# Patient Record
Sex: MALE | Race: BLACK OR AFRICAN AMERICAN | Employment: UNEMPLOYED | URBAN - METROPOLITAN AREA
[De-identification: names, ages, dates, MRNs, and addresses within clinical notes are randomized per-mention and may not be internally consistent; named-entity substitution may affect disease eponyms.]

---

## 2017-02-24 ENCOUNTER — ED HISTORICAL/CONVERTED ENCOUNTER (OUTPATIENT)
Dept: OTHER | Age: 47
End: 2017-02-24

## 2017-05-02 ENCOUNTER — ED HISTORICAL/CONVERTED ENCOUNTER (OUTPATIENT)
Dept: OTHER | Age: 47
End: 2017-05-02

## 2018-04-08 ENCOUNTER — ED HISTORICAL/CONVERTED ENCOUNTER (OUTPATIENT)
Dept: OTHER | Age: 48
End: 2018-04-08

## 2018-04-18 ENCOUNTER — OFFICE VISIT (OUTPATIENT)
Dept: FAMILY MEDICINE CLINIC | Age: 48
End: 2018-04-18

## 2018-04-18 VITALS
DIASTOLIC BLOOD PRESSURE: 124 MMHG | WEIGHT: 243 LBS | SYSTOLIC BLOOD PRESSURE: 182 MMHG | OXYGEN SATURATION: 100 % | HEART RATE: 53 BPM | BODY MASS INDEX: 28.69 KG/M2 | RESPIRATION RATE: 18 BRPM | HEIGHT: 77 IN

## 2018-04-18 DIAGNOSIS — I10 ESSENTIAL HYPERTENSION: Primary | ICD-10-CM

## 2018-04-18 RX ORDER — VALSARTAN AND HYDROCHLOROTHIAZIDE 160; 12.5 MG/1; MG/1
1 TABLET, FILM COATED ORAL DAILY
Qty: 30 TAB | Refills: 2 | Status: SHIPPED | OUTPATIENT
Start: 2018-04-18 | End: 2018-05-14 | Stop reason: DRUGHIGH

## 2018-04-18 RX ORDER — LISINOPRIL 20 MG/1
TABLET ORAL DAILY
COMMUNITY
End: 2018-04-18 | Stop reason: ALTCHOICE

## 2018-04-18 NOTE — PROGRESS NOTES
Chief Complaint   Patient presents with    Establish Care    Medication Evaluation    Blood Pressure Check     hx of BP       1. Have you been to the ER, urgent care clinic since your last visit? Hospitalized since your last visit?states that he passed out, he was dehydrated. 2. Have you seen or consulted any other health care providers outside of the [address] [place] [address] since your last visit? Include any pap smears or colon screening.  No

## 2018-04-18 NOTE — MR AVS SNAPSHOT
[address] [address] [phone] Patient: Terra Li MRN: [id number] MOH:9[birth year] Visit Information Date & Time Provider Department Dept. Phone Encounter #  
 4/18/2018 10:45 AM Elvin Paget, NP [facility] [phone] [id number] Follow-up Instructions Return in about 2 weeks (around 5/2/2018) for bp check. Your Appointments 4/18/2018 10:45 AM  
New Patient with Elvin Paget, NP [facility] ([address]) Appt Note: NEW pt est pcp  medication [id number] [place] [address] Road [address] [phone]  
  
   
 [address] [place] [address] Road [address] Upcoming Health Maintenance Date Due DTaP/Tdap/Td series (1 - Tdap) 8/4/1992 Influenza Age 5 to Adult 8/1/2017 Allergies as of 4/18/2018  Review Complete On: 4/18/2018 By: Emy Simon LPN No Known Allergies Current Immunizations  Never Reviewed No immunizations on file. Not reviewed this visit You Were Diagnosed With   
  
 Codes Comments Essential hypertension    -  Primary ICD-10-CM: I10 
ICD-9-CM: 401.9 Vitals BP Pulse Resp Height(growth percentile) Weight(growth percentile) SpO2  
 (!) 182/124 (!) 53 18 6' 5\" (1.956 m) 243 lb (110.2 kg) 100% BMI Smoking Status 28.82 kg/m2 Never Smoker Vitals History BMI and BSA Data Body Mass Index Body Surface Area  
 28.82 kg/m 2 2.45 m 2 Preferred Pharmacy Pharmacy Name Phone [facility] [address] [phone] Your Updated Medication List  
  
   
This list is accurate as of 4/18/18  8:52 AM.  Always use your most recent med list.  
  
  
  
  
 valsartan-hydroCHLOROthiazide 160-12.5 mg per tablet Commonly known as:  DIOVAN-HCT Take 1 Tab by mouth daily. Prescriptions Sent to Pharmacy Refills valsartan-hydroCHLOROthiazide (DIOVAN-HCT) 160-12.5 mg per tablet 2 Sig: Take 1 Tab by mouth daily. Class: Normal  
 Pharmacy: [facility] [address] BRANDON K[facility], [address] [place] [address] #: [phone] Route: Oral  
  
We Performed the Following CBC WITH AUTOMATED DIFF [36883 CPT(R)] LIPID PANEL [96604 CPT(R)] METABOLIC PANEL, COMPREHENSIVE [26921 CPT(R)] Follow-up Instructions Return in about 2 weeks (around 5/2/2018) for bp check. Introducing [facility] & HEALTH SERVICES! Mercedes Mckeon introduces [employer] patient portal. Now you can access parts of your medical record, email your doctor's office, and request medication refills online. 1. In your internet browser, go to [email or web address] 2. Click on the First Time User? Click Here link in the Sign In box. You will see the New Member Sign Up page. 3. Enter your [employer] Access Code exactly as it appears below. You will not need to use this code after youve completed the sign-up process. If you do not sign up before the expiration date, you must request a new code. · [employer] Access Code: [email or web address] Expires: 7/17/2018  8:52 AM 
 
4. Enter the last four digits of your Social Security Number (xxxx) and Date of Birth (mm/dd/yyyy) as indicated and click Submit. You will be taken to the next sign-up page. 5. Create a [employer] ID. This will be your [employer] login ID and cannot be changed, so think of one that is secure and easy to remember. 6. Create a [employer] password. You can change your password at any time. 7. Enter your Password Reset Question and Answer. This can be used at a later time if you forget your password. 8. Enter your e-mail address. You will receive e-mail notification when new information is available in [address] 9. Click Sign Up. You can now view and download portions of your medical record.  
10. Click the Download Summary menu link to download a portable copy of your medical information. If you have questions, please visit the Frequently Asked Questions section of the [employer] website. Remember, [employer] is NOT to be used for urgent needs. For medical emergencies, dial 911. Now available from your iPhone and Android! Please provide this summary of care documentation to your next provider. If you have any questions after today's visit, please call [phone].

## 2018-04-19 LAB
ALBUMIN SERPL-MCNC: 4.6 G/DL (ref 3.5–5.5)
ALBUMIN/GLOB SERPL: 1.8 {RATIO} (ref 1.2–2.2)
ALP SERPL-CCNC: 53 IU/L (ref 39–117)
ALT SERPL-CCNC: 17 IU/L (ref 0–44)
AST SERPL-CCNC: 18 IU/L (ref 0–40)
BASOPHILS # BLD AUTO: 0 X10E3/UL (ref 0–0.2)
BASOPHILS NFR BLD AUTO: 0 %
BILIRUB SERPL-MCNC: 0.9 MG/DL (ref 0–1.2)
BUN SERPL-MCNC: 17 MG/DL (ref 6–24)
BUN/CREAT SERPL: 11 (ref 9–20)
CALCIUM SERPL-MCNC: 9.4 MG/DL (ref 8.7–10.2)
CHLORIDE SERPL-SCNC: 99 MMOL/L (ref 96–106)
CHOLEST SERPL-MCNC: 146 MG/DL (ref 100–199)
CO2 SERPL-SCNC: 26 MMOL/L (ref 18–29)
CREAT SERPL-MCNC: 1.5 MG/DL (ref 0.76–1.27)
EOSINOPHIL # BLD AUTO: 0.1 X10E3/UL (ref 0–0.4)
EOSINOPHIL NFR BLD AUTO: 2 %
ERYTHROCYTE [DISTWIDTH] IN BLOOD BY AUTOMATED COUNT: 15.3 % (ref 12.3–15.4)
GFR SERPLBLD CREATININE-BSD FMLA CKD-EPI: 55 ML/MIN/1.73
GFR SERPLBLD CREATININE-BSD FMLA CKD-EPI: 64 ML/MIN/1.73
GLOBULIN SER CALC-MCNC: 2.6 G/DL (ref 1.5–4.5)
GLUCOSE SERPL-MCNC: 121 MG/DL (ref 65–99)
HCT VFR BLD AUTO: 40.2 % (ref 37.5–51)
HDLC SERPL-MCNC: 38 MG/DL
HGB BLD-MCNC: 13.4 G/DL (ref 13–17.7)
IMM GRANULOCYTES # BLD: 0 X10E3/UL (ref 0–0.1)
IMM GRANULOCYTES NFR BLD: 0 %
INTERPRETATION, 910389: NORMAL
INTERPRETATION: NORMAL
LDLC SERPL CALC-MCNC: 95 MG/DL (ref 0–99)
LYMPHOCYTES # BLD AUTO: 1.8 X10E3/UL (ref 0.7–3.1)
LYMPHOCYTES NFR BLD AUTO: 39 %
MCH RBC QN AUTO: 26.6 PG (ref 26.6–33)
MCHC RBC AUTO-ENTMCNC: 33.3 G/DL (ref 31.5–35.7)
MCV RBC AUTO: 80 FL (ref 79–97)
MONOCYTES # BLD AUTO: 0.3 X10E3/UL (ref 0.1–0.9)
MONOCYTES NFR BLD AUTO: 6 %
NEUTROPHILS # BLD AUTO: 2.3 X10E3/UL (ref 1.4–7)
NEUTROPHILS NFR BLD AUTO: 53 %
PDF IMAGE, 910387: NORMAL
PLATELET # BLD AUTO: 190 X10E3/UL (ref 150–379)
POTASSIUM SERPL-SCNC: 3.7 MMOL/L (ref 3.5–5.2)
PROT SERPL-MCNC: 7.2 G/DL (ref 6–8.5)
RBC # BLD AUTO: 5.03 X10E6/UL (ref 4.14–5.8)
SODIUM SERPL-SCNC: 141 MMOL/L (ref 134–144)
TRIGL SERPL-MCNC: 64 MG/DL (ref 0–149)
VLDLC SERPL CALC-MCNC: 13 MG/DL (ref 5–40)
WBC # BLD AUTO: 4.5 X10E3/UL (ref 3.4–10.8)

## 2018-04-22 NOTE — PROGRESS NOTES
HISTORY OF PRESENT ILLNESS  Cassius Henson is a 56 y.o. male. HPI  He is new to the practice today  Has a pmh of htn but has been out of meds  Has been as high as 180/120 recently  Does have slight headache otherwise no sx noted  The FamHx, SocHx, MedHx, Medication, and Allergy lists have been reviewed and updated in the chart. ROS  A comprehensive review of system was obtained and negative except findings in the HPI    Visit Vitals    BP (!) 182/124    Pulse (!) 53    Resp 18    Ht 6' 5\" (1.956 m)    Wt 243 lb (110.2 kg)    SpO2 100%    BMI 28.82 kg/m2     Physical Exam   Constitutional: He is oriented to person, place, and time. He appears well-developed and well-nourished. No distress. Cardiovascular: Normal rate and regular rhythm. No murmur heard. Pulmonary/Chest: Breath sounds normal. No respiratory distress. He has no wheezes. Musculoskeletal: He exhibits no edema. Neurological: He is alert and oriented to person, place, and time. Nursing note and vitals reviewed. ASSESSMENT and PLAN  Encounter Diagnoses   Name Primary?  Essential hypertension Yes     Orders Placed This Encounter    LIPID PANEL    METABOLIC PANEL, COMPREHENSIVE    CBC WITH AUTOMATED DIFF    valsartan-hydroCHLOROthiazide (DIOVAN-HCT) 160-12.5 mg per tablet     Labs updated today  Refills of diovan given, recheck 2 weeks  I have discussed the diagnosis with the patient and the intended plan as seen in the above orders. The patient has received an after-visit summary and questions were answered concerning future plans. Patient conveyed understanding of the plan at the time of the visit.     Cat Palmer, MSN, ANP  4/22/2018

## 2018-05-14 ENCOUNTER — OFFICE VISIT (OUTPATIENT)
Dept: FAMILY MEDICINE CLINIC | Age: 48
End: 2018-05-14

## 2018-05-14 VITALS
RESPIRATION RATE: 16 BRPM | OXYGEN SATURATION: 98 % | HEART RATE: 81 BPM | TEMPERATURE: 98.6 F | HEIGHT: 77 IN | BODY MASS INDEX: 28.47 KG/M2 | WEIGHT: 241.13 LBS | DIASTOLIC BLOOD PRESSURE: 120 MMHG | SYSTOLIC BLOOD PRESSURE: 172 MMHG

## 2018-05-14 DIAGNOSIS — I10 ESSENTIAL HYPERTENSION: Primary | ICD-10-CM

## 2018-05-14 RX ORDER — VALSARTAN AND HYDROCHLOROTHIAZIDE 320; 25 MG/1; MG/1
1 TABLET, FILM COATED ORAL DAILY
Qty: 30 TAB | Refills: 5 | Status: SHIPPED | OUTPATIENT
Start: 2018-05-14 | End: 2019-02-26

## 2018-05-14 NOTE — PROGRESS NOTES
1. Have you been to the ER, urgent care clinic since your last visit? Hospitalized since your last visit? No    2. Have you seen or consulted any other health care providers outside of the [address] [place] [address] since your last visit? Include any pap smears or colon screening.  No    Chief Complaint   Patient presents with    Follow-up     3 wk f/u med ck

## 2018-05-14 NOTE — MR AVS SNAPSHOT
[address] [place] [address] 
[phone] Patient: Ana Soto MRN: [id number] LAV:[birth year] Visit Information Date & Time Provider Department Dept. Phone Encounter #  
 5/14/2018  9:30 AM Irena Colmenares NP [address] [facility] [phone] [id number] Upcoming Health Maintenance Date Due DTaP/Tdap/Td series (1 - Tdap) 8/4/1992 Influenza Age 5 to Adult 8/1/2018 Allergies as of 5/14/2018  Review Complete On: 5/14/2018 By: Aranza Johnson LPN No Known Allergies Current Immunizations  Never Reviewed No immunizations on file. Not reviewed this visit You Were Diagnosed With   
  
 Codes Comments Essential hypertension    -  Primary ICD-10-CM: I10 
ICD-9-CM: 401.9 Vitals BP Pulse Temp Resp Height(growth percentile) Weight(growth percentile) (!) 172/120 81 98.6 °F (37 °C) (Oral) 16 6' 5\" (1.956 m) 241 lb 2 oz (109.4 kg) SpO2 BMI Smoking Status 98% 28.59 kg/m2 Never Smoker Vitals History BMI and BSA Data Body Mass Index Body Surface Area 28.59 kg/m 2 2.44 m 2 Preferred Pharmacy Pharmacy Name Phone [facility] [address] [phone] Your Updated Medication List  
  
   
This list is accurate as of 5/14/18  9:56 AM.  Always use your most recent med list.  
  
  
  
  
 valsartan-hydroCHLOROthiazide 320-25 mg per tablet Commonly known as:  DIOVAN-HCT Take 1 Tab by mouth daily. Prescriptions Sent to Pharmacy Refills  
 valsartan-hydroCHLOROthiazide (DIOVAN-HCT) 320-25 mg per tablet 5 Sig: Take 1 Tab by mouth daily. Class: Normal  
 [facility] [address] Ph #: [phone] Route: Oral  
  
Introducing [facility] HE[facility] SERVICES!    
 [facility] introduces [employer] patient portal. Now you can access parts of your medical record, email your doctor's office, and request medication refills online. 1. In your internet browser, go to [email or web address] 2. Click on the First Time User? Click Here link in the Sign In box. You will see the New Member Sign Up page. 3. Enter your [employer] Access Code exactly as it appears below. You will not need to use this code after youve completed the sign-up process. If you do not sign up before the expiration date, you must request a new code. · [employer] Access Code: [email or web address] Expires: 7/17/2018  8:52 AM 
 
4. Enter the last four digits of your Social Security Number (xxxx) and Date of Birth (mm/dd/yyyy) as indicated and click Submit. You will be taken to the next sign-up page. 5. Create a [employer] ID. This will be your [employer] login ID and cannot be changed, so think of one that is secure and easy to remember. 6. Create a [employer] password. You can change your password at any time. 7. Enter your Password Reset Question and Answer. This can be used at a later time if you forget your password. 8. Enter your e-mail address. You will receive e-mail notification when new information is available in [address] 9. Click Sign Up. You can now view and download portions of your medical record. 10. Click the Download Summary menu link to download a portable copy of your medical information. If you have questions, please visit the Frequently Asked Questions section of the [employer] website. Remember, [employer] is NOT to be used for urgent needs. For medical emergencies, dial 911. Now available from your iPhone and Android! Please provide this summary of care documentation to your next provider. If you have any questions after today's visit, please call [phone].

## 2018-05-14 NOTE — PROGRESS NOTES
HISTORY OF PRESENT ILLNESS  Venora Kawasaki is a 56 y.o. male. HPI  Here for bp check  Started diovan hct 160/12.5 last visit  A decrease in headaches but still running high  Blames stressful job at work,   No adr/se of med    ROS  A comprehensive review of system was obtained and negative except findings in the HPI    Visit Vitals    BP (!) 172/120    Pulse 81    Temp 98.6 °F (37 °C) (Oral)    Resp 16    Ht 6' 5\" (1.956 m)    Wt 241 lb 2 oz (109.4 kg)    SpO2 98%    BMI 28.59 kg/m2     Physical Exam   Constitutional: He is oriented to person, place, and time. He appears well-developed and well-nourished. No distress. Cardiovascular: Normal rate and regular rhythm. No murmur heard. Pulmonary/Chest: Breath sounds normal. No respiratory distress. He has no wheezes. Musculoskeletal: He exhibits no edema. Neurological: He is alert and oriented to person, place, and time. Nursing note and vitals reviewed. ASSESSMENT and PLAN  Encounter Diagnoses   Name Primary?  Essential hypertension Yes     Orders Placed This Encounter    valsartan-hydroCHLOROthiazide (DIOVAN-HCT) 320-25 mg per tablet     Increase diovan today to 320/25, recheck 2 weeks  Reviewed stress management as well  Encouraged bp checks at home as well    I have discussed the diagnosis with the patient and the intended plan as seen in the above orders. The patient has received an after-visit summary and questions were answered concerning future plans. Patient conveyed understanding of the plan at the time of the visit.     Abran Hashimoto, MSN, ANP  5/14/2018

## 2018-07-23 ENCOUNTER — ED HISTORICAL/CONVERTED ENCOUNTER (OUTPATIENT)
Dept: OTHER | Age: 48
End: 2018-07-23

## 2018-08-07 ENCOUNTER — OFFICE VISIT (OUTPATIENT)
Dept: FAMILY MEDICINE CLINIC | Age: 48
End: 2018-08-07

## 2018-08-07 VITALS
RESPIRATION RATE: 18 BRPM | SYSTOLIC BLOOD PRESSURE: 162 MMHG | OXYGEN SATURATION: 97 % | TEMPERATURE: 98.7 F | HEIGHT: 77 IN | WEIGHT: 244 LBS | DIASTOLIC BLOOD PRESSURE: 114 MMHG | BODY MASS INDEX: 28.81 KG/M2 | HEART RATE: 84 BPM

## 2018-08-07 DIAGNOSIS — I10 ESSENTIAL HYPERTENSION: Primary | ICD-10-CM

## 2018-08-07 RX ORDER — AMLODIPINE BESYLATE 5 MG/1
5 TABLET ORAL DAILY
Qty: 30 TAB | Refills: 5 | Status: SHIPPED | OUTPATIENT
Start: 2018-08-07 | End: 2019-02-26

## 2018-08-07 NOTE — MR AVS SNAPSHOT
[address] [place] [address] 
[phone] Patient: Supriya Barboza MRN: [id number] KMJ:[birth year] Visit Information Date & Time Provider Department Dept. Phone Encounter #  
 8/7/2018  4:00 PM Jeet Johnston [phone] [id number] Upcoming Health Maintenance Date Due DTaP/Tdap/Td series (1 - Tdap) 8/4/1992 Influenza Age 5 to Adult 8/1/2018 Allergies as of 8/7/2018  Review Complete On: 8/7/2018 By: Rafael Camacho LPN No Known Allergies Current Immunizations  Never Reviewed No immunizations on file. Not reviewed this visit You Were Diagnosed With   
  
 Codes Comments Essential hypertension    -  Primary ICD-10-CM: I10 
ICD-9-CM: 401.9 Vitals BP Pulse Temp Resp Height(growth percentile) Weight(growth percentile) (!) 162/114 (BP 1 Location: Right arm, BP Patient Position: Sitting) 84 98.7 °F (37.1 °C) (Oral) 18 6' 5\" (1.956 m) 244 lb (110.7 kg) SpO2 BMI Smoking Status 97% 28.93 kg/m2 Never Smoker BMI and BSA Data Body Mass Index Body Surface Area  
 28.93 kg/m 2 2.45 m 2 Preferred Pharmacy Pharmacy Name Phone [facility]", [address] [place] [address] [phone] Your Updated Medication List  
  
   
This list is accurate as of 8/7/18  4:54 PM.  Always use your most recent med list. amLODIPine 5 mg tablet Commonly known as:  Marcy Pace Take 1 Tab by mouth daily. valsartan-hydroCHLOROthiazide 320-25 mg per tablet Commonly known as:  DIOVAN-HCT Take 1 Tab by mouth daily. Prescriptions Sent to Pharmacy Refills  
 amLODIPine (NORVASC) 5 mg tablet 5 Sig: Take 1 Tab by mouth daily. Class: Normal  
 Pharmacy: [facility] \[facility]", [address] [place] [address] Ph #: [phone] Route: Oral  
  
Introducing [facility] & HEALTH SERVICES! [employer] introduces [employer] patient portal. Now you can access parts of your medical record, email your doctor's office, and request medication refills online. 1. In your internet browser, go to [email or web address] 2. Click on the First Time User? Click Here link in the Sign In box. You will see the New Member Sign Up page. 3. Enter your [employer] Access Code exactly as it appears below. You will not need to use this code after youve completed the sign-up process. If you do not sign up before the expiration date, you must request a new code. · [employer] Access Code: [email or web address] Expires: 11/5/2018  4:54 PM 
 
4. Enter the last four digits of your Social Security Number (xxxx) and Date of Birth (mm/dd/yyyy) as indicated and click Submit. You will be taken to the next sign-up page. 5. Create a [employer] ID. This will be your [employer] login ID and cannot be changed, so think of one that is secure and easy to remember. 6. Create a [employer] password. You can change your password at any time. 7. Enter your Password Reset Question and Answer. This can be used at a later time if you forget your password. 8. Enter your e-mail address. You will receive e-mail notification when new information is available in [address] 9. Click Sign Up. You can now view and download portions of your medical record. 10. Click the Download Summary menu link to download a portable copy of your medical information. If you have questions, please visit the Frequently Asked Questions section of the [employer] website. Remember, [employer] is NOT to be used for urgent needs. For medical emergencies, dial 911. Now available from your iPhone and Android! Please provide this summary of care documentation to your next provider. If you have any questions after today's visit, please call [phone].

## 2018-08-07 NOTE — PROGRESS NOTES
HISTORY OF PRESENT ILLNESS  Skyler Herron is a 53 y.o. male. 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 He appears well-developed and well-nourished. No distress. Cardiovascular: Normal rate and regular rhythm. No murmur heard. Pulmonary/Chest: Breath sounds normal. No respiratory distress. He has no wheezes. Musculoskeletal: He exhibits no edema. Neurological: He is alert and oriented to person, place, and time. Nursing note and vitals reviewed. ASSESSMENT and PLAN  Encounter Diagnoses   Name Primary?  Essential hypertension Yes     Orders Placed This Encounter    amLODIPine (NORVASC) 5 mg tablet     Start norvasc 5mg one a day with diovan hct  Recheck 2 weeks  I have discussed the diagnosis with the patient and the intended plan as seen in the above orders. The patient has received an after-visit summary and questions were answered concerning future plans. Patient conveyed understanding of the plan at the time of the visit.     Sang Elliott, MSN, ANP  8/7/2018

## 2018-08-07 NOTE — PROGRESS NOTES
Chief Complaint   Patient presents with    Hypertension     Patient in office today for recheck on bp. Pt would like to know if pt can approve a couple days missed from work. Have been trying to get dental work completed unable to get done due to elevated bp. 1. Have you been to the ER, urgent care clinic since your last visit? Hospitalized since your last visit? No    2. Have you seen or consulted any other health care providers outside of the [address] [place] [address] since your last visit? Include any pap smears or colon screening.  No

## 2018-08-07 NOTE — LETTER
8/7/2018 Mr. June Araujo [facility] [phone] Patient was seen in the office today due to illness. Please excuse from work 8/8/18 - 8/15/18.  
 
 
 
 
Sincerely, 
 
 
London Parham NP

## 2019-02-14 PROCEDURE — 96376 TX/PRO/DX INJ SAME DRUG ADON: CPT

## 2019-02-14 PROCEDURE — 99285 EMERGENCY DEPT VISIT HI MDM: CPT

## 2019-02-14 PROCEDURE — 96368 THER/DIAG CONCURRENT INF: CPT

## 2019-02-14 PROCEDURE — 96375 TX/PRO/DX INJ NEW DRUG ADDON: CPT

## 2019-02-14 PROCEDURE — 96365 THER/PROPH/DIAG IV INF INIT: CPT

## 2019-02-15 ENCOUNTER — APPOINTMENT (OUTPATIENT)
Dept: GENERAL RADIOLOGY | Age: 49
DRG: 173 | End: 2019-02-15
Attending: SURGERY
Payer: MEDICAID

## 2019-02-15 ENCOUNTER — APPOINTMENT (OUTPATIENT)
Dept: VASCULAR SURGERY | Age: 49
DRG: 173 | End: 2019-02-15
Attending: SURGERY
Payer: MEDICAID

## 2019-02-15 ENCOUNTER — ANESTHESIA (OUTPATIENT)
Dept: SURGERY | Age: 49
DRG: 173 | End: 2019-02-15
Payer: MEDICAID

## 2019-02-15 ENCOUNTER — HOSPITAL ENCOUNTER (INPATIENT)
Age: 49
LOS: 11 days | Discharge: HOME | DRG: 173 | End: 2019-02-26
Attending: EMERGENCY MEDICINE | Admitting: SURGERY
Payer: MEDICAID

## 2019-02-15 ENCOUNTER — APPOINTMENT (OUTPATIENT)
Dept: NON INVASIVE DIAGNOSTICS | Age: 49
DRG: 173 | End: 2019-02-15
Attending: INTERNAL MEDICINE
Payer: MEDICAID

## 2019-02-15 ENCOUNTER — APPOINTMENT (OUTPATIENT)
Dept: CT IMAGING | Age: 49
DRG: 173 | End: 2019-02-15
Attending: EMERGENCY MEDICINE
Payer: MEDICAID

## 2019-02-15 ENCOUNTER — ANESTHESIA EVENT (OUTPATIENT)
Dept: SURGERY | Age: 49
DRG: 173 | End: 2019-02-15
Payer: MEDICAID

## 2019-02-15 ENCOUNTER — APPOINTMENT (OUTPATIENT)
Dept: CT IMAGING | Age: 49
DRG: 173 | End: 2019-02-15
Attending: SURGERY
Payer: MEDICAID

## 2019-02-15 DIAGNOSIS — R11.2 NAUSEA AND VOMITING, INTRACTABILITY OF VOMITING NOT SPECIFIED, UNSPECIFIED VOMITING TYPE: ICD-10-CM

## 2019-02-15 DIAGNOSIS — R77.8 ELEVATED TROPONIN: ICD-10-CM

## 2019-02-15 DIAGNOSIS — I71.03 DISSECTION OF THORACOABDOMINAL AORTA (HCC): Primary | ICD-10-CM

## 2019-02-15 DIAGNOSIS — I16.1 HYPERTENSIVE EMERGENCY: ICD-10-CM

## 2019-02-15 PROBLEM — I71.019 ACUTE THORACIC AORTIC DISSECTION: Status: ACTIVE | Noted: 2019-02-15

## 2019-02-15 LAB
ALBUMIN SERPL-MCNC: 3.8 G/DL (ref 3.5–5)
ALBUMIN SERPL-MCNC: 4 G/DL (ref 3.5–5)
ALBUMIN/GLOB SERPL: 1.1 {RATIO} (ref 1.1–2.2)
ALBUMIN/GLOB SERPL: 1.2 {RATIO} (ref 1.1–2.2)
ALP SERPL-CCNC: 42 U/L (ref 45–117)
ALP SERPL-CCNC: 50 U/L (ref 45–117)
ALT SERPL-CCNC: 27 U/L (ref 12–78)
ALT SERPL-CCNC: 28 U/L (ref 12–78)
AMPHET UR QL SCN: NEGATIVE
ANION GAP SERPL CALC-SCNC: 8 MMOL/L (ref 5–15)
APPEARANCE UR: CLEAR
AST SERPL-CCNC: 19 U/L (ref 15–37)
AST SERPL-CCNC: 20 U/L (ref 15–37)
ATRIAL RATE: 67 BPM
ATRIAL RATE: 68 BPM
BACTERIA URNS QL MICRO: NEGATIVE /HPF
BARBITURATES UR QL SCN: NEGATIVE
BASOPHILS # BLD: 0 K/UL (ref 0–0.1)
BASOPHILS NFR BLD: 0 % (ref 0–1)
BENZODIAZ UR QL: NEGATIVE
BILIRUB SERPL-MCNC: 0.6 MG/DL (ref 0.2–1)
BILIRUB SERPL-MCNC: 0.7 MG/DL (ref 0.2–1)
BILIRUB UR QL: NEGATIVE
BUN SERPL-MCNC: 26 MG/DL (ref 6–20)
BUN SERPL-MCNC: 29 MG/DL (ref 6–20)
BUN SERPL-MCNC: 34 MG/DL (ref 6–20)
BUN/CREAT SERPL: 11 (ref 12–20)
BUN/CREAT SERPL: 11 (ref 12–20)
BUN/CREAT SERPL: 13 (ref 12–20)
CALCIUM SERPL-MCNC: 8.6 MG/DL (ref 8.5–10.1)
CALCIUM SERPL-MCNC: 8.7 MG/DL (ref 8.5–10.1)
CALCIUM SERPL-MCNC: 8.7 MG/DL (ref 8.5–10.1)
CALCULATED P AXIS, ECG09: 52 DEGREES
CALCULATED R AXIS, ECG10: 28 DEGREES
CALCULATED R AXIS, ECG10: 35 DEGREES
CALCULATED T AXIS, ECG11: -33 DEGREES
CALCULATED T AXIS, ECG11: -66 DEGREES
CANNABINOIDS UR QL SCN: NEGATIVE
CHLORIDE SERPL-SCNC: 105 MMOL/L (ref 97–108)
CK SERPL-CCNC: 234 U/L (ref 39–308)
CO2 SERPL-SCNC: 27 MMOL/L (ref 21–32)
CO2 SERPL-SCNC: 27 MMOL/L (ref 21–32)
CO2 SERPL-SCNC: 30 MMOL/L (ref 21–32)
COCAINE UR QL SCN: NEGATIVE
COLOR UR: ABNORMAL
COMMENT, HOLDF: NORMAL
CREAT SERPL-MCNC: 1.97 MG/DL (ref 0.7–1.3)
CREAT SERPL-MCNC: 2.58 MG/DL (ref 0.7–1.3)
CREAT SERPL-MCNC: 3.06 MG/DL (ref 0.7–1.3)
DIAGNOSIS, 93000: NORMAL
DIAGNOSIS, 93000: NORMAL
DIFFERENTIAL METHOD BLD: ABNORMAL
DRUG SCRN COMMENT,DRGCM: NORMAL
ECHO AO ROOT DIAM: 3.77 CM
ECHO AV AREA PEAK VELOCITY: 2.1 CM2
ECHO AV AREA/BSA PEAK VELOCITY: 0.9 CM2/M2
ECHO AV CUSP MM: 2.34 CM
ECHO AV PEAK GRADIENT: 7.3 MMHG
ECHO AV PEAK VELOCITY: 135.38 CM/S
ECHO EST RA PRESSURE: 8 MMHG
ECHO LA AREA 2C: 27.95 CM2
ECHO LA AREA 4C: 24 CM2
ECHO LA MAJOR AXIS: 4.04 CM
ECHO LA TO AORTIC ROOT RATIO: 1.07
ECHO LA VOL 2C: 100.83 ML (ref 18–58)
ECHO LA VOL 4C: 68.67 ML (ref 18–58)
ECHO LA VOL BP: 87.1 ML (ref 18–58)
ECHO LA VOL/BSA BIPLANE: 35.96 ML/M2 (ref 16–28)
ECHO LA VOLUME INDEX A2C: 41.63 ML/M2 (ref 16–28)
ECHO LA VOLUME INDEX A4C: 28.35 ML/M2 (ref 16–28)
ECHO LV INTERNAL DIMENSION DIASTOLIC: 5.24 CM (ref 4.2–5.9)
ECHO LV INTERNAL DIMENSION SYSTOLIC: 3.46 CM
ECHO LV IVSD: 1.82 CM (ref 0.6–1)
ECHO LV IVSS: 2.09 CM
ECHO LV MASS 2D: 557.1 G (ref 88–224)
ECHO LV MASS INDEX 2D: 230 G/M2 (ref 49–115)
ECHO LV POSTERIOR WALL DIASTOLIC: 1.79 CM (ref 0.6–1)
ECHO LV POSTERIOR WALL SYSTOLIC: 2.1 CM
ECHO LVOT DIAM: 2.33 CM
ECHO LVOT PEAK GRADIENT: 1.8 MMHG
ECHO LVOT PEAK VELOCITY: 66.25 CM/S
ECHO MV A VELOCITY: 68.35 CM/S
ECHO MV AREA PHT: 4.3 CM2
ECHO MV E DECELERATION TIME (DT): 174.4 MS
ECHO MV E VELOCITY: 0.83 CM/S
ECHO MV E/A RATIO: 0.01
ECHO MV PRESSURE HALF TIME (PHT): 50.6 MS
ECHO MV REGURGITANT PEAK GRADIENT: 7.6 MMHG
ECHO MV REGURGITANT PEAK VELOCITY: 137.51 CM/S
ECHO PULMONARY ARTERY SYSTOLIC PRESSURE (PASP): 19.2 MMHG
ECHO PV MAX VELOCITY: 110.96 CM/S
ECHO PV PEAK GRADIENT: 4.9 MMHG
ECHO RIGHT VENTRICULAR SYSTOLIC PRESSURE (RVSP): 19.2 MMHG
ECHO RV INTERNAL DIMENSION: 3.86 CM
ECHO TV REGURGITANT MAX VELOCITY: 167.2 CM/S
ECHO TV REGURGITANT PEAK GRADIENT: 11.2 MMHG
EOSINOPHIL # BLD: 0 K/UL (ref 0–0.4)
EOSINOPHIL # BLD: 0 K/UL (ref 0–0.4)
EOSINOPHIL # BLD: 0.3 K/UL (ref 0–0.4)
EOSINOPHIL NFR BLD: 0 % (ref 0–7)
EOSINOPHIL NFR BLD: 0 % (ref 0–7)
EOSINOPHIL NFR BLD: 4 % (ref 0–7)
EPITH CASTS URNS QL MICRO: ABNORMAL /LPF
ERYTHROCYTE [DISTWIDTH] IN BLOOD BY AUTOMATED COUNT: 14.4 % (ref 11.5–14.5)
ERYTHROCYTE [DISTWIDTH] IN BLOOD BY AUTOMATED COUNT: 14.6 % (ref 11.5–14.5)
ERYTHROCYTE [DISTWIDTH] IN BLOOD BY AUTOMATED COUNT: 14.7 % (ref 11.5–14.5)
GLOBULIN SER CALC-MCNC: 3.3 G/DL (ref 2–4)
GLOBULIN SER CALC-MCNC: 3.4 G/DL (ref 2–4)
GLUCOSE SERPL-MCNC: 124 MG/DL (ref 65–100)
GLUCOSE SERPL-MCNC: 128 MG/DL (ref 65–100)
GLUCOSE SERPL-MCNC: 143 MG/DL (ref 65–100)
GLUCOSE UR STRIP.AUTO-MCNC: NEGATIVE MG/DL
HCT VFR BLD AUTO: 38.3 % (ref 36.6–50.3)
HCT VFR BLD AUTO: 39.7 % (ref 36.6–50.3)
HCT VFR BLD AUTO: 40.1 % (ref 36.6–50.3)
HGB BLD-MCNC: 12.3 G/DL (ref 12.1–17)
HGB BLD-MCNC: 12.6 G/DL (ref 12.1–17)
HGB BLD-MCNC: 13.1 G/DL (ref 12.1–17)
HGB UR QL STRIP: NEGATIVE
HYALINE CASTS URNS QL MICRO: ABNORMAL /LPF (ref 0–5)
IMM GRANULOCYTES # BLD AUTO: 0 K/UL (ref 0–0.04)
IMM GRANULOCYTES # BLD AUTO: 0 K/UL (ref 0–0.04)
IMM GRANULOCYTES # BLD AUTO: 0.1 K/UL (ref 0–0.04)
IMM GRANULOCYTES NFR BLD AUTO: 0 % (ref 0–0.5)
IMM GRANULOCYTES NFR BLD AUTO: 0 % (ref 0–0.5)
IMM GRANULOCYTES NFR BLD AUTO: 1 % (ref 0–0.5)
KETONES UR QL STRIP.AUTO: NEGATIVE MG/DL
LACTATE SERPL-SCNC: 2.9 MMOL/L (ref 0.4–2)
LEUKOCYTE ESTERASE UR QL STRIP.AUTO: NEGATIVE
LIPASE SERPL-CCNC: 146 U/L (ref 73–393)
LYMPHOCYTES # BLD: 0.7 K/UL (ref 0.8–3.5)
LYMPHOCYTES # BLD: 1.1 K/UL (ref 0.8–3.5)
LYMPHOCYTES # BLD: 4.3 K/UL (ref 0.8–3.5)
LYMPHOCYTES NFR BLD: 11 % (ref 12–49)
LYMPHOCYTES NFR BLD: 47 % (ref 12–49)
LYMPHOCYTES NFR BLD: 8 % (ref 12–49)
MCH RBC QN AUTO: 26.4 PG (ref 26–34)
MCH RBC QN AUTO: 26.9 PG (ref 26–34)
MCH RBC QN AUTO: 27.1 PG (ref 26–34)
MCHC RBC AUTO-ENTMCNC: 31.7 G/DL (ref 30–36.5)
MCHC RBC AUTO-ENTMCNC: 32.1 G/DL (ref 30–36.5)
MCHC RBC AUTO-ENTMCNC: 32.7 G/DL (ref 30–36.5)
MCV RBC AUTO: 82.9 FL (ref 80–99)
MCV RBC AUTO: 83.2 FL (ref 80–99)
MCV RBC AUTO: 83.6 FL (ref 80–99)
METHADONE UR QL: NEGATIVE
MONOCYTES # BLD: 0.3 K/UL (ref 0–1)
MONOCYTES # BLD: 0.4 K/UL (ref 0–1)
MONOCYTES # BLD: 0.7 K/UL (ref 0–1)
MONOCYTES NFR BLD: 3 % (ref 5–13)
MONOCYTES NFR BLD: 4 % (ref 5–13)
MONOCYTES NFR BLD: 7 % (ref 5–13)
NEUTS SEG # BLD: 3.9 K/UL (ref 1.8–8)
NEUTS SEG # BLD: 8.3 K/UL (ref 1.8–8)
NEUTS SEG # BLD: 8.5 K/UL (ref 1.8–8)
NEUTS SEG NFR BLD: 42 % (ref 32–75)
NEUTS SEG NFR BLD: 85 % (ref 32–75)
NEUTS SEG NFR BLD: 89 % (ref 32–75)
NITRITE UR QL STRIP.AUTO: NEGATIVE
NRBC # BLD: 0 K/UL (ref 0–0.01)
NRBC BLD-RTO: 0 PER 100 WBC
OPIATES UR QL: NEGATIVE
P-R INTERVAL, ECG05: 160 MS
P-R INTERVAL, ECG05: 194 MS
PCP UR QL: NEGATIVE
PH UR STRIP: 7 [PH] (ref 5–8)
PLATELET # BLD AUTO: 137 K/UL (ref 150–400)
PLATELET # BLD AUTO: 144 K/UL (ref 150–400)
PLATELET # BLD AUTO: 152 K/UL (ref 150–400)
PMV BLD AUTO: 11.1 FL (ref 8.9–12.9)
PMV BLD AUTO: 11.5 FL (ref 8.9–12.9)
PMV BLD AUTO: 11.8 FL (ref 8.9–12.9)
POTASSIUM SERPL-SCNC: 2.9 MMOL/L (ref 3.5–5.1)
POTASSIUM SERPL-SCNC: 3.8 MMOL/L (ref 3.5–5.1)
POTASSIUM SERPL-SCNC: 4.3 MMOL/L (ref 3.5–5.1)
PROT SERPL-MCNC: 7.2 G/DL (ref 6.4–8.2)
PROT SERPL-MCNC: 7.3 G/DL (ref 6.4–8.2)
PROT UR STRIP-MCNC: ABNORMAL MG/DL
Q-T INTERVAL, ECG07: 406 MS
Q-T INTERVAL, ECG07: 416 MS
QRS DURATION, ECG06: 104 MS
QRS DURATION, ECG06: 92 MS
QTC CALCULATION (BEZET), ECG08: 429 MS
QTC CALCULATION (BEZET), ECG08: 442 MS
RBC # BLD AUTO: 4.58 M/UL (ref 4.1–5.7)
RBC # BLD AUTO: 4.77 M/UL (ref 4.1–5.7)
RBC # BLD AUTO: 4.84 M/UL (ref 4.1–5.7)
RBC #/AREA URNS HPF: ABNORMAL /HPF (ref 0–5)
SAMPLES BEING HELD,HOLD: NORMAL
SODIUM SERPL-SCNC: 140 MMOL/L (ref 136–145)
SODIUM SERPL-SCNC: 140 MMOL/L (ref 136–145)
SODIUM SERPL-SCNC: 143 MMOL/L (ref 136–145)
SP GR UR REFRACTOMETRY: 1.02 (ref 1–1.03)
TROPONIN I BLD-MCNC: <0.04 NG/ML (ref 0–0.08)
TROPONIN I SERPL-MCNC: 0.1 NG/ML
TROPONIN I SERPL-MCNC: 0.11 NG/ML
TROPONIN I SERPL-MCNC: 0.11 NG/ML
UA: UC IF INDICATED,UAUC: ABNORMAL
UROBILINOGEN UR QL STRIP.AUTO: 1 EU/DL (ref 0.2–1)
VENTRICULAR RATE, ECG03: 67 BPM
VENTRICULAR RATE, ECG03: 68 BPM
WBC # BLD AUTO: 10 K/UL (ref 4.1–11.1)
WBC # BLD AUTO: 9.2 K/UL (ref 4.1–11.1)
WBC # BLD AUTO: 9.3 K/UL (ref 4.1–11.1)
WBC URNS QL MICRO: ABNORMAL /HPF (ref 0–4)

## 2019-02-15 PROCEDURE — 77030008771 HC TU NG SALEM SUMP -A: Performed by: ANESTHESIOLOGY

## 2019-02-15 PROCEDURE — B4101ZZ FLUOROSCOPY OF ABDOMINAL AORTA USING LOW OSMOLAR CONTRAST: ICD-10-PCS | Performed by: SURGERY

## 2019-02-15 PROCEDURE — 76000 FLUOROSCOPY <1 HR PHYS/QHP: CPT

## 2019-02-15 PROCEDURE — B3101ZZ FLUOROSCOPY OF THORACIC AORTA USING LOW OSMOLAR CONTRAST: ICD-10-PCS | Performed by: SURGERY

## 2019-02-15 PROCEDURE — 82550 ASSAY OF CK (CPK): CPT

## 2019-02-15 PROCEDURE — 96375 TX/PRO/DX INJ NEW DRUG ADDON: CPT

## 2019-02-15 PROCEDURE — C1894 INTRO/SHEATH, NON-LASER: HCPCS | Performed by: SURGERY

## 2019-02-15 PROCEDURE — 71275 CT ANGIOGRAPHY CHEST: CPT

## 2019-02-15 PROCEDURE — 77030026438 HC STYL ET INTUB CARD -A: Performed by: ANESTHESIOLOGY

## 2019-02-15 PROCEDURE — 74011000250 HC RX REV CODE- 250: Performed by: INTERNAL MEDICINE

## 2019-02-15 PROCEDURE — 77030002924 HC SUT GORTX WLGO -B: Performed by: SURGERY

## 2019-02-15 PROCEDURE — 74011250637 HC RX REV CODE- 250/637: Performed by: NURSE PRACTITIONER

## 2019-02-15 PROCEDURE — 86923 COMPATIBILITY TEST ELECTRIC: CPT

## 2019-02-15 PROCEDURE — 74011250636 HC RX REV CODE- 250/636

## 2019-02-15 PROCEDURE — 76010000179 HC OR TIME 6 TO 6.5 HR INTENSV-TIER 1: Performed by: SURGERY

## 2019-02-15 PROCEDURE — 02VX3EZ RESTRICTION OF THORACIC AORTA, ASCENDING/ARCH WITH BRANCHED OR FENESTRATED INTRALUMINAL DEVICE, ONE OR TWO ARTERIES, PERCUTANEOUS APPROACH: ICD-10-PCS | Performed by: SURGERY

## 2019-02-15 PROCEDURE — 74011000250 HC RX REV CODE- 250

## 2019-02-15 PROCEDURE — 74011250636 HC RX REV CODE- 250/636: Performed by: EMERGENCY MEDICINE

## 2019-02-15 PROCEDURE — 83690 ASSAY OF LIPASE: CPT

## 2019-02-15 PROCEDURE — 74011000272 HC RX REV CODE- 272: Performed by: SURGERY

## 2019-02-15 PROCEDURE — C1769 GUIDE WIRE: HCPCS | Performed by: SURGERY

## 2019-02-15 PROCEDURE — 77030018793 HC ORG SUT GAB FRAT TELE -B: Performed by: SURGERY

## 2019-02-15 PROCEDURE — 74011000258 HC RX REV CODE- 258: Performed by: SURGERY

## 2019-02-15 PROCEDURE — 96365 THER/PROPH/DIAG IV INF INIT: CPT

## 2019-02-15 PROCEDURE — 74011636320 HC RX REV CODE- 636/320: Performed by: SURGERY

## 2019-02-15 PROCEDURE — 77030002986 HC SUT PROL J&J -A: Performed by: SURGERY

## 2019-02-15 PROCEDURE — 74011250636 HC RX REV CODE- 250/636: Performed by: SURGERY

## 2019-02-15 PROCEDURE — 83605 ASSAY OF LACTIC ACID: CPT

## 2019-02-15 PROCEDURE — 80053 COMPREHEN METABOLIC PANEL: CPT

## 2019-02-15 PROCEDURE — C1876 STENT, NON-COA/NON-COV W/DEL: HCPCS | Performed by: SURGERY

## 2019-02-15 PROCEDURE — 81001 URINALYSIS AUTO W/SCOPE: CPT

## 2019-02-15 PROCEDURE — C1768 GRAFT, VASCULAR: HCPCS | Performed by: SURGERY

## 2019-02-15 PROCEDURE — 74011000258 HC RX REV CODE- 258: Performed by: INTERNAL MEDICINE

## 2019-02-15 PROCEDURE — 77030010938 HC CLP LIG TELE -A: Performed by: SURGERY

## 2019-02-15 PROCEDURE — 77030013797 HC KT TRNSDUC PRSSR EDWD -A: Performed by: ANESTHESIOLOGY

## 2019-02-15 PROCEDURE — 65610000006 HC RM INTENSIVE CARE

## 2019-02-15 PROCEDURE — 74011000250 HC RX REV CODE- 250: Performed by: SURGERY

## 2019-02-15 PROCEDURE — 86900 BLOOD TYPING SEROLOGIC ABO: CPT

## 2019-02-15 PROCEDURE — 96376 TX/PRO/DX INJ SAME DRUG ADON: CPT

## 2019-02-15 PROCEDURE — 74011250637 HC RX REV CODE- 250/637: Performed by: INTERNAL MEDICINE

## 2019-02-15 PROCEDURE — 77030020061 HC IV BLD WRMR ADMIN SET 3M -B: Performed by: ANESTHESIOLOGY

## 2019-02-15 PROCEDURE — 84484 ASSAY OF TROPONIN QUANT: CPT

## 2019-02-15 PROCEDURE — 74011000258 HC RX REV CODE- 258: Performed by: EMERGENCY MEDICINE

## 2019-02-15 PROCEDURE — 77030019908 HC STETH ESOPH SIMS -A: Performed by: ANESTHESIOLOGY

## 2019-02-15 PROCEDURE — 93005 ELECTROCARDIOGRAM TRACING: CPT

## 2019-02-15 PROCEDURE — C1892 INTRO/SHEATH,FIXED,PEEL-AWAY: HCPCS | Performed by: SURGERY

## 2019-02-15 PROCEDURE — 80307 DRUG TEST PRSMV CHEM ANLYZR: CPT

## 2019-02-15 PROCEDURE — 77030018673: Performed by: SURGERY

## 2019-02-15 PROCEDURE — 77030018836 HC SOL IRR NACL ICUM -A: Performed by: SURGERY

## 2019-02-15 PROCEDURE — C1751 CATH, INF, PER/CENT/MIDLINE: HCPCS | Performed by: ANESTHESIOLOGY

## 2019-02-15 PROCEDURE — 76060000043 HC ANESTHESIA 6 TO 6.5 HR: Performed by: SURGERY

## 2019-02-15 PROCEDURE — 77030002933 HC SUT MCRYL J&J -A: Performed by: SURGERY

## 2019-02-15 PROCEDURE — 77030020263 HC SOL INJ SOD CL0.9% LFCR 1000ML: Performed by: SURGERY

## 2019-02-15 PROCEDURE — 36415 COLL VENOUS BLD VENIPUNCTURE: CPT

## 2019-02-15 PROCEDURE — 93922 UPR/L XTREMITY ART 2 LEVELS: CPT

## 2019-02-15 PROCEDURE — 77030020153 HC PRB DOPLR DISP MIZU -C: Performed by: SURGERY

## 2019-02-15 PROCEDURE — 77030004561 HC CATH ANGI DX COBRA ANGI -B: Performed by: SURGERY

## 2019-02-15 PROCEDURE — C1753 CATH, INTRAVAS ULTRASOUND: HCPCS | Performed by: SURGERY

## 2019-02-15 PROCEDURE — 74011250636 HC RX REV CODE- 250/636: Performed by: INTERNAL MEDICINE

## 2019-02-15 PROCEDURE — 93306 TTE W/DOPPLER COMPLETE: CPT

## 2019-02-15 PROCEDURE — C2625 STENT, NON-COR, TEM W/DEL SY: HCPCS | Performed by: SURGERY

## 2019-02-15 PROCEDURE — 77030014008 HC SPNG HEMSTAT J&J -C: Performed by: SURGERY

## 2019-02-15 PROCEDURE — 77030008684 HC TU ET CUF COVD -B: Performed by: ANESTHESIOLOGY

## 2019-02-15 PROCEDURE — 74011000250 HC RX REV CODE- 250: Performed by: EMERGENCY MEDICINE

## 2019-02-15 PROCEDURE — 72190 X-RAY EXAM OF PELVIS: CPT

## 2019-02-15 PROCEDURE — 77030008463 HC STPLR SKN PROX J&J -B: Performed by: SURGERY

## 2019-02-15 PROCEDURE — B31J1ZZ FLUOROSCOPY OF LEFT UPPER EXTREMITY ARTERIES USING LOW OSMOLAR CONTRAST: ICD-10-PCS | Performed by: SURGERY

## 2019-02-15 PROCEDURE — 031J0JY BYPASS LEFT COMMON CAROTID ARTERY TO UPPER ARTERY WITH SYNTHETIC SUBSTITUTE, OPEN APPROACH: ICD-10-PCS | Performed by: SURGERY

## 2019-02-15 PROCEDURE — 03L43DZ OCCLUSION OF LEFT SUBCLAVIAN ARTERY WITH INTRALUMINAL DEVICE, PERCUTANEOUS APPROACH: ICD-10-PCS | Performed by: SURGERY

## 2019-02-15 PROCEDURE — 77030031139 HC SUT VCRL2 J&J -A: Performed by: SURGERY

## 2019-02-15 PROCEDURE — 047A3EZ DILATION OF LEFT RENAL ARTERY WITH TWO INTRALUMINAL DEVICES, PERCUTANEOUS APPROACH: ICD-10-PCS | Performed by: SURGERY

## 2019-02-15 PROCEDURE — 77030005401 HC CATH RAD ARRO -A: Performed by: ANESTHESIOLOGY

## 2019-02-15 PROCEDURE — 74011000250 HC RX REV CODE- 250: Performed by: NURSE ANESTHETIST, CERTIFIED REGISTERED

## 2019-02-15 PROCEDURE — 0T9B70Z DRAINAGE OF BLADDER WITH DRAINAGE DEVICE, VIA NATURAL OR ARTIFICIAL OPENING: ICD-10-PCS | Performed by: SURGERY

## 2019-02-15 PROCEDURE — 77030011640 HC PAD GRND REM COVD -A: Performed by: SURGERY

## 2019-02-15 PROCEDURE — 77030010120 HC SHR COAG HARMO J&J -E: Performed by: SURGERY

## 2019-02-15 PROCEDURE — 96368 THER/DIAG CONCURRENT INF: CPT

## 2019-02-15 PROCEDURE — C1760 CLOSURE DEV, VASC: HCPCS | Performed by: SURGERY

## 2019-02-15 PROCEDURE — B4171ZZ FLUOROSCOPY OF LEFT RENAL ARTERY USING LOW OSMOLAR CONTRAST: ICD-10-PCS | Performed by: SURGERY

## 2019-02-15 PROCEDURE — 74174 CTA ABD&PLVS W/CONTRAST: CPT

## 2019-02-15 PROCEDURE — 74011250637 HC RX REV CODE- 250/637: Performed by: EMERGENCY MEDICINE

## 2019-02-15 PROCEDURE — 77030002987 HC SUT PROL J&J -B: Performed by: SURGERY

## 2019-02-15 PROCEDURE — 77030014558 HC PLUG EMB VASC -G: Performed by: SURGERY

## 2019-02-15 PROCEDURE — C1887 CATHETER, GUIDING: HCPCS | Performed by: SURGERY

## 2019-02-15 PROCEDURE — 77030013058 HC DEV INFL ANGIO BSC -B: Performed by: SURGERY

## 2019-02-15 PROCEDURE — 74011636320 HC RX REV CODE- 636/320: Performed by: EMERGENCY MEDICINE

## 2019-02-15 PROCEDURE — 85025 COMPLETE CBC W/AUTO DIFF WBC: CPT

## 2019-02-15 PROCEDURE — 77030020256 HC SOL INJ NACL 0.9%  500ML: Performed by: SURGERY

## 2019-02-15 DEVICE — IMPLANTABLE DEVICE: Type: IMPLANTABLE DEVICE | Site: BILE DUCT | Status: FUNCTIONAL

## 2019-02-15 DEVICE — PREMOUNTED STENT SYSTEM
Type: IMPLANTABLE DEVICE | Site: BILE DUCT | Status: FUNCTIONAL
Brand: EXPRESS® LD ILIAC / BILIARY

## 2019-02-15 DEVICE — PROPATEN VASCULAR GRAFT TW 8MMX40CM HEPARIN
Type: IMPLANTABLE DEVICE | Site: ARTERIAL | Status: FUNCTIONAL
Brand: GORE PROPATEN VASCULAR GRAFT

## 2019-02-15 RX ORDER — FENTANYL CITRATE 50 UG/ML
INJECTION, SOLUTION INTRAMUSCULAR; INTRAVENOUS AS NEEDED
Status: DISCONTINUED | OUTPATIENT
Start: 2019-02-15 | End: 2019-02-15 | Stop reason: HOSPADM

## 2019-02-15 RX ORDER — HYDROMORPHONE HYDROCHLORIDE 1 MG/ML
0.5 INJECTION, SOLUTION INTRAMUSCULAR; INTRAVENOUS; SUBCUTANEOUS
Status: DISCONTINUED | OUTPATIENT
Start: 2019-02-15 | End: 2019-02-15

## 2019-02-15 RX ORDER — ESMOLOL HYDROCHLORIDE 10 MG/ML
0-200 INJECTION, SOLUTION INTRAVENOUS
Status: DISCONTINUED | OUTPATIENT
Start: 2019-02-15 | End: 2019-02-16

## 2019-02-15 RX ORDER — LABETALOL HYDROCHLORIDE 5 MG/ML
20 INJECTION, SOLUTION INTRAVENOUS
Status: DISCONTINUED | OUTPATIENT
Start: 2019-02-15 | End: 2019-02-15

## 2019-02-15 RX ORDER — LABETALOL HCL 20 MG/4 ML
SYRINGE (ML) INTRAVENOUS
Status: DISPENSED
Start: 2019-02-15 | End: 2019-02-15

## 2019-02-15 RX ORDER — MUPIROCIN 20 MG/G
OINTMENT TOPICAL 2 TIMES DAILY
Status: DISPENSED | OUTPATIENT
Start: 2019-02-15 | End: 2019-02-20

## 2019-02-15 RX ORDER — LABETALOL HCL 20 MG/4 ML
20 SYRINGE (ML) INTRAVENOUS
Status: COMPLETED | OUTPATIENT
Start: 2019-02-15 | End: 2019-02-15

## 2019-02-15 RX ORDER — MORPHINE SULFATE 2 MG/ML
2 INJECTION, SOLUTION INTRAMUSCULAR; INTRAVENOUS
Status: DISCONTINUED | OUTPATIENT
Start: 2019-02-15 | End: 2019-02-19

## 2019-02-15 RX ORDER — POTASSIUM CHLORIDE 7.45 MG/ML
10 INJECTION INTRAVENOUS
Status: COMPLETED | OUTPATIENT
Start: 2019-02-15 | End: 2019-02-15

## 2019-02-15 RX ORDER — AMLODIPINE BESYLATE 5 MG/1
5 TABLET ORAL DAILY
Status: DISCONTINUED | OUTPATIENT
Start: 2019-02-15 | End: 2019-02-20

## 2019-02-15 RX ORDER — SODIUM CHLORIDE 0.9 % (FLUSH) 0.9 %
10 SYRINGE (ML) INJECTION
Status: COMPLETED | OUTPATIENT
Start: 2019-02-15 | End: 2019-02-15

## 2019-02-15 RX ORDER — LIDOCAINE HYDROCHLORIDE 10 MG/ML
0.1 INJECTION, SOLUTION EPIDURAL; INFILTRATION; INTRACAUDAL; PERINEURAL AS NEEDED
Status: CANCELLED | OUTPATIENT
Start: 2019-02-15

## 2019-02-15 RX ORDER — SODIUM CHLORIDE 9 MG/ML
INJECTION, SOLUTION INTRAVENOUS
Status: DISCONTINUED | OUTPATIENT
Start: 2019-02-15 | End: 2019-02-15 | Stop reason: HOSPADM

## 2019-02-15 RX ORDER — LIDOCAINE HYDROCHLORIDE 20 MG/ML
INJECTION, SOLUTION EPIDURAL; INFILTRATION; INTRACAUDAL; PERINEURAL AS NEEDED
Status: DISCONTINUED | OUTPATIENT
Start: 2019-02-15 | End: 2019-02-15 | Stop reason: HOSPADM

## 2019-02-15 RX ORDER — SODIUM CHLORIDE 9 MG/ML
250 INJECTION, SOLUTION INTRAVENOUS AS NEEDED
Status: DISCONTINUED | OUTPATIENT
Start: 2019-02-15 | End: 2019-02-17 | Stop reason: ALTCHOICE

## 2019-02-15 RX ORDER — HYDROMORPHONE HYDROCHLORIDE 1 MG/ML
1-2 INJECTION, SOLUTION INTRAMUSCULAR; INTRAVENOUS; SUBCUTANEOUS
Status: DISCONTINUED | OUTPATIENT
Start: 2019-02-15 | End: 2019-02-15

## 2019-02-15 RX ORDER — SODIUM BICARBONATE IN D5W 150/1000ML
PLASTIC BAG, INJECTION (ML) INTRAVENOUS CONTINUOUS
Status: DISCONTINUED | OUTPATIENT
Start: 2019-02-15 | End: 2019-02-16

## 2019-02-15 RX ORDER — LORAZEPAM 2 MG/ML
INJECTION INTRAMUSCULAR
Status: DISPENSED
Start: 2019-02-15 | End: 2019-02-16

## 2019-02-15 RX ORDER — SODIUM CHLORIDE 0.9 % (FLUSH) 0.9 %
5-40 SYRINGE (ML) INJECTION EVERY 8 HOURS
Status: CANCELLED | OUTPATIENT
Start: 2019-02-15

## 2019-02-15 RX ORDER — FACIAL-BODY WIPES
10 EACH TOPICAL DAILY PRN
Status: DISCONTINUED | OUTPATIENT
Start: 2019-02-15 | End: 2019-02-26 | Stop reason: HOSPADM

## 2019-02-15 RX ORDER — SODIUM CHLORIDE 0.9 % (FLUSH) 0.9 %
5-40 SYRINGE (ML) INJECTION AS NEEDED
Status: CANCELLED | OUTPATIENT
Start: 2019-02-15

## 2019-02-15 RX ORDER — PROPOFOL 10 MG/ML
INJECTION, EMULSION INTRAVENOUS AS NEEDED
Status: DISCONTINUED | OUTPATIENT
Start: 2019-02-15 | End: 2019-02-15 | Stop reason: HOSPADM

## 2019-02-15 RX ORDER — NEOSTIGMINE METHYLSULFATE 1 MG/ML
INJECTION INTRAVENOUS AS NEEDED
Status: DISCONTINUED | OUTPATIENT
Start: 2019-02-15 | End: 2019-02-15 | Stop reason: HOSPADM

## 2019-02-15 RX ORDER — ACETAMINOPHEN 10 MG/ML
INJECTION, SOLUTION INTRAVENOUS AS NEEDED
Status: DISCONTINUED | OUTPATIENT
Start: 2019-02-15 | End: 2019-02-15 | Stop reason: HOSPADM

## 2019-02-15 RX ORDER — SODIUM CHLORIDE, SODIUM LACTATE, POTASSIUM CHLORIDE, CALCIUM CHLORIDE 600; 310; 30; 20 MG/100ML; MG/100ML; MG/100ML; MG/100ML
INJECTION, SOLUTION INTRAVENOUS
Status: DISCONTINUED | OUTPATIENT
Start: 2019-02-15 | End: 2019-02-15 | Stop reason: HOSPADM

## 2019-02-15 RX ORDER — ASPIRIN 325 MG
325 TABLET ORAL
Status: DISCONTINUED | OUTPATIENT
Start: 2019-02-15 | End: 2019-02-15

## 2019-02-15 RX ORDER — GUAIFENESIN 100 MG/5ML
324 LIQUID (ML) ORAL
Status: COMPLETED | OUTPATIENT
Start: 2019-02-15 | End: 2019-02-15

## 2019-02-15 RX ORDER — GLYCOPYRROLATE 0.2 MG/ML
INJECTION INTRAMUSCULAR; INTRAVENOUS AS NEEDED
Status: DISCONTINUED | OUTPATIENT
Start: 2019-02-15 | End: 2019-02-15 | Stop reason: HOSPADM

## 2019-02-15 RX ORDER — MORPHINE SULFATE 4 MG/ML
4 INJECTION INTRAVENOUS
Status: DISCONTINUED | OUTPATIENT
Start: 2019-02-15 | End: 2019-02-19

## 2019-02-15 RX ORDER — NITROGLYCERIN 0.4 MG/1
0.4 TABLET SUBLINGUAL
Status: DISCONTINUED | OUTPATIENT
Start: 2019-02-15 | End: 2019-02-15

## 2019-02-15 RX ORDER — POTASSIUM CHLORIDE 29.8 MG/ML
20 INJECTION INTRAVENOUS ONCE
Status: DISCONTINUED | OUTPATIENT
Start: 2019-02-15 | End: 2019-02-15

## 2019-02-15 RX ORDER — FENTANYL CITRATE 50 UG/ML
50 INJECTION, SOLUTION INTRAMUSCULAR; INTRAVENOUS
Status: COMPLETED | OUTPATIENT
Start: 2019-02-15 | End: 2019-02-15

## 2019-02-15 RX ORDER — ONDANSETRON 2 MG/ML
4 INJECTION INTRAMUSCULAR; INTRAVENOUS
Status: DISCONTINUED | OUTPATIENT
Start: 2019-02-15 | End: 2019-02-19

## 2019-02-15 RX ORDER — HYDROMORPHONE HYDROCHLORIDE 1 MG/ML
1 INJECTION, SOLUTION INTRAMUSCULAR; INTRAVENOUS; SUBCUTANEOUS
Status: DISCONTINUED | OUTPATIENT
Start: 2019-02-15 | End: 2019-02-15

## 2019-02-15 RX ORDER — ONDANSETRON 2 MG/ML
INJECTION INTRAMUSCULAR; INTRAVENOUS AS NEEDED
Status: DISCONTINUED | OUTPATIENT
Start: 2019-02-15 | End: 2019-02-15 | Stop reason: HOSPADM

## 2019-02-15 RX ORDER — LABETALOL HYDROCHLORIDE 5 MG/ML
20 INJECTION, SOLUTION INTRAVENOUS
Status: COMPLETED | OUTPATIENT
Start: 2019-02-15 | End: 2019-02-15

## 2019-02-15 RX ORDER — CARVEDILOL 6.25 MG/1
6.25 TABLET ORAL 2 TIMES DAILY WITH MEALS
Status: DISCONTINUED | OUTPATIENT
Start: 2019-02-15 | End: 2019-02-18

## 2019-02-15 RX ORDER — ROCURONIUM BROMIDE 10 MG/ML
INJECTION, SOLUTION INTRAVENOUS AS NEEDED
Status: DISCONTINUED | OUTPATIENT
Start: 2019-02-15 | End: 2019-02-15 | Stop reason: HOSPADM

## 2019-02-15 RX ORDER — CEFAZOLIN SODIUM 1 G/3ML
INJECTION, POWDER, FOR SOLUTION INTRAMUSCULAR; INTRAVENOUS AS NEEDED
Status: DISCONTINUED | OUTPATIENT
Start: 2019-02-15 | End: 2019-02-15 | Stop reason: HOSPADM

## 2019-02-15 RX ORDER — HYDROMORPHONE HYDROCHLORIDE 1 MG/ML
INJECTION, SOLUTION INTRAMUSCULAR; INTRAVENOUS; SUBCUTANEOUS
Status: COMPLETED
Start: 2019-02-15 | End: 2019-02-15

## 2019-02-15 RX ORDER — SUCCINYLCHOLINE CHLORIDE 20 MG/ML
INJECTION INTRAMUSCULAR; INTRAVENOUS AS NEEDED
Status: DISCONTINUED | OUTPATIENT
Start: 2019-02-15 | End: 2019-02-15 | Stop reason: HOSPADM

## 2019-02-15 RX ORDER — LORAZEPAM 2 MG/ML
1 INJECTION INTRAMUSCULAR ONCE
Status: COMPLETED | OUTPATIENT
Start: 2019-02-15 | End: 2019-02-15

## 2019-02-15 RX ORDER — FENTANYL CITRATE 50 UG/ML
50 INJECTION, SOLUTION INTRAMUSCULAR; INTRAVENOUS
Status: DISCONTINUED | OUTPATIENT
Start: 2019-02-15 | End: 2019-02-15

## 2019-02-15 RX ORDER — PHENYLEPHRINE HCL IN 0.9% NACL 30MG/250ML
10-100 PLASTIC BAG, INJECTION (ML) INTRAVENOUS
Status: DISCONTINUED | OUTPATIENT
Start: 2019-02-15 | End: 2019-02-19

## 2019-02-15 RX ORDER — LORAZEPAM 2 MG/ML
1 INJECTION INTRAMUSCULAR
Status: DISCONTINUED | OUTPATIENT
Start: 2019-02-15 | End: 2019-02-21

## 2019-02-15 RX ORDER — ONDANSETRON 2 MG/ML
4 INJECTION INTRAMUSCULAR; INTRAVENOUS
Status: COMPLETED | OUTPATIENT
Start: 2019-02-15 | End: 2019-02-15

## 2019-02-15 RX ORDER — SODIUM CHLORIDE 9 MG/ML
100 INJECTION, SOLUTION INTRAVENOUS CONTINUOUS
Status: DISCONTINUED | OUTPATIENT
Start: 2019-02-15 | End: 2019-02-16

## 2019-02-15 RX ORDER — LABETALOL 200 MG/1
200 TABLET, FILM COATED ORAL 2 TIMES DAILY
Status: DISCONTINUED | OUTPATIENT
Start: 2019-02-15 | End: 2019-02-15

## 2019-02-15 RX ORDER — HEPARIN SODIUM 1000 [USP'U]/ML
INJECTION, SOLUTION INTRAVENOUS; SUBCUTANEOUS AS NEEDED
Status: DISCONTINUED | OUTPATIENT
Start: 2019-02-15 | End: 2019-02-15 | Stop reason: HOSPADM

## 2019-02-15 RX ADMIN — SODIUM CHLORIDE, SODIUM LACTATE, POTASSIUM CHLORIDE, CALCIUM CHLORIDE: 600; 310; 30; 20 INJECTION, SOLUTION INTRAVENOUS at 16:00

## 2019-02-15 RX ADMIN — IOPAMIDOL 100 ML: 755 INJECTION, SOLUTION INTRAVENOUS at 00:50

## 2019-02-15 RX ADMIN — CARVEDILOL 6.25 MG: 6.25 TABLET, FILM COATED ORAL at 11:29

## 2019-02-15 RX ADMIN — ESMOLOL HYDROCHLORIDE 75 MCG/KG/MIN: 10 INJECTION INTRAVENOUS at 05:58

## 2019-02-15 RX ADMIN — ROCURONIUM BROMIDE 10 MG: 10 INJECTION, SOLUTION INTRAVENOUS at 20:29

## 2019-02-15 RX ADMIN — SUCCINYLCHOLINE CHLORIDE 160 MG: 20 INJECTION INTRAMUSCULAR; INTRAVENOUS at 16:15

## 2019-02-15 RX ADMIN — ONDANSETRON 4 MG: 2 INJECTION INTRAMUSCULAR; INTRAVENOUS at 05:50

## 2019-02-15 RX ADMIN — PROCHLORPERAZINE EDISYLATE 10 MG: 5 INJECTION INTRAMUSCULAR; INTRAVENOUS at 09:02

## 2019-02-15 RX ADMIN — SODIUM CHLORIDE 5 MG/HR: 900 INJECTION, SOLUTION INTRAVENOUS at 05:57

## 2019-02-15 RX ADMIN — AMLODIPINE BESYLATE 5 MG: 5 TABLET ORAL at 11:29

## 2019-02-15 RX ADMIN — NEOSTIGMINE METHYLSULFATE 5 MG: 1 INJECTION INTRAVENOUS at 21:07

## 2019-02-15 RX ADMIN — HEPARIN SODIUM 3000 UNITS: 1000 INJECTION, SOLUTION INTRAVENOUS; SUBCUTANEOUS at 18:46

## 2019-02-15 RX ADMIN — ACETAMINOPHEN 1000 MG: 10 INJECTION, SOLUTION INTRAVENOUS at 20:27

## 2019-02-15 RX ADMIN — POTASSIUM CHLORIDE 10 MEQ: 10 INJECTION, SOLUTION INTRAVENOUS at 02:31

## 2019-02-15 RX ADMIN — ROCURONIUM BROMIDE 30 MG: 10 INJECTION, SOLUTION INTRAVENOUS at 17:55

## 2019-02-15 RX ADMIN — FENTANYL CITRATE 50 MCG: 50 INJECTION, SOLUTION INTRAMUSCULAR; INTRAVENOUS at 20:25

## 2019-02-15 RX ADMIN — HEPARIN SODIUM 7000 UNITS: 1000 INJECTION, SOLUTION INTRAVENOUS; SUBCUTANEOUS at 17:38

## 2019-02-15 RX ADMIN — LORAZEPAM 1 MG: 2 INJECTION INTRAMUSCULAR; INTRAVENOUS at 23:00

## 2019-02-15 RX ADMIN — FENTANYL CITRATE 50 MCG: 50 INJECTION, SOLUTION INTRAMUSCULAR; INTRAVENOUS at 16:14

## 2019-02-15 RX ADMIN — ESMOLOL HYDROCHLORIDE 100 MCG/KG/MIN: 10 INJECTION INTRAVENOUS at 05:30

## 2019-02-15 RX ADMIN — FENTANYL CITRATE 100 MCG: 50 INJECTION, SOLUTION INTRAMUSCULAR; INTRAVENOUS at 17:56

## 2019-02-15 RX ADMIN — FENTANYL CITRATE 150 MCG: 50 INJECTION, SOLUTION INTRAMUSCULAR; INTRAVENOUS at 16:55

## 2019-02-15 RX ADMIN — IOPAMIDOL 100 ML: 755 INJECTION, SOLUTION INTRAVENOUS at 13:16

## 2019-02-15 RX ADMIN — GLYCOPYRROLATE 0.8 MG: 0.2 INJECTION INTRAMUSCULAR; INTRAVENOUS at 21:07

## 2019-02-15 RX ADMIN — ASPIRIN 81 MG 324 MG: 81 TABLET ORAL at 00:25

## 2019-02-15 RX ADMIN — HEPARIN SODIUM 2000 UNITS: 1000 INJECTION, SOLUTION INTRAVENOUS; SUBCUTANEOUS at 19:45

## 2019-02-15 RX ADMIN — POTASSIUM CHLORIDE 10 MEQ: 10 INJECTION, SOLUTION INTRAVENOUS at 03:25

## 2019-02-15 RX ADMIN — SODIUM CHLORIDE 15 MG/HR: 900 INJECTION, SOLUTION INTRAVENOUS at 03:20

## 2019-02-15 RX ADMIN — FENTANYL CITRATE 50 MCG: 50 INJECTION, SOLUTION INTRAMUSCULAR; INTRAVENOUS at 09:02

## 2019-02-15 RX ADMIN — ONDANSETRON 4 MG: 2 INJECTION INTRAMUSCULAR; INTRAVENOUS at 21:07

## 2019-02-15 RX ADMIN — LORAZEPAM 1 MG: 2 INJECTION INTRAMUSCULAR; INTRAVENOUS at 23:30

## 2019-02-15 RX ADMIN — MUPIROCIN: 20 OINTMENT TOPICAL at 09:02

## 2019-02-15 RX ADMIN — FENTANYL CITRATE 50 MCG: 50 INJECTION, SOLUTION INTRAMUSCULAR; INTRAVENOUS at 00:52

## 2019-02-15 RX ADMIN — HYDROMORPHONE HYDROCHLORIDE 1 MG: 1 INJECTION, SOLUTION INTRAMUSCULAR; INTRAVENOUS; SUBCUTANEOUS at 01:57

## 2019-02-15 RX ADMIN — ESMOLOL HYDROCHLORIDE 100 MCG/KG/MIN: 10 INJECTION INTRAVENOUS at 09:53

## 2019-02-15 RX ADMIN — DEXMEDETOMIDINE HYDROCHLORIDE 1.4 MCG/KG/HR: 100 INJECTION, SOLUTION INTRAVENOUS at 23:42

## 2019-02-15 RX ADMIN — ESMOLOL HYDROCHLORIDE 75 MCG/KG/MIN: 10 INJECTION INTRAVENOUS at 05:56

## 2019-02-15 RX ADMIN — CEFAZOLIN SODIUM 2 G: 1 INJECTION, POWDER, FOR SOLUTION INTRAMUSCULAR; INTRAVENOUS at 20:18

## 2019-02-15 RX ADMIN — Medication 100 ML/HR: at 17:52

## 2019-02-15 RX ADMIN — ROCURONIUM BROMIDE 50 MG: 10 INJECTION, SOLUTION INTRAVENOUS at 16:32

## 2019-02-15 RX ADMIN — SODIUM CHLORIDE 100 ML/HR: 900 INJECTION, SOLUTION INTRAVENOUS at 11:22

## 2019-02-15 RX ADMIN — LABETALOL HYDROCHLORIDE 20 MG: 5 INJECTION, SOLUTION INTRAVENOUS at 01:09

## 2019-02-15 RX ADMIN — ESMOLOL HYDROCHLORIDE 50 MCG/KG/MIN: 10 INJECTION INTRAVENOUS at 01:12

## 2019-02-15 RX ADMIN — LORAZEPAM 1 MG: 2 INJECTION INTRAMUSCULAR; INTRAVENOUS at 23:20

## 2019-02-15 RX ADMIN — PROPOFOL 100 MG: 10 INJECTION, EMULSION INTRAVENOUS at 16:15

## 2019-02-15 RX ADMIN — FENTANYL CITRATE 100 MCG: 50 INJECTION, SOLUTION INTRAMUSCULAR; INTRAVENOUS at 19:14

## 2019-02-15 RX ADMIN — LABETALOL HYDROCHLORIDE 20 MG: 5 INJECTION, SOLUTION INTRAVENOUS at 00:57

## 2019-02-15 RX ADMIN — ESMOLOL HYDROCHLORIDE 125 MCG/KG/MIN: 10 INJECTION INTRAVENOUS at 14:56

## 2019-02-15 RX ADMIN — SODIUM CHLORIDE 5 MG/HR: 900 INJECTION, SOLUTION INTRAVENOUS at 11:23

## 2019-02-15 RX ADMIN — ONDANSETRON 4 MG: 2 INJECTION INTRAMUSCULAR; INTRAVENOUS at 02:01

## 2019-02-15 RX ADMIN — Medication 10 ML: at 13:17

## 2019-02-15 RX ADMIN — ROCURONIUM BROMIDE 20 MG: 10 INJECTION, SOLUTION INTRAVENOUS at 18:43

## 2019-02-15 RX ADMIN — Medication 10 ML: at 00:50

## 2019-02-15 RX ADMIN — PROPOFOL 100 MG: 10 INJECTION, EMULSION INTRAVENOUS at 19:46

## 2019-02-15 RX ADMIN — SODIUM CHLORIDE 15 MG/HR: 900 INJECTION, SOLUTION INTRAVENOUS at 23:05

## 2019-02-15 RX ADMIN — LORAZEPAM 1 MG: 2 INJECTION INTRAMUSCULAR; INTRAVENOUS at 22:35

## 2019-02-15 RX ADMIN — SODIUM CHLORIDE: 9 INJECTION, SOLUTION INTRAVENOUS at 16:04

## 2019-02-15 RX ADMIN — HYDROMORPHONE HYDROCHLORIDE 1 MG: 1 INJECTION, SOLUTION INTRAMUSCULAR; INTRAVENOUS; SUBCUTANEOUS at 03:42

## 2019-02-15 RX ADMIN — FENTANYL CITRATE 50 MCG: 50 INJECTION, SOLUTION INTRAMUSCULAR; INTRAVENOUS at 11:31

## 2019-02-15 RX ADMIN — LIDOCAINE HYDROCHLORIDE 100 MG: 20 INJECTION, SOLUTION EPIDURAL; INFILTRATION; INTRACAUDAL; PERINEURAL at 16:14

## 2019-02-15 RX ADMIN — LORAZEPAM 1 MG: 2 INJECTION INTRAMUSCULAR; INTRAVENOUS at 14:54

## 2019-02-15 RX ADMIN — SODIUM CHLORIDE 5 MG/HR: 900 INJECTION, SOLUTION INTRAVENOUS at 01:13

## 2019-02-15 RX ADMIN — ESMOLOL HYDROCHLORIDE 150 MCG/KG/MIN: 10 INJECTION INTRAVENOUS at 22:57

## 2019-02-15 RX ADMIN — CEFAZOLIN SODIUM 2 G: 1 INJECTION, POWDER, FOR SOLUTION INTRAMUSCULAR; INTRAVENOUS at 16:52

## 2019-02-15 NOTE — ED NOTES
Pt arrived via EMS c/o 10/10 sharp, stabbing chest pain during sexual intercourse tonight. EMS gave 100 mcg fentanyl 2341 IV. Pt denies ETOH or drug use.

## 2019-02-15 NOTE — ED PROVIDER NOTES
EMERGENCY DEPARTMENT HISTORY AND PHYSICAL EXAM      Date: 2/15/2019  Patient Name: Rossy Lindsay    History of Presenting Illness     Chief Complaint   Patient presents with    Chest Pain    Vomiting       History Provided By: Patient and Patient's Wife    HPI: Rossy Lindsay, 53 y.o. male with a PMHx significant for HTN, fracture (jaw, thumb, and foot), and ruptured ear drum, presents ambulatory to the ED with cc of moderate, constant CP that started at 10:45 PM tonight. Pt reports nausea and vomiting. This patient states he was having intercourse when his Sx started. He states he called 911 at this time and EMS report an EKG was given that showed ST elevations in multiple leads. Pt notes his pain was sharp and stabbing in quality, and was non-radiating. He states this has never happened in the past. He notes he has had indigestion before, but this feels different. Pt notes he has no FHx of CAD. He states he does not smoke, does not abuse EtOH, and does not use illicit drugs. He states he took an antacid with no relief. Pt states he has taken no ASA PTA to try and relieve his Sx, and none was given by EMS. EMS gave 100 mcg fentanyl 2341 IV. Pt denies any modifying factors to his Sx. Pt specifically denies pleuritic pain, SOB, diaphoresis, numbness, weakness, tingling, calf pain, LE swelling, extremity pain, and lightheadedness. There are no other complaints, changes, or physical findings at this time.     Social History: -tobacco, -EtOH, -Illicit Drugs  Allergies: NKDA    PCP: None    Current Facility-Administered Medications   Medication Dose Route Frequency Provider Last Rate Last Dose    labetalol (NORMODYNE;TRANDATE) 20 mg/4 mL (5 mg/mL) injection             nitroglycerin (NITROSTAT) tablet 0.4 mg  0.4 mg SubLINGual Q5MIN PRN Gio Spear MD        esmolol 10mg/mL (BREVIBLOC) infusion  50 mcg/kg/min IntraVENous TITRATE Gio Spear .9 mL/hr at 02/15/19 0210 200 mcg/kg/min at 02/15/19 0210    niCARdipine (CARDENE) 25 mg in 0.9% sodium chloride 250 mL infusion  0-15 mg/hr IntraVENous TITRATE Max Mederos  mL/hr at 02/15/19 0206 15 mg/hr at 02/15/19 0206    HYDROmorphone (PF) (DILAUDID) injection 1-2 mg  1-2 mg IntraVENous Q1H PRN Andrei Funez MD         Current Outpatient Medications   Medication Sig Dispense Refill    amLODIPine (NORVASC) 5 mg tablet Take 1 Tab by mouth daily. 30 Tab 5    valsartan-hydroCHLOROthiazide (DIOVAN-HCT) 320-25 mg per tablet Take 1 Tab by mouth daily. 30 Tab 5       Past History     Past Medical History:  Past Medical History:   Diagnosis Date    Foot fracture     Hypertension     Jaw fracture ([address]     Ruptured ear drum     Thumb fracture        Past Surgical History:  Past Surgical History:   Procedure Laterality Date    HX APPENDECTOMY      HX ORTHOPAEDIC         Family History:  Family History   Problem Relation Age of Onset    Diabetes Mother        Social History:  Social History     Tobacco Use    Smoking status: Never Smoker    Smokeless tobacco: Never Used   Substance Use Topics    Alcohol use: No    Drug use: No       Allergies:  No Known Allergies      Review of Systems   Review of Systems   Constitutional: Negative for fever, chills, and fatigue. HENT: Negative for congestion, sore throat, rhinorrhea, sneezing and neck stiffness   Eyes: Negative for discharge and redness. Respiratory: Negative for  shortness of breath, pleuritic pain, wheezing   Cardiovascular: Positive for chest pain, palpitations   Gastrointestinal: Positive for nausea, vomiting, negative for abdominal pain, constipation, diarrhea and blood in stool. Genitourinary: Negative for dysuria, urgency, frequency, hematuria, flank pain, decreased urine volume, discharge,   Musculoskeletal: Negative for myalgias, calf pain, extremity pain or joint pain . Skin: Negative for rash, diaphoresis, or lesions .    Neurological: Negative weakness, tingling, light-headedness, numbness and headaches. Physical Exam   Physical Exam   Nursing note and vitals reviewed. GENERAL: alert and oriented, moderate distress, diaphoretic. EYES: PEERL, No injection, discharge or icterus. HENT: Mucous membranes pink and moist.  NECK: Supple  LUNGS: Airway patent. Non-labored respirations. Breath sounds clear with good air entry bilaterally. HEART: Regular rate and rhythm. No peripheral edema  ABDOMEN: Non-distended and non-tender, without guarding or rebound. SKIN:  warm, dry  MSK/ EXTREMITIES: Without swelling, tenderness or deformity, symmetric with normal ROM  NEUROLOGICAL: Alert, oriented      Diagnostic Study Results     Labs -     Recent Results (from the past 12 hour(s))   POC TROPONIN-I    Collection Time: 02/15/19 12:20 AM   Result Value Ref Range    Troponin-I (POC) <0.04 0.00 - 0.08 ng/mL   CBC WITH AUTOMATED DIFF    Collection Time: 02/15/19 12:21 AM   Result Value Ref Range    WBC 9.2 4.1 - 11.1 K/uL    RBC 4.84 4.10 - 5.70 M/uL    HGB 13.1 12.1 - 17.0 g/dL    HCT 40.1 36.6 - 50.3 %    MCV 82.9 80.0 - 99.0 FL    MCH 27.1 26.0 - 34.0 PG    MCHC 32.7 30.0 - 36.5 g/dL    RDW 14.6 (H) 11.5 - 14.5 %    PLATELET 141 839 - 891 K/uL    MPV 11.1 8.9 - 12.9 FL    NRBC 0.0 0  WBC    ABSOLUTE NRBC 0.00 0.00 - 0.01 K/uL    NEUTROPHILS 42 32 - 75 %    LYMPHOCYTES 47 12 - 49 %    MONOCYTES 7 5 - 13 %    EOSINOPHILS 4 0 - 7 %    BASOPHILS 0 0 - 1 %    IMMATURE GRANULOCYTES 0 0.0 - 0.5 %    ABS. NEUTROPHILS 3.9 1.8 - 8.0 K/UL    ABS. LYMPHOCYTES 4.3 (H) 0.8 - 3.5 K/UL    ABS. MONOCYTES 0.7 0.0 - 1.0 K/UL    ABS. EOSINOPHILS 0.3 0.0 - 0.4 K/UL    ABS. BASOPHILS 0.0 0.0 - 0.1 K/UL    ABS. IMM.  GRANS. 0.0 0.00 - 0.04 K/UL    DF AUTOMATED     METABOLIC PANEL, COMPREHENSIVE    Collection Time: 02/15/19 12:21 AM   Result Value Ref Range    Sodium 143 136 - 145 mmol/L    Potassium 2.9 (L) 3.5 - 5.1 mmol/L    Chloride 105 97 - 108 mmol/L    CO2 30 21 - 32 mmol/L    Anion gap 8 5 - 15 mmol/L    Glucose 124 (H) 65 - 100 mg/dL    BUN 26 (H) 6 - 20 MG/DL    Creatinine 1.97 (H) 0.70 - 1.30 MG/DL    BUN/Creatinine ratio 13 12 - 20      GFR est AA 44 (L) >60 ml/min/1.73m2    GFR est non-AA 37 (L) >60 ml/min/1.73m2    Calcium 8.7 8.5 - 10.1 MG/DL    Bilirubin, total 0.6 0.2 - 1.0 MG/DL    ALT (SGPT) 27 12 - 78 U/L    AST (SGOT) 20 15 - 37 U/L    Alk. phosphatase 50 45 - 117 U/L    Protein, total 7.2 6.4 - 8.2 g/dL    Albumin 3.8 3.5 - 5.0 g/dL    Globulin 3.4 2.0 - 4.0 g/dL    A-G Ratio 1.1 1.1 - 2.2     TROPONIN I    Collection Time: 02/15/19 12:21 AM   Result Value Ref Range    Troponin-I, Qt. 0.11 (H) <0.05 ng/mL   LIPASE    Collection Time: 02/15/19 12:21 AM   Result Value Ref Range    Lipase 146 73 - 393 U/L   TYPE & SCREEN    Collection Time: 02/15/19  1:10 AM   Result Value Ref Range    Crossmatch Expiration 02/18/2019     ABO/Rh(D) O POSITIVE     Antibody screen NEG    EKG, 12 LEAD, INITIAL    Collection Time: 02/15/19  1:42 AM   Result Value Ref Range    Ventricular Rate 68 BPM    Atrial Rate 68 BPM    P-R Interval 194 ms    QRS Duration 104 ms    Q-T Interval 416 ms    QTC Calculation (Bezet) 442 ms    Calculated P Axis 52 degrees    Calculated R Axis 28 degrees    Calculated T Axis -33 degrees    Diagnosis       Normal sinus rhythm  T wave abnormality, consider inferolateral ischemia  No previous ECGs available         Radiologic Studies -   CTA CHEST W OR W WO CONT   Final Result   IMPRESSION:    There is no pulmonary embolism. There is a type B aortic dissection with filling of the true and false lumen at   this time. No aortic aneurysm. No evidence of compromise of the cervical   vasculature. The findings were called to Dr. Antony Donaldson on 2/15/2018 at 12:50 AM by Dr. Sandra Live. [phone]            CT Results  (Last 48 hours)               02/15/19 0050  CTA CHEST W OR W WO CONT Final result    Impression:  IMPRESSION:    There is no pulmonary embolism.    There is a type B aortic dissection with filling of the true and false lumen at   this time. No aortic aneurysm. No evidence of compromise of the cervical   vasculature. The findings were called to Dr. Lonnie Chang on 2/15/2018 at 12:50 AM by Dr. Jennifer Fiore. [phone]            Narrative:  CLINICAL HISTORY: Severe chest pain       INDICATION: Or chest pain       COMPARISON: None       TECHNIQUE: CT of the chest with  IV contrast , Isovue-370 is performed. Axial   images from the thoracic inlet to the level of the upper abdomen are obtained. Manual post-processing of the images and coronal reformatting is also performed. CT dose reduction was achieved through use of a standardized protocol tailored   for this examination and automatic exposure control for dose modulation. Multiplanar reformatted imaging was performed. Sagittal and coronal reformatting. 3-D Postprocessing of imaging was performed. 3-D MIP reconstructed images were obtained in the coronal plane. FINDINGS:    There is no pulmonary embolism. 3 vessel arch without compromise of the major cervical vasculature. There is an aortic dissection which begins just beyond the origin of the left   subclavian artery. The thoracic aorta measures 35 x 38 mm at the level of the   main pulmonary artery. Descending aorta 32 x 33 mm in size. Opacification of   both the true and false lumen. Visualized celiac and SMA origins arising from   the true lumen. Tiny hepatic hypodensity is most likely simple cyst. There is no   pleural or pericardial effusion. There is no mediastinal, axillary or hilar   lymphadenopathy. The aorta is normal in course and caliber. The proximal   pulmonary arteries are without evidence of filling defects. No lytic or blastic   lesions are identified. The remainder of the upper abdomen visualized is   unremarkable. CXR Results  (Last 48 hours)    None          Medical Decision Making   I am the first provider for this patient.     I reviewed the vital signs, available nursing notes, past medical history, past surgical history, family history and social history. Vital Signs-Reviewed the patient's vital signs. Patient Vitals for the past 12 hrs:   Temp Pulse Resp BP SpO2   02/15/19 0206 -- 72 -- 139/66 --   02/15/19 0156 -- 70 -- 154/75 --   02/15/19 0147 -- 70 -- 151/79 --   02/15/19 0127 -- 66 -- (!) 211/115 --   02/15/19 0104 -- 69 17 (!) 213/108 97 %   02/15/19 0100 -- 66 19 (!) 223/111 97 %   02/15/19 0053 -- 67 24 (!) 221/114 100 %   02/15/19 0052 -- 65 18 (!) 214/116 99 %   02/15/19 0049 -- 76 27 (!) 214/116 97 %   02/15/19 0010 97.5 °F (36.4 °C) 67 16 (!) 169/100 99 %       Pulse Oximetry Analysis - 99% on RA    Cardiac Monitor:   Rate: 67 bpm  Rhythm: Normal Sinus Rhythm      EKG interpretation: (Preliminary) 0004  Rhythm: normal sinus rhythm; and regular . Rate (approx.): 67; Axis: normal; ID interval: normal; QRS interval: normal ; ST/T wave: T wave inverted inferior lateral leads; No previous for comparision    Records Reviewed: Nursing Notes, Old Medical Records, Previous Radiology Studies and Previous Laboratory Studies    Provider Notes (Medical Decision Making): On presentation the patient is well appearing, distressed with reassurnig vital signs. Based on the history and exam the differential diagnosis for this patient includes ACS, aortic dissection, PE, PNX. Patients sx concerning for possible aortic dissection so stat CTA ordered which did note Type B aortic dissection. Immediately after the dx was made esmolol/nicardipine drips were ordered and several labetalol pushes ordered for BP control with goal SBP<120 and HR<60. Also aggressive pain management as this is likely contributing to the htn. Vascular surgery was consulted who evaluated immediately in the ED and will admit to the ICU. ED Course:   Initial assessment performed.  The patients presenting problems have been discussed, and they are in agreement with the care plan formulated and outlined with them. I have encouraged them to ask questions as they arise throughout their visit. Meds given in the ER:   Medications   nitroglycerin (NITROSTAT) tablet 0.4 mg (not administered)   esmolol 10mg/mL (BREVIBLOC) infusion (200 mcg/kg/min × 111.6 kg IntraVENous Rate Change 2/15/19 0210)   niCARdipine (CARDENE) 25 mg in 0.9% sodium chloride 250 mL infusion (15 mg/hr IntraVENous Rate Change 2/15/19 0206)   labetalol (NORMODYNE;TRANDATE) 20 mg/4 mL (5 mg/mL) injection (  Canceled Entry 2/15/19 0152)   HYDROmorphone (PF) (DILAUDID) injection 1-2 mg (not administered)   aspirin chewable tablet 324 mg (324 mg Oral Given 2/15/19 0025)   fentaNYL citrate (PF) injection 50 mcg (50 mcg IntraVENous Given 2/15/19 0052)   sodium chloride (NS) flush 10 mL (10 mL IntraVENous Given 2/15/19 0050)   iopamidol (ISOVUE-370) 76 % injection 100 mL (100 mL IntraVENous Given 2/15/19 0050)   ondansetron (ZOFRAN) injection 4 mg (4 mg IntraVENous Given 2/15/19 0201)   labetalol (NORMODYNE;TRANDATE) injection 20 mg (20 mg IntraVENous Given 2/15/19 0057)   labetalol (NORMODYNE;TRANDATE) 20 mg/4 mL (5 mg/mL) injection 20 mg (20 mg IntraVENous Given 2/15/19 0109)     Progress Note:  12:48 AM  Dx  aortic dissection. Consult Note:  1:05 AM  Noel Sosa MD spoke with Hermilo Patten MD,  Specialty: Vascular Surgery  Discussed pt's hx, disposition, and available diagnostic and imaging results. Reviewed care plans. Consultant agrees with plans as outlined. Agrees with nicardipine and esmolol, will come and see this patient and admit to the ICU. Progress Note:  1:45 AM  Pain is well controlled. Blood pressure is down. Vascular surgeon is in the room evaluating the patient.       Critical Care Time:   CRITICAL CARE NOTE :    1:02 AM  IMPENDING DETERIORATION -Cardiovascular  ASSOCIATED RISK FACTORS - dissection  MANAGEMENT- Bedside Assessment and Supervision of Care  INTERPRETATION -  CT Scan, ECG and Blood Pressure  INTERVENTIONS - hemodynamic mngmt  CASE REVIEW - Medical Sub-Specialist  TREATMENT RESPONSE -Stable  PERFORMED BY - Self    NOTES   :  I have spent 60 minutes of critical care time involved in lab review, consultations with specialist, family decision- making, bedside attention and documentation. During this entire length of time I was immediately available to the patient . Tonya Heath MD    Disposition:  Admit Note:  2:23 AM  Pt is being admitted by Dr. Kelly Yip. The results of their tests and reason(s) for their admission have been discussed with pt and/or available family. They convey agreement and understanding for the need to be admitted and for admission diagnosis. PLAN:  1. Admit to the ICU. Diagnosis     Clinical Impression:   1. Aortic dissection  2. Hypertensive emergency   3. elevated troponin    Attestations: This note is prepared by Lilibeth Hitchcock. Jazmín Ramirez, acting as Scribe for Tonya Heath MD.    Fiona Heath MD: The scribe's documentation has been prepared under my direction and personally reviewed by me in its entirety. I confirm that the note above accurately reflects all work, treatment, procedures, and medical decision making performed by me. This note will not be viewable in [address]

## 2019-02-15 NOTE — ANESTHESIA PROCEDURE NOTES
Arterial Line Placement    Start time: 2/15/2019 3:33 PM  End time: 2/15/2019 3:37 PM  Performed by: Young Wade CRNA  Authorized by: Luis M Droan MD     Pre-Procedure  Timeout Time: 15:08        Procedure:   Prep:  Chlorhexidine  Seldinger Technique?: Yes    Orientation:  Right  Location:  Radial artery  Catheter size:  20 G  Number of attempts:  1  Cont Cardiac Output Sensor: No      Assessment:   Post-procedure:  Line secured and sterile dressing applied  Patient Tolerance:  Patient tolerated the procedure well with no immediate complications
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
## 2019-02-15 NOTE — ED NOTES
Care of patient assumed from triage. Patient presents with complaints of pain to the chest and vomiting. Patient with 10/10 chest pain, doubled up in bed. 12:18 AM  18 GA R AC established. EMS line in [address] [place] [address] verified to be patent. Patient placed on monitor. POC troponin done. Dr. Donia Soulier notified. Orders given for 324 mg of chewable aspirin. 12:20 AM  POC troponin negative. 12:25 AM  Patient off floor for stat CTA. 12:57 AM  Patient blood pressure 200/100s Dr. Yasmin Murray notified. 20 mg of labetolol push given. Pharmacy called to stat send medications. 1:09 AM  Patient blood pressure still in 200/100s. 20 mg labetalol repeated per Dr. Yasmin Murray    1:12 AM  Esmolol and Cardene arrived from pharmacy. Administered to patient. Medications verified with 2nd RN Nery Musa.    1:29 AM  Esmolol increased to 100 mcg/min and cardene increased to 10 mg/hr. 1:30 AM  Patient blood pressure down to 185/98, trending in the right direction. 1:35 AM  Patient blood pressure 171/89. Will continue to monitor. 1:40 AM  Patient blood pressure 155/83. Will continue to monitor. 1:45 AM  Patient blood pressure 151/79. Will continue to monitor. 1:50 AM  Patient blood pressure 147/82. Vascular surgeon at bedside. 2:15 AM  Patient sleeping at this time. Blood pressure 134/76. 2:35 AM   Potassium started. 2:57 AM  CCU called to give report. Will call back shortly for report. 3:04 AM  Verbal report given to [facility], RN. All questions answered. Patient to be transported upstairs.

## 2019-02-15 NOTE — PROGRESS NOTES
Vascular    Denies pain  Abd now S, NT  Extremely oliguric w/ rapid rise in Cr  Likely headed for at least temp HD  Picture is concerning for renal malperfusion  Will get stat CTA C/A/P

## 2019-02-15 NOTE — ANESTHESIA PROCEDURE NOTES
Central Line Placement    Start time: 2/15/2019 3:33 PM  End time: 2/15/2019 3:51 PM  Performed by: Gwendolyn Rm MD  Authorized by: Gwendolyn Rm MD     Indications: vascular access  Preanesthetic Checklist: patient identified, risks and benefits discussed, anesthesia consent, site marked, patient being monitored and timeout performed    Timeout Time: 15:08       Pre-procedure: All elements of maximal sterile barrier technique followed?  Yes    2% Chlorhexidine for cutaneous antisepsis, Hand hygiene performed prior to catheter insertion and Ultrasound guidance    Sterile Ultrasound Technique followed?: Yes            Procedure:   Prep:  Chlorhexidine    Orientation:  Right  Patient position:  Trendelenburg  Catheter type:  Quad lumen  Catheter size:  8.5 Fr  Catheter length:  16 cm  Number of attempts:  1  Successful placement: Yes      Assessment:   Post-procedure:  Catheter secured and sterile dressing with CHG applied  Assessment:  Blood return through all ports, free fluid flow and guidewire removal verified  Insertion:  Uncomplicated  Patient tolerance:  Patient tolerated the procedure well with no immediate complications

## 2019-02-15 NOTE — CDMP QUERY
Account Number: [de-identified] MRN: [id number] Patient: Jannie Watts Created: [id number]:[id number]:00 Clinician Name: Nakia Karimi RN, CCDS Dr. Dima Martell. : 
Patient admitted with Acute thoracic aortic dissection, noted to have Creat of 1.97. If possible, please document in progress notes and d/c summary if you are evaluating and/or treating any of the following:  
 
=> Acute kidney Injury POA 
=> CKD stage 
=> ATN  
=> Other explanation of clinical findings  
=> Clinically Undetermined (no explanation for clinical findings) The medical record reflects the following: 
   Risk Factors: 54yo M w/ HTN, ? sleep apnea Clinical Indicators: GFR 44, Cr 1.97, BUN 26 rising Creat to 3.06, GFR 27 hypertensive on admission. 222/118 Treatment: IVF, nephro consult, anithypertensive meds, follow I&0. enamorado.    
Thank Rajat Gallegos RN, CCDS

## 2019-02-15 NOTE — PERIOP NOTES
Report was received from [address] [place] [address] [address] prior to patient coming to Holding area. Esmolol drip on IV pump @ 100 mics and Cardene is going on IV pump @ 7.5/hr. Dr. Mynor Collins was a beside and inserted a lumbar drain, art line, and Central line. Patient was on cardiac monitors x 3 and oxygen at 4 l/m.

## 2019-02-15 NOTE — PROGRESS NOTES
Patient seen and examined. Denies CP. VSS currently stable on esmolol and nicardipine. Complains of suprapubic pain reports that he feels like he needs to pee but can't. Abd soft and ND. Creatinine elevated. Enamorado catheter ordered. Nephrology, Cardiology, and Cardio-thoracic consulted. Dr. Krystal Dean aware. 13:37 Update: Persistent abd pain despite enamorado catheter placement. Lactic acid 2.9. WBC normal.  H&H stable. Stat CT of the chest, abd, and pelvis pending. Dr. Krystal Dean notified.

## 2019-02-15 NOTE — INTERDISCIPLINARY ROUNDS
Interdisciplinary team rounds were held 2/15/2019 with the following team members:Care Management, Nursing, Nutrition, Pharmacy, Physical Therapy, Physician, Respiratory Therapy and Clinical Coordinator. Plan of care discussed. See clinical pathway and/or care plan for interventions and desired outcomes.

## 2019-02-15 NOTE — PROGRESS NOTES
Patient nauseated, Dr. Ryan baires, orders received for Zofran 4 mg IV A3nteqt PRN. Raised need for A-line placement, MD states cuff pressure is adequate for now. Will continue to monitor.

## 2019-02-15 NOTE — PROGRESS NOTES
1- Dr. Carlos Bautista at the bedside. Aware u/o low. Patient for stat ct. 1310- Patient to CT with Nurse.

## 2019-02-15 NOTE — PROGRESS NOTES
3526- pt arrived to room 2516 via stretcher with girlfriend at his side.      0342-1mg dilaudid given for chest pain    0540- 100ml of emesis-  0550-4mg iv zofran given    See emar- for titration of esmolol and nicardipine    0650- esmolol gtt at 75mcg/kg/min             Nicardipine at 5mg/hr

## 2019-02-15 NOTE — PROGRESS NOTES
Reason for Admission:   Acute thoracic aortic dissection                   RRAT Score:         4            Plan for utilizing home health: To be determined                     Likelihood of Readmission: Mod to high                         Transition of Care Plan: To be determined    CM met with patient and Mother at bedside. Pt confirmed name and  as pt identifiers. Demographics confirmed. Patient has asked that his mother be added as emergency contact. Tomy Mckinnon  [phone]    Patient is single, lives with his mother in a single story home, with ramp entry. ADL's/IADL's  - Independent prior to admission to include steps and driving. DME - none  Preferred Rx - [facility]  HH/SNF - no previous experience    Care Management Interventions  PCP Verified by CM: No(patient does not have a PCP)  Transition of Care Consult (CM Consult): Discharge Planning  Discharge Durable Medical Equipment: No  Physical Therapy Consult: No  Occupational Therapy Consult: No  Current Support Network: Relative's Home(1 story home with ramp entry)  Confirm Follow Up Transport: Family  Plan discussed with Pt/Family/Caregiver: No  Discharge Location  Discharge Placement: Home with home health    CM discussed PCP status with patient. Patient does not have a PCP and will need one set up prior to discharge. Patient confirmed he is Self Pay. Irven Clam with [facility] [employer] notified. Patient is agreeable to screening and information regarding Care Card. Patient will need assistance with cost of prescriptions at time of discharge. Late entry  CM offered assistance with AMD during initial assessment. Patient declined.      Ricardo Gamez RN CM  Ext [phone]
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
## 2019-02-15 NOTE — CONSULTS
Consult    NAME: Melita Young   :  1970   MRN:  [id number]     Date/Time:  2/15/2019 9:46 AM    Patient PCP: None  ________________________________________________________________________     Assessment:     1. Acute type B aortic dissection starting from left subclavian down to at least renals. 2. No hx of CAD, equivicol troponin  3. Unknown EF  4. Sinus with inferior and lateral TWI  5. HTN  6. Lipid ok  7. Snores, concern for sleep apnea, will need outpt sleep study  8. 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 Echo now. Sinus  Discussed with Dr. Arely Villegas concern for mal perfusion of abdomen and kidneys. 1. Hold on aspirin, would like to start soon. 2. Titrate esmolol and nicardipine as needed. 3. Add coreg 6.25mg bid  4. Cont amlodipine  5. Hold valsartan HCT  6. Add  cc/hr, enamorado in place, renal consulted, follow bmp  7. Eventual statin    Discussed with mother, brother, son and patient. [x]        High complexity decision making was performed        Subjective:   CHIEF COMPLAINT: nausea/vomiting    HISTORY OF PRESENT ILLNESS:       Melita Young, 53 y.o. male with a PMHx significant for HTN, fracture (jaw, thumb, and foot), and ruptured ear drum, presents ambulatory to the ED with cc of moderate, constant CP that started at 10:45 PM tonight. Pt reports nausea and vomiting. This patient states he was having intercourse when his Sx started. He states he called 911 at this time and EMS report an EKG was given that showed ST elevations in multiple leads. Pt notes his pain was sharp and stabbing in quality, and was non-radiating. He states this has never happened in the past. He notes he has had indigestion before, but this feels different. Pt notes he has no FHx of CAD.  He states he does not smoke, does not abuse EtOH, and does not use illicit drugs. He states he took an antacid with no relief. Pt states he has taken no ASA PTA to try and relieve his Sx, and none was given by EMS. EMS gave 100 mcg fentanyl 2341 IV. Pt denies any modifying factors to his Sx. Pt specifically denies pleuritic pain, SOB, diaphoresis, numbness, weakness, tingling, calf pain, LE swelling, extremity pain, and lightheadedness. Currently in bed, sleepy, still nauseated, +abd pain. We were asked to consult for work up and evaluation of the above problems. Past Medical History:   Diagnosis Date    Foot fracture     Hypertension     Jaw fracture (HCC)     Ruptured ear drum     Thumb fracture       Past Surgical History:   Procedure Laterality Date    HX APPENDECTOMY      HX ORTHOPAEDIC       No Known Allergies   Meds:  See below  Social History     Tobacco Use    Smoking status: Never Smoker    Smokeless tobacco: Never Used   Substance Use Topics    Alcohol use: No      Family History   Problem Relation Age of Onset    Diabetes Mother        REVIEW OF SYSTEMS:     []         Unable to obtain  ROS due to ---   [x]         Total of 12 systems reviewed as follows:     Total of 12 systems reviewed as follows:       POSITIVE= Bold text  Negative = normal text  General:  fever, chills, sweats, generalized weakness, weight loss/gain,      loss of appetite   Eyes:    blurred vision, eye pain, loss of vision, double vision  ENT:    rhinorrhea, pharyngitis   Respiratory:   cough, sputum production, SOB, ESCOBEDO, wheezing, pleuritic pain   Cardiology:   chest pain, palpitations, orthopnea, PND, edema, syncope   Gastrointestinal:  abdominal pain , N/V, diarrhea, dysphagia, constipation, bleeding   Genitourinary:  frequency, urgency, dysuria, hematuria, incontinence   Muskuloskeletal :  arthralgia, myalgia, back pain  Hematology:  easy bruising, nose or gum bleeding, lymphadenopathy   Dermatological: rash, ulceration, pruritis, color change / jaundice  Endocrine:   hot flashes or polydipsia   Neurological:  headache, dizziness, confusion, focal weakness, paresthesia,     Speech difficulties, memory loss, gait difficulty  Psychological: Feelings of anxiety, depression, agitation    Objective:      Physical Exam:    Last 24hrs VS reviewed since prior progress note. Most recent are:    Visit Vitals  /70   Pulse 68   Temp 97.6 °F (36.4 °C)   Resp 14   Ht 6' 5\" (1.956 m)   Wt 109.4 kg (241 lb 2.9 oz)   SpO2 96%   BMI 28.60 kg/m²       Intake/Output Summary (Last 24 hours) at 2/15/2019 0946  Last data filed at 2/15/2019 0330  Gross per 24 hour   Intake 734.38 ml   Output --   Net 734.38 ml        General Appearance: Well developed, well nourished, alert & oriented x 3,    Mild distress. Ears/Nose/Mouth/Throat: Pupils equal and round, Hearing grossly normal.  Neck: Supple. JVP within normal limits. Carotids good upstrokes, with no bruit. Chest: Lungs clear to auscultation bilaterally. Cardiovascular: Regular rate and rhythm, S1S2 normal, no murmur, rubs, gallops. Abdomen: Distended, mild tender, bowel sounds are hypoactive. No organomegaly. Extremities: No edema bilaterally. Femoral pulses +2, Distal Pulses +1. Skin: Warm and dry. Neuro: CN II-XII grossly intact, Strength and sensation grossly intact. Data:      Prior to Admission medications    Medication Sig Start Date End Date Taking? Authorizing Provider   amLODIPine (NORVASC) 5 mg tablet Take 1 Tab by mouth daily. 8/7/18   Abel Schmitt NP   valsartan-hydroCHLOROthiazide (DIOVAN-HCT) 320-25 mg per tablet Take 1 Tab by mouth daily.  5/14/18   Abel Schmitt NP       Recent Results (from the past 24 hour(s))   EKG, 12 LEAD, INITIAL    Collection Time: 02/15/19 12:04 AM   Result Value Ref Range    Ventricular Rate 67 BPM    Atrial Rate 67 BPM    P-R Interval 160 ms    QRS Duration 92 ms    Q-T Interval 406 ms    QTC Calculation (Bezet) 429 ms    Calculated R Axis 35 degrees Calculated T Axis -66 degrees    Diagnosis       Normal sinus rhythm  ST & T wave abnormality, consider inferior ischemia  No previous ECGs available  Confirmed by Obie Rosenberg MD, Bridget Mckeon ([id number]) on 2/15/2019 9:08:51 AM     POC TROPONIN-I    Collection Time: 02/15/19 12:20 AM   Result Value Ref Range    Troponin-I (POC) <0.04 0.00 - 0.08 ng/mL   CBC WITH AUTOMATED DIFF    Collection Time: 02/15/19 12:21 AM   Result Value Ref Range    WBC 9.2 4.1 - 11.1 K/uL    RBC 4.84 4.10 - 5.70 M/uL    HGB 13.1 12.1 - 17.0 g/dL    HCT 40.1 36.6 - 50.3 %    MCV 82.9 80.0 - 99.0 FL    MCH 27.1 26.0 - 34.0 PG    MCHC 32.7 30.0 - 36.5 g/dL    RDW 14.6 (H) 11.5 - 14.5 %    PLATELET 201 876 - 389 K/uL    MPV 11.1 8.9 - 12.9 FL    NRBC 0.0 0  WBC    ABSOLUTE NRBC 0.00 0.00 - 0.01 K/uL    NEUTROPHILS 42 32 - 75 %    LYMPHOCYTES 47 12 - 49 %    MONOCYTES 7 5 - 13 %    EOSINOPHILS 4 0 - 7 %    BASOPHILS 0 0 - 1 %    IMMATURE GRANULOCYTES 0 0.0 - 0.5 %    ABS. NEUTROPHILS 3.9 1.8 - 8.0 K/UL    ABS. LYMPHOCYTES 4.3 (H) 0.8 - 3.5 K/UL    ABS. MONOCYTES 0.7 0.0 - 1.0 K/UL    ABS. EOSINOPHILS 0.3 0.0 - 0.4 K/UL    ABS. BASOPHILS 0.0 0.0 - 0.1 K/UL    ABS. IMM. GRANS. 0.0 0.00 - 0.04 K/UL    DF AUTOMATED     METABOLIC PANEL, COMPREHENSIVE    Collection Time: 02/15/19 12:21 AM   Result Value Ref Range    Sodium 143 136 - 145 mmol/L    Potassium 2.9 (L) 3.5 - 5.1 mmol/L    Chloride 105 97 - 108 mmol/L    CO2 30 21 - 32 mmol/L    Anion gap 8 5 - 15 mmol/L    Glucose 124 (H) 65 - 100 mg/dL    BUN 26 (H) 6 - 20 MG/DL    Creatinine 1.97 (H) 0.70 - 1.30 MG/DL    BUN/Creatinine ratio 13 12 - 20      GFR est AA 44 (L) >60 ml/min/1.73m2    GFR est non-AA 37 (L) >60 ml/min/1.73m2    Calcium 8.7 8.5 - 10.1 MG/DL    Bilirubin, total 0.6 0.2 - 1.0 MG/DL    ALT (SGPT) 27 12 - 78 U/L    AST (SGOT) 20 15 - 37 U/L    Alk.  phosphatase 50 45 - 117 U/L    Protein, total 7.2 6.4 - 8.2 g/dL    Albumin 3.8 3.5 - 5.0 g/dL    Globulin 3.4 2.0 - 4.0 g/dL    A-G Ratio 1.1 1.1 - 2.2     TROPONIN I    Collection Time: 02/15/19 12:21 AM   Result Value Ref Range    Troponin-I, Qt. 0.11 (H) <0.05 ng/mL   LIPASE    Collection Time: 02/15/19 12:21 AM   Result Value Ref Range    Lipase 146 73 - 393 U/L   TYPE & SCREEN    Collection Time: 02/15/19  1:10 AM   Result Value Ref Range    Crossmatch Expiration 02/18/2019     ABO/Rh(D) O POSITIVE     Antibody screen NEG    EKG, 12 LEAD, INITIAL    Collection Time: 02/15/19  1:42 AM   Result Value Ref Range    Ventricular Rate 68 BPM    Atrial Rate 68 BPM    P-R Interval 194 ms    QRS Duration 104 ms    Q-T Interval 416 ms    QTC Calculation (Bezet) 442 ms    Calculated P Axis 52 degrees    Calculated R Axis 28 degrees    Calculated T Axis -33 degrees    Diagnosis       Normal sinus rhythm  T wave abnormality, consider inferolateral ischemia  No previous ECGs available  Confirmed by Edgar Cobb MD, Juan David Voss ([id number]) on 2/15/2019 9:09:16 AM     CBC WITH AUTOMATED DIFF    Collection Time: 02/15/19  5:28 AM   Result Value Ref Range    WBC 10.0 4.1 - 11.1 K/uL    RBC 4.77 4.10 - 5.70 M/uL    HGB 12.6 12.1 - 17.0 g/dL    HCT 39.7 36.6 - 50.3 %    MCV 83.2 80.0 - 99.0 FL    MCH 26.4 26.0 - 34.0 PG    MCHC 31.7 30.0 - 36.5 g/dL    RDW 14.4 11.5 - 14.5 %    PLATELET 517 (L) 080 - 400 K/uL    MPV 11.5 8.9 - 12.9 FL    NRBC 0.0 0  WBC    ABSOLUTE NRBC 0.00 0.00 - 0.01 K/uL    NEUTROPHILS 85 (H) 32 - 75 %    LYMPHOCYTES 11 (L) 12 - 49 %    MONOCYTES 4 (L) 5 - 13 %    EOSINOPHILS 0 0 - 7 %    BASOPHILS 0 0 - 1 %    IMMATURE GRANULOCYTES 1 (H) 0.0 - 0.5 %    ABS. NEUTROPHILS 8.5 (H) 1.8 - 8.0 K/UL    ABS. LYMPHOCYTES 1.1 0.8 - 3.5 K/UL    ABS. MONOCYTES 0.4 0.0 - 1.0 K/UL    ABS. EOSINOPHILS 0.0 0.0 - 0.4 K/UL    ABS. BASOPHILS 0.0 0.0 - 0.1 K/UL    ABS. IMM.  GRANS. 0.1 (H) 0.00 - 0.04 K/UL    DF AUTOMATED     METABOLIC PANEL, COMPREHENSIVE    Collection Time: 02/15/19  5:28 AM   Result Value Ref Range    Sodium 140 136 - 145 mmol/L    Potassium 3.8 3.5 - 5.1 mmol/L    Chloride 105 97 - 108 mmol/L    CO2 27 21 - 32 mmol/L    Anion gap 8 5 - 15 mmol/L    Glucose 143 (H) 65 - 100 mg/dL    BUN 29 (H) 6 - 20 MG/DL    Creatinine 2.58 (H) 0.70 - 1.30 MG/DL    BUN/Creatinine ratio 11 (L) 12 - 20      GFR est AA 33 (L) >60 ml/min/1.73m2    GFR est non-AA 27 (L) >60 ml/min/1.73m2    Calcium 8.7 8.5 - 10.1 MG/DL    Bilirubin, total 0.7 0.2 - 1.0 MG/DL    ALT (SGPT) 28 12 - 78 U/L    AST (SGOT) 19 15 - 37 U/L    Alk. phosphatase 42 (L) 45 - 117 U/L    Protein, total 7.3 6.4 - 8.2 g/dL    Albumin 4.0 3.5 - 5.0 g/dL    Globulin 3.3 2.0 - 4.0 g/dL    A-G Ratio 1.2 1.1 - 2.2     TROPONIN I    Collection Time: 02/15/19  5:28 AM   Result Value Ref Range    Troponin-I, Qt. 0.11 (H) <0.05 ng/mL   SAMPLES BEING HELD    Collection Time: 02/15/19  5:28 AM   Result Value Ref Range    SAMPLES BEING HELD 1PST     COMMENT        Add-on orders for these samples will be processed based on acceptable specimen integrity and analyte stability, which may vary by analyte.

## 2019-02-15 NOTE — CONSULTS
Consultation Note    NAME: Kathy Araujo   :  1970   MRN:  [id number]     Date/Time:  2/15/2019 1:28 PM    I have been asked to see this patient by Dr. Nora Wiley  for advice/opinion re: MARY. Assessment :    Plan:  MARY on CKD stage 2    HTN/LVH    type B aortic dissection with filling of the true and false lumen    Rapid creatinine rise and dropping urine output (oligoanuric at this time). Johnson. S/p CTA early this am, but creatinine was elevated prior to contrast (I am doubtful that this is BRANDON). Creatinine in  1.5, elevated (2) on arrival, now 3.1. Home BP meds: norvasc 5, diovan /25  Quite HTN on presentation (now controlled on esmolol/cardene gtt, amlodipine 5, coreg 6.25 bid    I agree with NS at 100    [place] urine    I think the benefit from more information with CTA of abd/pelvis outweighs any potential downside from further contrast.     No acute need for RRT, but will need to follow closely. Subjective:   CHIEF COMPLAINT:  mary    HISTORY OF PRESENT ILLNESS:     Sandra Flores is a 53 y.o.   male who has a history of HTN. Presented with CP (sharp, stabbing). Some abdominal pain and n/v earlier this morning. Now comfortable. S/p CTA chest early this am and just back from CTA chest/abd/pelvis. Johnson. No nsaids. Not following with a pcp lately. States he is ok at taking meds (misses doses though). Aunt on HD for unclear etiology. His mother and son were in the room. We discussed that no HD is needed at present. We reviewed that as of now his kidneys are not doing so well and that we will need to follow closely.     Past Medical History:   Diagnosis Date    Foot fracture     Hypertension     Jaw fracture (HCC)     Ruptured ear drum     Thumb fracture       Past Surgical History:   Procedure Laterality Date    HX APPENDECTOMY      HX ORTHOPAEDIC       Social History     Tobacco Use    Smoking status: Never Smoker    Smokeless tobacco: Never Used   Substance Use Topics    Alcohol use: No      Family History   Problem Relation Age of Onset    Diabetes Mother       No Known Allergies   Prior to Admission medications    Medication Sig Start Date End Date Taking? Authorizing Provider   amLODIPine (NORVASC) 5 mg tablet Take 1 Tab by mouth daily. 8/7/18   Marlen Cons, NP   valsartan-hydroCHLOROthiazide (DIOVAN-HCT) 320-25 mg per tablet Take 1 Tab by mouth daily.  5/14/18   Marlen Cons, NP     REVIEW OF SYSTEMS:     []  Unable to obtain reliable ROS due to  [] mental status  [] sedated   [] intubated   [x] Total of 12 systems reviewed as follows:  Constitutional: negative fever, negative chills, negative weight loss  Eyes:   negative visual changes  ENT:   negative sore throat, tongue or lip swelling  Respiratory:  negative cough, negative dyspnea  Cards:  negative for  palpitations, lower extremity edema  GI:   negative for diarrhea  :  negative for frequency, dysuria  Integument:  negative for rash and pruritus  Heme:  negative for easy bruising and gum/nose bleeding  Musculoskel: negative for myalgias,  back pain and muscle weakness  Neuro:  negative for headaches, dizziness, vertigo  Psych:  negative for feelings of anxiety, depression   Travel?: none    Objective:   VITALS:    Visit Vitals  /73   Pulse 75   Temp 97.6 °F (36.4 °C)   Resp 16   Ht 6' 5\" (1.956 m)   Wt 109.4 kg (241 lb 2.9 oz)   SpO2 95%   BMI 28.60 kg/m²     PHYSICAL EXAM:  Gen:  [x]  WD [x]  WN  [] cachectic []  thin []  obese []  disheveled             []  ill apearing  []   Critical  []   Chronic    [x]  No acute distress    HEENT:   [x] NC/AT/PERRLA/EOMI    [] pink conjunctivae      [] pale conjunctivae                  PERRL  [] yes  [] no      [] moist mucosa    [] dry mucosa    hearing intact to voice [x] yes  [] No                 NECK:   supple [x] yes  [] no        masses [] yes  [] No               thyroid  []  non tender  []  tender    RESP:   [x] CTA bilaterally/no wheezing/rhonchi/rales/crackles    [] rhonchi bilaterally - no dullness  [] wheezing   [] rhonchi   [] crackles     use of accessory muscles [] yes [x] no    CARD:   [x]  regular rate and rhythm/No murmurs/rubs/gallops    murmur  [] yes ()  [] no      Rubs  [] yes  [] no       Gallops [] yes  [] no    Rate []  regular  []  irregular        carotid bruits  [] Right  []  Left                 LE edema [] yes  [x] no           JVP  []  yes   [x]  no    ABD:    [x] soft/non distended/mildly tender/+bowel sounds/no HSM    []  Rigid    tenderness [] yes [] no   Liver enlargement  []  yes []  no                Spleen enlargement  []  yes []  no     distended []  yes [] no     bowel sound  [] hypoactive   [] hyperactive    LYMPH:    Neck []  yes [x]  no       Axillae []  yes []  no    SKIN:   Rashes []  yes   [x]  no    Ulcers []  yes   []  no               [] tight to palpitation    skin turgor [x]  good  [] poor  [] decreased               Cyanosis/clubbing []  yes []  no    NEUR:   [x] cranial nerves II-XII grossly intact       [] Cranial nerves deficit                 []  facial droop    []  slurred speech   [] aphasic     [] Strength normal     []  weakness  []  LUE  []   RUE/ []  LLE  []   RLE    follows commands  [x]  yes []  no           PSYCH:   insight [] poor [x] good   Alert and Oriented to  [x] person  [x] place  [x]  time                    [] depressed [] anxious [] agitated  [] lethargic [] stuporous  [] sedated     LAB DATA REVIEWED:    Recent Labs     02/15/19  1114 02/15/19  0528   WBC 9.3 10.0   HGB 12.3 12.6   HCT 38.3 39.7   * 144*     Recent Labs     02/15/19  1114 02/15/19  0528 02/15/19  0021    140 143   K 4.3 3.8 2.9*    105 105   CO2 27 27 30   BUN 34* 29* 26*   CREA 3.06* 2.58* 1.97*   * 143* 124*   CA 8.6 8.7 8.7     Recent Labs     02/15/19  0528 02/15/19  0021   SGOT 19 20   ALT 28 27   AP 42* 50   TBILI 0.7 0.6   ALB 4.0 3.8   GLOB 3.3 3.4   LPSE  --  146     No results for input(s): INR, PTP, APTT in the last 72 hours. No lab exists for component: INREXT   No results for input(s): FE, TIBC, PSAT, FERR in the last 72 hours. No results for input(s): PH, PCO2, PO2 in the last 72 hours. No results for input(s): CPK, CKMB in the last 72 hours. No lab exists for component: TROPONINI  No results found for: GLUCPOC    Procedures: see electronic medical records for all procedures/Xrays and details which were not copied into this note but were reviewed prior to creation of Plan.    ________________________________________________________________________       ___________________________________________________  Consulting Physician:  Angelina Pettit MD

## 2019-02-15 NOTE — H&P
Vascular Surgery  --full note dictated  --acute type B dissection  --symptoms/appearance have improved following BP mgmt  --goals of SBP of 120/HR of 60 with nicardipine/esmolol  --will need repeat CT-A following next lab draw of abd/pelvis  --pain control  --d/w family; will likely require tx of this/ stent graft coverage

## 2019-02-15 NOTE — PROGRESS NOTES
12- TRANSFER - IN REPORT:    Verbal report received from Sandip Sawant RN(name) on Beti Russo  being received from ED(unit) for routine progression of care      Report consisted of patients Situation, Background, Assessment and   Recommendations(SBAR). Information from the following report(s) SBAR, Kardex, ED Summary, Intake/Output, MAR, Accordion, Recent Results, Med Rec Status, Cardiac Rhythm SR on esmolo gtt at 200mcg/kg/min and cardene gtt at 15mg/hr and Alarm Parameters  was reviewed with the receiving nurse. Opportunity for questions and clarification was provided. Assessment completed upon patients arrival to unit and care assumed.

## 2019-02-15 NOTE — PROGRESS NOTES
0730: Report received from [facility].    0800: Pt. Is alert and oriented X4. Pt. Follows commands. Pt. NPO at this time. Breath sounds diminished. Pt. On 2 Liters via NC. Bowel sounds active. Pt. Has Esmolol infusing and Nicardipine infusing to keep a SBP less than 120. See MAR for titrations as both of these are titratable infusions. Pt. Now nauseous and just vomited. Pt. Having CP. Giving Compazine and Fentanyl now as ordered by . Dr. Rhonda Saleem at the bedside. Pt. Urinated 250 ml of urine in the urinal. Bladder scanned showed 200 ml of urine still in the bladder. enamorado inserted per Dr. Hector Reddy NP due to rising CR. 40 ml out after enamorado inserted. 1114: Labs now sent. Will make Dr. Judy Peres aware of these labs when they result. 1122: NS infusion started. 1129: PO HTN meds given with little water per Dr. Derrick sEcobar. 1130: PRN fentanyl given again for ABD pain. 1200: Pt. Reassessed. Pt. States his N/V is much better and CP/ABD pain has improved. 1230: Spoke to Dr. Jacob Courtney regarding patient's VERY low UOP even after enamorado inserted. He is also aware of Lactic acid result of 2.9 and BMP with a higher CR. STAT CT ordered received. 1250: Pt. Now in CT. 1430: Dr. Judy Peres at the bedside. Consents singed by Dr. Jacob Courtney and patient. Chlorhexidine bath not given at this time as OR team at the bedside and stated they would do it in Pre OP. 1454: Ativan given one time as ordered by Dr. Jacob Courtney for Anxiety.

## 2019-02-15 NOTE — ANESTHESIA PREPROCEDURE EVALUATION
Anesthetic History   No history of anesthetic complications            Review of Systems / Medical History  Patient summary reviewed, nursing notes reviewed and pertinent labs reviewed    Pulmonary  Within defined limits                 Neuro/Psych   Within defined limits           Cardiovascular    Hypertension  Valvular problems/murmurs: aortic insufficiency            Exercise tolerance: >4 METS  Comments: Acute Type B Thoracic Aortic Dissection    TTE (2/15/19): Mild AI, EF=60-65%   GI/Hepatic/Renal         Renal disease: ARF and CRI       Endo/Other  Within defined limits          Comments:  Thrombocytopenia Other Findings            Physical Exam    Airway  Mallampati: III  TM Distance: > 6 cm  Neck ROM: normal range of motion   Mouth opening: Normal     Cardiovascular  Regular rate and rhythm,  S1 and S2 normal,  no murmur, click, rub, or gallop             Dental         Pulmonary  Breath sounds clear to auscultation               Abdominal  GI exam deferred       Other Findings            Anesthetic Plan    ASA: 4, emergent  Anesthesia type: general    Monitoring Plan: CVP and Arterial line      Induction: Intravenous  Anesthetic plan and risks discussed with: Patient      Lumbar Drain

## 2019-02-15 NOTE — ANESTHESIA PROCEDURE NOTES
Lumbar Drain Placement    Start time: 2/15/2019 3:10 PM  End time: 2/15/2019 3:21 PM  Performed by: Sharon Vuong MD  Authorized by: Sharon Vuong MD     Pre-Procedure  Indications: Lumbar Drain placement for TEVAR. Preanesthetic Checklist: patient identified, risks and benefits discussed, anesthesia consent, site marked, patient being monitored and timeout performed    Timeout Time: 15:08        Anesthesia and Sedation:   Anesthesia:  Local infiltration  Local Anesthetic:  Lidocaine 1% without epinephrine  Patient Sedated: Yes      Procedure:   Prep:  ChloraPrep  Lumbar Space:  L4-L5 interspace  Patient position:  Seated  Needle gauge: 14 G   Needle Type:  Tuohy needle  Needle length:  3.5  Attempts:  1  Fluid appearance:  Clear    Assessment:   Post-procedure:  Site cleaned  Patient tolerance:  Patient tolerated the procedure well with no immediate complications  Catheter placed and Touhy removed. Catheter secured with sterile bandage.

## 2019-02-15 NOTE — PROGRESS NOTES
CCU called to state that order for Esmolol was put in incorrectly. Dr. Ned Stern notified. Dr. Ned Stern gave verbal order to modify order to 0/200 mcg/ min. Order in [facility] reflects change.

## 2019-02-16 LAB
ALBUMIN SERPL-MCNC: 3.2 G/DL (ref 3.5–5)
ALBUMIN/GLOB SERPL: 1 {RATIO} (ref 1.1–2.2)
ALP SERPL-CCNC: 33 U/L (ref 45–117)
ALT SERPL-CCNC: 60 U/L (ref 12–78)
ANION GAP SERPL CALC-SCNC: 6 MMOL/L (ref 5–15)
AST SERPL-CCNC: 124 U/L (ref 15–37)
BASOPHILS # BLD: 0 K/UL (ref 0–0.1)
BASOPHILS NFR BLD: 0 % (ref 0–1)
BILIRUB DIRECT SERPL-MCNC: 0.1 MG/DL (ref 0–0.2)
BILIRUB SERPL-MCNC: 0.4 MG/DL (ref 0.2–1)
BUN SERPL-MCNC: 36 MG/DL (ref 6–20)
BUN/CREAT SERPL: 12 (ref 12–20)
CALCIUM SERPL-MCNC: 7.1 MG/DL (ref 8.5–10.1)
CHLORIDE SERPL-SCNC: 107 MMOL/L (ref 97–108)
CO2 SERPL-SCNC: 28 MMOL/L (ref 21–32)
CREAT SERPL-MCNC: 3.12 MG/DL (ref 0.7–1.3)
DIFFERENTIAL METHOD BLD: ABNORMAL
EOSINOPHIL # BLD: 0 K/UL (ref 0–0.4)
EOSINOPHIL NFR BLD: 0 % (ref 0–7)
ERYTHROCYTE [DISTWIDTH] IN BLOOD BY AUTOMATED COUNT: 15 % (ref 11.5–14.5)
GLOBULIN SER CALC-MCNC: 3.1 G/DL (ref 2–4)
GLUCOSE SERPL-MCNC: 154 MG/DL (ref 65–100)
HBV SURFACE AB SER QL: NONREACTIVE
HBV SURFACE AB SER-ACNC: <3.1 MIU/ML
HBV SURFACE AG SER QL: <0.1 INDEX
HBV SURFACE AG SER QL: NEGATIVE
HCT VFR BLD AUTO: 35.4 % (ref 36.6–50.3)
HGB BLD-MCNC: 11 G/DL (ref 12.1–17)
IMM GRANULOCYTES # BLD AUTO: 0 K/UL (ref 0–0.04)
IMM GRANULOCYTES NFR BLD AUTO: 0 % (ref 0–0.5)
LACTATE SERPL-SCNC: 1.7 MMOL/L (ref 0.4–2)
LYMPHOCYTES # BLD: 0.8 K/UL (ref 0.8–3.5)
LYMPHOCYTES NFR BLD: 9 % (ref 12–49)
MAGNESIUM SERPL-MCNC: 2 MG/DL (ref 1.6–2.4)
MCH RBC QN AUTO: 26.3 PG (ref 26–34)
MCHC RBC AUTO-ENTMCNC: 31.1 G/DL (ref 30–36.5)
MCV RBC AUTO: 84.7 FL (ref 80–99)
MONOCYTES # BLD: 0.8 K/UL (ref 0–1)
MONOCYTES NFR BLD: 8 % (ref 5–13)
NEUTS SEG # BLD: 7.9 K/UL (ref 1.8–8)
NEUTS SEG NFR BLD: 83 % (ref 32–75)
NRBC # BLD: 0 K/UL (ref 0–0.01)
NRBC BLD-RTO: 0 PER 100 WBC
PHOSPHATE SERPL-MCNC: 3.8 MG/DL (ref 2.6–4.7)
PLATELET # BLD AUTO: 102 K/UL (ref 150–400)
PMV BLD AUTO: 11 FL (ref 8.9–12.9)
POTASSIUM SERPL-SCNC: 4.7 MMOL/L (ref 3.5–5.1)
PROT SERPL-MCNC: 6.3 G/DL (ref 6.4–8.2)
RBC # BLD AUTO: 4.18 M/UL (ref 4.1–5.7)
SODIUM SERPL-SCNC: 141 MMOL/L (ref 136–145)
WBC # BLD AUTO: 9.6 K/UL (ref 4.1–11.1)

## 2019-02-16 PROCEDURE — 80076 HEPATIC FUNCTION PANEL: CPT

## 2019-02-16 PROCEDURE — 77030027138 HC INCENT SPIROMETER -A

## 2019-02-16 PROCEDURE — 83735 ASSAY OF MAGNESIUM: CPT

## 2019-02-16 PROCEDURE — 74011000250 HC RX REV CODE- 250: Performed by: INTERNAL MEDICINE

## 2019-02-16 PROCEDURE — 86706 HEP B SURFACE ANTIBODY: CPT

## 2019-02-16 PROCEDURE — 36415 COLL VENOUS BLD VENIPUNCTURE: CPT

## 2019-02-16 PROCEDURE — 74011250636 HC RX REV CODE- 250/636: Performed by: SURGERY

## 2019-02-16 PROCEDURE — 77010033711 HC HIGH FLOW OXYGEN

## 2019-02-16 PROCEDURE — 77030029684 HC NEB SM VOL KT MONA -A

## 2019-02-16 PROCEDURE — 85025 COMPLETE CBC W/AUTO DIFF WBC: CPT

## 2019-02-16 PROCEDURE — 74011000258 HC RX REV CODE- 258: Performed by: SURGERY

## 2019-02-16 PROCEDURE — 74011000250 HC RX REV CODE- 250: Performed by: SURGERY

## 2019-02-16 PROCEDURE — 77030033269 HC SLV COMPR SCD KNE2 CARD -B

## 2019-02-16 PROCEDURE — 74011000258 HC RX REV CODE- 258: Performed by: INTERNAL MEDICINE

## 2019-02-16 PROCEDURE — 87340 HEPATITIS B SURFACE AG IA: CPT

## 2019-02-16 PROCEDURE — 77030018846 HC SOL IRR STRL H20 ICUM -A

## 2019-02-16 PROCEDURE — 83605 ASSAY OF LACTIC ACID: CPT

## 2019-02-16 PROCEDURE — 94640 AIRWAY INHALATION TREATMENT: CPT

## 2019-02-16 PROCEDURE — 80048 BASIC METABOLIC PNL TOTAL CA: CPT

## 2019-02-16 PROCEDURE — 74011000250 HC RX REV CODE- 250: Performed by: EMERGENCY MEDICINE

## 2019-02-16 PROCEDURE — 77030012794 HC KT NSL CANN/HGH TRAN -A

## 2019-02-16 PROCEDURE — 84100 ASSAY OF PHOSPHORUS: CPT

## 2019-02-16 PROCEDURE — 74011250637 HC RX REV CODE- 250/637: Performed by: SURGERY

## 2019-02-16 PROCEDURE — 65610000006 HC RM INTENSIVE CARE

## 2019-02-16 PROCEDURE — 74011000250 HC RX REV CODE- 250: Performed by: NURSE ANESTHETIST, CERTIFIED REGISTERED

## 2019-02-16 RX ORDER — FENTANYL CITRATE 50 UG/ML
25 INJECTION, SOLUTION INTRAMUSCULAR; INTRAVENOUS
Status: DISCONTINUED | OUTPATIENT
Start: 2019-02-16 | End: 2019-02-18 | Stop reason: ALTCHOICE

## 2019-02-16 RX ORDER — SODIUM CHLORIDE 0.9 % (FLUSH) 0.9 %
5-40 SYRINGE (ML) INJECTION EVERY 8 HOURS
Status: DISCONTINUED | OUTPATIENT
Start: 2019-02-16 | End: 2019-02-26 | Stop reason: HOSPADM

## 2019-02-16 RX ORDER — SODIUM CHLORIDE, SODIUM LACTATE, POTASSIUM CHLORIDE, CALCIUM CHLORIDE 600; 310; 30; 20 MG/100ML; MG/100ML; MG/100ML; MG/100ML
25 INJECTION, SOLUTION INTRAVENOUS CONTINUOUS
Status: DISCONTINUED | OUTPATIENT
Start: 2019-02-16 | End: 2019-02-16

## 2019-02-16 RX ORDER — SODIUM CHLORIDE 450 MG/100ML
25 INJECTION, SOLUTION INTRAVENOUS CONTINUOUS
Status: DISCONTINUED | OUTPATIENT
Start: 2019-02-16 | End: 2019-02-19

## 2019-02-16 RX ORDER — HYDROMORPHONE HYDROCHLORIDE 1 MG/ML
0.2 INJECTION, SOLUTION INTRAMUSCULAR; INTRAVENOUS; SUBCUTANEOUS
Status: DISCONTINUED | OUTPATIENT
Start: 2019-02-16 | End: 2019-02-18 | Stop reason: ALTCHOICE

## 2019-02-16 RX ORDER — DIPHENHYDRAMINE HYDROCHLORIDE 50 MG/ML
12.5 INJECTION, SOLUTION INTRAMUSCULAR; INTRAVENOUS AS NEEDED
Status: ACTIVE | OUTPATIENT
Start: 2019-02-16 | End: 2019-02-16

## 2019-02-16 RX ORDER — ESMOLOL HYDROCHLORIDE 10 MG/ML
0-200 INJECTION, SOLUTION INTRAVENOUS
Status: DISCONTINUED | OUTPATIENT
Start: 2019-02-16 | End: 2019-02-19

## 2019-02-16 RX ORDER — LORAZEPAM 2 MG/ML
1 INJECTION INTRAMUSCULAR ONCE
Status: DISCONTINUED | OUTPATIENT
Start: 2019-02-16 | End: 2019-02-16

## 2019-02-16 RX ORDER — METOPROLOL TARTRATE 5 MG/5ML
5 INJECTION INTRAVENOUS EVERY 6 HOURS
Status: DISCONTINUED | OUTPATIENT
Start: 2019-02-16 | End: 2019-02-16

## 2019-02-16 RX ORDER — SODIUM CHLORIDE 0.9 % (FLUSH) 0.9 %
5-40 SYRINGE (ML) INJECTION AS NEEDED
Status: DISCONTINUED | OUTPATIENT
Start: 2019-02-16 | End: 2019-02-26 | Stop reason: HOSPADM

## 2019-02-16 RX ORDER — ACETAMINOPHEN 650 MG/1
650 SUPPOSITORY RECTAL
Status: DISCONTINUED | OUTPATIENT
Start: 2019-02-16 | End: 2019-02-18

## 2019-02-16 RX ORDER — HYDRALAZINE HYDROCHLORIDE 20 MG/ML
10 INJECTION INTRAMUSCULAR; INTRAVENOUS EVERY 6 HOURS
Status: DISCONTINUED | OUTPATIENT
Start: 2019-02-16 | End: 2019-02-17

## 2019-02-16 RX ORDER — ALBUTEROL SULFATE 0.83 MG/ML
2.5 SOLUTION RESPIRATORY (INHALATION)
Status: DISCONTINUED | OUTPATIENT
Start: 2019-02-16 | End: 2019-02-20

## 2019-02-16 RX ADMIN — ACETAMINOPHEN 650 MG: 650 SUPPOSITORY RECTAL at 18:07

## 2019-02-16 RX ADMIN — SODIUM CHLORIDE 15 MG/HR: 900 INJECTION, SOLUTION INTRAVENOUS at 22:54

## 2019-02-16 RX ADMIN — SODIUM CHLORIDE 15 MG/HR: 900 INJECTION, SOLUTION INTRAVENOUS at 10:08

## 2019-02-16 RX ADMIN — SODIUM CHLORIDE 15 MG/HR: 900 INJECTION, SOLUTION INTRAVENOUS at 19:30

## 2019-02-16 RX ADMIN — SODIUM NITROPRUSSIDE 0.2 MCG/KG/MIN: 25 INJECTION, SOLUTION, CONCENTRATE INTRAVENOUS at 19:00

## 2019-02-16 RX ADMIN — ESMOLOL HYDROCHLORIDE 50 MCG/KG/MIN: 10 INJECTION INTRAVENOUS at 05:12

## 2019-02-16 RX ADMIN — ESMOLOL HYDROCHLORIDE 175 MCG/KG/MIN: 10 INJECTION INTRAVENOUS at 21:15

## 2019-02-16 RX ADMIN — SODIUM CHLORIDE 15 MG/HR: 900 INJECTION, SOLUTION INTRAVENOUS at 08:25

## 2019-02-16 RX ADMIN — SODIUM CHLORIDE 100 ML/HR: 450 INJECTION, SOLUTION INTRAVENOUS at 16:01

## 2019-02-16 RX ADMIN — ALBUTEROL SULFATE 2.5 MG: 2.5 SOLUTION RESPIRATORY (INHALATION) at 20:01

## 2019-02-16 RX ADMIN — METOPROLOL TARTRATE 5 MG: 5 INJECTION INTRAVENOUS at 14:40

## 2019-02-16 RX ADMIN — SODIUM CHLORIDE 15 MG/HR: 900 INJECTION, SOLUTION INTRAVENOUS at 15:33

## 2019-02-16 RX ADMIN — SODIUM CHLORIDE 100 ML/HR: 900 INJECTION, SOLUTION INTRAVENOUS at 15:34

## 2019-02-16 RX ADMIN — SODIUM CHLORIDE 15 MG/HR: 900 INJECTION, SOLUTION INTRAVENOUS at 18:54

## 2019-02-16 RX ADMIN — Medication 10 ML: at 15:34

## 2019-02-16 RX ADMIN — SODIUM CHLORIDE 15 MG/HR: 900 INJECTION, SOLUTION INTRAVENOUS at 11:55

## 2019-02-16 RX ADMIN — DEXMEDETOMIDINE HYDROCHLORIDE 0.3 MCG/KG/HR: 100 INJECTION, SOLUTION INTRAVENOUS at 19:24

## 2019-02-16 RX ADMIN — MUPIROCIN: 20 OINTMENT TOPICAL at 17:53

## 2019-02-16 RX ADMIN — DEXMEDETOMIDINE HYDROCHLORIDE 1.4 MCG/KG/HR: 100 INJECTION, SOLUTION INTRAVENOUS at 15:32

## 2019-02-16 RX ADMIN — DEXMEDETOMIDINE HYDROCHLORIDE 1.4 MCG/KG/HR: 100 INJECTION, SOLUTION INTRAVENOUS at 02:33

## 2019-02-16 RX ADMIN — DEXMEDETOMIDINE HYDROCHLORIDE 1.4 MCG/KG/HR: 100 INJECTION, SOLUTION INTRAVENOUS at 13:09

## 2019-02-16 RX ADMIN — DEXMEDETOMIDINE HYDROCHLORIDE 1.2 MCG/KG/HR: 100 INJECTION, SOLUTION INTRAVENOUS at 05:32

## 2019-02-16 RX ADMIN — MORPHINE SULFATE 2 MG: 2 INJECTION, SOLUTION INTRAMUSCULAR; INTRAVENOUS at 14:20

## 2019-02-16 RX ADMIN — ESMOLOL HYDROCHLORIDE 175 MCG/KG/MIN: 10 INJECTION INTRAVENOUS at 23:13

## 2019-02-16 RX ADMIN — Medication 10 ML: at 05:04

## 2019-02-16 RX ADMIN — ESMOLOL HYDROCHLORIDE 200 MCG/KG/MIN: 10 INJECTION INTRAVENOUS at 19:14

## 2019-02-16 RX ADMIN — SODIUM CHLORIDE 15 MG/HR: 900 INJECTION, SOLUTION INTRAVENOUS at 06:41

## 2019-02-16 RX ADMIN — Medication 10 ML: at 21:16

## 2019-02-16 RX ADMIN — HYDRALAZINE HYDROCHLORIDE 10 MG: 20 INJECTION INTRAMUSCULAR; INTRAVENOUS at 16:48

## 2019-02-16 RX ADMIN — HYDRALAZINE HYDROCHLORIDE 10 MG: 20 INJECTION INTRAMUSCULAR; INTRAVENOUS at 23:15

## 2019-02-16 RX ADMIN — SODIUM CHLORIDE 15 MG/HR: 900 INJECTION, SOLUTION INTRAVENOUS at 05:03

## 2019-02-16 RX ADMIN — SODIUM CHLORIDE 15 MG/HR: 900 INJECTION, SOLUTION INTRAVENOUS at 03:26

## 2019-02-16 RX ADMIN — ESMOLOL HYDROCHLORIDE 50 MCG/KG/MIN: 10 INJECTION INTRAVENOUS at 16:40

## 2019-02-16 RX ADMIN — DEXMEDETOMIDINE HYDROCHLORIDE 0.6 MCG/KG/HR: 100 INJECTION, SOLUTION INTRAVENOUS at 09:01

## 2019-02-16 RX ADMIN — SODIUM CHLORIDE 15 MG/HR: 900 INJECTION, SOLUTION INTRAVENOUS at 01:53

## 2019-02-16 RX ADMIN — SODIUM CHLORIDE 15 MG/HR: 900 INJECTION, SOLUTION INTRAVENOUS at 17:10

## 2019-02-16 RX ADMIN — SODIUM CHLORIDE 15 MG/HR: 900 INJECTION, SOLUTION INTRAVENOUS at 13:43

## 2019-02-16 RX ADMIN — Medication: at 03:25

## 2019-02-16 RX ADMIN — LORAZEPAM 1 MG: 2 INJECTION INTRAMUSCULAR; INTRAVENOUS at 12:18

## 2019-02-16 NOTE — ANESTHESIA POSTPROCEDURE EVALUATION
Procedure(s):  THORACIC ANEURYSM REPAIR ENDOVASCULAR (TEVAR)  LEFT CAROTID TO SUBCLAVIAN BYPASS. Anesthesia Post Evaluation        Patient location during evaluation: PACU  Note status: Adequate. Level of consciousness: responsive to verbal stimuli and sleepy but conscious  Pain management: satisfactory to patient  Airway patency: patent  Anesthetic complications: no  Cardiovascular status: acceptable  Respiratory status: acceptable  Hydration status: acceptable  Comments: +Post-Anesthesia Evaluation and Assessment    Patient: Janell Fulton MRN: [id number]  SSN: [id number]   YOB: 1970  Age: 53 y.o. Sex: male      Cardiovascular Function/Vital Signs    /79   Pulse 78   Temp 36.8 °C (98.2 °F)   Resp 18   Ht 6' 5\" (1.956 m)   Wt 109.4 kg (241 lb 2.9 oz)   SpO2 95%   BMI 28.60 kg/m²     Patient is status post Procedure(s) with comments:  THORACIC ANEURYSM REPAIR ENDOVASCULAR (TEVAR)  LEFT CAROTID TO SUBCLAVIAN BYPASS - Neck also included. .    Nausea/Vomiting: Controlled. Postoperative hydration reviewed and adequate. Pain:  Pain Scale 1: Numeric (0 - 10) (02/15/19 1507)  Pain Intensity 1: 0 (02/15/19 1507)   Managed. Neurological Status:   Neuro (WDL): Within Defined Limits (02/15/19 1510)   At baseline. Mental Status and Level of Consciousness: Arousable. Pulmonary Status:   O2 Device: Ventimask (02/15/19 2246)   Adequate oxygenation and airway patent. Complications related to anesthesia: None    Post-anesthesia assessment completed. No concerns.     Signed By: Jimmie Mcarthur MD    2/15/2019  Post anesthesia nausea and vomiting:  controlled      Visit Vitals  /79   Pulse 78   Temp 36.8 °C (98.2 °F)   Resp 18   Ht 6' 5\" (1.956 m)   Wt 109.4 kg (241 lb 2.9 oz)   SpO2 95%   BMI 28.60 kg/m²

## 2019-02-16 NOTE — BRIEF OP NOTE
BRIEF OPERATIVE NOTE    Date of Procedure: 2/15/2019   Preoperative Diagnosis: thoracic aneurysm  Postoperative Diagnosis: thoracic aneurysm    Procedure(s): TEVAR,  LEFT CAROTID TO SUBCLAVIAN BYPASS, EMBOLIZATION OF LEFT SUBCLAVIAN ARTERY, STENTING OF LEFT RENAL ARTERY  Surgeon(s) and Role:     * Oskar Arvizu MD - Primary     * Pari Trotter MD - Assisting         Surgical Assistant: None    Surgical Staff:  Circ-1: Pamela Morgan RN  Circ-Relief: Maria Elena Garcia RN  Radiology Technician: Jelena Rivero Tech-1: Francisca Soto  Scrub Tech-Relief: Kerry Chaudhry  Scrub RN-1: Nicolette Zapien RN  Surg Asst-1: [id number] Staff: Waldo Calvo RN  Event Time In Time Out   Incision Start 1654    Incision Close 2131      Anesthesia: General   Estimated Blood Loss: 200 cc  Specimens: * No specimens in log *   Findings: Malperfusion of bilateral renal arteries and compromise of inflow to Celiac and SMA. Celiac, SMA, and Rt renal improved after TEVAR. Dissection extended well into Lt renal artery. Good flow to Lt kidney after stenting. Dramatically improved flow in true lumen throughout abdominal aorta after TEVAR. Complications: None  Implants:   Implant Name Type Inv.  Item Serial No.  Lot No. LRB No. Used Action   GRAFT TW STRTCH [id number] -- [employer]  GRAFT TW STRTCH [id number] -- Jagjit Keita [id number] [employer] NA N/A 1 Implanted   valant thoracic stent graft with the captivia delivery system    [id number] [employer] N/A N/A 1 Implanted   valiant thoracic stent gradt with the captivia delivery system   [id number] [employer] N/A N/A 1 Implanted   ev3 everflex protege 7x40 biliary stent   N/A [employer] [id number] N/A 1 Implanted   STENT BILI LD PMTD 6FR 7X27MM -- EXPRESS [id number] - SN/A Stent STENT BILI LD PMTD 6FR 7X27MM -- EXPRESS [id number] N/A [employer] [id number] N/A 1 Implanted

## 2019-02-16 NOTE — PROGRESS NOTES
[facility] INTENSIVIST Consult Service Note  Pulmonary, Critical Care, and Sleep Medicine    Name: Terra Li MRN: [id number]   : 1970 Hospital: [facility]   Date: 2019  Admission date: 2/15/2019 Hospital Day: 2       Subjective/Interval History:       Chart reviewed  D/w vasc sgy and RN  Post op TEVAR and left carotid to [facility] bypass 2/15  BP up   sedation issue- agitated        Patient Active Problem List   Diagnosis Code    Essential hypertension I10    Acute thoracic aortic dissection ([facility] [address] I71.01       IMPRESSION:   1. Uncontrolled HTN on IV esmolol and Nicardipine  2. Acute type B aortic Dissection  3. Severe Chest pain  4. Nausea and vomiting  5. Abdominal pain  6. MARY  7. Urinary retention  Body mass index is 28.6 kg/m². Probable MARITZA  8. Additional workup outlined below  9. Multiorgan dysfunction as outlined above: Pt has one or more acute or chronic illnesses with severe exacerbation with progression or side effects of treatment that poses a threat to life or bodily function  10. Pt is unstable, unpredictable needing more CCU monitoring; at high risk of sudden decline and decompensation with life threatening consequenses and continued end organ dysfunction and failure  11. Pt is critically ill. Time spent with pt and staff actively rendering care, managing pt and coordinating care as stated below;  35 minutes, exclusive of any procedures      RECOMMENDATIONS/PLAN:   1. CCU monitoring  2. Post op assessments w vasc sgy  3. titate oxygenAntiemetics  4. Adjust pain meds  5. Watch GI sx  6. Elective pSG  7. cxr  8. Renal following  9. Transfuse prn to maintain Hgb > 7  10. Glucose monitoring and SSI  11. Replete electrolytes  12. Labs to follow electrolytes, renal function and and blood counts  13. Bronchial hygiene with respiratory therapy techniques, bronchodilators  14.  Pt needs IV fluids with additives and Drug therapy requiring intensive monitoring for toxicity  15. Prescription drug management with home med reconciliation reviewed  16. DVT, SUP prophylaxis  17. Will be available to assist in medical management while in the CCU pending disposition         Subjective/Initial History:   I have reviewed the flowsheet and previous days notes. Seen earlier today on rounds. I was asked by Vianey Howard MD to see Terra Li a 53 y.o.  male  in consultation for a chief complaint of uncontrolled HTN with acute Type B aortic dissection    Excerpts from admission 2/15/2019 and consult notes reviewed as follows:     \"Pt arrived via EMS c/o 10/10 sharp, stabbing chest pain during sexual intercourse tonight. EMS gave 100 mcg fentanyl 2341 IV. Pt denies ETOH or drug use. \"    Per Dr. Zay Palumbo: Terra Li, 53 y.o. male with a PMHx significant for HTN, fracture (jaw, thumb, and foot), and ruptured ear drum, presents ambulatory to the ED with cc of moderate, constant CP that started at 10:45 PM tonight. Pt reports nausea and vomiting. This patient states he was having intercourse when his Sx started. He states he called 911 at this time and EMS report an EKG was given that showed ST elevations in multiple leads. Pt notes his pain was sharp and stabbing in quality, and was non-radiating. He states this has never happened in the past. He notes he has had indigestion before, but this feels different. Pt notes he has no FHx of CAD. He states he does not smoke, does not abuse EtOH, and does not use illicit drugs. He states he took an antacid with no relief. Pt states he has taken no ASA PTA to try and relieve his Sx, and none was given by EMS. EMS gave 100 mcg fentanyl 2341 IV. \"    CTA showed acute type B dissection. Seen by Dr. Julio Ramirez. Now in CCU on IV esmolol and IV n icardipine. Nausea and emesis with abdominal pain after IV dilaudid. Getting IVF. Cr up. D/w nursing and Vascular. Abdomen tender. Will not void while supine.      Patient PCP: None  PMH:  has a past medical history of Foot fracture, Hypertension, Jaw fracture ([address] Ruptured ear drum, and Thumb fracture. PSH:   has a past surgical history that includes hx appendectomy and hx orthopaedic. FHX: family history includes Diabetes in his mother. SHX:  reports that  has never smoked. he has never used smokeless tobacco. He reports that he does not drink alcohol or use drugs. ROS:A comprehensive review of systems was negative except for that written in the HPI.     No Known Allergies   MEDS:   Current Facility-Administered Medications   Medication    sodium chloride (NS) flush 5-40 mL    sodium chloride (NS) flush 5-40 mL    fentaNYL citrate (PF) injection 25 mcg    HYDROmorphone (PF) (DILAUDID) injection 0.2 mg    diphenhydrAMINE (BENADRYL) injection 12.5 mg    meperidine (DEMEROL) injection 12.5 mg    esmolol 10mg/mL (BREVIBLOC) infusion    mupirocin (BACTROBAN) 2 % ointment    ondansetron (ZOFRAN) injection 4 mg    prochlorperazine (COMPAZINE) with saline injection 10 mg    amLODIPine (NORVASC) tablet 5 mg    carvedilol (COREG) tablet 6.25 mg    0.9% sodium chloride infusion    0.9% sodium chloride infusion 250 mL    0.9% sodium chloride infusion 250 mL    niCARdipine (CARDENE) 25 mg in 0.9% sodium chloride 250 mL infusion    morphine injection 2 mg    morphine injection 4 mg    bisacodyl (DULCOLAX) suppository 10 mg    PHENYLephrine (PF)(TRE-SYNEPHRINE) 30 mg in 0.9% sodium chloride 250 mL infusion    LORazepam (ATIVAN) injection 1 mg    dexmedeTOMidine (PRECEDEX) 400 mcg in 0.9% sodium chloride 100 mL infusion        Current Facility-Administered Medications:     sodium chloride (NS) flush 5-40 mL, 5-40 mL, IntraVENous, Q8H, Ko Ortega MD, 10 mL at 02/16/19 0504    sodium chloride (NS) flush 5-40 mL, 5-40 mL, IntraVENous, PRN, Carol Ortega MD    fentaNYL citrate (PF) injection 25 mcg, 25 mcg, IntraVENous, Multiple, Carol Ortega MD  [facility] HYDROmorphone (PF) (DILAUDID) injection 0.2 mg, 0.2 mg, IntraVENous, Multiple, Jamee Ortega MD    diphenhydrAMINE (BENADRYL) injection 12.5 mg, 12.5 mg, IntraVENous, PRN, Jamee Ortega MD    meperidine (DEMEROL) injection 12.5 mg, 12.5 mg, IntraVENous, Q5MIN PRN, Royce Mendoza MD    esmolol 10mg/mL (BREVIBLOC) infusion, 0-200 mcg/kg/min, IntraVENous, TITRATE, Moses Mata MD, Stopped at 02/16/19 1211    mupirocin (BACTROBAN) 2 % ointment, , Both Nostrils, BID, [place], [facility] Center[facility] ARIAS MD    ondansetron [facility]) injection 4 mg, 4 mg, IntraVENous, Q4H PRN, Josi Hawk MD, 4 mg at 02/15/19 0550    prochlorperazine (COMPAZINE) with saline injection 10 mg, 10 mg, IntraVENous, Q6H PRN, Giovany ANTONIO MD, 10 mg at 02/15/19 0902    amLODIPine (NORVASC) tablet 5 mg, 5 mg, Oral, DAILY, Faiza Reilly NP, Stopped at 02/16/19 0900    carvedilol (COREG) tablet 6.25 mg, 6.25 mg, Oral, BID WITH MEALS, Aric Garrido MD, Stopped at 02/16/19 0800    0.9% sodium chloride infusion, 100 mL/hr, IntraVENous, CONTINUOUS, Abilio Aguirre MD, Last Rate: 100 mL/hr at 02/16/19 0810, 100 mL/hr at 02/16/19 0810    0.9% sodium chloride infusion 250 mL, 250 mL, IntraVENous, PRN, Abilio Aguirre MD    0.9% sodium chloride infusion 250 mL, 250 mL, IntraVENous, PRN, Ryley Stafford CRNA    niCARdipine (CARDENE) 25 mg in 0.9% sodium chloride 250 mL infusion, 0-15 mg/hr, IntraVENous, TITRATE, Abilio Aguirre MD, Last Rate: 150 mL/hr at 02/16/19 1155, 15 mg/hr at 02/16/19 1155    morphine injection 2 mg, 2 mg, IntraVENous, Q3H PRN, Abilio Signs, MD    morphine injection 4 mg, 4 mg, IntraVENous, Q3H PRN, Abilio Signs, MD    bisacodyl (DULCOLAX) suppository 10 mg, 10 mg, Rectal, DAILY PRN, Abilio Signs, MD    PHENYLephrine (PF)(TRE-SYNEPHRINE) 30 mg in 0.9% sodium chloride 250 mL infusion,  mcg/min, IntraVENous, TITRATE, Mari Farooq Ernst Manzano MD, Stopped at 02/15/19 2303    LORazepam (ATIVAN) injection 1 mg, 1 mg, IntraVENous, Q4H PRN, Bam Mcgrath MD, 1 mg at 19 1218    dexmedeTOMidine (PRECEDEX) 400 mcg in 0.9% sodium chloride 100 mL infusion, 0.2-1.4 mcg/kg/hr, IntraVENous, TITRATE, Thalia England MD, Last Rate: 38.3 mL/hr at 19 1309, 1.4 mcg/kg/hr at 19 1309      Objective:     Vital Signs: Telemetry:    normal sinus rhythm Intake/Output:   Visit Vitals  /82   Pulse 84   Temp 98.8 °F (37.1 °C)   Resp 14   Ht 6' 5\" (1.956 m)   Wt 109.4 kg (241 lb 2.9 oz)   SpO2 92%   BMI 28.60 kg/m²       Temp (24hrs), Av.2 °F (36.8 °C), Min:97.7 °F (36.5 °C), Max:98.8 °F (37.1 °C)        O2 Device: Hi flow nasal cannula O2 Flow Rate (L/min): 15 l/min         Body mass index is 28.6 kg/m². Wt Readings from Last 4 Encounters:   02/15/19 109.4 kg (241 lb 2.9 oz)   18 110.7 kg (244 lb)   18 109.4 kg (241 lb 2 oz)   18 110.2 kg (243 lb)          Intake/Output Summary (Last 24 hours) at 2019 1327  Last data filed at 2019 1300  Gross per 24 hour   Intake 4045.34 ml   Output 4605 ml   Net -559.66 ml       Last shift:       07 - 1900  In: -   Out: 9024 [Urine:1295; Drains:60]  Last 3 shifts: 1901 -  0700  In: 6018.2 [I.V.:6018.2]  Out: 2222 [Urine:3160; Drains:90]         Physical Exam:     General:  male; somnolent but arousable   HEAD: Normocephalic, without obvious abnormality, atraumatic   EYES: conjunctivae clear. PERRL,  AN Icteric sclerae   NOSE: nares normal, no drainage, no nasal flaring,    THROAT: mucous membranes dry; Lips, mucosa dry; No Thrush;     Neck: Supple, symmetrical, trachea midline,  No accessory mm use; No Stridor/ cuff leak, No goiter or thyroid tenderness   LYMPH: No abnormally enlarged lymph nodes.  in neck   Chest: normal   Lungs: normal air entry   Heart: Regular rate and rhythm; no edema   Abdomen: nondistended, hypoactive bowel sounds, tenderness marked and guarding - in the entire abdomen   : No Johnson; Extremity: negative, clubbing; no joint swelling or erythema   Neuro: speech fluent; withdraws to pain; unable to check gait and station; following simple commands   Psych: oriented to time, place and person ; No agitation;    Devices:per sepsis flow sheet- reviewed with team during IDR   Skin: Warm;    Pulses:Bilateral, Radial, 2+   Capillary refill: warm, well perfused,      Labs:    Recent Labs     02/16/19  0406 02/15/19  1114 02/15/19  0528   WBC 9.6 9.3 10.0   HGB 11.0* 12.3 12.6   * 137* 144*     Recent Labs     02/16/19  0406 02/16/19  0149 02/15/19  1114 02/15/19  0528 02/15/19  0021     --  140 140 143   K 4.7  --  4.3 3.8 2.9*     --  105 105 105   CO2 28  --  27 27 30   *  --  128* 143* 124*   BUN 36*  --  34* 29* 26*   CREA 3.12*  --  3.06* 2.58* 1.97*   CA 7.1*  --  8.6 8.7 8.7   MG 2.0  --   --   --   --    PHOS 3.8  --   --   --   --    LAC  --  1.7 2.9*  --   --    ALB 3.2*  --   --  4.0 3.8   SGOT 124*  --   --  19 20   ALT 60  --   --  28 27   LPSE  --   --   --   --  146     No results for input(s): PH, PCO2, PO2, HCO3, FIO2 in the last 72 hours. Recent Labs     02/15/19  1114 02/15/19  0528 02/15/19  0021   TROIQ 0.10* 0.11* 0.11*     No results found for: BNPP, BNP   No results found for: CULT   No results found for: VANCT, CPK    Imaging:  I have personally reviewed the patients radiographs and have reviewed the reports:    CXR Results  (Last 48 hours)    None        Results from Hospital Encounter encounter on 02/15/19   XR PELV 3 V    Narrative Clinical history: Intraoperative views obtained during aortic stenting. Compliance only    Fluoroscopy dose: 553.22 MG Y     Results from [facility] encounter on 02/15/19   CTA CHEST W OR W WO CONT    Narrative EXAM:  CTA CHEST W OR W WO CONT, CTA ABDOMEN PELV W CONT   INDICATION: Dissection.   COMPARISON: CT chest 2/15/2019. CONTRAST: 100 mL of Isovue-370. TECHNIQUE:   Multislice helical CT arteriography was performed from the thoracic inlet to the  symphysis pubis during uneventful rapid bolus intravenous contrast  administration. No oral contrast was administered. Contiguous 2.5 mm axial  images were reconstructed and lung and soft tissue windows were generated. 3D  postprocessing was performed. Coronal and sagittal reformations and 3-D shaded  surface display renderings were generated. CT dose reduction was achieved  through use of a standardized protocol tailored for this examination and  automatic exposure control for dose modulation. FINDINGS:  The thoracic aorta and great vessels enhance well. There is a type B dissection  beginning in the aortic arch beyond the left subclavian artery and extending  through the descending thoracic and abdominal aorta. The dissection extends through the abdominal aorta. The celiac, superior  mesenteric and renal arteries are patent without stenosis. There is a single  renal artery to each kidney. The mesenteric and right renal arteries all arise  from the true lumen which is smaller than the false lumen. The left renal artery  arises from the false lumen. The inferior mesenteric artery is patent and arises  from the true lumen. The common iliac and external iliac arteries are patent. The dissection extends into the right common and external iliac arteries and  into the left common iliac artery. The ascending aorta measures 3.7 cm. The descending thoracic aorta measures 3.2  cm. The abdominal aorta at the level of the renal arteries measures 2.7 cm. The  common iliac arteries each measure 14-15 mm. The external iliac and common  femoral arteries measure 10 mm. There is mild atelectasis in the lower lobes. There is no mediastinal or hilar adenopathy or mass.   There is absent enhancement of the lower pole the left kidney consistent with  renal infarct and diminished enhancement of the upper pole. There is contrast in  the renal collecting systems and gallbladder from an earlier CT  examination. There are no focal abnormalities within the liver, spleen, pancreas,  adrenal glands or kidneys. The aorta tapers without aneurysm. There is no retroperitoneal adenopathy or  mass. The bowel is normal. The appendix is normal. There is no ascites or free  intraperitoneal air. The prostate gland and seminal vesicles are symmetrical. There is a Johnson  catheter in the urinary bladder which contains contrast and is normal.   There  is no pelvic mass or adenopathy. The bones and soft tissues are within normal limits. Impression IMPRESSION:   1. Type B aortic dissection beginning in the aortic arch and extending into the  right common and extra renal iliac and left common iliac arteries. 2. The mesenteric and right renal arteries all arise from the true lumen. 3. The left renal artery arises from the false lumen and there is an infarct of  the lower pole of the left kidney and diminished enhancement of the upper pole. During this entire length of time the patient's condition was unstable, unpredictable and critically ill in the CCU/ ICU. I was immediately available to the patient whose care required several interactions with nursing, multidisciplinary team members leading to multiple interventions with fluid resuscitation and medication adjustments to optimize respiratory support, hemodynamic treatment, medication changes based on repeat labs results, reviews, exams and assessments. The reason for providing this level of medical care was due to a critical illness that impaired one or more vital organ systems, such that there was a high probability of sudden or life threatening deterioration in the patient's condition.      This care involved high complexity medical decision making to treat acute and unstable vital organ system failure, and to prevent further life threatening deterioration of the patients condition. I personally:  · Reviewed the flowsheet and previous days notes  · Reviewed and summarized records or history from previous days note or discussions with staff, family  · Parenteral controlled substances - Reviewed/ Adjusted / Pasty Harps / Started  · High Risk Drug therapy requiring intensive monitoring for toxicity: eg steroids, pressors, antibiotics  · Reviewed and/or ordered Clinical lab tests  · Reviewed and/or ordered Radiology tests  · Reviewed and/or ordered of Medicine tests  · Independently visualized radiologic Images  · Reviewed the patients ECG / Telemetry  · discussed my assessment/management with : Consultants, Nursing, Pharmacy, Case Management, PT, OT, Respiratory Therapy, Hospitalist and Family for coordination of care           Thank you for allowing us to participate in the care of this patient. We will follow along with you until they no longer require CCU services.     Keshav Gallardo MD

## 2019-02-16 NOTE — PROGRESS NOTES
Vascular    Sedated on Precedex  Not responding to voice  BP/HR good on Cardene and Esmolol  -250/hr  Cr basically stable (3.12 from 3.08 preop)  Abd soft  Groins flat  2+DP/PT bilaterally  Not able to get neuro exam at present  Overall, he is doing very well  Post ATN diuresis  Does not look like he will need HD at present  Dropping platelets probably from graft absorption  Cont bedrest and CSF drainage  D/C bicarb drip  Keep I=O  PO BP meds per Renal and Cardiology  Need to wean off Precedex and get neuro exam

## 2019-02-16 NOTE — PROGRESS NOTES
26 - Bedside report received from Jessica, [facility]. Patient is currently asleep in bed, VSS. Precedex @ 1.2, Cardene @ 15, Esmolol @ 25, NaHCO3 @ 100. Duc spiked and hung on pole but not currently infusing (on standby for SBP < 100). RIJ Merrill and RIJ 4L dressings clean, dry, and inctact. Lumbar drain intact and in place. Midflow O2@ 15L.      0800 - Per MD, increased UOP following ATN is appropriate finding. Orders received to adjust IVF and attempt to wean sedation. 7496 - Precedex @ 0.8. Patient opens eyes to light physical stimulation. Patient asks GEOFF Atkinson I talk? \" RN informed patient that he could speak freely. Patient able to wiggle toes of BLE upon request.  BLE pulses palpable +2. Patient drifted back to sleep quickly in the absence of physical stimulation. Will continue to monitor. Bonifacia.Crafts - Per MD, no need for dialysis at this time. WIll continue to monitor. 36 - Precedex currently at 0.4. Patient's eyes open to light physical stimulation with mouth care. Patient is able to move all extremities to command. Patient c/o dry throat and mucous build up in his throat. Oral care provided. Patient asked if he could get up to expectorate his mucous. RN reminded patient of the need to keep [facility] minimally elevated. Oral suction provided. Patient drifted back to sleep in the absence of physical stimulation. 1720 - Patient drowsy but responds to voice. Precedex at 1.2. Able to participate in incentive spirometry treatment, mobilizing the disc to the 250cc marker for 7 of 10 breaths. 1857 - SPO2 83%. 15L NRB applied. 1905 - SPO2 94%. Report given to GEOFF Lopez.

## 2019-02-16 NOTE — PROGRESS NOTES
Problem: Falls - Risk of  Goal: *Absence of Falls  Document Layton Fall Risk and appropriate interventions in the flowsheet.   Outcome: Progressing Towards Goal  Fall Risk Interventions:            Medication Interventions: Assess postural VS orthostatic hypotension, Bed/chair exit alarm, Evaluate medications/consider consulting pharmacy, Utilize gait belt for transfers/ambulation, Teach patient to arise slowly, Patient to call before getting OOB    Elimination Interventions: Call light in reach, Bed/chair exit alarm, Patient to call for help with toileting needs

## 2019-02-16 NOTE — PROGRESS NOTES
PRogress Note    NAME: Cassius Henson   :  1970   MRN:  [id number]     Date/Time:  2019          Assessment :    Plan:  MARY on CKD stage 2    HTN/LVH    type B aortic dissection with filling of the true and false lumen --S/P TEVAR, RA stenting, (L) carotid bypass           Home BP meds: norvasc 5, diovan /25  Quite HTN on presentation (now controlled on esmolol/cardene gtt, amlodipine 5, coreg 6.25 bid--home meds restarted-holding arb/hcta  No need for HD-adequate uop, creatinine holding at 3.1    Change to 1/2 ns    [place] urine  D/W dr. Casanova Men:   CHIEF COMPLAINT:  Unable to give, seen earlier on rounds    Past Medical History:   Diagnosis Date    Foot fracture     Hypertension     Jaw fracture ([address]     Ruptured ear drum     Thumb fracture       Past Surgical History:   Procedure Laterality Date    HX APPENDECTOMY      HX ORTHOPAEDIC       Social History     Tobacco Use    Smoking status: Never Smoker    Smokeless tobacco: Never Used   Substance Use Topics    Alcohol use: No      Family History   Problem Relation Age of Onset    Diabetes Mother       No Known Allergies   Prior to Admission medications    Medication Sig Start Date End Date Taking? Authorizing Provider   amLODIPine (NORVASC) 5 mg tablet Take 1 Tab by mouth daily. 18   Melvi Malik NP   valsartan-hydroCHLOROthiazide (DIOVAN-HCT) 320-25 mg per tablet Take 1 Tab by mouth daily.  18   Melvi Malik NP     REVIEW OF SYSTEMS:     [x]  Unable to obtain reliable ROS due to  [] mental status  [x] sedated   [] intubated      Objective:   VITALS:    Visit Vitals  /68   Pulse 90   Temp 98.8 °F (37.1 °C)   Resp 16   Ht 6' 5\" (1.956 m)   Wt 109.4 kg (241 lb 2.9 oz)   SpO2 92%   BMI 28.60 kg/m²     PHYSICAL EXAM:  Gen:  [x]  WD [x]  WN  [] cachectic []  thin []  obese []  disheveled             []  ill apearing  []   Critical  []   Chronic    [x]  No acute distress    HEENT:   [x] NC/AT    [] pink conjunctivae      [] pale conjunctivae                  PERRL  [] yes  [] no      [] moist mucosa    [] dry mucosa    hearing intact to voice [x] yes  [] No                   RESP:   [x] CTA bilaterally/no wheezing/rhonchi/rales/crackles    [] rhonchi bilaterally - no dullness  [] wheezing   [] rhonchi   [] crackles     use of accessory muscles [] yes [x] no    CARD:   [x]  regular rate and rhythm/No murmurs/rubs/gallops    murmur  [] yes ()  [] no      Rubs  [] yes  [] no       Gallops [] yes  [] no    Rate []  regular  []  irregular        carotid bruits  [] Right  []  Left                 LE edema [] yes  [x] no           JVP  []  yes   [x]  no    ABD:    [] soft/non distended/mildly tender/+bowel sounds/no HSM    []  Rigid    tenderness [] yes [] no   Liver enlargement  []  yes []  no                Spleen enlargement  []  yes []  no     distended [x]  yes [] no     bowel sound  [x] hypoactive   [] hyperactive    NEUR:   [x] cranial nerves II-XII grossly intact       [] Cranial nerves deficit                   LAB DATA REVIEWED:    Recent Labs     02/16/19  0406 02/15/19  1114   WBC 9.6 9.3   HGB 11.0* 12.3   HCT 35.4* 38.3   * 137*     Recent Labs     02/16/19  0406 02/15/19  1114 02/15/19  0528    140 140   K 4.7 4.3 3.8    105 105   CO2 28 27 27   BUN 36* 34* 29*   CREA 3.12* 3.06* 2.58*   * 128* 143*   CA 7.1* 8.6 8.7   MG 2.0  --   --    PHOS 3.8  --   --      Recent Labs     02/16/19  0406 02/15/19  0528 02/15/19  0021   SGOT 124* 19 20   ALT 60 28 27   AP 33* 42* 50   TBILI 0.4 0.7 0.6   ALB 3.2* 4.0 3.8   GLOB 3.1 3.3 3.4   LPSE  --   --  146     No results for input(s): INR, PTP, APTT in the last 72 hours. No lab exists for component: INREXT, INREXT   No results for input(s): FE, TIBC, PSAT, FERR in the last 72 hours. No results for input(s): PH, PCO2, PO2 in the last 72 hours. No results for input(s): CPK, CKMB in the last 72 hours.     No lab exists for component: TROPONINI  No results found for: GLUCPOC    Procedures: see electronic medical records for all procedures/Xrays and details which were not copied into this note but were reviewed prior to creation of Plan.    ________________________________________________________________________       ___________________________________________________  Consulting Physician: Shane Charlton MD

## 2019-02-16 NOTE — PROGRESS NOTES
Cardiology Progress Note      2/16/2019 11:24 AM    Admit Date: 2/15/2019          Subjective: Course noted. Remains on nicardipene and esmolol gtt. Sedation wened some earlier. Good UO. Visit Vitals  /79   Pulse 79   Temp 97.7 °F (36.5 °C)   Resp 17   Ht 6' 5\" (1.956 m)   Wt 109.4 kg (241 lb 2.9 oz)   SpO2 93%   BMI 28.60 kg/m²     02/14 1901 - 02/16 0700  In: 6018.2 [I.V.:6018.2]  Out: 7519 [Urine:3160; Drains:90]        Objective:      Physical Exam:  VS as above  Chest CTA ant  Card RRR no gallop or rub     Data Review:   Labs:    BUN 36  Creat 3.1   Plat 102K  Hgb 11.0     Telemetry: SR       Assessment:       1. Acute type B aortic dissection starting from left subclavian down to at least renals. S/p TEVAR, left carotid subclavian bypass, renal artery stenting   2. No hx of CAD, equivicol troponin, nl EF by echo   3. Sinus with inferior and lateral TWI  4. HTN  5. Acute kidney injury  6. Thrombocytopenia     Plan: Try and wean pressors and transition to PO meds as tolerated.  No additional recc this am.

## 2019-02-16 NOTE — PROGRESS NOTES
2140- Spoke to OR patient to come directly to ICU for recovery. 2230- Patient restless, attempting to get out of bed, multiple staff present to ensure patient's safety, received order for 1 mg ativan by Dr. Nuria Castillo.   2240- RN to call to get order for precedex if needed, per Dr. Nuria Castillo ok to give ativan if needed prior to precedex drip.   0700- Report to [facility]

## 2019-02-17 ENCOUNTER — APPOINTMENT (OUTPATIENT)
Dept: GENERAL RADIOLOGY | Age: 49
DRG: 173 | End: 2019-02-17
Attending: INTERNAL MEDICINE
Payer: MEDICAID

## 2019-02-17 LAB
ANION GAP SERPL CALC-SCNC: 5 MMOL/L (ref 5–15)
APTT PPP: 33 SEC (ref 22.1–32)
BASOPHILS # BLD: 0 K/UL (ref 0–0.1)
BASOPHILS NFR BLD: 0 % (ref 0–1)
BUN SERPL-MCNC: 36 MG/DL (ref 6–20)
BUN/CREAT SERPL: 16 (ref 12–20)
CALCIUM SERPL-MCNC: 6.8 MG/DL (ref 8.5–10.1)
CHLORIDE SERPL-SCNC: 112 MMOL/L (ref 97–108)
CO2 SERPL-SCNC: 27 MMOL/L (ref 21–32)
CREAT SERPL-MCNC: 2.31 MG/DL (ref 0.7–1.3)
DIFFERENTIAL METHOD BLD: ABNORMAL
EOSINOPHIL # BLD: 0 K/UL (ref 0–0.4)
EOSINOPHIL NFR BLD: 0 % (ref 0–7)
ERYTHROCYTE [DISTWIDTH] IN BLOOD BY AUTOMATED COUNT: 15.1 % (ref 11.5–14.5)
ERYTHROCYTE [DISTWIDTH] IN BLOOD BY AUTOMATED COUNT: 15.5 % (ref 11.5–14.5)
GLUCOSE SERPL-MCNC: 127 MG/DL (ref 65–100)
HCT VFR BLD AUTO: 32.7 % (ref 36.6–50.3)
HCT VFR BLD AUTO: 33.3 % (ref 36.6–50.3)
HGB BLD-MCNC: 10.6 G/DL (ref 12.1–17)
HGB BLD-MCNC: 10.7 G/DL (ref 12.1–17)
IMM GRANULOCYTES # BLD AUTO: 0.1 K/UL (ref 0–0.04)
IMM GRANULOCYTES NFR BLD AUTO: 1 % (ref 0–0.5)
INR PPP: 1.3 (ref 0.9–1.1)
LYMPHOCYTES # BLD: 0.9 K/UL (ref 0.8–3.5)
LYMPHOCYTES NFR BLD: 7 % (ref 12–49)
MAGNESIUM SERPL-MCNC: 1.6 MG/DL (ref 1.6–2.4)
MCH RBC QN AUTO: 26.8 PG (ref 26–34)
MCH RBC QN AUTO: 27 PG (ref 26–34)
MCHC RBC AUTO-ENTMCNC: 32.1 G/DL (ref 30–36.5)
MCHC RBC AUTO-ENTMCNC: 32.4 G/DL (ref 30–36.5)
MCV RBC AUTO: 83.2 FL (ref 80–99)
MCV RBC AUTO: 83.3 FL (ref 80–99)
MONOCYTES # BLD: 1.3 K/UL (ref 0–1)
MONOCYTES NFR BLD: 11 % (ref 5–13)
NEUTS SEG # BLD: 9.5 K/UL (ref 1.8–8)
NEUTS SEG NFR BLD: 81 % (ref 32–75)
NRBC # BLD: 0 K/UL (ref 0–0.01)
NRBC # BLD: 0 K/UL (ref 0–0.01)
NRBC BLD-RTO: 0 PER 100 WBC
NRBC BLD-RTO: 0 PER 100 WBC
PHOSPHATE SERPL-MCNC: 1.9 MG/DL (ref 2.6–4.7)
PLATELET # BLD AUTO: 94 K/UL (ref 150–400)
PLATELET # BLD AUTO: 99 K/UL (ref 150–400)
PMV BLD AUTO: 11.6 FL (ref 8.9–12.9)
PMV BLD AUTO: 12.2 FL (ref 8.9–12.9)
POTASSIUM SERPL-SCNC: 3.6 MMOL/L (ref 3.5–5.1)
PROTHROMBIN TIME: 13 SEC (ref 9–11.1)
RBC # BLD AUTO: 3.93 M/UL (ref 4.1–5.7)
RBC # BLD AUTO: 4 M/UL (ref 4.1–5.7)
SODIUM SERPL-SCNC: 144 MMOL/L (ref 136–145)
THERAPEUTIC RANGE,PTTT: ABNORMAL SECS (ref 58–77)
WBC # BLD AUTO: 11.7 K/UL (ref 4.1–11.1)
WBC # BLD AUTO: 12.5 K/UL (ref 4.1–11.1)

## 2019-02-17 PROCEDURE — 74011000250 HC RX REV CODE- 250: Performed by: INTERNAL MEDICINE

## 2019-02-17 PROCEDURE — 74011250637 HC RX REV CODE- 250/637: Performed by: SURGERY

## 2019-02-17 PROCEDURE — 74011000250 HC RX REV CODE- 250: Performed by: SURGERY

## 2019-02-17 PROCEDURE — 74011250637 HC RX REV CODE- 250/637: Performed by: INTERNAL MEDICINE

## 2019-02-17 PROCEDURE — 36415 COLL VENOUS BLD VENIPUNCTURE: CPT

## 2019-02-17 PROCEDURE — 77030022017 HC DRSG HEMO QCLOT ZMED -A

## 2019-02-17 PROCEDURE — 87040 BLOOD CULTURE FOR BACTERIA: CPT

## 2019-02-17 PROCEDURE — 85025 COMPLETE CBC W/AUTO DIFF WBC: CPT

## 2019-02-17 PROCEDURE — 74011000258 HC RX REV CODE- 258: Performed by: INTERNAL MEDICINE

## 2019-02-17 PROCEDURE — 94640 AIRWAY INHALATION TREATMENT: CPT

## 2019-02-17 PROCEDURE — 74011250636 HC RX REV CODE- 250/636: Performed by: INTERNAL MEDICINE

## 2019-02-17 PROCEDURE — 85027 COMPLETE CBC AUTOMATED: CPT

## 2019-02-17 PROCEDURE — 65610000006 HC RM INTENSIVE CARE

## 2019-02-17 PROCEDURE — 71045 X-RAY EXAM CHEST 1 VIEW: CPT

## 2019-02-17 PROCEDURE — 85610 PROTHROMBIN TIME: CPT

## 2019-02-17 PROCEDURE — 85730 THROMBOPLASTIN TIME PARTIAL: CPT

## 2019-02-17 PROCEDURE — 74011250637 HC RX REV CODE- 250/637: Performed by: NURSE PRACTITIONER

## 2019-02-17 PROCEDURE — 77010033678 HC OXYGEN DAILY

## 2019-02-17 PROCEDURE — 84100 ASSAY OF PHOSPHORUS: CPT

## 2019-02-17 PROCEDURE — 80048 BASIC METABOLIC PNL TOTAL CA: CPT

## 2019-02-17 PROCEDURE — 74011250636 HC RX REV CODE- 250/636: Performed by: SURGERY

## 2019-02-17 PROCEDURE — 77010033711 HC HIGH FLOW OXYGEN

## 2019-02-17 PROCEDURE — 83735 ASSAY OF MAGNESIUM: CPT

## 2019-02-17 PROCEDURE — 74011000258 HC RX REV CODE- 258: Performed by: SURGERY

## 2019-02-17 RX ORDER — MAGNESIUM SULFATE HEPTAHYDRATE 40 MG/ML
2 INJECTION, SOLUTION INTRAVENOUS ONCE
Status: COMPLETED | OUTPATIENT
Start: 2019-02-17 | End: 2019-02-17

## 2019-02-17 RX ORDER — METOPROLOL TARTRATE 5 MG/5ML
5 INJECTION INTRAVENOUS
Status: DISCONTINUED | OUTPATIENT
Start: 2019-02-17 | End: 2019-02-21

## 2019-02-17 RX ORDER — BUMETANIDE 0.25 MG/ML
2 INJECTION INTRAMUSCULAR; INTRAVENOUS ONCE
Status: COMPLETED | OUTPATIENT
Start: 2019-02-17 | End: 2019-02-17

## 2019-02-17 RX ORDER — POTASSIUM CHLORIDE 29.8 MG/ML
20 INJECTION INTRAVENOUS ONCE
Status: COMPLETED | OUTPATIENT
Start: 2019-02-17 | End: 2019-02-17

## 2019-02-17 RX ORDER — ACETAMINOPHEN 325 MG/1
650 TABLET ORAL
Status: DISCONTINUED | OUTPATIENT
Start: 2019-02-17 | End: 2019-02-18

## 2019-02-17 RX ADMIN — CARVEDILOL 6.25 MG: 6.25 TABLET, FILM COATED ORAL at 18:08

## 2019-02-17 RX ADMIN — SODIUM CHLORIDE 12.5 MG/HR: 900 INJECTION, SOLUTION INTRAVENOUS at 16:40

## 2019-02-17 RX ADMIN — SODIUM CHLORIDE 15 MG/HR: 900 INJECTION, SOLUTION INTRAVENOUS at 02:14

## 2019-02-17 RX ADMIN — ESMOLOL HYDROCHLORIDE 200 MCG/KG/MIN: 10 INJECTION INTRAVENOUS at 16:38

## 2019-02-17 RX ADMIN — SODIUM CHLORIDE 100 ML/HR: 450 INJECTION, SOLUTION INTRAVENOUS at 01:06

## 2019-02-17 RX ADMIN — SODIUM CHLORIDE 12.5 MG/HR: 900 INJECTION, SOLUTION INTRAVENOUS at 08:11

## 2019-02-17 RX ADMIN — Medication 10 ML: at 05:04

## 2019-02-17 RX ADMIN — ALBUTEROL SULFATE 2.5 MG: 2.5 SOLUTION RESPIRATORY (INHALATION) at 19:45

## 2019-02-17 RX ADMIN — SODIUM CHLORIDE 12.5 MG/HR: 900 INJECTION, SOLUTION INTRAVENOUS at 12:04

## 2019-02-17 RX ADMIN — ESMOLOL HYDROCHLORIDE 200 MCG/KG/MIN: 10 INJECTION INTRAVENOUS at 14:41

## 2019-02-17 RX ADMIN — MORPHINE SULFATE 2 MG: 2 INJECTION, SOLUTION INTRAMUSCULAR; INTRAVENOUS at 14:35

## 2019-02-17 RX ADMIN — ESMOLOL HYDROCHLORIDE 200 MCG/KG/MIN: 10 INJECTION INTRAVENOUS at 18:33

## 2019-02-17 RX ADMIN — AMLODIPINE BESYLATE 5 MG: 5 TABLET ORAL at 08:59

## 2019-02-17 RX ADMIN — MORPHINE SULFATE 2 MG: 2 INJECTION, SOLUTION INTRAMUSCULAR; INTRAVENOUS at 21:07

## 2019-02-17 RX ADMIN — ESMOLOL HYDROCHLORIDE 100 MCG/KG/MIN: 10 INJECTION INTRAVENOUS at 01:37

## 2019-02-17 RX ADMIN — DEXMEDETOMIDINE HYDROCHLORIDE 0.3 MCG/KG/HR: 100 INJECTION, SOLUTION INTRAVENOUS at 06:27

## 2019-02-17 RX ADMIN — MUPIROCIN: 20 OINTMENT TOPICAL at 09:01

## 2019-02-17 RX ADMIN — SODIUM CHLORIDE: 900 INJECTION, SOLUTION INTRAVENOUS at 10:36

## 2019-02-17 RX ADMIN — ESMOLOL HYDROCHLORIDE 75 MCG/KG/MIN: 10 INJECTION INTRAVENOUS at 12:29

## 2019-02-17 RX ADMIN — ALBUTEROL SULFATE 2.5 MG: 2.5 SOLUTION RESPIRATORY (INHALATION) at 15:40

## 2019-02-17 RX ADMIN — METOPROLOL TARTRATE 5 MG: 5 INJECTION INTRAVENOUS at 17:12

## 2019-02-17 RX ADMIN — ACETAMINOPHEN 650 MG: 325 TABLET ORAL at 18:24

## 2019-02-17 RX ADMIN — Medication 10 ML: at 13:15

## 2019-02-17 RX ADMIN — BUMETANIDE 2 MG: 0.25 INJECTION INTRAMUSCULAR; INTRAVENOUS at 12:32

## 2019-02-17 RX ADMIN — SODIUM CHLORIDE 25 ML/HR: 450 INJECTION, SOLUTION INTRAVENOUS at 18:34

## 2019-02-17 RX ADMIN — METOPROLOL TARTRATE 5 MG: 5 INJECTION INTRAVENOUS at 23:07

## 2019-02-17 RX ADMIN — HYDRALAZINE HYDROCHLORIDE 10 MG: 20 INJECTION INTRAMUSCULAR; INTRAVENOUS at 05:05

## 2019-02-17 RX ADMIN — MUPIROCIN: 20 OINTMENT TOPICAL at 18:10

## 2019-02-17 RX ADMIN — ALBUTEROL SULFATE 2.5 MG: 2.5 SOLUTION RESPIRATORY (INHALATION) at 11:07

## 2019-02-17 RX ADMIN — ALBUTEROL SULFATE 2.5 MG: 2.5 SOLUTION RESPIRATORY (INHALATION) at 07:45

## 2019-02-17 RX ADMIN — CARVEDILOL 6.25 MG: 6.25 TABLET, FILM COATED ORAL at 07:58

## 2019-02-17 RX ADMIN — Medication 10 ML: at 23:07

## 2019-02-17 RX ADMIN — ACETAMINOPHEN 650 MG: 325 TABLET ORAL at 09:06

## 2019-02-17 RX ADMIN — SODIUM CHLORIDE 5 MG/HR: 900 INJECTION, SOLUTION INTRAVENOUS at 22:25

## 2019-02-17 RX ADMIN — ESMOLOL HYDROCHLORIDE 200 MCG/KG/MIN: 10 INJECTION INTRAVENOUS at 22:28

## 2019-02-17 RX ADMIN — POTASSIUM CHLORIDE 20 MEQ: 29.8 INJECTION, SOLUTION INTRAVENOUS at 10:11

## 2019-02-17 RX ADMIN — ESMOLOL HYDROCHLORIDE 200 MCG/KG/MIN: 10 INJECTION INTRAVENOUS at 20:30

## 2019-02-17 RX ADMIN — MAGNESIUM SULFATE HEPTAHYDRATE 2 G: 40 INJECTION, SOLUTION INTRAVENOUS at 10:08

## 2019-02-17 RX ADMIN — ESMOLOL HYDROCHLORIDE 125 MCG/KG/MIN: 10 INJECTION INTRAVENOUS at 12:48

## 2019-02-17 NOTE — PROGRESS NOTES
Lumbar Drain Follow-up Note    2 Days Post-Op sp Procedure(s):  THORACIC ANEURYSM REPAIR ENDOVASCULAR (TEVAR)  LEFT CAROTID TO SUBCLAVIAN BYPASS. Visit Vitals  /70   Pulse 91   Temp (!) 38.5 °C (101.3 °F)   Resp 19   Ht 6' 5\" (1.956 m)   Wt 109.4 kg (241 lb 2.9 oz)   SpO2 90%   BMI 28.60 kg/m²   . Lumbar Drain site is clean, dry and intact. Lumbar drain catheter removed with tip intact. No bleeding or CSF leakage noted after catheter removed. Sterile gauze and Tegaderm placed over site.

## 2019-02-17 NOTE — PROGRESS NOTES
Vascular    Alert  Wants enamorado out  Tm 101.9  On Cardene  Esmolol off now despite HR>100  Nipride last night but off now  SBP 90's this AM  Great UOP   I>O 1700  Looks good  Neuro intact throughout  2+ pedal pulses  Abd soft  Incisions OK  Plt down to 99, WBC 11  Cr 2.3  CXR: mild pulmonary edema and bilateral effusions  Overall, doing well  Excellent renal recovery  Fever most likely 2/2 atelectasis  He is not septic  Volume overloaded 2/2 multiple drips and decrease in post ATN diuresis  Will KVO maintenance IV  D/C Precedex  Restart Esmolol to keep HR<80  Able to take PO Norvasc and Coreg this AM  Lyte repletion, diuresis, Merrill removal per Dr Karthikeyan Beard  Needs to get off bedrest for pulm toilet  Will stop draining CSF for 6 hrs and pull drain if OK  Thrombocytopenia likely 2/2 graft absorption, recheck before pulling drain  Cont enamorado while drain in  Remain in ICU

## 2019-02-17 NOTE — PROGRESS NOTES
PULMONARY ASSOCIATES [facility] INTENSIVIST Consult Service Note  Pulmonary, Critical Care, and Sleep Medicine    Name: Paddy Deleon MRN: [id number]   : 1970 Hospital: [facility]   Date: 2019  Admission date: 2/15/2019 Hospital Day: 3       Subjective/Interval History:       Better  Lumbar drain to come out later  To stay CCU per vas  BP controlled  Renal following crea 2.3        Chart reviewed  D/w vasc sgy and RN  Post op TEVAR and left carotid to SC bypass 2/15  BP up   sedation issue- agitated        Patient Active Problem List   Diagnosis Code    Essential hypertension I10    Acute thoracic aortic dissection ([facility] [address] I71.01       IMPRESSION:   1. Uncontrolled HTN on IV esmolol and Nicardipine  2. Acute type B aortic Dissection  3. Severe Chest pain  4. Nausea and vomiting  5. Abdominal pain  6. MARY  7. Urinary retention  Body mass index is 28.6 kg/m². Probable MARITZA  8. Additional workup outlined below  9. Multiorgan dysfunction as outlined above: Pt has one or more acute or chronic illnesses with severe exacerbation with progression or side effects of treatment that poses a threat to life or bodily function  10. Pt is unstable, unpredictable needing more CCU monitoring; at high risk of sudden decline and decompensation with life threatening consequenses and continued end organ dysfunction and failure  11. Pt is critically ill. Time spent with pt and staff actively rendering care, managing pt and coordinating care as stated below;  35 minutes, exclusive of any procedures      RECOMMENDATIONS/PLAN:   1. CCU monitoring  2. Post op assessments w vasc sgy  3. titate oxygenAntiemetics  4. Adjust pain meds  5. Watch GI sx  6. Elective pSG  7. Labs to follow electrolytes, renal function and and blood counts  8. Bronchial hygiene with respiratory therapy techniques, bronchodilators  9.  Pt needs IV fluids with additives and Drug therapy requiring intensive monitoring for toxicity  10. Prescription drug management with home med reconciliation reviewed  11. DVT, SUP prophylaxis  12. Will be available to assist in medical management while in the CCU pending disposition         Subjective/Initial History:   I have reviewed the flowsheet and previous days notes. Seen earlier today on rounds. I was asked by Perfecto Parikh MD to see Venora Kawasaki a 53 y.o.  male  in consultation for a chief complaint of uncontrolled HTN with acute Type B aortic dissection    Excerpts from admission 2/15/2019 and consult notes reviewed as follows:     \"Pt arrived via EMS c/o 10/10 sharp, stabbing chest pain during sexual intercourse tonight. EMS gave 100 mcg fentanyl 2341 IV. Pt denies ETOH or drug use. \"    Per Dr. Vasquez Kasi: Venora Kawasaki, 53 y.o. male with a PMHx significant for HTN, fracture (jaw, thumb, and foot), and ruptured ear drum, presents ambulatory to the ED with cc of moderate, constant CP that started at 10:45 PM tonight. Pt reports nausea and vomiting. This patient states he was having intercourse when his Sx started. He states he called 911 at this time and EMS report an EKG was given that showed ST elevations in multiple leads. Pt notes his pain was sharp and stabbing in quality, and was non-radiating. He states this has never happened in the past. He notes he has had indigestion before, but this feels different. Pt notes he has no FHx of CAD. He states he does not smoke, does not abuse EtOH, and does not use illicit drugs. He states he took an antacid with no relief. Pt states he has taken no ASA PTA to try and relieve his Sx, and none was given by EMS. EMS gave 100 mcg fentanyl 2341 IV. \"    CTA showed acute type B dissection. Seen by Dr. Cerda Fairly. Now in CCU on IV esmolol and IV n icardipine. Nausea and emesis with abdominal pain after IV dilaudid. Getting IVF. Cr up. D/w nursing and Vascular. Abdomen tender. Will not void while supine.      Patient PCP: None  PMH: has a past medical history of Foot fracture, Hypertension, Jaw fracture ([address] Ruptured ear drum, and Thumb fracture. PSH:   has a past surgical history that includes hx appendectomy and hx orthopaedic. FHX: family history includes Diabetes in his mother. SHX:  reports that  has never smoked. he has never used smokeless tobacco. He reports that he does not drink alcohol or use drugs. ROS:A comprehensive review of systems was negative except for that written in the HPI.     No Known Allergies   MEDS:   Current Facility-Administered Medications   Medication    acetaminophen (TYLENOL) tablet 650 mg    potassium phosphate 20 mmol in 0.9% sodium chloride 250 mL infusion    sodium chloride (NS) flush 5-40 mL    sodium chloride (NS) flush 5-40 mL    fentaNYL citrate (PF) injection 25 mcg    HYDROmorphone (PF) (DILAUDID) injection 0.2 mg    meperidine (DEMEROL) injection 12.5 mg    albuterol (PROVENTIL VENTOLIN) nebulizer solution 2.5 mg    0.45% sodium chloride infusion    acetaminophen (TYLENOL) suppository 650 mg    esmolol 10mg/mL (BREVIBLOC) infusion    niCARdipine (CARDENE) 50 mg in 0.9% sodium chloride 100 mL infusion    nitroPRUSSide (NIPRIDE) 50 mg in dextrose 5% 100 mL infusion    mupirocin (BACTROBAN) 2 % ointment    ondansetron (ZOFRAN) injection 4 mg    prochlorperazine (COMPAZINE) with saline injection 10 mg    amLODIPine (NORVASC) tablet 5 mg    carvedilol (COREG) tablet 6.25 mg    morphine injection 2 mg    morphine injection 4 mg    bisacodyl (DULCOLAX) suppository 10 mg    PHENYLephrine (PF)(TRE-SYNEPHRINE) 30 mg in 0.9% sodium chloride 250 mL infusion    LORazepam (ATIVAN) injection 1 mg        Current Facility-Administered Medications:     acetaminophen (TYLENOL) tablet 650 mg, 650 mg, Oral, Q4H PRN, Ai Parks MD, 650 mg at 02/17/19 0906    potassium phosphate 20 mmol in 0.9% sodium chloride 250 mL infusion, , IntraVENous, ONCE, Ej Catalan MD  [facility] sodium chloride (NS) flush 5-40 mL, 5-40 mL, IntraVENous, Q8H, Héctor Ortega MD, 10 mL at 02/17/19 0504    sodium chloride (NS) flush 5-40 mL, 5-40 mL, IntraVENous, PRN, Héctor Ortega MD    fentaNYL citrate (PF) injection 25 mcg, 25 mcg, IntraVENous, Multiple, Héctor Ortega MD    HYDROmorphone (PF) (DILAUDID) injection 0.2 mg, 0.2 mg, IntraVENous, Multiple, Héctor Ortega MD    meperidine (DEMEROL) injection 12.5 mg, 12.5 mg, IntraVENous, Q5MIN PRN, Elvie Christianson MD    albuterol (PROVENTIL VENTOLIN) nebulizer solution 2.5 mg, 2.5 mg, Inhalation, QID RT, Opal Richards MD, 2.5 mg at 02/17/19 1107    0.45% sodium chloride infusion, 25 mL/hr, IntraVENous, CONTINUOUS, Donavon Candelaria MD, Last Rate: 25 mL/hr at 02/17/19 0826, 25 mL/hr at 02/17/19 0826    acetaminophen (TYLENOL) suppository 650 mg, 650 mg, Rectal, Q4H PRN, Donavon Candelaria MD, 650 mg at 02/16/19 1807    esmolol 10mg/mL (BREVIBLOC) infusion, 0-200 mcg/kg/min, IntraVENous, TITRATE, Donavon Candelaria MD, Last Rate: 50.2 mL/hr at 02/17/19 1229, 75 mcg/kg/min at 02/17/19 1229    niCARdipine (CARDENE) 50 mg in 0.9% sodium chloride 100 mL infusion, 0-15 mg/hr, IntraVENous, TITRATE, Aric Garrido MD, Last Rate: 25 mL/hr at 02/17/19 1204, 12.5 mg/hr at 02/17/19 1204    nitroPRUSSide (NIPRIDE) 50 mg in dextrose 5% 100 mL infusion, 0.2-10 mcg/kg/min, IntraVENous, TITRATE, Donavon Candelaria MD, Stopped at 02/16/19 1940    mupirocin (BACTROBAN) 2 % ointment, , Both Nostrils, BID, [place], Joselito Rodriguez MD    ondansetron [facility]) injection 4 mg, 4 mg, IntraVENous, Q4H PRN, Breanna Lockhart MD, 4 mg at 02/15/19 0550    prochlorperazine (COMPAZINE) with saline injection 10 mg, 10 mg, IntraVENous, Q6H PRN, Saima ANTONIO MD, 10 mg at 02/15/19 0902    amLODIPine (NORVASC) tablet 5 mg, 5 mg, Oral, DAILY, Faiza Reilly NP, 5 mg at 02/17/19 0859    carvedilol (COREG) tablet 6.25 mg, 6.25 mg, Oral, BID WITH MEALS, Aric Garrido MD, 6.25 mg at 19 0758    morphine injection 2 mg, 2 mg, IntraVENous, Q3H PRN, Jayesh Mclain MD, 2 mg at 19 1420    morphine injection 4 mg, 4 mg, IntraVENous, Q3H PRN, Jayesh Mclain MD    bisacodyl (DULCOLAX) suppository 10 mg, 10 mg, Rectal, DAILY PRN, Jayesh Mclain MD    PHENYLephrine (PF)(TRE-SYNEPHRINE) 30 mg in 0.9% sodium chloride 250 mL infusion,  mcg/min, IntraVENous, TITRATE, Jayesh Mclain MD, Stopped at 02/15/19 2303    LORazepam (ATIVAN) injection 1 mg, 1 mg, IntraVENous, Q4H PRN, Jayesh Mclain MD, 1 mg at 19 1218      Objective:     Vital Signs: Telemetry:    normal sinus rhythm Intake/Output:   Visit Vitals  /54   Pulse 96   Temp (!) 101.3 °F (38.5 °C)   Resp 22   Ht 6' 5\" (1.956 m)   Wt 109.4 kg (241 lb 2.9 oz)   SpO2 92%   BMI 28.60 kg/m²       Temp (24hrs), Av.7 °F (38.2 °C), Min:99.7 °F (37.6 °C), Max:101.9 °F (38.8 °C)        O2 Device: Hi flow nasal cannula O2 Flow Rate (L/min): 15 l/min         Body mass index is 28.6 kg/m². Wt Readings from Last 4 Encounters:   02/15/19 109.4 kg (241 lb 2.9 oz)   18 110.7 kg (244 lb)   18 109.4 kg (241 lb 2 oz)   18 110.2 kg (243 lb)          Intake/Output Summary (Last 24 hours) at 2019 1234  Last data filed at 2019 1200  Gross per 24 hour   Intake 4864.14 ml   Output 3487 ml   Net 1377.14 ml       Last shift:       0701 -  1900  In: 576.6 [I.V.:576.6]  Out: 790 [Urine:790]  Last 3 shifts: 02/15 1901 -  0700  In: 8576.6 [I.V.:8576.6]  Out: 6467 [Urine:6015; Drains:332]         Physical Exam:     General:  male; somnolent but arousable   HEAD: Normocephalic, without obvious abnormality, atraumatic   EYES: conjunctivae clear. PERRL,  AN Icteric sclerae   NOSE: nares normal, no drainage, no nasal flaring,    THROAT: mucous membranes dry; Lips, mucosa dry;  No Thrush; Neck: Supple, symmetrical, trachea midline,  No accessory mm use; No Stridor/ cuff leak, No goiter or thyroid tenderness   LYMPH: No abnormally enlarged lymph nodes. in neck   Chest: normal   Lungs: normal air entry   Heart: Regular rate and rhythm; no edema   Abdomen: nondistended, hypoactive bowel sounds, tenderness marked and guarding - in the entire abdomen   : No Johnson; Extremity: negative, clubbing; no joint swelling or erythema   Neuro: speech fluent; withdraws to pain; unable to check gait and station; following simple commands   Psych: oriented to time, place and person ; No agitation;    Devices:per sepsis flow sheet- reviewed with team during IDR   Skin: Warm;    Pulses:Bilateral, Radial, 2+   Capillary refill: warm, well perfused,      Labs:    Recent Labs     02/17/19  1155 02/17/19  0400 02/16/19  0406   WBC 12.5* 11.7* 9.6   HGB 10.6* 10.7* 11.0*   PLT 94* 99* 102*   INR 1.3*  --   --    APTT 33.0*  --   --      Recent Labs     02/17/19  0400 02/16/19  0406 02/16/19  0149 02/15/19  1114 02/15/19  0528 02/15/19  0021    141  --  140 140 143   K 3.6 4.7  --  4.3 3.8 2.9*   * 107  --  105 105 105   CO2 27 28  --  27 27 30   * 154*  --  128* 143* 124*   BUN 36* 36*  --  34* 29* 26*   CREA 2.31* 3.12*  --  3.06* 2.58* 1.97*   CA 6.8* 7.1*  --  8.6 8.7 8.7   MG 1.6 2.0  --   --   --   --    PHOS 1.9* 3.8  --   --   --   --    LAC  --   --  1.7 2.9*  --   --    ALB  --  3.2*  --   --  4.0 3.8   SGOT  --  124*  --   --  19 20   ALT  --  60  --   --  28 27   LPSE  --   --   --   --   --  146     No results for input(s): PH, PCO2, PO2, HCO3, FIO2 in the last 72 hours.   Recent Labs     02/15/19  1114 02/15/19  0528 02/15/19  0021   TROIQ 0.10* 0.11* 0.11*     No results found for: BNPP, BNP   No results found for: CULT   No results found for: VANCT, CPK    Imaging:  I have personally reviewed the patients radiographs and have reviewed the reports:    CXR Results  (Last 48 hours) 02/17/19 0517  XR CHEST PORT Final result    Impression:  IMPRESSION: Minimal pulmonary edema with bibasilar atelectasis and trace   bilateral pleural effusions. Narrative:  INDICATION: Respiratory failure       COMPARISON: None       FINDINGS: Single AP portable view of the chest obtained at 4:19 AM demonstrates   right internal jugular central venous catheter tip overlying the proximal   superior vena cava. Right internal jugular temporary dialysis catheter tip   overlies the right atrium. Heart size is at the upper limits of normal. Aortic   stent graft is present. There is minimal pulmonary edema with bibasilar   atelectasis and trace bilateral pleural effusions. No pneumothorax is seen. Results from Hospital Encounter encounter on 02/15/19   XR CHEST PORT    Narrative INDICATION: Respiratory failure    COMPARISON: None    FINDINGS: Single AP portable view of the chest obtained at 4:19 AM demonstrates  right internal jugular central venous catheter tip overlying the proximal  superior vena cava. Right internal jugular temporary dialysis catheter tip  overlies the right atrium. Heart size is at the upper limits of normal. Aortic  stent graft is present. There is minimal pulmonary edema with bibasilar  atelectasis and trace bilateral pleural effusions. No pneumothorax is seen. Impression IMPRESSION: Minimal pulmonary edema with bibasilar atelectasis and trace  bilateral pleural effusions. XR PELV 3 V    Narrative Clinical history: Intraoperative views obtained during aortic stenting. Compliance only    Fluoroscopy dose: 553.22 MG Y     Results from [place] encounter on 02/15/19   CTA CHEST W OR W WO CONT    Narrative EXAM:  CTA CHEST W OR W WO CONT, CTA ABDOMEN PELV W CONT   INDICATION: Dissection. COMPARISON: CT chest 2/15/2019. CONTRAST: 100 mL of Isovue-370.     TECHNIQUE:   Multislice helical CT arteriography was performed from the thoracic inlet to the  symphysis pubis during uneventful rapid bolus intravenous contrast  administration. No oral contrast was administered. Contiguous 2.5 mm axial  images were reconstructed and lung and soft tissue windows were generated. 3D  postprocessing was performed. Coronal and sagittal reformations and 3-D shaded  surface display renderings were generated. CT dose reduction was achieved  through use of a standardized protocol tailored for this examination and  automatic exposure control for dose modulation. FINDINGS:  The thoracic aorta and great vessels enhance well. There is a type B dissection  beginning in the aortic arch beyond the left subclavian artery and extending  through the descending thoracic and abdominal aorta. The dissection extends through the abdominal aorta. The celiac, superior  mesenteric and renal arteries are patent without stenosis. There is a single  renal artery to each kidney. The mesenteric and right renal arteries all arise  from the true lumen which is smaller than the false lumen. The left renal artery  arises from the false lumen. The inferior mesenteric artery is patent and arises  from the true lumen. The common iliac and external iliac arteries are patent. The dissection extends into the right common and external iliac arteries and  into the left common iliac artery. The ascending aorta measures 3.7 cm. The descending thoracic aorta measures 3.2  cm. The abdominal aorta at the level of the renal arteries measures 2.7 cm. The  common iliac arteries each measure 14-15 mm. The external iliac and common  femoral arteries measure 10 mm. There is mild atelectasis in the lower lobes. There is no mediastinal or hilar adenopathy or mass. There is absent enhancement of the lower pole the left kidney consistent with  renal infarct and diminished enhancement of the upper pole. There is contrast in  the renal collecting systems and gallbladder from an earlier CT  examination. There are no focal abnormalities within the liver, spleen, pancreas,  adrenal glands or kidneys. The aorta tapers without aneurysm. There is no retroperitoneal adenopathy or  mass. The bowel is normal. The appendix is normal. There is no ascites or free  intraperitoneal air. The prostate gland and seminal vesicles are symmetrical. There is a Johnson  catheter in the urinary bladder which contains contrast and is normal.   There  is no pelvic mass or adenopathy. The bones and soft tissues are within normal limits. Impression IMPRESSION:   1. Type B aortic dissection beginning in the aortic arch and extending into the  right common and extra renal iliac and left common iliac arteries. 2. The mesenteric and right renal arteries all arise from the true lumen. 3. The left renal artery arises from the false lumen and there is an infarct of  the lower pole of the left kidney and diminished enhancement of the upper pole. During this entire length of time the patient's condition was unstable, unpredictable and critically ill in the CCU/ ICU. I was immediately available to the patient whose care required several interactions with nursing, multidisciplinary team members leading to multiple interventions with fluid resuscitation and medication adjustments to optimize respiratory support, hemodynamic treatment, medication changes based on repeat labs results, reviews, exams and assessments. The reason for providing this level of medical care was due to a critical illness that impaired one or more vital organ systems, such that there was a high probability of sudden or life threatening deterioration in the patient's condition. This care involved high complexity medical decision making to treat acute and unstable vital organ system failure, and to prevent further life threatening deterioration of the patients condition.  I personally:  · Reviewed the flowsheet and previous days notes  · Reviewed and summarized records or history from previous days note or discussions with staff, family  · Parenteral controlled substances - Reviewed/ Adjusted / Weaned / Started  · High Risk Drug therapy requiring intensive monitoring for toxicity: eg steroids, pressors, antibiotics  · Reviewed and/or ordered Clinical lab tests  · Reviewed and/or ordered Radiology tests  · Reviewed and/or ordered of Medicine tests  · Independently visualized radiologic Images  · Reviewed the patients ECG / Telemetry  · discussed my assessment/management with : Consultants, Nursing, Pharmacy, Case Management, PT, OT, Respiratory Therapy, Hospitalist and Family for coordination of care           Thank you for allowing us to participate in the care of this patient. We will follow along with you until they no longer require CCU services.     Keshav Gallardo MD

## 2019-02-17 NOTE — PROGRESS NOTES
PRogress Note    NAME: Haydee Worley   :  1970   MRN:  [id number]     Date/Time:  2019          Assessment :    Plan:  MARY on CKD stage 2    HTN/LVH    type B aortic dissection with filling of the true and false lumen --S/P TEVAR, RA stenting, (L) carotid bypass           Home BP meds: norvasc 5, diovan /25  Quite HTN on presentation (now controlled on esmolol/cardene gtt, amlodipine 5, coreg 6.25 bid--home meds restarted-holding arb/hcta  No need for HD-adequate uop, creatinine improved to 2.3 but volume expanded. Replete k/mg/phos--bumex x1 since still (+)    [place] urine  D/W dr. Gonsalo Juan          Subjective:   CHIEF COMPLAINT:  thirsty    Past Medical History:   Diagnosis Date    Foot fracture     Hypertension     Jaw fracture ([address]     Ruptured ear drum     Thumb fracture       Past Surgical History:   Procedure Laterality Date    HX APPENDECTOMY      HX ORTHOPAEDIC       Social History     Tobacco Use    Smoking status: Never Smoker    Smokeless tobacco: Never Used   Substance Use Topics    Alcohol use: No      Family History   Problem Relation Age of Onset    Diabetes Mother       No Known Allergies   Prior to Admission medications    Medication Sig Start Date End Date Taking? Authorizing Provider   amLODIPine (NORVASC) 5 mg tablet Take 1 Tab by mouth daily. 18   Dennise Snellen, NP   valsartan-hydroCHLOROthiazide (DIOVAN-HCT) 320-25 mg per tablet Take 1 Tab by mouth daily.  18   Dennise Snellen, NP     REVIEW OF SYSTEMS:    thirsty      Objective:   VITALS:    Visit Vitals  /71   Pulse 98   Temp (!) 100.7 °F (38.2 °C)   Resp 24   Ht 6' 5\" (1.956 m)   Wt 109.4 kg (241 lb 2.9 oz)   SpO2 91%   BMI 28.60 kg/m²     PHYSICAL EXAM:  Gen:  [x]  WD [x]  WN  [] cachectic []  thin []  obese []  disheveled             []  ill apearing  []   Critical  []   Chronic    [x]  No acute distress    HEENT:   [x] NC/AT    [] pink conjunctivae      [] pale conjunctivae PERRL  [] yes  [] no      [] moist mucosa    [] dry mucosa    hearing intact to voice [x] yes  [] No                   RESP:   [x] CTA bilaterally/no wheezing/rhonchi/rales/crackles    [] rhonchi bilaterally - no dullness  [] wheezing   [] rhonchi   [] crackles     use of accessory muscles [] yes [x] no    CARD:   [x]  regular rate and rhythm/No murmurs/rubs/gallops    murmur  [] yes ()  [] no      Rubs  [] yes  [] no       Gallops [] yes  [] no    Rate []  regular  []  irregular        carotid bruits  [] Right  []  Left                 LE edema [] yes  [x] no           JVP  []  yes   [x]  no    ABD:    [] soft/non distended/mildly tender/+bowel sounds/no HSM    []  Rigid    tenderness [] yes [] no   Liver enlargement  []  yes []  no                Spleen enlargement  []  yes []  no     distended [x]  yes [] no     bowel sound  [x] hypoactive   [] hyperactive    NEUR:   [x] cranial nerves II-XII grossly intact       [] Cranial nerves deficit                   LAB DATA REVIEWED:    Recent Labs     02/17/19  0400 02/16/19  0406   WBC 11.7* 9.6   HGB 10.7* 11.0*   HCT 33.3* 35.4*   PLT 99* 102*     Recent Labs     02/17/19  0400 02/16/19  0406 02/15/19  1114    141 140   K 3.6 4.7 4.3   * 107 105   CO2 27 28 27   BUN 36* 36* 34*   CREA 2.31* 3.12* 3.06*   * 154* 128*   CA 6.8* 7.1* 8.6   MG 1.6 2.0  --    PHOS 1.9* 3.8  --      Recent Labs     02/16/19  0406 02/15/19  0528 02/15/19  0021   SGOT 124* 19 20   ALT 60 28 27   AP 33* 42* 50   TBILI 0.4 0.7 0.6   ALB 3.2* 4.0 3.8   GLOB 3.1 3.3 3.4   LPSE  --   --  146     No results for input(s): INR, PTP, APTT in the last 72 hours. No lab exists for component: INREXT, INREXT   No results for input(s): FE, TIBC, PSAT, FERR in the last 72 hours. No results for input(s): PH, PCO2, PO2 in the last 72 hours. No results for input(s): CPK, CKMB in the last 72 hours.     No lab exists for component: TROPONINI  No results found for: GLUCPOC    Procedures: see electronic medical records for all procedures/Xrays and details which were not copied into this note but were reviewed prior to creation of Plan.    ________________________________________________________________________       ___________________________________________________  Consulting Physician: Savannah Mcqueen MD

## 2019-02-17 NOTE — PROGRESS NOTES
2100- Patient's temperature remains 101.9 A, ice pack placed under patient. 0700- Report to [employer].

## 2019-02-17 NOTE — PROGRESS NOTES
Cardiology Progress Note      2/17/2019 12:33 PM    Admit Date: 2/15/2019          Subjective: Course overnight noted. More awake. No chest or back pain. D/W Dr Katie Osgood          Visit Vitals  /54   Pulse 96   Temp (!) 101.3 °F (38.5 °C)   Resp 22   Ht 6' 5\" (1.956 m)   Wt 109.4 kg (241 lb 2.9 oz)   SpO2 92%   BMI 28.60 kg/m²     02/15 1901 - 02/17 0700  In: 8576.6 [I.V.:8576.6]  Out: 6467 [Urine:6015; Drains:332]        Objective:      Physical Exam:  VS as above  Chest occas crcakles  Card RRR no gallop, rub     Data Review:   Labs:    plat 94K  Hgb 10.6  WBC 12.5  BUN 36  Creat 2.3     Telemetry: SR R 95       Assessment:       1. Acute type B aortic dissection starting from left subclavian down to at least renals. S/p TEVAR, left carotid subclavian bypass, left  renal artery stenting   2. No hx of CAD, equivicol troponin, nl EF by echo   3. Sinus with inferior and lateral TWI  4. HTN  5. Acute kidney injury- better  6. Thrombocytopenia   7. Anemia   8. Fever, unlikely sepsis     Plan:  Need to get resting HR down to less than 80. I asked the nurse to titrate up metprolol.  Cardene gtt can be decreased if needed to maintain BP

## 2019-02-17 NOTE — PROGRESS NOTES
Dr. Ramy Atkinson in to see patient. 0900 Blood cultures drawn and sent to lab. O2 sat 89-90%. Patient working with incentive spirometer, needs encouragement but able to get up to 1000. Patient able to wiggle toes, move feet and legs with good sensation. Not draining lumbar drain per MD order. 5127 Dr. Katharine Cook in to see patient. 1000 Patient with movement of legs and feet, no pain, no tingling, sensation present in both legs and feet at this time. 1025 Right IJ casey cath removed at this time. Hand pressure held to site X 5 minutes, no oozing or bleeding noted. Pressure dressing to site. Patient tolerated well. 1045 Patient with no pain or tingling in lower extremities. Patient with movement of legs and feet/toes. Patient has sensation in both legs and feet. 1130 Patient with no pain or tingling in legs or feet. Patient with movement of legs and feet. Patient has sensation in both legs and feet. R neck dressing from casey removal clean, dry and intact. 1215 Temp 101.3. Dr. Ramy Atkinson notified and no change in orders at this time. 1230 No pain and no tingling in lower extremities. Sensation present lower extremities and patient able to move legs. 200 Dr. Berenice Montalvo in to see patient. 1316 titrating up on esmolol to achieve HR goal of 80. Patient able to feel legs and feet. Denies any tingling or pain. Patient moving lower extremities without difficulty. 1400 Patient able to move lower extremities. Patient denies any numbness or tingling. 1410 No pain or tingling in lower extremities. Sensation present both legs and feet and patient able to move legs and feet. Dr. Vargas Noss in and lumbar drain pulled at this time. Patient tolerated well. 1416 Dressing over removal site for lumbar drain clean and dry at this time. 1430 Dressing over removal site for lumbar drain clean, dry and intact. 1500 site on back from removal of lumbar drain clean dry and intact.    Austenelisha Atkinson to update on patient. Patients HR 93-97's with esmolol drip at 200mcg/kg/min. Dr. Mary Yang referred HR management to Dr. Stefani Dimas at this time. [id number] Dr. Stefani Dimas paged. 200 Dr. Stefani Dimas called back and order received for prn metoprolol. 1712 HR 94. Metoprolol 5mg IV given at this time. 1800 HR 94.  1815 HOB elevated 30 degrees for patient to eat clear liquid dinner. Both legs with sensation, movement and no tingling. Dressing on old lumbar drain site clean and dry. 1900 No changes. Old lumbar site dressing remains clean, dry and intact. Report to [place].

## 2019-02-18 ENCOUNTER — APPOINTMENT (OUTPATIENT)
Dept: GENERAL RADIOLOGY | Age: 49
DRG: 173 | End: 2019-02-18
Attending: INTERNAL MEDICINE
Payer: MEDICAID

## 2019-02-18 LAB
ALBUMIN SERPL-MCNC: 2.7 G/DL (ref 3.5–5)
ALBUMIN/GLOB SERPL: 0.8 {RATIO} (ref 1.1–2.2)
ALP SERPL-CCNC: 35 U/L (ref 45–117)
ALT SERPL-CCNC: 45 U/L (ref 12–78)
ANION GAP SERPL CALC-SCNC: 8 MMOL/L (ref 5–15)
APPEARANCE UR: CLEAR
AST SERPL-CCNC: 72 U/L (ref 15–37)
BACTERIA URNS QL MICRO: NEGATIVE /HPF
BASOPHILS # BLD: 0 K/UL (ref 0–0.1)
BASOPHILS NFR BLD: 0 % (ref 0–1)
BILIRUB SERPL-MCNC: 1 MG/DL (ref 0.2–1)
BILIRUB UR QL: NEGATIVE
BUN SERPL-MCNC: 32 MG/DL (ref 6–20)
BUN/CREAT SERPL: 17 (ref 12–20)
CALCIUM SERPL-MCNC: 6.7 MG/DL (ref 8.5–10.1)
CHLORIDE SERPL-SCNC: 111 MMOL/L (ref 97–108)
CO2 SERPL-SCNC: 24 MMOL/L (ref 21–32)
COLOR UR: ABNORMAL
CREAT SERPL-MCNC: 1.88 MG/DL (ref 0.7–1.3)
DIFFERENTIAL METHOD BLD: ABNORMAL
EOSINOPHIL # BLD: 0 K/UL (ref 0–0.4)
EOSINOPHIL NFR BLD: 0 % (ref 0–7)
EPITH CASTS URNS QL MICRO: ABNORMAL /LPF
ERYTHROCYTE [DISTWIDTH] IN BLOOD BY AUTOMATED COUNT: 15.5 % (ref 11.5–14.5)
GLOBULIN SER CALC-MCNC: 3.2 G/DL (ref 2–4)
GLUCOSE SERPL-MCNC: 126 MG/DL (ref 65–100)
GLUCOSE UR STRIP.AUTO-MCNC: NEGATIVE MG/DL
HCT VFR BLD AUTO: 33.2 % (ref 36.6–50.3)
HGB BLD-MCNC: 10.9 G/DL (ref 12.1–17)
HGB UR QL STRIP: ABNORMAL
HYALINE CASTS URNS QL MICRO: ABNORMAL /LPF (ref 0–5)
IMM GRANULOCYTES # BLD AUTO: 0.1 K/UL (ref 0–0.04)
IMM GRANULOCYTES NFR BLD AUTO: 1 % (ref 0–0.5)
KETONES UR QL STRIP.AUTO: NEGATIVE MG/DL
LEFT ABI: 0.82
LEFT ANTERIOR TIBIAL: 132 MMHG
LEFT POSTERIOR TIBIAL: 134 MMHG
LEFT TBI: 0.53
LEFT TOE PRESSURE: 86 MMHG
LEUKOCYTE ESTERASE UR QL STRIP.AUTO: NEGATIVE
LYMPHOCYTES # BLD: 1.1 K/UL (ref 0.8–3.5)
LYMPHOCYTES NFR BLD: 8 % (ref 12–49)
MAGNESIUM SERPL-MCNC: 2.2 MG/DL (ref 1.6–2.4)
MCH RBC QN AUTO: 27.3 PG (ref 26–34)
MCHC RBC AUTO-ENTMCNC: 32.8 G/DL (ref 30–36.5)
MCV RBC AUTO: 83 FL (ref 80–99)
MONOCYTES # BLD: 1.2 K/UL (ref 0–1)
MONOCYTES NFR BLD: 9 % (ref 5–13)
NEUTS SEG # BLD: 11.7 K/UL (ref 1.8–8)
NEUTS SEG NFR BLD: 83 % (ref 32–75)
NITRITE UR QL STRIP.AUTO: NEGATIVE
NRBC # BLD: 0 K/UL (ref 0–0.01)
NRBC BLD-RTO: 0 PER 100 WBC
PH UR STRIP: 5 [PH] (ref 5–8)
PHOSPHATE SERPL-MCNC: 2.3 MG/DL (ref 2.6–4.7)
PLATELET # BLD AUTO: 99 K/UL (ref 150–400)
PMV BLD AUTO: 11.4 FL (ref 8.9–12.9)
POTASSIUM SERPL-SCNC: 3.7 MMOL/L (ref 3.5–5.1)
PROT SERPL-MCNC: 5.9 G/DL (ref 6.4–8.2)
PROT UR STRIP-MCNC: ABNORMAL MG/DL
RBC # BLD AUTO: 4 M/UL (ref 4.1–5.7)
RBC #/AREA URNS HPF: ABNORMAL /HPF (ref 0–5)
RIGHT ABI: 0.98
RIGHT ANTERIOR TIBIAL: 160 MMHG
RIGHT ARM BP: 163 MMHG
RIGHT POSTERIOR TIBIAL: 152 MMHG
RIGHT TBI: 0.6
RIGHT TOE PRESSURE: 97 MMHG
SODIUM SERPL-SCNC: 143 MMOL/L (ref 136–145)
SP GR UR REFRACTOMETRY: 1.01 (ref 1–1.03)
UROBILINOGEN UR QL STRIP.AUTO: 0.2 EU/DL (ref 0.2–1)
WBC # BLD AUTO: 14.1 K/UL (ref 4.1–11.1)
WBC URNS QL MICRO: ABNORMAL /HPF (ref 0–4)

## 2019-02-18 PROCEDURE — 74011000250 HC RX REV CODE- 250: Performed by: INTERNAL MEDICINE

## 2019-02-18 PROCEDURE — 74011000250 HC RX REV CODE- 250: Performed by: SURGERY

## 2019-02-18 PROCEDURE — 36415 COLL VENOUS BLD VENIPUNCTURE: CPT

## 2019-02-18 PROCEDURE — 87040 BLOOD CULTURE FOR BACTERIA: CPT

## 2019-02-18 PROCEDURE — 77010033678 HC OXYGEN DAILY

## 2019-02-18 PROCEDURE — 65610000006 HC RM INTENSIVE CARE

## 2019-02-18 PROCEDURE — 77010033711 HC HIGH FLOW OXYGEN

## 2019-02-18 PROCEDURE — 74011250637 HC RX REV CODE- 250/637: Performed by: SURGERY

## 2019-02-18 PROCEDURE — 74011250637 HC RX REV CODE- 250/637: Performed by: NURSE PRACTITIONER

## 2019-02-18 PROCEDURE — 74011250636 HC RX REV CODE- 250/636: Performed by: SURGERY

## 2019-02-18 PROCEDURE — 74011000258 HC RX REV CODE- 258: Performed by: NURSE PRACTITIONER

## 2019-02-18 PROCEDURE — 74011000258 HC RX REV CODE- 258: Performed by: INTERNAL MEDICINE

## 2019-02-18 PROCEDURE — 80053 COMPREHEN METABOLIC PANEL: CPT

## 2019-02-18 PROCEDURE — 83735 ASSAY OF MAGNESIUM: CPT

## 2019-02-18 PROCEDURE — 81001 URINALYSIS AUTO W/SCOPE: CPT

## 2019-02-18 PROCEDURE — 74011250636 HC RX REV CODE- 250/636: Performed by: INTERNAL MEDICINE

## 2019-02-18 PROCEDURE — 94640 AIRWAY INHALATION TREATMENT: CPT

## 2019-02-18 PROCEDURE — 74011250636 HC RX REV CODE- 250/636: Performed by: NURSE PRACTITIONER

## 2019-02-18 PROCEDURE — 84100 ASSAY OF PHOSPHORUS: CPT

## 2019-02-18 PROCEDURE — 85025 COMPLETE CBC W/AUTO DIFF WBC: CPT

## 2019-02-18 PROCEDURE — 74011250637 HC RX REV CODE- 250/637: Performed by: INTERNAL MEDICINE

## 2019-02-18 PROCEDURE — 71045 X-RAY EXAM CHEST 1 VIEW: CPT

## 2019-02-18 RX ORDER — BUMETANIDE 0.25 MG/ML
2 INJECTION INTRAMUSCULAR; INTRAVENOUS ONCE
Status: DISCONTINUED | OUTPATIENT
Start: 2019-02-18 | End: 2019-02-18

## 2019-02-18 RX ORDER — FACIAL-BODY WIPES
10 EACH TOPICAL ONCE
Status: COMPLETED | OUTPATIENT
Start: 2019-02-18 | End: 2019-02-18

## 2019-02-18 RX ORDER — GUAIFENESIN 100 MG/5ML
81 LIQUID (ML) ORAL DAILY
Status: DISCONTINUED | OUTPATIENT
Start: 2019-02-18 | End: 2019-02-26 | Stop reason: HOSPADM

## 2019-02-18 RX ORDER — BUMETANIDE 0.25 MG/ML
2 INJECTION INTRAMUSCULAR; INTRAVENOUS EVERY 12 HOURS
Status: DISCONTINUED | OUTPATIENT
Start: 2019-02-18 | End: 2019-02-19

## 2019-02-18 RX ORDER — CARVEDILOL 12.5 MG/1
12.5 TABLET ORAL 2 TIMES DAILY WITH MEALS
Status: DISCONTINUED | OUTPATIENT
Start: 2019-02-18 | End: 2019-02-20

## 2019-02-18 RX ORDER — ADHESIVE BANDAGE
30 BANDAGE TOPICAL ONCE
Status: COMPLETED | OUTPATIENT
Start: 2019-02-18 | End: 2019-02-18

## 2019-02-18 RX ORDER — PRAVASTATIN SODIUM 10 MG/1
20 TABLET ORAL
Status: DISCONTINUED | OUTPATIENT
Start: 2019-02-18 | End: 2019-02-26 | Stop reason: HOSPADM

## 2019-02-18 RX ORDER — OXYCODONE AND ACETAMINOPHEN 5; 325 MG/1; MG/1
1 TABLET ORAL
Status: DISCONTINUED | OUTPATIENT
Start: 2019-02-18 | End: 2019-02-21

## 2019-02-18 RX ORDER — CARVEDILOL 6.25 MG/1
6.25 TABLET ORAL
Status: COMPLETED | OUTPATIENT
Start: 2019-02-18 | End: 2019-02-18

## 2019-02-18 RX ORDER — AMOXICILLIN 250 MG
2 CAPSULE ORAL
Status: DISCONTINUED | OUTPATIENT
Start: 2019-02-18 | End: 2019-02-26 | Stop reason: HOSPADM

## 2019-02-18 RX ADMIN — LORAZEPAM 1 MG: 2 INJECTION INTRAMUSCULAR; INTRAVENOUS at 00:16

## 2019-02-18 RX ADMIN — LORAZEPAM 1 MG: 2 INJECTION INTRAMUSCULAR; INTRAVENOUS at 04:30

## 2019-02-18 RX ADMIN — PIPERACILLIN SODIUM,TAZOBACTAM SODIUM 3.38 G: 3; .375 INJECTION, POWDER, FOR SOLUTION INTRAVENOUS at 23:19

## 2019-02-18 RX ADMIN — ESMOLOL HYDROCHLORIDE 75 MCG/KG/MIN: 10 INJECTION INTRAVENOUS at 17:12

## 2019-02-18 RX ADMIN — ACETAMINOPHEN 650 MG: 160 SOLUTION ORAL at 21:41

## 2019-02-18 RX ADMIN — ESMOLOL HYDROCHLORIDE 200 MCG/KG/MIN: 10 INJECTION INTRAVENOUS at 08:20

## 2019-02-18 RX ADMIN — ESMOLOL HYDROCHLORIDE 200 MCG/KG/MIN: 10 INJECTION INTRAVENOUS at 06:25

## 2019-02-18 RX ADMIN — ESMOLOL HYDROCHLORIDE 200 MCG/KG/MIN: 10 INJECTION INTRAVENOUS at 02:19

## 2019-02-18 RX ADMIN — SODIUM CHLORIDE: 900 INJECTION, SOLUTION INTRAVENOUS at 09:43

## 2019-02-18 RX ADMIN — Medication 40 ML: at 17:03

## 2019-02-18 RX ADMIN — PIPERACILLIN SODIUM,TAZOBACTAM SODIUM 3.38 G: 3; .375 INJECTION, POWDER, FOR SOLUTION INTRAVENOUS at 11:47

## 2019-02-18 RX ADMIN — SODIUM CHLORIDE 15 MG/HR: 900 INJECTION, SOLUTION INTRAVENOUS at 03:24

## 2019-02-18 RX ADMIN — SODIUM CHLORIDE 15 MG/HR: 900 INJECTION, SOLUTION INTRAVENOUS at 07:06

## 2019-02-18 RX ADMIN — BUMETANIDE 2 MG: 0.25 INJECTION INTRAMUSCULAR; INTRAVENOUS at 09:41

## 2019-02-18 RX ADMIN — LORAZEPAM 1 MG: 2 INJECTION INTRAMUSCULAR; INTRAVENOUS at 11:27

## 2019-02-18 RX ADMIN — SODIUM CHLORIDE 15 MG/HR: 900 INJECTION, SOLUTION INTRAVENOUS at 14:34

## 2019-02-18 RX ADMIN — CARVEDILOL 6.25 MG: 6.25 TABLET, FILM COATED ORAL at 08:24

## 2019-02-18 RX ADMIN — ESMOLOL HYDROCHLORIDE 200 MCG/KG/MIN: 10 INJECTION INTRAVENOUS at 10:06

## 2019-02-18 RX ADMIN — CARVEDILOL 12.5 MG: 12.5 TABLET, FILM COATED ORAL at 17:03

## 2019-02-18 RX ADMIN — MORPHINE SULFATE 4 MG: 4 INJECTION INTRAVENOUS at 02:40

## 2019-02-18 RX ADMIN — CARVEDILOL 6.25 MG: 6.25 TABLET, FILM COATED ORAL at 09:53

## 2019-02-18 RX ADMIN — MUPIROCIN: 20 OINTMENT TOPICAL at 08:25

## 2019-02-18 RX ADMIN — SODIUM CHLORIDE 15 MG/HR: 900 INJECTION, SOLUTION INTRAVENOUS at 17:17

## 2019-02-18 RX ADMIN — SENNOSIDES AND DOCUSATE SODIUM 2 TABLET: 8.6; 5 TABLET ORAL at 23:23

## 2019-02-18 RX ADMIN — ESMOLOL HYDROCHLORIDE 200 MCG/KG/MIN: 10 INJECTION INTRAVENOUS at 00:24

## 2019-02-18 RX ADMIN — MUPIROCIN: 20 OINTMENT TOPICAL at 17:05

## 2019-02-18 RX ADMIN — ESMOLOL HYDROCHLORIDE 200 MCG/KG/MIN: 10 INJECTION INTRAVENOUS at 04:21

## 2019-02-18 RX ADMIN — BUMETANIDE 2 MG: 0.25 INJECTION INTRAMUSCULAR; INTRAVENOUS at 20:33

## 2019-02-18 RX ADMIN — SODIUM CHLORIDE 12.5 MG/HR: 900 INJECTION, SOLUTION INTRAVENOUS at 20:43

## 2019-02-18 RX ADMIN — ALBUTEROL SULFATE 2.5 MG: 2.5 SOLUTION RESPIRATORY (INHALATION) at 08:01

## 2019-02-18 RX ADMIN — SODIUM CHLORIDE 15 MG/HR: 900 INJECTION, SOLUTION INTRAVENOUS at 10:29

## 2019-02-18 RX ADMIN — BUMETANIDE 2 MG: 0.25 INJECTION INTRAMUSCULAR; INTRAVENOUS at 17:02

## 2019-02-18 RX ADMIN — Medication 10 ML: at 23:26

## 2019-02-18 RX ADMIN — ALBUTEROL SULFATE 2.5 MG: 2.5 SOLUTION RESPIRATORY (INHALATION) at 15:21

## 2019-02-18 RX ADMIN — MAGNESIUM HYDROXIDE 30 ML: 400 SUSPENSION ORAL at 15:06

## 2019-02-18 RX ADMIN — ESMOLOL HYDROCHLORIDE 150 MCG/KG/MIN: 10 INJECTION INTRAVENOUS at 14:35

## 2019-02-18 RX ADMIN — ALBUTEROL SULFATE 2.5 MG: 2.5 SOLUTION RESPIRATORY (INHALATION) at 11:51

## 2019-02-18 RX ADMIN — AMLODIPINE BESYLATE 5 MG: 5 TABLET ORAL at 08:24

## 2019-02-18 RX ADMIN — ESMOLOL HYDROCHLORIDE 150 MCG/KG/MIN: 10 INJECTION INTRAVENOUS at 11:53

## 2019-02-18 RX ADMIN — PIPERACILLIN SODIUM,TAZOBACTAM SODIUM 3.38 G: 3; .375 INJECTION, POWDER, FOR SOLUTION INTRAVENOUS at 17:04

## 2019-02-18 RX ADMIN — ASPIRIN 81 MG 81 MG: 81 TABLET ORAL at 11:52

## 2019-02-18 RX ADMIN — PRAVASTATIN SODIUM 20 MG: 10 TABLET ORAL at 23:23

## 2019-02-18 RX ADMIN — Medication 10 ML: at 06:27

## 2019-02-18 RX ADMIN — BISACODYL 10 MG: 10 SUPPOSITORY RECTAL at 13:00

## 2019-02-18 RX ADMIN — ALBUTEROL SULFATE 2.5 MG: 2.5 SOLUTION RESPIRATORY (INHALATION) at 19:16

## 2019-02-18 NOTE — INTERDISCIPLINARY ROUNDS
Interdisciplinary team rounds were held 2/18/2019 with the following team members:Care Management, Diabetes Treatment Specialist, Nursing, Nutrition, Pharmacy, Physician and Respiratory Therapy. Plan of care discussed. See clinical pathway and/or care plan for interventions and desired outcomes.

## 2019-02-18 NOTE — PROGRESS NOTES
1900 Bedside report received from Rosa Quevedo RN via [employer]. Sensation are present to lower extremities and able to move without difficulty. 2330 Put on NRM for sats 86% on hi donna NC.    2345 Sats up to 95%, will continue to monitor. 0015 Ativan 1 mg given for restlessness, trying to get out of bed.    0330 Cardene increase to 15 mg/hr for BP >160.    0430 Ativan 1 mg given for restlessness, trying to get out of bed.

## 2019-02-18 NOTE — PROGRESS NOTES
[facility] INTENSIVIST Consult Service Note  Pulmonary, Critical Care, and Sleep Medicine    Name: Anastacio Chester MRN: [id number]   : 1970 Hospital: [facility]   Date: 2019  Admission date: 2/15/2019 Hospital Day: 4           IMPRESSION:   1. Uncontrolled HTN on IV esmolol and Nicardipine  2. Acute type B aortic Dissection  3. Severe Chest pain  4. Nausea and vomiting  5. Abdominal pain  6. MARY  7. Urinary retention  Probable MARITZA  8. Additional workup outlined below  9. Multiorgan dysfunction as outlined above: Pt has one or more acute or chronic illnesses with severe exacerbation with progression or side effects of treatment that poses a threat to life or bodily function  10. Pt is unstable, unpredictable needing more CCU monitoring; at high risk of sudden decline and decompensation with life threatening consequenses and continued end organ dysfunction and failure  11. Pt is critically ill. Time spent with pt and staff actively rendering care, managing pt and coordinating care as stated below;  35 minutes, exclusive of any procedures      RECOMMENDATIONS/PLAN:   1. CCU monitoring  2. Critically ill  3. Post op assessments w vasc sgy  4. BP control with current drips  5. Needs diuretics  6. titate oxygen to keep sats > 90%  7. Antiemetics  8. Adjust pain meds  9. Watch GI sx  10. Elective pSG  11. Labs to follow electrolytes, renal function and and blood counts  12. Bronchial hygiene with respiratory therapy techniques, bronchodilators  13. Pt needs IV fluids with additives and Drug therapy requiring intensive monitoring for toxicity  14. Prescription drug management with home med reconciliation reviewed  15. DVT, SUP prophylaxis  16. Will be available to assist in medical management while in the CCU pending disposition         Subjective/Initial History:   I have reviewed the flowsheet and previous days notes. Seen earlier today on rounds.     More hypoxic overnight, most likely due to pulmonary edema  Today critically ill still on multiple drips to control BP  Critically ill        ROS:A comprehensive review of systems was negative except for that written in the HPI.     No Known Allergies   MEDS:   Current Facility-Administered Medications   Medication    piperacillin-tazobactam (ZOSYN) 3.375 g in 0.9% sodium chloride (MBP/ADV) 100 mL    Followed by    piperacillin-tazobactam (ZOSYN) 3.375 g in 0.9% sodium chloride (MBP/ADV) 100 mL    potassium phosphate 20 mmol in 0.9% sodium chloride 250 mL infusion    bumetanide (BUMEX) injection 2 mg    acetaminophen (TYLENOL) tablet 650 mg    metoprolol (LOPRESSOR) injection 5 mg    sodium chloride (NS) flush 5-40 mL    sodium chloride (NS) flush 5-40 mL    fentaNYL citrate (PF) injection 25 mcg    HYDROmorphone (PF) (DILAUDID) injection 0.2 mg    albuterol (PROVENTIL VENTOLIN) nebulizer solution 2.5 mg    0.45% sodium chloride infusion    acetaminophen (TYLENOL) suppository 650 mg    esmolol 10mg/mL (BREVIBLOC) infusion    niCARdipine (CARDENE) 50 mg in 0.9% sodium chloride 100 mL infusion    nitroPRUSSide (NIPRIDE) 50 mg in dextrose 5% 100 mL infusion    mupirocin (BACTROBAN) 2 % ointment    ondansetron (ZOFRAN) injection 4 mg    prochlorperazine (COMPAZINE) with saline injection 10 mg    amLODIPine (NORVASC) tablet 5 mg    carvedilol (COREG) tablet 6.25 mg    morphine injection 2 mg    morphine injection 4 mg    bisacodyl (DULCOLAX) suppository 10 mg    PHENYLephrine (PF)(TRE-SYNEPHRINE) 30 mg in 0.9% sodium chloride 250 mL infusion    LORazepam (ATIVAN) injection 1 mg        Current Facility-Administered Medications:     piperacillin-tazobactam (ZOSYN) 3.375 g in 0.9% sodium chloride (MBP/ADV) 100 mL, 3.375 g, IntraVENous, ONCE **FOLLOWED BY** piperacillin-tazobactam (ZOSYN) 3.375 g in 0.9% sodium chloride (MBP/ADV) 100 mL, 3.375 g, IntraVENous, Q8H, Faiza Reilly P, NP    potassium phosphate 20 mmol in 0.9% sodium chloride 250 mL infusion, , IntraVENous, ONCE, Margaret Catalan MD    bumetanide (BUMEX) injection 2 mg, 2 mg, IntraVENous, Q12H, Margaret Catalan MD    acetaminophen (TYLENOL) tablet 650 mg, 650 mg, Oral, Q4H PRN, Mahsa Gallo MD, 650 mg at 02/17/19 1824    metoprolol (LOPRESSOR) injection 5 mg, 5 mg, IntraVENous, Q6H PRN, [place], Elly Spatz, MD, 5 mg at 02/17/19 2307    sodium chloride (NS) flush 5-40 mL, 5-40 mL, IntraVENous, Q8H, Joon Ortega MD, 10 mL at 02/18/19 8248    sodium chloride (NS) flush 5-40 mL, 5-40 mL, IntraVENous, PRN, Joon Ortega MD    fentaNYL citrate (PF) injection 25 mcg, 25 mcg, IntraVENous, Multiple, Joon Ortega MD    HYDROmorphone (PF) (DILAUDID) injection 0.2 mg, 0.2 mg, IntraVENous, Multiple, Ko Ortega MD    albuterol (PROVENTIL VENTOLIN) nebulizer solution 2.5 mg, 2.5 mg, Inhalation, QID RT, Rody Maldonado MD, 2.5 mg at 02/18/19 0801    0.45% sodium chloride infusion, 25 mL/hr, IntraVENous, CONTINUOUS, Mahsa Gallo MD, Last Rate: 25 mL/hr at 02/17/19 1834, 25 mL/hr at 02/17/19 1834    acetaminophen (TYLENOL) suppository 650 mg, 650 mg, Rectal, Q4H PRN, Mahsa Gallo MD, 650 mg at 02/16/19 1807    esmolol 10mg/mL (BREVIBLOC) infusion, 0-200 mcg/kg/min, IntraVENous, TITRATE, Mahsa Gallo MD, Last Rate: 133.9 mL/hr at 02/18/19 0820, 200 mcg/kg/min at 02/18/19 0820    niCARdipine (CARDENE) 50 mg in 0.9% sodium chloride 100 mL infusion, 0-15 mg/hr, IntraVENous, TITRATE, Aric Garrido MD, Last Rate: 30 mL/hr at 02/18/19 0706, 15 mg/hr at 02/18/19 0706    nitroPRUSSide (NIPRIDE) 50 mg in dextrose 5% 100 mL infusion, 0.2-10 mcg/kg/min, IntraVENous, TITRATE, Mahsa Gallo MD, Stopped at 02/16/19 1940    mupirocin (BACTROBAN) 2 % ointment, , Both Nostrils, BID, Noel Coleman MD    ondansetron [facility]) injection 4 mg, 4 mg, IntraVENous, Q4H PRN, Hilda Parada, MD, 4 mg at 02/15/19 0550    prochlorperazine (COMPAZINE) with saline injection 10 mg, 10 mg, IntraVENous, Q6H PRN, Cynthia ANTONIO MD, 10 mg at 02/15/19 0902    amLODIPine (NORVASC) tablet 5 mg, 5 mg, Oral, DAILY, Faiza Reilly NP, 5 mg at 19 0824    carvedilol (COREG) tablet 6.25 mg, 6.25 mg, Oral, BID WITH MEALS, Aric Garrido MD, 6.25 mg at 19 0824    morphine injection 2 mg, 2 mg, IntraVENous, Q3H PRN, Lolis Longoria MD, 2 mg at 19 210    morphine injection 4 mg, 4 mg, IntraVENous, Q3H PRN, Lolis Longoria MD, 4 mg at 19 0240    bisacodyl (DULCOLAX) suppository 10 mg, 10 mg, Rectal, DAILY PRN, Lolis Longoria MD    PHENYLephrine (PF)(TRE-SYNEPHRINE) 30 mg in 0.9% sodium chloride 250 mL infusion,  mcg/min, IntraVENous, TITRATE, Lolis Longoria MD, Stopped at 02/15/19 2303    LORazepam (ATIVAN) injection 1 mg, 1 mg, IntraVENous, Q4H PRN, Lolis Longoria MD, 1 mg at 19 0430      Objective:     Vital Signs: Telemetry:    normal sinus rhythm Intake/Output:   Visit Vitals  /81   Pulse 89   Temp 99.5 °F (37.5 °C)   Resp 25   Ht 6' 5\" (1.956 m)   Wt 109.4 kg (241 lb 2.9 oz)   SpO2 90%   BMI 28.60 kg/m²       Temp (24hrs), Av.9 °F (37.7 °C), Min:99.4 °F (37.4 °C), Max:101.3 °F (38.5 °C)        O2 Device: Hi flow nasal cannula O2 Flow Rate (L/min): 15 l/min         Body mass index is 28.6 kg/m². Wt Readings from Last 4 Encounters:   02/15/19 109.4 kg (241 lb 2.9 oz)   18 110.7 kg (244 lb)   18 109.4 kg (241 lb 2 oz)   18 110.2 kg (243 lb)          Intake/Output Summary (Last 24 hours) at 2019 0859  Last data filed at 2019 0600  Gross per 24 hour   Intake 4034.83 ml   Output 3595 ml   Net 439.83 ml       Last shift:      No intake/output data recorded. Last 3 shifts:  1901 -  0700  In: 6669 [P.O.:550;  I.V.:6119]  Out: 5578 [Urine:4780; Drains:122]         Physical Exam:     General:  male; somnolent but arousable   HEAD: Normocephalic, without obvious abnormality, atraumatic   EYES: conjunctivae clear. PERRL,  AN Icteric sclerae   NOSE: nares normal, no drainage, no nasal flaring,    THROAT: mucous membranes dry; Lips, mucosa dry; No Thrush;     Neck: Supple, symmetrical, trachea midline,  No accessory mm use; No Stridor/ cuff leak, No goiter or thyroid tenderness   LYMPH: No abnormally enlarged lymph nodes. in neck   Chest: normal   Lungs: rales   Heart: Regular rate and rhythm; no edema   Abdomen: nondistended, hypoactive bowel sounds, tenderness marked and guarding - in the entire abdomen   : No Johnson; Extremity: negative, clubbing; no joint swelling or erythema   Neuro: speech fluent; withdraws to pain; unable to check gait and station; following simple commands   Psych: oriented to time, place and person ; No agitation;    Devices:per sepsis flow sheet- reviewed with team during IDR   Skin: Warm;    Pulses:Bilateral, Radial, 2+   Capillary refill: warm, well perfused,      Labs:    Recent Labs     02/18/19  0431 02/17/19  1155 02/17/19  0400   WBC 14.1* 12.5* 11.7*   HGB 10.9* 10.6* 10.7*   PLT 99* 94* 99*   INR  --  1.3*  --    APTT  --  33.0*  --      Recent Labs     02/18/19  0431 02/17/19  0400 02/16/19  0406 02/16/19  0149 02/15/19  1114    144 141  --  140   K 3.7 3.6 4.7  --  4.3   * 112* 107  --  105   CO2 24 27 28  --  27   * 127* 154*  --  128*   BUN 32* 36* 36*  --  34*   CREA 1.88* 2.31* 3.12*  --  3.06*   CA 6.7* 6.8* 7.1*  --  8.6   MG 2.2 1.6 2.0  --   --    PHOS 2.3* 1.9* 3.8  --   --    LAC  --   --   --  1.7 2.9*   ALB 2.7*  --  3.2*  --   --    SGOT 72*  --  124*  --   --    ALT 45  --  60  --   --      No results for input(s): PH, PCO2, PO2, HCO3, FIO2 in the last 72 hours.   Recent Labs     02/15/19  1114   TROIQ 0.10*     No results found for: BNPP, BNP   Lab Results   Component Value Date/Time    Culture result: NO GROWTH AFTER 22 HOURS 02/17/2019 08:51 AM      No results found for: VANCT, CPK    Imaging:  I have personally reviewed the patients radiographs and have reviewed the reports:    CXR: none today      During this entire length of time the patient's condition was unstable, unpredictable and critically ill in the CCU/ ICU. I was immediately available to the patient whose care required several interactions with nursing, multidisciplinary team members leading to multiple interventions with fluid resuscitation and medication adjustments to optimize respiratory support, hemodynamic treatment, medication changes based on repeat labs results, reviews, exams and assessments. The reason for providing this level of medical care was due to a critical illness that impaired one or more vital organ systems, such that there was a high probability of sudden or life threatening deterioration in the patient's condition. This care involved high complexity medical decision making to treat acute and unstable vital organ system failure, and to prevent further life threatening deterioration of the patients condition. I personally:  · Reviewed the flowsheet and previous days notes  · Reviewed and summarized records or history from previous days note or discussions with staff, family  · Parenteral controlled substances - Reviewed/ Adjusted / Earney Parada / Started  · High Risk Drug therapy requiring intensive monitoring for toxicity: eg steroids, pressors, antibiotics  · Reviewed and/or ordered Clinical lab tests  · Reviewed and/or ordered Radiology tests  · Reviewed and/or ordered of Medicine tests  · Independently visualized radiologic Images  · Reviewed the patients ECG / Telemetry  · discussed my assessment/management with : Consultants, Nursing, Pharmacy, Case Management, PT, OT, Respiratory Therapy, Hospitalist and Family for coordination of care           Thank you for allowing us to participate in the care of this patient. We will follow along with you until they no longer require CCU services.     Rodrigo Cyr MD

## 2019-02-18 NOTE — PROGRESS NOTES
PRogress Note    NAME: Lenin Smith   :  1970   MRN:  [id number]     Date/Time:  2019          Assessment :    Plan:  MARY on CKD stage 2    HTN/LVH    type B aortic dissection with filling of the true and false lumen --S/P TEVAR, RA stenting, (L) carotid bypass           KPHOS  Adding bumex q12  Creatinine improved to 1.9  [place] urine  Merrill removed  Following art line-bp 145, on esmolol/nicardipine-  Calcium corrects to 8.0       Subjective:   CHIEF COMPLAINT:  thirsty    Past Medical History:   Diagnosis Date    Foot fracture     Hypertension     Jaw fracture ([address]     Ruptured ear drum     Thumb fracture       Past Surgical History:   Procedure Laterality Date    HX APPENDECTOMY      HX ORTHOPAEDIC       Social History     Tobacco Use    Smoking status: Never Smoker    Smokeless tobacco: Never Used   Substance Use Topics    Alcohol use: No      Family History   Problem Relation Age of Onset    Diabetes Mother       No Known Allergies   Prior to Admission medications    Medication Sig Start Date End Date Taking? Authorizing Provider   amLODIPine (NORVASC) 5 mg tablet Take 1 Tab by mouth daily. 18   Clarisse Flores NP   valsartan-hydroCHLOROthiazide (DIOVAN-HCT) 320-25 mg per tablet Take 1 Tab by mouth daily.  18   Clarisse Flores NP     REVIEW OF SYSTEMS:    thirsty      Objective:   VITALS:    Visit Vitals  /81   Pulse 89   Temp 99.5 °F (37.5 °C)   Resp 25   Ht 6' 5\" (1.956 m)   Wt 109.4 kg (241 lb 2.9 oz)   SpO2 90%   BMI 28.60 kg/m²     PHYSICAL EXAM:  Gen:  [x]  WD [x]  WN  [] cachectic []  thin []  obese []  disheveled             []  ill apearing  []   Critical  []   Chronic    [x]  No acute distress    HEENT:   [x] NC/AT    [] pink conjunctivae      [] pale conjunctivae                  PERRL  [] yes  [] no      [] moist mucosa    [] dry mucosa    hearing intact to voice [x] yes  [] No                   RESP:   [] CTA bilaterally/no wheezing/rhonchi/rales/crackles    [] rhonchi bilaterally - no dullness  [] wheezing   [] rhonchi   [x] crackles     use of accessory muscles [] yes [x] no    CARD:   [x]  regular rate and rhythm/No murmurs/rubs/gallops    murmur  [] yes ()  [] no      Rubs  [] yes  [] no       Gallops [] yes  [] no    Rate []  regular  []  irregular        carotid bruits  [] Right  []  Left                 LE edema [] yes  [x] no           JVP  []  yes   [x]  no    NEUR:   [x] cranial nerves II-XII grossly intact       [] Cranial nerves deficit                   LAB DATA REVIEWED:    Recent Labs     02/18/19  0431 02/17/19  1155   WBC 14.1* 12.5*   HGB 10.9* 10.6*   HCT 33.2* 32.7*   PLT 99* 94*     Recent Labs     02/18/19  0431 02/17/19  0400 02/16/19  0406    144 141   K 3.7 3.6 4.7   * 112* 107   CO2 24 27 28   BUN 32* 36* 36*   CREA 1.88* 2.31* 3.12*   * 127* 154*   CA 6.7* 6.8* 7.1*   MG 2.2 1.6 2.0   PHOS 2.3* 1.9* 3.8     Recent Labs     02/18/19  0431 02/16/19  0406   SGOT 72* 124*   ALT 45 60   AP 35* 33*   TBILI 1.0 0.4   ALB 2.7* 3.2*   GLOB 3.2 3.1     Recent Labs     02/17/19  1155   INR 1.3*   PTP 13.0*   APTT 33.0*      No results for input(s): FE, TIBC, PSAT, FERR in the last 72 hours. No results for input(s): PH, PCO2, PO2 in the last 72 hours. No results for input(s): CPK, CKMB in the last 72 hours.     No lab exists for component: TROPONINI  No results found for: GLUCPOC    Procedures: see electronic medical records for all procedures/Xrays and details which were not copied into this note but were reviewed prior to creation of Plan.    ________________________________________________________________________       ___________________________________________________  Consulting Physician: Ariane Mantle, MD

## 2019-02-18 NOTE — PROGRESS NOTES
Vascular Surgery Progress Note  Monae Polo ACNP-BC  2/18/2019       Subjective:     Mr. Sudhir Song is a 52 yo AAM with a pmhx significant for uncontrolled hypertension. He presented to the hospital with complaint of acute CP while having intercourse. He had associated nausea and vomiting. On arrival he was profoundly hypertensive as high as 223/111. CTA of the chest was + for a type B aortic dissection. He was admitted to the ICU and initiated on medical management with 2 agent IV antihypertensive regimen. Despite this his creatinine and lactic acid continued to rise. Repeat CTA of the chest, abd, and pelvis later the same day showed progression of his dissection and he was taken for an emergent TEVAR on 02/15/2019. His is also s/p left carotid to subclavian bypass, embolization of left subclavian artery, and left renal artery stenting. Post procedure he remains on IV antihypertensive management. Following the procedure he has had significant urinary output and his creatinine continues to steadily fall. He received aggressive fluid resuscitation in the acute phase of his illness and has developed acute pulmonary edema, BL pleural effusions, and hypoxic respiratory failure. This am he is on a NRB. He is receiving aggressive diuresis per nephrology. He has developed a fever of 101.3 and a rising leukocytosis. His abd is mildly distended. He is tolerating a clear liquid diet. He denies N/V.       Nursing Data:     Patient Vitals for the past 24 hrs:   BP Temp Pulse Resp SpO2   02/18/19 1200 124/74 99.6 °F (37.6 °C) 88 24 93 %   02/18/19 1152 -- -- -- -- 96 %   02/18/19 1100 100/71 -- 89 23 93 %   02/18/19 1000 120/41 -- 90 23 (!) 87 %   02/18/19 0900 109/88 -- 91 26 93 %   02/18/19 0803 -- -- -- -- 90 %   02/18/19 0800 116/90 98.8 °F (37.1 °C) 90 20 93 %   02/18/19 0700 106/81 -- 89 25 95 %   02/18/19 0600 110/88 -- 88 24 95 %   02/18/19 0500 (!) 108/94 -- 88 24 93 %   02/18/19 0400 112/84 99.5 °F (37.5 °C) 89 24 95 %   02/18/19 0300 125/88 -- 89 23 96 %   02/18/19 0200 110/80 -- 90 21 91 %   02/18/19 0100 109/75 -- 91 24 94 %   02/18/19 0000 115/65 99.4 °F (37.4 °C) 90 27 94 %   02/17/19 2300 101/74 -- 89 22 (!) 88 %   02/17/19 2200 104/75 -- 90 21 93 %   02/17/19 2100 99/59 -- 89 23 92 %   02/17/19 2000 114/83 99.6 °F (37.6 °C) 91 (!) 31 91 %   02/17/19 1940 -- -- -- -- 93 %   02/17/19 1900 (!) 119/91 -- 93 27 91 %   02/17/19 1808 152/64 -- 93 -- --   02/17/19 1800 112/89 -- 94 24 91 %   02/17/19 1712 144/71 -- 95 -- --   02/17/19 1700 104/76 -- 94 26 90 %   02/17/19 1640 142/59 -- -- -- --   02/17/19 1600 99/72 99.7 °F (37.6 °C) 93 25 90 %   02/17/19 1545 -- -- 93 24 91 %   02/17/19 1540 -- -- -- -- 90 %   02/17/19 1530 -- -- 92 28 90 %   02/17/19 1515 -- -- 92 25 90 %   02/17/19 1500 111/78 -- 91 23 90 %   02/17/19 1441 141/55 -- 92 -- --   02/17/19 1435 -- -- 92 16 91 %   02/17/19 1416 -- -- 92 25 90 %   02/17/19 1400 120/70 -- 91 19 90 %   02/17/19 1325 -- -- 94 -- --   02/17/19 1313 -- -- 92 -- --   02/17/19 1300 114/85 -- 93 20 92 %     ---------------------------------------------------------------------------------------------------------    Intake/Output Summary (Last 24 hours) at 2/18/2019 1248  Last data filed at 2/18/2019 1200  Gross per 24 hour   Intake 5029.37 ml   Output 4205 ml   Net 824.37 ml       Exam:     Physical Exam   Constitutional: He is oriented to person, place, and time. He appears well-developed and well-nourished. HENT:   Head: Normocephalic. Eyes: Pupils are equal, round, and reactive to light. Neck: Normal range of motion. Right ICA central line. Cardiovascular: Regular rhythm. Tachycardia present. Pulmonary/Chest: Accessory muscle usage present. Tachypnea noted. He is in respiratory distress. Currently wearing a non re-breather mask. Abdominal: Soft. He exhibits distension. Musculoskeletal: Normal range of motion.    Neurological: He is alert and oriented to person, place, and time. Skin:   Groin incision dry and intact with dermabond. Dressing to left scapula dry and intact. Dressing to right chest (previous Merrill catheter) dry and intact. Multiple tattoos. Psychiatric: He has a normal mood and affect. His behavior is normal. Thought content normal.       Lab Review:     . Recent Results (from the past 24 hour(s))   CBC WITH AUTOMATED DIFF    Collection Time: 02/18/19  4:31 AM   Result Value Ref Range    WBC 14.1 (H) 4.1 - 11.1 K/uL    RBC 4.00 (L) 4.10 - 5.70 M/uL    HGB 10.9 (L) 12.1 - 17.0 g/dL    HCT 33.2 (L) 36.6 - 50.3 %    MCV 83.0 80.0 - 99.0 FL    MCH 27.3 26.0 - 34.0 PG    MCHC 32.8 30.0 - 36.5 g/dL    RDW 15.5 (H) 11.5 - 14.5 %    PLATELET 99 (L) 084 - 400 K/uL    MPV 11.4 8.9 - 12.9 FL    NRBC 0.0 0  WBC    ABSOLUTE NRBC 0.00 0.00 - 0.01 K/uL    NEUTROPHILS 83 (H) 32 - 75 %    LYMPHOCYTES 8 (L) 12 - 49 %    MONOCYTES 9 5 - 13 %    EOSINOPHILS 0 0 - 7 %    BASOPHILS 0 0 - 1 %    IMMATURE GRANULOCYTES 1 (H) 0.0 - 0.5 %    ABS. NEUTROPHILS 11.7 (H) 1.8 - 8.0 K/UL    ABS. LYMPHOCYTES 1.1 0.8 - 3.5 K/UL    ABS. MONOCYTES 1.2 (H) 0.0 - 1.0 K/UL    ABS. EOSINOPHILS 0.0 0.0 - 0.4 K/UL    ABS. BASOPHILS 0.0 0.0 - 0.1 K/UL    ABS. IMM.  GRANS. 0.1 (H) 0.00 - 0.04 K/UL    DF AUTOMATED     MAGNESIUM    Collection Time: 02/18/19  4:31 AM   Result Value Ref Range    Magnesium 2.2 1.6 - 2.4 mg/dL   PHOSPHORUS    Collection Time: 02/18/19  4:31 AM   Result Value Ref Range    Phosphorus 2.3 (L) 2.6 - 4.7 MG/DL   METABOLIC PANEL, COMPREHENSIVE    Collection Time: 02/18/19  4:31 AM   Result Value Ref Range    Sodium 143 136 - 145 mmol/L    Potassium 3.7 3.5 - 5.1 mmol/L    Chloride 111 (H) 97 - 108 mmol/L    CO2 24 21 - 32 mmol/L    Anion gap 8 5 - 15 mmol/L    Glucose 126 (H) 65 - 100 mg/dL    BUN 32 (H) 6 - 20 MG/DL    Creatinine 1.88 (H) 0.70 - 1.30 MG/DL    BUN/Creatinine ratio 17 12 - 20      GFR est AA 47 (L) >60 ml/min/1.73m2    GFR est non-AA 39 (L) >60 ml/min/1.73m2    Calcium 6.7 (L) 8.5 - 10.1 MG/DL    Bilirubin, total 1.0 0.2 - 1.0 MG/DL    ALT (SGPT) 45 12 - 78 U/L    AST (SGOT) 72 (H) 15 - 37 U/L    Alk. phosphatase 35 (L) 45 - 117 U/L    Protein, total 5.9 (L) 6.4 - 8.2 g/dL    Albumin 2.7 (L) 3.5 - 5.0 g/dL    Globulin 3.2 2.0 - 4.0 g/dL    A-G Ratio 0.8 (L) 1.1 - 2.2     URINALYSIS W/ RFLX MICROSCOPIC    Collection Time: 02/18/19 11:45 AM   Result Value Ref Range    Color YELLOW/STRAW      Appearance CLEAR CLEAR      Specific gravity 1.011 1.003 - 1.030      pH (UA) 5.0 5.0 - 8.0      Protein TRACE (A) NEG mg/dL    Glucose NEGATIVE  NEG mg/dL    Ketone NEGATIVE  NEG mg/dL    Bilirubin NEGATIVE  NEG      Blood LARGE (A) NEG      Urobilinogen 0.2 0.2 - 1.0 EU/dL    Nitrites NEGATIVE  NEG      Leukocyte Esterase NEGATIVE  NEG      WBC 0-4 0 - 4 /hpf    RBC 10-20 0 - 5 /hpf    Epithelial cells FEW FEW /lpf    Bacteria NEGATIVE  NEG /hpf    Hyaline cast 0-2 0 - 5 /lpf          Assessment/Plan:      Principal problems  Type B aortic dissection   -s/p TEVAR, left subclavian embolization, and left carotid to subclavian bypass 02/15/19  Renal artery stenosis  -s/p left renal artery stenting 02/15/2019  Subendocardial ischemia  Hypertensive urgency   LVH  -secondary to infarction related to dissection  -rule out ASHD  -on ASA, BBlocker, CCB, and statin. Patient remains omn IV cardene and esmolol. Appreciate input from cardiology. ARB and HCT held for MARY. Current on 2 oral agents and IV diuretics. Appreciate input from nephrology. Continue ASA. Statin per cardiology. Pain control. Continue IVP morphine. Add oral. PRN Percocet.       Active problems  Acute hypoxic respiratory failure   Bilateral pleural effusions  Atelectasis   -IV diuresis per nephrology  -pulmonary toileting per pulmonary   -will need outpatient sleep study     Renal infarction   MARY on CKD stage II  -steadily improving   Hypophosphotasia   -supplementation per nephrology    SIRs  Hyperglycemia  Lactic acidosis   -resolved    Thrombocytopenia  -CXR unremarkable for infectious etiology  -UA unremarkable for infectious etiology  -blood cx NGTD  -patient has significant hardware post procedure. Initiate empiric Zosyn. Will hold off on Vancomycin for now due to MARY. -atelectasis? Anemia of acute blood loss  -not at levels to transfuse    Constipation  -rule out ileus    -tolerating clears, denies abd pain, nausea, and vomiting   -initiate BM regimen. VTE prophylaxis:  SCDs    Disposition:   TBD. PT evaluation when able to tolerate from a pulmonary standpoint.

## 2019-02-18 NOTE — PROGRESS NOTES
0700: Received report from Saeed Lewis RN. 
 
0800: Patient on mid flow nasal cannula SpO2 dropped to 82%, had to give NRB. Sensation present, hands warm, pulses present in all extremities. Alert and oriented x 4. Patient has 3 PIV, Right IJ, and enamorado catheter. Esmolol and nicardipine running, see MAR for titration. 0915: Removed enamorado catheter per MD order. Applied condom catheter. 1000: Bumex given per MD order due to fluid volume excess. 1030: Patient does not want to use condom catheter and keeps trying to get out of bed. 1130: Administered Lorazepam to help calm patient's anxiety and restlessness. 1200: Removed condom catheter and Inserted a enamorado per doctor order. Patient now quietly sleeping. Reassessment performed. Patient has expiratory wheezing in the right lung and crackles in the left. 1500: Patient family at bedside and had many questions about patient condition. Educated family about patient condition. Patient resting quietly in bed. Patient reassessed with no changes to previous assessment. 1730: Patient attempted to use bedpan but had no bowel movement. 1900: Report given to Antonia Garcia RN.

## 2019-02-18 NOTE — PROGRESS NOTES
I was present and observed Patient Interventions, Patient Assessment, and all medications administrations conducted by Nursing student Kat Villafana today. I agree and have reviewed all documentation by [facility].

## 2019-02-18 NOTE — PROGRESS NOTES
Progress Note      2/18/2019 9:13 AM  NAME: Rossy Lindsay   MRN:  [id number]   Admit Diagnosis: Acute thoracic aortic dissection ([facility] [address] [I71.01]                Assessment:       1. 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 2. No hx of CAD, equivicol troponin  3. Echo nl EF, LVH, mildly dilated ascending aorta with mild aortic regurgitation  4. Sinus with inferior and lateral TWI  5. HTN  6. Lipid ok  7. Snores, concern for sleep apnea, will need outpt sleep study  8. 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 Normal EF  Sinus  S/p TEVAR     1. Start aspirin 81mg daily  2. Titrate esmolol and nicardipine as needed. 3. Increase coreg 12.5mg bid  4. Cont amlodipine  5. Hold valsartan HCT  6. Diurese, follow cxr and resp status, follow bmp  7. Low dose statin             [x]        High complexity decision making was performed             Subjective:     Rossy Lindsay denies chest pain. +dyspnea, hungry. Discussed with RN events overnight. Review of Systems:    Symptom Y/N Comments  Symptom Y/N Comments   Fever/Chills N   Chest Pain N    Poor Appetite N   Edema N    Cough N   Abdominal Pain N    Sputum N   Joint Pain N    SOB/ESCOBEDO N   Pruritis/Rash N    Nausea/vomit N   Tolerating PT/OT Y    Diarrhea N   Tolerating Diet Y    Constipation N   Other       Could NOT obtain due to:      Objective:      Physical Exam:    Last 24hrs VS reviewed since prior progress note.  Most recent are:    Visit Vitals  /90 (BP 1 Location: Left arm, BP Patient Position: At rest;Head of bed elevated (Comment degrees))   Pulse 90   Temp 98.8 °F (37.1 °C) Resp 20   Ht 6' 5\" (1.956 m)   Wt 109.4 kg (241 lb 2.9 oz)   SpO2 90%   BMI 28.60 kg/m²       Intake/Output Summary (Last 24 hours) at 2/18/2019 0913  Last data filed at 2/18/2019 0600  Gross per 24 hour   Intake 3924.92 ml   Output 3455 ml   Net 469.92 ml        General Appearance: Well developed, well nourished, alert & oriented x 3,    no acute distress. Ears/Nose/Mouth/Throat: Hearing grossly normal.  Neck: Supple. Chest: Lungs clear to auscultation bilaterally. Cardiovascular: Regular rate and rhythm, S1S2 normal, no murmur. Abdomen: Soft, non-tender, bowel sounds are active. Extremities: No edema bilaterally. Skin: Warm and dry. PMH/SH reviewed - no change compared to H&P    Data Review    Telemetry: normal sinus rhythm     Lab Data Personally Reviewed:    Recent Labs     02/18/19  0431 02/17/19  1155   WBC 14.1* 12.5*   HGB 10.9* 10.6*   HCT 33.2* 32.7*   PLT 99* 94*     Recent Labs     02/17/19  1155   INR 1.3*   PTP 13.0*   APTT 33.0*      Recent Labs     02/18/19  0431 02/17/19  0400 02/16/19  0406    144 141   K 3.7 3.6 4.7   * 112* 107   CO2 24 27 28   BUN 32* 36* 36*   CREA 1.88* 2.31* 3.12*   * 127* 154*   CA 6.7* 6.8* 7.1*   MG 2.2 1.6 2.0     Recent Labs     02/15/19  1114   TROIQ 0.10*     Lab Results   Component Value Date/Time    Cholesterol, total 146 04/18/2018 08:58 AM    HDL Cholesterol 38 (L) 04/18/2018 08:58 AM    LDL, calculated 95 04/18/2018 08:58 AM    Triglyceride 64 04/18/2018 08:58 AM       Recent Labs     02/18/19  0431 02/16/19  0406   SGOT 72* 124*   AP 35* 33*   TP 5.9* 6.3*   ALB 2.7* 3.2*   GLOB 3.2 3.1     No results for input(s): PH, PCO2, PO2 in the last 72 hours.     Medications Personally Reviewed:    Current Facility-Administered Medications   Medication Dose Route Frequency    piperacillin-tazobactam (ZOSYN) 3.375 g in 0.9% sodium chloride (MBP/ADV) 100 mL  3.375 g IntraVENous ONCE    Followed by    piperacillin-tazobactam (ZOSYN) 3.375 g in 0.9% sodium chloride (MBP/ADV) 100 mL  3.375 g IntraVENous Q8H    potassium phosphate 20 mmol in 0.9% sodium chloride 250 mL infusion   IntraVENous ONCE    bumetanide (BUMEX) injection 2 mg  2 mg IntraVENous Q12H    acetaminophen (TYLENOL) tablet 650 mg  650 mg Oral Q4H PRN    metoprolol (LOPRESSOR) injection 5 mg  5 mg IntraVENous Q6H PRN    sodium chloride (NS) flush 5-40 mL  5-40 mL IntraVENous Q8H    sodium chloride (NS) flush 5-40 mL  5-40 mL IntraVENous PRN    fentaNYL citrate (PF) injection 25 mcg  25 mcg IntraVENous Multiple    HYDROmorphone (PF) (DILAUDID) injection 0.2 mg  0.2 mg IntraVENous Multiple    albuterol (PROVENTIL VENTOLIN) nebulizer solution 2.5 mg  2.5 mg Inhalation QID RT    0.45% sodium chloride infusion  25 mL/hr IntraVENous CONTINUOUS    acetaminophen (TYLENOL) suppository 650 mg  650 mg Rectal Q4H PRN    esmolol 10mg/mL (BREVIBLOC) infusion  0-200 mcg/kg/min IntraVENous TITRATE    niCARdipine (CARDENE) 50 mg in 0.9% sodium chloride 100 mL infusion  0-15 mg/hr IntraVENous TITRATE    nitroPRUSSide (NIPRIDE) 50 mg in dextrose 5% 100 mL infusion  0.2-10 mcg/kg/min IntraVENous TITRATE    mupirocin (BACTROBAN) 2 % ointment   Both Nostrils BID    ondansetron (ZOFRAN) injection 4 mg  4 mg IntraVENous Q4H PRN    prochlorperazine (COMPAZINE) with saline injection 10 mg  10 mg IntraVENous Q6H PRN    amLODIPine (NORVASC) tablet 5 mg  5 mg Oral DAILY    carvedilol (COREG) tablet 6.25 mg  6.25 mg Oral BID WITH MEALS    morphine injection 2 mg  2 mg IntraVENous Q3H PRN    morphine injection 4 mg  4 mg IntraVENous Q3H PRN    bisacodyl (DULCOLAX) suppository 10 mg  10 mg Rectal DAILY PRN    PHENYLephrine (PF)(TRE-SYNEPHRINE) 30 mg in 0.9% sodium chloride 250 mL infusion   mcg/min IntraVENous TITRATE    LORazepam (ATIVAN) injection 1 mg  1 mg IntraVENous Q4H PRN         Prasanna Oneill MD

## 2019-02-19 ENCOUNTER — APPOINTMENT (OUTPATIENT)
Dept: GENERAL RADIOLOGY | Age: 49
DRG: 173 | End: 2019-02-19
Attending: INTERNAL MEDICINE
Payer: MEDICAID

## 2019-02-19 LAB
ABO + RH BLD: NORMAL
ANION GAP SERPL CALC-SCNC: 6 MMOL/L (ref 5–15)
BASOPHILS # BLD: 0 K/UL (ref 0–0.1)
BASOPHILS NFR BLD: 0 % (ref 0–1)
BLD PROD TYP BPU: NORMAL
BLOOD GROUP ANTIBODIES SERPL: NORMAL
BPU ID: NORMAL
BUN SERPL-MCNC: 34 MG/DL (ref 6–20)
BUN/CREAT SERPL: 16 (ref 12–20)
CALCIUM SERPL-MCNC: 6.9 MG/DL (ref 8.5–10.1)
CHLORIDE SERPL-SCNC: 107 MMOL/L (ref 97–108)
CO2 SERPL-SCNC: 27 MMOL/L (ref 21–32)
CREAT SERPL-MCNC: 2.12 MG/DL (ref 0.7–1.3)
CROSSMATCH RESULT,%XM: NORMAL
DIFFERENTIAL METHOD BLD: ABNORMAL
EOSINOPHIL # BLD: 0 K/UL (ref 0–0.4)
EOSINOPHIL NFR BLD: 0 % (ref 0–7)
ERYTHROCYTE [DISTWIDTH] IN BLOOD BY AUTOMATED COUNT: 15.3 % (ref 11.5–14.5)
GLUCOSE SERPL-MCNC: 133 MG/DL (ref 65–100)
HCT VFR BLD AUTO: 30 % (ref 36.6–50.3)
HGB BLD-MCNC: 9.9 G/DL (ref 12.1–17)
IMM GRANULOCYTES # BLD AUTO: 0.1 K/UL (ref 0–0.04)
IMM GRANULOCYTES NFR BLD AUTO: 1 % (ref 0–0.5)
LYMPHOCYTES # BLD: 1.1 K/UL (ref 0.8–3.5)
LYMPHOCYTES NFR BLD: 11 % (ref 12–49)
MCH RBC QN AUTO: 26.8 PG (ref 26–34)
MCHC RBC AUTO-ENTMCNC: 33 G/DL (ref 30–36.5)
MCV RBC AUTO: 81.3 FL (ref 80–99)
MONOCYTES # BLD: 1.1 K/UL (ref 0–1)
MONOCYTES NFR BLD: 10 % (ref 5–13)
NEUTS SEG # BLD: 7.9 K/UL (ref 1.8–8)
NEUTS SEG NFR BLD: 78 % (ref 32–75)
NRBC # BLD: 0 K/UL (ref 0–0.01)
NRBC BLD-RTO: 0 PER 100 WBC
PLATELET # BLD AUTO: 107 K/UL (ref 150–400)
PMV BLD AUTO: 11.9 FL (ref 8.9–12.9)
POTASSIUM SERPL-SCNC: 3.1 MMOL/L (ref 3.5–5.1)
RBC # BLD AUTO: 3.69 M/UL (ref 4.1–5.7)
SODIUM SERPL-SCNC: 140 MMOL/L (ref 136–145)
SPECIMEN EXP DATE BLD: NORMAL
STATUS OF UNIT,%ST: NORMAL
UNIT DIVISION, %UDIV: 0
WBC # BLD AUTO: 10.2 K/UL (ref 4.1–11.1)

## 2019-02-19 PROCEDURE — 36415 COLL VENOUS BLD VENIPUNCTURE: CPT

## 2019-02-19 PROCEDURE — 74011000250 HC RX REV CODE- 250: Performed by: INTERNAL MEDICINE

## 2019-02-19 PROCEDURE — 74011250636 HC RX REV CODE- 250/636: Performed by: INTERNAL MEDICINE

## 2019-02-19 PROCEDURE — 80048 BASIC METABOLIC PNL TOTAL CA: CPT

## 2019-02-19 PROCEDURE — 74011250637 HC RX REV CODE- 250/637: Performed by: SURGERY

## 2019-02-19 PROCEDURE — 74011250637 HC RX REV CODE- 250/637: Performed by: INTERNAL MEDICINE

## 2019-02-19 PROCEDURE — 94640 AIRWAY INHALATION TREATMENT: CPT

## 2019-02-19 PROCEDURE — 74011250636 HC RX REV CODE- 250/636: Performed by: NURSE PRACTITIONER

## 2019-02-19 PROCEDURE — 65610000006 HC RM INTENSIVE CARE

## 2019-02-19 PROCEDURE — 74011250637 HC RX REV CODE- 250/637: Performed by: NURSE PRACTITIONER

## 2019-02-19 PROCEDURE — 85025 COMPLETE CBC W/AUTO DIFF WBC: CPT

## 2019-02-19 PROCEDURE — 74011000258 HC RX REV CODE- 258: Performed by: INTERNAL MEDICINE

## 2019-02-19 PROCEDURE — 77010033678 HC OXYGEN DAILY

## 2019-02-19 PROCEDURE — 74011250636 HC RX REV CODE- 250/636: Performed by: SURGERY

## 2019-02-19 PROCEDURE — 71045 X-RAY EXAM CHEST 1 VIEW: CPT

## 2019-02-19 PROCEDURE — 74011000258 HC RX REV CODE- 258: Performed by: NURSE PRACTITIONER

## 2019-02-19 PROCEDURE — 77010033711 HC HIGH FLOW OXYGEN

## 2019-02-19 RX ORDER — POTASSIUM CHLORIDE 1.5 G/1.77G
20 POWDER, FOR SOLUTION ORAL DAILY
Status: DISCONTINUED | OUTPATIENT
Start: 2019-02-19 | End: 2019-02-26

## 2019-02-19 RX ORDER — POTASSIUM CHLORIDE 14.9 MG/ML
10 INJECTION INTRAVENOUS
Status: COMPLETED | OUTPATIENT
Start: 2019-02-19 | End: 2019-02-20

## 2019-02-19 RX ORDER — PANTOPRAZOLE SODIUM 40 MG/1
40 TABLET, DELAYED RELEASE ORAL
Status: DISCONTINUED | OUTPATIENT
Start: 2019-02-19 | End: 2019-02-26 | Stop reason: HOSPADM

## 2019-02-19 RX ORDER — CHLORHEXIDINE GLUCONATE 1.2 MG/ML
15 RINSE ORAL EVERY 12 HOURS
Status: DISCONTINUED | OUTPATIENT
Start: 2019-02-19 | End: 2019-02-19 | Stop reason: CLARIF

## 2019-02-19 RX ADMIN — MUPIROCIN: 20 OINTMENT TOPICAL at 17:57

## 2019-02-19 RX ADMIN — AMLODIPINE BESYLATE 5 MG: 5 TABLET ORAL at 09:01

## 2019-02-19 RX ADMIN — ALBUTEROL SULFATE 2.5 MG: 2.5 SOLUTION RESPIRATORY (INHALATION) at 11:56

## 2019-02-19 RX ADMIN — CARVEDILOL 12.5 MG: 12.5 TABLET, FILM COATED ORAL at 09:01

## 2019-02-19 RX ADMIN — CARVEDILOL 12.5 MG: 12.5 TABLET, FILM COATED ORAL at 17:57

## 2019-02-19 RX ADMIN — POTASSIUM CHLORIDE 10 MEQ: 200 INJECTION, SOLUTION INTRAVENOUS at 11:41

## 2019-02-19 RX ADMIN — ACETAMINOPHEN 650 MG: 160 SOLUTION ORAL at 02:35

## 2019-02-19 RX ADMIN — LORAZEPAM 1 MG: 2 INJECTION INTRAMUSCULAR; INTRAVENOUS at 02:35

## 2019-02-19 RX ADMIN — ALBUTEROL SULFATE 2.5 MG: 2.5 SOLUTION RESPIRATORY (INHALATION) at 20:49

## 2019-02-19 RX ADMIN — POTASSIUM CHLORIDE 10 MEQ: 200 INJECTION, SOLUTION INTRAVENOUS at 09:11

## 2019-02-19 RX ADMIN — OXYCODONE AND ACETAMINOPHEN 1 TABLET: 5; 325 TABLET ORAL at 18:04

## 2019-02-19 RX ADMIN — ACETAMINOPHEN 650 MG: 160 SOLUTION ORAL at 17:56

## 2019-02-19 RX ADMIN — POTASSIUM CHLORIDE 10 MEQ: 200 INJECTION, SOLUTION INTRAVENOUS at 13:29

## 2019-02-19 RX ADMIN — PANTOPRAZOLE SODIUM 40 MG: 40 TABLET, DELAYED RELEASE ORAL at 10:47

## 2019-02-19 RX ADMIN — PRAVASTATIN SODIUM 20 MG: 10 TABLET ORAL at 22:41

## 2019-02-19 RX ADMIN — PIPERACILLIN SODIUM,TAZOBACTAM SODIUM 3.38 G: 3; .375 INJECTION, POWDER, FOR SOLUTION INTRAVENOUS at 09:01

## 2019-02-19 RX ADMIN — Medication 10 ML: at 06:00

## 2019-02-19 RX ADMIN — ALBUTEROL SULFATE 2.5 MG: 2.5 SOLUTION RESPIRATORY (INHALATION) at 15:57

## 2019-02-19 RX ADMIN — Medication 10 ML: at 13:29

## 2019-02-19 RX ADMIN — SODIUM CHLORIDE 12.5 MG/HR: 900 INJECTION, SOLUTION INTRAVENOUS at 00:47

## 2019-02-19 RX ADMIN — ASPIRIN 81 MG 81 MG: 81 TABLET ORAL at 09:01

## 2019-02-19 RX ADMIN — MUPIROCIN: 20 OINTMENT TOPICAL at 09:02

## 2019-02-19 RX ADMIN — POTASSIUM CHLORIDE 10 MEQ: 200 INJECTION, SOLUTION INTRAVENOUS at 10:39

## 2019-02-19 RX ADMIN — Medication 10 ML: at 22:41

## 2019-02-19 RX ADMIN — PIPERACILLIN SODIUM,TAZOBACTAM SODIUM 3.38 G: 3; .375 INJECTION, POWDER, FOR SOLUTION INTRAVENOUS at 14:24

## 2019-02-19 RX ADMIN — ALBUTEROL SULFATE 2.5 MG: 2.5 SOLUTION RESPIRATORY (INHALATION) at 09:26

## 2019-02-19 RX ADMIN — PIPERACILLIN SODIUM,TAZOBACTAM SODIUM 3.38 G: 3; .375 INJECTION, POWDER, FOR SOLUTION INTRAVENOUS at 22:42

## 2019-02-19 NOTE — PROGRESS NOTES
0700: Report received from Julia Yan RN. 
 
0800: Patient AOx4. Discontinued Nicardipine drip, BP stable. High flow oxygen at 15 l/min  
 
0900: Titrating high flow oxygen down to 6 l/min nasal cannula. 1000: Patient stood up with 2 assist to use bedside commode. Had an unmeasurable bowel movement. Moved patient to recliner chair. 1200: Reassessment performed, patient sitting in recliner chair having lunch. Titrating nasal cannula down to 2 l/min. 
 
1600: Reassessment performed, no change to previous assessment. Patient resting quietly in recliner. 1800: Moved patient back to bed with 3 assist. Patient complains of a 6/10 pain in his lower back. Gave percocet. 1900: Report given to Julia Yan RN oncoming shift nurse.

## 2019-02-19 NOTE — PROGRESS NOTES
1940  Received report on patient at [facility] from Valentin Correa, [address] [facility]. Care assumed. Girlfriend, Chang Cesar, at [facility]. Patient resting but easily arousable. 2030  Temp 102.4 ax; unable to do oral as patient unable to not breath orally to get temperature. Attempted to give patient APAP in tablet form and patient was unable to swallow pills, so removed from mouth and will follow up to get elixir as patient is swallowing liquids without any issues. 2125  Paged vascular surgeon on call. Received call back. Relayed temperature and attempt to give APAP tablets despite able to eat regular diet earlier. Ordered paired blood cultures and APAP elixir. 2140  APAP elixir given to patient. No problem with swallowing liquids. 2245  Patient more focused. Temp down to 101.0. Continues to request large amounts of fluid. Good response to Bumex. Dee Dee to stay with patient through the night. 0030  No change in assessment. 0240  Patient unable to fall asleep. Stated that he is still \"achy\". Gave patient APAP and Ativan to help him get to sleep. 0445  Labs drawn and no change in assessment. Patient sleeping soundly. 0700  Assessed right radial art line site. Dressing no longer intact. Upon inspection, the art line is ~1/2 out and there is evidence of swelling around the site. A-line D/C'd. Direct pressure applied for >8 minutes. 0730  Report to Valentin Correa RN.     Anjali Parish RN, BSN, CCM

## 2019-02-19 NOTE — PROGRESS NOTES
PRogress Note    NAME: Guerda Cerda   :  1970   MRN:  [id number]     Date/Time:  2019          Assessment :    Plan:  MARY on CKD stage 2    HTN/LVH    type B aortic dissection with filling of the true and false lumen --S/P TEVAR, RA stenting, (L) carotid bypass    Fever-post op  THrombocytopenia  Resp failure improving           Excellent diuresis-had 3 doses of bumex yest (also had > 3 liters of oral intake  Yesterday)-off IV gtts-on oral bp meds-holding diuretics today, creatinine bumped to 2.1-may be a truer reflection of renal dysfunciton  D/W vascular/pulm--cx (-)-fever/wbc from renal infarct? Calcium corrects   No ace  repleting electrolytes   Platelets improving         Subjective:   CHIEF COMPLAINT:  Wants to use bedside commode    Past Medical History:   Diagnosis Date    Foot fracture     Hypertension     Jaw fracture (HCC)     Ruptured ear drum     Thumb fracture       Past Surgical History:   Procedure Laterality Date    HX APPENDECTOMY      HX ORTHOPAEDIC       Social History     Tobacco Use    Smoking status: Never Smoker    Smokeless tobacco: Never Used   Substance Use Topics    Alcohol use: No      Family History   Problem Relation Age of Onset    Diabetes Mother       No Known Allergies   Prior to Admission medications    Medication Sig Start Date End Date Taking? Authorizing Provider   amLODIPine (NORVASC) 5 mg tablet Take 1 Tab by mouth daily. 18   Carlton Height, NP   valsartan-hydroCHLOROthiazide (DIOVAN-HCT) 320-25 mg per tablet Take 1 Tab by mouth daily.  18   Carlton Height, NP     REVIEW OF SYSTEMS:    thirsty      Objective:   VITALS:    Visit Vitals  /86   Pulse 88   Temp 99.2 °F (37.3 °C)   Resp 22   Ht 6' 5\" (1.956 m)   Wt 109.4 kg (241 lb 2.9 oz)   SpO2 97%   BMI 28.60 kg/m²     PHYSICAL EXAM:  Gen:  [x]  WD [x]  WN  [] cachectic []  thin []  obese []  disheveled             []  ill apearing  []   Critical  []   Chronic    [x]  No acute distress    HEENT:   [x] NC/AT    [] pink conjunctivae      [] pale conjunctivae                  PERRL  [] yes  [] no      [] moist mucosa    [] dry mucosa    hearing intact to voice [x] yes  [] No                   RESP:   [] CTA bilaterally/no wheezing/rhonchi/rales/crackles    [] rhonchi bilaterally - no dullness  [] wheezing   [] rhonchi   [x] crackles     use of accessory muscles [] yes [x] no    CARD:   [x]  regular rate and rhythm/No murmurs/rubs/gallops    murmur  [] yes ()  [] no      Rubs  [] yes  [] no       Gallops [] yes  [] no    Rate []  regular  []  irregular        carotid bruits  [] Right  []  Left                 LE edema [] yes  [x] no           JVP  []  yes   [x]  no    NEUR:   [x] cranial nerves II-XII grossly intact       [] Cranial nerves deficit                   LAB DATA REVIEWED:    Recent Labs     02/19/19 0440 02/18/19  0431   WBC 10.2 14.1*   HGB 9.9* 10.9*   HCT 30.0* 33.2*   * 99*     Recent Labs     02/19/19  0440 02/18/19  0431 02/17/19  0400    143 144   K 3.1* 3.7 3.6    111* 112*   CO2 27 24 27   BUN 34* 32* 36*   CREA 2.12* 1.88* 2.31*   * 126* 127*   CA 6.9* 6.7* 6.8*   MG  --  2.2 1.6   PHOS  --  2.3* 1.9*     Recent Labs     02/18/19  0431   SGOT 72*   ALT 45   AP 35*   TBILI 1.0   ALB 2.7*   GLOB 3.2     Recent Labs     02/17/19  1155   INR 1.3*   PTP 13.0*   APTT 33.0*      No results for input(s): FE, TIBC, PSAT, FERR in the last 72 hours. No results for input(s): PH, PCO2, PO2 in the last 72 hours. No results for input(s): CPK, CKMB in the last 72 hours.     No lab exists for component: TROPONINI  No results found for: GLUCPOC    Procedures: see electronic medical records for all procedures/Xrays and details which were not copied into this note but were reviewed prior to creation of Plan.    ________________________________________________________________________       ___________________________________________________  Consulting Physician: Griselda Kind, MD

## 2019-02-19 NOTE — PROGRESS NOTES
Vascular Surgery Progress Note  Delmis Ornelas ACNP-BC  2/19/2019       Subjective:     Mr. Anitha Hernandez is a 54 yo AAM with a pmhx significant for uncontrolled hypertension. He presented to the hospital with complaint of acute CP while having intercourse. He had associated nausea and vomiting. On arrival he was profoundly hypertensive as high as 223/111. CTA of the chest was + for a type B aortic dissection. He was admitted to the ICU and initiated on medical management with 2 agent IV antihypertensive regimen. Despite this his creatinine and lactic acid continued to rise. Repeat CTA of the chest, abd, and pelvis later the same day showed progression of his dissection and he was taken for an emergent TEVAR on 02/15/2019. His is also s/p left carotid to subclavian bypass, embolization of left subclavian artery, and left renal artery stenting. His post procedure course has been complicated by acute hypoxic respiratory failure due to volume overload. This am his respiratory status has dramatically improved post diuresis. He has had a mild elevation in his creatinine as expected. He had a fever overnight. CXR, UA and blood cultures are unremarkable. Repeat blood cx are pending. He had a BM overnight but continues to feel constipated. This am he complains of mild intermittent substernal CP with radiation to the back. His VSS are stable and he has been weaned off IV antihypertensive medications. He is tolerating a cardiac diet.       Nursing Data:     Patient Vitals for the past 24 hrs:   BP Temp Pulse Resp SpO2   02/19/19 0800 118/79 -- 91 22 97 %   02/19/19 0700 110/79 -- 89 20 99 %   02/19/19 0600 118/83 -- 92 25 96 %   02/19/19 0500 117/76 -- 90 25 98 %   02/19/19 0400 156/72 -- 86 19 98 %   02/19/19 0301 -- -- 92 23 98 %   02/19/19 0300 -- -- 90 22 98 %   02/19/19 0243 143/62 -- 90 -- --   02/19/19 0242 -- 99.6 °F (37.6 °C) -- -- --   02/19/19 0200 129/84 -- 92 22 99 %   02/19/19 0100 92/58 -- 96 20 97 %   02/19/19 0047 128/50 -- 95 -- --   02/19/19 0000 96/62 (!) 101.2 °F (38.4 °C) 100 20 92 %   02/18/19 2300 101/64 -- 100 29 91 %   02/18/19 2200 107/65 -- 100 26 98 %   02/18/19 2100 110/74 -- (!) 101 24 98 %   02/18/19 2000 110/70 (!) 102.4 °F (39.1 °C) (!) 101 25 95 %   02/18/19 1916 -- -- -- -- 98 %   02/18/19 1900 96/64 -- 98 29 95 %   02/18/19 1800 96/67 -- 97 25 98 %   02/18/19 1700 106/70 -- 95 29 (!) 89 %   02/18/19 1600 95/69 -- 91 22 91 %   02/18/19 1522 -- -- -- -- 92 %   02/18/19 1500 96/67 98 °F (36.7 °C) 90 21 94 %   02/18/19 1400 (!) 136/91 -- 89 23 93 %   02/18/19 1300 110/80 -- 90 26 95 %   02/18/19 1200 124/74 99.6 °F (37.6 °C) 88 24 93 %   02/18/19 1152 -- -- -- -- 96 %   02/18/19 1100 100/71 -- 89 23 93 %   02/18/19 1000 120/41 -- 90 23 (!) 87 %     ---------------------------------------------------------------------------------------------------------    Intake/Output Summary (Last 24 hours) at 2/19/2019 0933  Last data filed at 2/19/2019 0920  Gross per 24 hour   Intake 4518.65 ml   Output 7980 ml   Net -3461.35 ml       Exam:     Physical Exam   Constitutional: He is oriented to person, place, and time. He appears well-developed and well-nourished. HENT:   Head: Normocephalic. Eyes: Pupils are equal, round, and reactive to light. Neck: Normal range of motion. Right ICA central line. Cardiovascular: Normal rate and regular rhythm. Pulmonary/Chest: No accessory muscle usage. No tachypnea. No respiratory distress. He continues on oxygen via NC. Abdominal: Soft. He exhibits no distension. Musculoskeletal: Normal range of motion. Neurological: He is alert and oriented to person, place, and time. Skin:   Groin incision dry and intact with dermabond. Dressing to left scapula dry and intact. Dressing to right chest (previous Merrill catheter) dry and intact. Multiple tattoos. Psychiatric: He has a normal mood and affect.  His behavior is normal. Thought content normal.     Lab Review:     . Recent Results (from the past 24 hour(s))   URINALYSIS W/ RFLX MICROSCOPIC    Collection Time: 02/18/19 11:45 AM   Result Value Ref Range    Color YELLOW/STRAW      Appearance CLEAR CLEAR      Specific gravity 1.011 1.003 - 1.030      pH (UA) 5.0 5.0 - 8.0      Protein TRACE (A) NEG mg/dL    Glucose NEGATIVE  NEG mg/dL    Ketone NEGATIVE  NEG mg/dL    Bilirubin NEGATIVE  NEG      Blood LARGE (A) NEG      Urobilinogen 0.2 0.2 - 1.0 EU/dL    Nitrites NEGATIVE  NEG      Leukocyte Esterase NEGATIVE  NEG      WBC 0-4 0 - 4 /hpf    RBC 10-20 0 - 5 /hpf    Epithelial cells FEW FEW /lpf    Bacteria NEGATIVE  NEG /hpf    Hyaline cast 0-2 0 - 5 /lpf   CULTURE, BLOOD, PERIPHERAL    Collection Time: 02/18/19 10:28 PM   Result Value Ref Range    Special Requests: NO SPECIAL REQUESTS      Culture result: NO GROWTH AFTER 8 HOURS     CBC WITH AUTOMATED DIFF    Collection Time: 02/19/19  4:40 AM   Result Value Ref Range    WBC 10.2 4.1 - 11.1 K/uL    RBC 3.69 (L) 4.10 - 5.70 M/uL    HGB 9.9 (L) 12.1 - 17.0 g/dL    HCT 30.0 (L) 36.6 - 50.3 %    MCV 81.3 80.0 - 99.0 FL    MCH 26.8 26.0 - 34.0 PG    MCHC 33.0 30.0 - 36.5 g/dL    RDW 15.3 (H) 11.5 - 14.5 %    PLATELET 115 (L) 679 - 400 K/uL    MPV 11.9 8.9 - 12.9 FL    NRBC 0.0 0  WBC    ABSOLUTE NRBC 0.00 0.00 - 0.01 K/uL    NEUTROPHILS 78 (H) 32 - 75 %    LYMPHOCYTES 11 (L) 12 - 49 %    MONOCYTES 10 5 - 13 %    EOSINOPHILS 0 0 - 7 %    BASOPHILS 0 0 - 1 %    IMMATURE GRANULOCYTES 1 (H) 0.0 - 0.5 %    ABS. NEUTROPHILS 7.9 1.8 - 8.0 K/UL    ABS. LYMPHOCYTES 1.1 0.8 - 3.5 K/UL    ABS. MONOCYTES 1.1 (H) 0.0 - 1.0 K/UL    ABS. EOSINOPHILS 0.0 0.0 - 0.4 K/UL    ABS. BASOPHILS 0.0 0.0 - 0.1 K/UL    ABS. IMM.  GRANS. 0.1 (H) 0.00 - 0.04 K/UL    DF AUTOMATED     METABOLIC PANEL, BASIC    Collection Time: 02/19/19  4:40 AM   Result Value Ref Range    Sodium 140 136 - 145 mmol/L    Potassium 3.1 (L) 3.5 - 5.1 mmol/L    Chloride 107 97 - 108 mmol/L    CO2 27 21 - 32 mmol/L    Anion gap 6 5 - 15 mmol/L    Glucose 133 (H) 65 - 100 mg/dL    BUN 34 (H) 6 - 20 MG/DL    Creatinine 2.12 (H) 0.70 - 1.30 MG/DL    BUN/Creatinine ratio 16 12 - 20      GFR est AA 41 (L) >60 ml/min/1.73m2    GFR est non-AA 34 (L) >60 ml/min/1.73m2    Calcium 6.9 (L) 8.5 - 10.1 MG/DL          Assessment/Plan:      Principal problems  Type B aortic dissection   -s/p TEVAR, left subclavian embolization, and left carotid to subclavian bypass 02/15/19  Renal artery stenosis  -s/p left renal artery stenting 02/15/2019    Patient weaned off IV cardene and esmolol overnight. BP remains controlled this am.  Creatinine slightly elevated post diuresis. Encourage OOB/PT/OT/IS. Pain control with oral Percocet. Active problems  Subendocardial ischemia  -secondary to infarction related to dissection  -rule out ASHD  Hypertensive urgency   -resolved  LV  -on ASA, BBlocker, CCB, and statin. Appreciate input from cardiology. ARB and HCT discontinued for MARY. Currently controlled on CCB and BBlocker. Continue ASA. Statin per cardiology. Chest pain  -patient reports it is assocated with cough. Possibly pleuritic. Oral Percocet ordered PRN. Not a candidate for NSaids due to MARY/CKD. -initiated after diet advanced. Rule out GERD. Add PPI  -VSS. Unlikely cardiac. If persists will reconsider cardiac evaluation vs CT wo contrast.  Not a candidate for CTA due to MARY. Fever  Hyperglycemia  -UA, CXR, and blood cultures unremarkable. -WBC normal.  -he currently does not meet SIRs criteria  -secondary to renal infarction ? ??  -patient has significant hardware post procedure. Continue empiric Zosyn. Will hold off on Vancomycin for now due to MARY. -encourage IS. Acute hypoxic respiratory failure   -improving s/p diuresis  Bilateral pleural effusions  Atelectasis   -IV diuresis held for today.   Plan per nephrology and intensivist.    -pulmonary toileting per pulmonary   -will need outpatient sleep study     Renal infarction   MARY on CKD stage II  -plan per nephrology   Hypophosphotasia   Hypokalemia   -supplementation per nephrology and intensivist    Lactic acidosis   -resolved    Thrombocytopenia  -steadily improving     Anemia of acute blood loss  -not at levels to transfuse    Constipation  -tolerating clears, denies abd pain, nausea, and vomiting   -BM overnight. Continue BM regimen. VTE prophylaxis:  SCDs    Disposition:   TBD. P T evaluation today.

## 2019-02-19 NOTE — PROGRESS NOTES
I was present and observed Patient Interventions, Patient Assessment, and all medications administrations conducted by Nursing student Lorenzo Rushing today. I agree and have reviewed all documentation by [facility].

## 2019-02-19 NOTE — PROGRESS NOTES
Progress Note      2/19/2019 9:13 AM  NAME: Francesca Richardson   MRN:  [id number]   Admit Diagnosis: Acute thoracic aortic dissection ([facility] [address] [I71.01]                Assessment:       1. 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 2. No hx of CAD, equivicol troponin  3. Echo nl EF, LVH, mildly dilated ascending aorta with mild aortic regurgitation  4. Sinus with inferior and lateral TWI  5. HTN  6. Lipid ok  7. Snores, concern for sleep apnea, will need outpt sleep study  8. 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 Some atypical chest pain, resolved. Normal EF  Sinus  S/p TEVAR     1. Cont added aspirin 81mg daily  2. Currently off ggt's  3. Cont added coreg 12.5mg bid  4. Cont added amlodipine  5. Hold valsartan HCT  6. Diurese, resp status improving, follow bmp  7. Cont added statin             [x]        High complexity decision making was performed             Subjective:     Francesca Richardson denies chest pain. +dyspnea, hungry. Discussed with RN events overnight. Review of Systems:    Symptom Y/N Comments  Symptom Y/N Comments   Fever/Chills N   Chest Pain N    Poor Appetite N   Edema N    Cough N   Abdominal Pain N    Sputum N   Joint Pain N    SOB/ESCOBEDO N   Pruritis/Rash N    Nausea/vomit N   Tolerating PT/OT Y    Diarrhea N   Tolerating Diet Y    Constipation N   Other       Could NOT obtain due to:      Objective:      Physical Exam:    Last 24hrs VS reviewed since prior progress note.  Most recent are:    Visit Vitals  /86   Pulse 88   Temp 99.2 °F (37.3 °C)   Resp 22   Ht 6' 5\" (1.956 m)   Wt 109.4 kg (241 lb 2.9 oz)   SpO2 97% BMI 28.60 kg/m²       Intake/Output Summary (Last 24 hours) at 2/19/2019 1253  Last data filed at 2/19/2019 1200  Gross per 24 hour   Intake 3868.03 ml   Output 7955 ml   Net -4086.97 ml        General Appearance: Well developed, well nourished, alert & oriented x 3,    no acute distress. Ears/Nose/Mouth/Throat: Hearing grossly normal.  Neck: Supple. Chest: Lungs clear to auscultation bilaterally. Cardiovascular: Regular rate and rhythm, S1S2 normal, no murmur. Abdomen: Soft, non-tender, bowel sounds are active. Extremities: No edema bilaterally. Skin: Warm and dry. PMH/SH reviewed - no change compared to H&P    Data Review    Telemetry: normal sinus rhythm     Lab Data Personally Reviewed:    Recent Labs     02/19/19 0440 02/18/19  0431   WBC 10.2 14.1*   HGB 9.9* 10.9*   HCT 30.0* 33.2*   * 99*     Recent Labs     02/17/19  1155   INR 1.3*   PTP 13.0*   APTT 33.0*      Recent Labs     02/19/19  0440 02/18/19  0431 02/17/19  0400    143 144   K 3.1* 3.7 3.6    111* 112*   CO2 27 24 27   BUN 34* 32* 36*   CREA 2.12* 1.88* 2.31*   * 126* 127*   CA 6.9* 6.7* 6.8*   MG  --  2.2 1.6     No results for input(s): CPK, CKNDX, TROIQ in the last 72 hours. No lab exists for component: CPKMB  Lab Results   Component Value Date/Time    Cholesterol, total 146 04/18/2018 08:58 AM    HDL Cholesterol 38 (L) 04/18/2018 08:58 AM    LDL, calculated 95 04/18/2018 08:58 AM    Triglyceride 64 04/18/2018 08:58 AM       Recent Labs     02/18/19  0431   SGOT 72*   AP 35*   TP 5.9*   ALB 2.7*   GLOB 3.2     No results for input(s): PH, PCO2, PO2 in the last 72 hours.     Medications Personally Reviewed:    Current Facility-Administered Medications   Medication Dose Route Frequency    potassium chloride 10 mEq in 50 ml IVPB  10 mEq IntraVENous Q1H    potassium chloride (KLOR-CON) packet for solution 20 mEq  20 mEq Oral DAILY    pantoprazole (PROTONIX) tablet 40 mg  40 mg Oral ACB    piperacillin-tazobactam (ZOSYN) 3.375 g in 0.9% sodium chloride (MBP/ADV) 100 mL  3.375 g IntraVENous Q8H    aspirin chewable tablet 81 mg  81 mg Oral DAILY    carvedilol (COREG) tablet 12.5 mg  12.5 mg Oral BID WITH MEALS    pravastatin (PRAVACHOL) tablet 20 mg  20 mg Oral QHS    senna-docusate (PERICOLACE) 8.6-50 mg per tablet 2 Tab  2 Tab Oral QHS    oxyCODONE-acetaminophen (PERCOCET) 5-325 mg per tablet 1 Tab  1 Tab Oral Q4H PRN    acetaminophen (TYLENOL) solution 650 mg  650 mg Oral Q6H PRN    metoprolol (LOPRESSOR) injection 5 mg  5 mg IntraVENous Q6H PRN    sodium chloride (NS) flush 5-40 mL  5-40 mL IntraVENous Q8H    sodium chloride (NS) flush 5-40 mL  5-40 mL IntraVENous PRN    albuterol (PROVENTIL VENTOLIN) nebulizer solution 2.5 mg  2.5 mg Inhalation QID RT    mupirocin (BACTROBAN) 2 % ointment   Both Nostrils BID    amLODIPine (NORVASC) tablet 5 mg  5 mg Oral DAILY    bisacodyl (DULCOLAX) suppository 10 mg  10 mg Rectal DAILY PRN    LORazepam (ATIVAN) injection 1 mg  1 mg IntraVENous Q4H PRN         Dianna Ayala MD

## 2019-02-19 NOTE — INTERDISCIPLINARY ROUNDS
Interdisciplinary team rounds were held 2/19/2019 with the following team members:Care Management, Diabetes Treatment Specialist, Nursing, Nutrition, Pharmacy, Physician and Respiratory Therapy. Plan of care discussed. See clinical pathway and/or care plan for interventions and desired outcomes.

## 2019-02-19 NOTE — PROGRESS NOTES
PULMONARY ASSOCIATES [facility] INTENSIVIST Consult Service Note  Pulmonary, Critical Care, and Sleep Medicine    Name: Rkaesh Jeffery MRN: [id number]   : 1970 Hospital: [facility]   Date: 2019  Admission date: 2/15/2019 Hospital Day: 5           IMPRESSION:   1. Uncontrolled HTN on IV esmolol and Nicardipine  2. Acute type B aortic Dissection  3. Severe Chest pain  4. Nausea and vomiting  5. Abdominal pain  6. MARY  7. Urinary retention  Probable MARITZA  8. Additional workup outlined below  9. Multiorgan dysfunction as outlined above: Pt has one or more acute or chronic illnesses with severe exacerbation with progression or side effects of treatment that poses a threat to life or bodily function  10. Pt is unstable, unpredictable needing more CCU monitoring; at high risk of sudden decline and decompensation with life threatening consequenses and continued end organ dysfunction and failure  11. Pt is critically ill. Time spent with pt and staff actively rendering care, managing pt and coordinating care as stated below;  35 minutes, exclusive of any procedures      RECOMMENDATIONS/PLAN:   1. CCU monitoring  2. Critically ill  3. Empiric abx per vascular surgery  4. BP control with current drips, titrating down  5. Hold diuretics today  6. Replete K  7. titate oxygen to keep sats > 90%  8. Antiemetics  9. Adjust pain meds  10. Bronchial hygiene with respiratory therapy techniques, bronchodilators  11. DVT, SUP prophylaxis  12. Will be available to assist in medical management while in the CCU pending disposition         Subjective/Initial History:   I have reviewed the flowsheet and previous days notes. Seen earlier today on rounds. Today critically ill still on multiple drips to control BP  Critically ill, still on midflow O2  Responded to diuretics        ROS:A comprehensive review of systems was negative except for that written in the HPI.     No Known Allergies   MEDS:   Current Facility-Administered Medications   Medication    potassium chloride 10 mEq in 50 ml IVPB    potassium chloride (KLOR-CON) packet for solution 20 mEq    piperacillin-tazobactam (ZOSYN) 3.375 g in 0.9% sodium chloride (MBP/ADV) 100 mL    bumetanide (BUMEX) injection 2 mg    aspirin chewable tablet 81 mg    carvedilol (COREG) tablet 12.5 mg    pravastatin (PRAVACHOL) tablet 20 mg    senna-docusate (PERICOLACE) 8.6-50 mg per tablet 2 Tab    oxyCODONE-acetaminophen (PERCOCET) 5-325 mg per tablet 1 Tab    acetaminophen (TYLENOL) solution 650 mg    metoprolol (LOPRESSOR) injection 5 mg    sodium chloride (NS) flush 5-40 mL    sodium chloride (NS) flush 5-40 mL    albuterol (PROVENTIL VENTOLIN) nebulizer solution 2.5 mg    esmolol 10mg/mL (BREVIBLOC) infusion    niCARdipine (CARDENE) 50 mg in 0.9% sodium chloride 100 mL infusion    nitroPRUSSide (NIPRIDE) 50 mg in dextrose 5% 100 mL infusion    mupirocin (BACTROBAN) 2 % ointment    ondansetron (ZOFRAN) injection 4 mg    prochlorperazine (COMPAZINE) with saline injection 10 mg    amLODIPine (NORVASC) tablet 5 mg    morphine injection 2 mg    morphine injection 4 mg    bisacodyl (DULCOLAX) suppository 10 mg    PHENYLephrine (PF)(TRE-SYNEPHRINE) 30 mg in 0.9% sodium chloride 250 mL infusion    LORazepam (ATIVAN) injection 1 mg        Current Facility-Administered Medications:     potassium chloride 10 mEq in 50 ml IVPB, 10 mEq, IntraVENous, Q1H, Margaret Catalan MD    potassium chloride (KLOR-CON) packet for solution 20 mEq, 20 mEq, Oral, DAILY, Margaret Catalan MD    [COMPLETED] piperacillin-tazobactam (ZOSYN) 3.375 g in 0.9% sodium chloride (MBP/ADV) 100 mL, 3.375 g, IntraVENous, ONCE, Last Rate: 200 mL/hr at 02/18/19 1147, 3.375 g at 02/18/19 1147 **FOLLOWED BY** piperacillin-tazobactam (ZOSYN) 3.375 g in 0.9% sodium chloride (MBP/ADV) 100 mL, 3.375 g, IntraVENous, Q8H, Faiza Reilly, NP, Last Rate: 25 mL/hr at 02/18/19 2319, 3.375 g at 02/18/19 2319    bumetanide (BUMEX) injection 2 mg, 2 mg, IntraVENous, Q12H, Margaret Catalan MD, 2 mg at 02/18/19 2033    aspirin chewable tablet 81 mg, 81 mg, Oral, DAILY, Aric Garrido MD, 81 mg at 02/18/19 1152    carvedilol (COREG) tablet 12.5 mg, 12.5 mg, Oral, BID WITH MEALS, Aric Garrido MD, 12.5 mg at 02/18/19 1703    pravastatin (PRAVACHOL) tablet 20 mg, 20 mg, Oral, QHS, Aric Garrido MD, 20 mg at 02/18/19 2323    senna-docusate (PERICOLACE) 8.6-50 mg per tablet 2 Tab, 2 Tab, Oral, QHS, Faiza Reilly P, NP, 2 Tab at 02/18/19 2323    oxyCODONE-acetaminophen (PERCOCET) 5-325 mg per tablet 1 Tab, 1 Tab, Oral, Q4H PRN, Faiza Duffy, NP    acetaminophen (TYLENOL) solution 650 mg, 650 mg, Oral, Q6H PRN, Lois Church MD, 650 mg at 02/19/19 0235    metoprolol (LOPRESSOR) injection 5 mg, 5 mg, IntraVENous, Q6H PRN, Jorge Goerges MD, 5 mg at 02/17/19 2307    sodium chloride (NS) flush 5-40 mL, 5-40 mL, IntraVENous, Q8H, McDonough, Randon Apley, MD, 10 mL at 02/19/19 0600    sodium chloride (NS) flush 5-40 mL, 5-40 mL, IntraVENous, PRN, Ko Ortega MD    albuterol (PROVENTIL VENTOLIN) nebulizer solution 2.5 mg, 2.5 mg, Inhalation, QID RT, Basilia Pérez MD, 2.5 mg at 02/18/19 1916    esmolol 10mg/mL (BREVIBLOC) infusion, 0-200 mcg/kg/min, IntraVENous, TITRATE, Aydin Hooper MD, Stopped at 02/18/19 1922    niCARdipine (CARDENE) 50 mg in 0.9% sodium chloride 100 mL infusion, 0-15 mg/hr, IntraVENous, TITRATE, Aric Garrido MD, Last Rate: 5 mL/hr at 02/19/19 0743, 2.5 mg/hr at 02/19/19 0743    nitroPRUSSide (NIPRIDE) 50 mg in dextrose 5% 100 mL infusion, 0.2-10 mcg/kg/min, IntraVENous, TITRATE, Aydin Hooper MD, Stopped at 02/16/19 1940    mupirocin (BACTROBAN) 2 % ointment, , Both Nostrils, BID, Brein Lee MD    ondansetron [facility]) injection 4 mg, 4 mg, IntraVENous, Q4H PRN, Lois Church MD, 4 mg at 02/15/19 0550    prochlorperazine (COMPAZINE) with saline injection 10 mg, 10 mg, IntraVENous, Q6H PRN, Hu ANTONIO MD, 10 mg at 02/15/19 0902    amLODIPine (NORVASC) tablet 5 mg, 5 mg, Oral, DAILY, Faiza Denny NP, 5 mg at 19 5321    morphine injection 2 mg, 2 mg, IntraVENous, Q3H PRN, Sabina Courtney MD, 2 mg at 19 210    morphine injection 4 mg, 4 mg, IntraVENous, Q3H PRN, Sabina Courtney MD, 4 mg at 19 0240    bisacodyl (DULCOLAX) suppository 10 mg, 10 mg, Rectal, DAILY PRN, Sabina Courtney MD    PHENYLephrine (PF)(TRE-SYNEPHRINE) 30 mg in 0.9% sodium chloride 250 mL infusion,  mcg/min, IntraVENous, TITRATE, Sabina Courtney MD, Stopped at 02/15/19 2303    LORazepam (ATIVAN) injection 1 mg, 1 mg, IntraVENous, Q4H PRN, Sabina Courtney MD, 1 mg at 19 0235      Objective:     Vital Signs: Telemetry:    normal sinus rhythm Intake/Output:   Visit Vitals  /79 (BP 1 Location: Left arm, BP Patient Position: At rest)   Pulse 91   Temp 99.6 °F (37.6 °C)   Resp 22   Ht 6' 5\" (1.956 m)   Wt 109.4 kg (241 lb 2.9 oz)   SpO2 97%   BMI 28.60 kg/m²       Temp (24hrs), Av.2 °F (37.9 °C), Min:98 °F (36.7 °C), Max:102.4 °F (39.1 °C)        O2 Device: Hi flow nasal cannula O2 Flow Rate (L/min): 15 l/min         Body mass index is 28.6 kg/m².     Wt Readings from Last 4 Encounters:   02/15/19 109.4 kg (241 lb 2.9 oz)   18 110.7 kg (244 lb)   18 109.4 kg (241 lb 2 oz)   18 110.2 kg (243 lb)          Intake/Output Summary (Last 24 hours) at 2019 6054  Last data filed at 2019 0800  Gross per 24 hour   Intake 5863.15 ml   Output 7405 ml   Net -1541.85 ml       Last shift:      701 - 1900  In: -   Out: 375 [Urine:375]  Last 3 shifts: 1901 -  07  In: 7476.9 [P.O.:2440; I.V.:5036.9]  Out: 8380 [Urine:8380]         Physical Exam:     General:  male; somnolent but arousable   HEAD: Normocephalic, without obvious abnormality, atraumatic   EYES: conjunctivae clear. PERRL,  AN Icteric sclerae   NOSE: nares normal, no drainage, no nasal flaring,    THROAT: mucous membranes dry; Lips, mucosa dry; No Thrush;     Neck: Supple, symmetrical, trachea midline,  No accessory mm use; No Stridor/ cuff leak, No goiter or thyroid tenderness   LYMPH: No abnormally enlarged lymph nodes. in neck   Chest: normal   Lungs: rales   Heart: Regular rate and rhythm; no edema   Abdomen: nondistended, hypoactive bowel sounds, tenderness marked and guarding - in the entire abdomen   : No Johnson; Extremity: negative, clubbing; no joint swelling or erythema   Neuro: speech fluent; withdraws to pain; unable to check gait and station; following simple commands   Psych: oriented to time, place and person ; No agitation;    Devices:per sepsis flow sheet- reviewed with team during IDR   Skin: Warm;    Pulses:Bilateral, Radial, 2+   Capillary refill: warm, well perfused,      Labs:    Recent Labs     02/19/19 0440 02/18/19  0431 02/17/19  1155   WBC 10.2 14.1* 12.5*   HGB 9.9* 10.9* 10.6*   * 99* 94*   INR  --   --  1.3*   APTT  --   --  33.0*     Recent Labs     02/19/19 0440 02/18/19  0431 02/17/19  0400    143 144   K 3.1* 3.7 3.6    111* 112*   CO2 27 24 27   * 126* 127*   BUN 34* 32* 36*   CREA 2.12* 1.88* 2.31*   CA 6.9* 6.7* 6.8*   MG  --  2.2 1.6   PHOS  --  2.3* 1.9*   ALB  --  2.7*  --    SGOT  --  72*  --    ALT  --  45  --      No results for input(s): PH, PCO2, PO2, HCO3, FIO2 in the last 72 hours. No results for input(s): CPK, CKNDX, TROIQ in the last 72 hours.     No lab exists for component: CPKMB  No results found for: BNPP, BNP   Lab Results   Component Value Date/Time    Culture result: NO GROWTH AFTER 8 HOURS 02/18/2019 10:28 PM    Culture result: NO GROWTH 2 DAYS 02/17/2019 08:51 AM      No results found for: VANCT, CPK    Imaging:  I have personally reviewed the patients radiographs and have reviewed the reports:    CXR: pulmonary edema, bilateral effusions      During this entire length of time the patient's condition was unstable, unpredictable and critically ill in the CCU/ ICU. I was immediately available to the patient whose care required several interactions with nursing, multidisciplinary team members leading to multiple interventions with fluid resuscitation and medication adjustments to optimize respiratory support, hemodynamic treatment, medication changes based on repeat labs results, reviews, exams and assessments. The reason for providing this level of medical care was due to a critical illness that impaired one or more vital organ systems, such that there was a high probability of sudden or life threatening deterioration in the patient's condition. This care involved high complexity medical decision making to treat acute and unstable vital organ system failure, and to prevent further life threatening deterioration of the patients condition. I personally:  · Reviewed the flowsheet and previous days notes  · Reviewed and summarized records or history from previous days note or discussions with staff, family  · Parenteral controlled substances - Reviewed/ Adjusted / Nikolay Foster / Started  · High Risk Drug therapy requiring intensive monitoring for toxicity: eg steroids, pressors, antibiotics  · Reviewed and/or ordered Clinical lab tests  · Reviewed and/or ordered Radiology tests  · Reviewed and/or ordered of Medicine tests  · Independently visualized radiologic Images  · Reviewed the patients ECG / Telemetry  · discussed my assessment/management with : Consultants, Nursing, Pharmacy, Case Management, PT, OT, Respiratory Therapy, Hospitalist and Family for coordination of care           Thank you for allowing us to participate in the care of this patient. We will follow along with you until they no longer require CCU services.     Hieu Bucio MD

## 2019-02-20 ENCOUNTER — APPOINTMENT (OUTPATIENT)
Dept: GENERAL RADIOLOGY | Age: 49
DRG: 173 | End: 2019-02-20
Attending: INTERNAL MEDICINE
Payer: MEDICAID

## 2019-02-20 LAB
ANION GAP SERPL CALC-SCNC: 6 MMOL/L (ref 5–15)
BUN SERPL-MCNC: 26 MG/DL (ref 6–20)
BUN/CREAT SERPL: 15 (ref 12–20)
CALCIUM SERPL-MCNC: 7.6 MG/DL (ref 8.5–10.1)
CHLORIDE SERPL-SCNC: 105 MMOL/L (ref 97–108)
CO2 SERPL-SCNC: 28 MMOL/L (ref 21–32)
CREAT SERPL-MCNC: 1.7 MG/DL (ref 0.7–1.3)
ERYTHROCYTE [DISTWIDTH] IN BLOOD BY AUTOMATED COUNT: 15 % (ref 11.5–14.5)
GLUCOSE SERPL-MCNC: 108 MG/DL (ref 65–100)
HCT VFR BLD AUTO: 31.3 % (ref 36.6–50.3)
HGB BLD-MCNC: 10.2 G/DL (ref 12.1–17)
MAGNESIUM SERPL-MCNC: 1.9 MG/DL (ref 1.6–2.4)
MCH RBC QN AUTO: 26.8 PG (ref 26–34)
MCHC RBC AUTO-ENTMCNC: 32.6 G/DL (ref 30–36.5)
MCV RBC AUTO: 82.4 FL (ref 80–99)
NRBC # BLD: 0 K/UL (ref 0–0.01)
NRBC BLD-RTO: 0 PER 100 WBC
PHOSPHATE SERPL-MCNC: 2.2 MG/DL (ref 2.6–4.7)
PLATELET # BLD AUTO: 121 K/UL (ref 150–400)
PMV BLD AUTO: 11.3 FL (ref 8.9–12.9)
POTASSIUM SERPL-SCNC: 3.6 MMOL/L (ref 3.5–5.1)
RBC # BLD AUTO: 3.8 M/UL (ref 4.1–5.7)
SODIUM SERPL-SCNC: 139 MMOL/L (ref 136–145)
WBC # BLD AUTO: 7.9 K/UL (ref 4.1–11.1)

## 2019-02-20 PROCEDURE — 74011250636 HC RX REV CODE- 250/636

## 2019-02-20 PROCEDURE — 74011000258 HC RX REV CODE- 258: Performed by: SURGERY

## 2019-02-20 PROCEDURE — 65610000006 HC RM INTENSIVE CARE

## 2019-02-20 PROCEDURE — 74011250637 HC RX REV CODE- 250/637: Performed by: INTERNAL MEDICINE

## 2019-02-20 PROCEDURE — 80048 BASIC METABOLIC PNL TOTAL CA: CPT

## 2019-02-20 PROCEDURE — 94640 AIRWAY INHALATION TREATMENT: CPT

## 2019-02-20 PROCEDURE — 74011000250 HC RX REV CODE- 250: Performed by: NURSE PRACTITIONER

## 2019-02-20 PROCEDURE — 74011250636 HC RX REV CODE- 250/636: Performed by: INTERNAL MEDICINE

## 2019-02-20 PROCEDURE — 74011000250 HC RX REV CODE- 250: Performed by: INTERNAL MEDICINE

## 2019-02-20 PROCEDURE — 97116 GAIT TRAINING THERAPY: CPT

## 2019-02-20 PROCEDURE — 97162 PT EVAL MOD COMPLEX 30 MIN: CPT

## 2019-02-20 PROCEDURE — 74011250637 HC RX REV CODE- 250/637: Performed by: SURGERY

## 2019-02-20 PROCEDURE — 84100 ASSAY OF PHOSPHORUS: CPT

## 2019-02-20 PROCEDURE — 74011000258 HC RX REV CODE- 258: Performed by: NURSE PRACTITIONER

## 2019-02-20 PROCEDURE — 74011250636 HC RX REV CODE- 250/636: Performed by: SURGERY

## 2019-02-20 PROCEDURE — 74011000258 HC RX REV CODE- 258: Performed by: INTERNAL MEDICINE

## 2019-02-20 PROCEDURE — 74011250637 HC RX REV CODE- 250/637: Performed by: NURSE PRACTITIONER

## 2019-02-20 PROCEDURE — 74011000250 HC RX REV CODE- 250: Performed by: SURGERY

## 2019-02-20 PROCEDURE — 85027 COMPLETE CBC AUTOMATED: CPT

## 2019-02-20 PROCEDURE — 36415 COLL VENOUS BLD VENIPUNCTURE: CPT

## 2019-02-20 PROCEDURE — 71045 X-RAY EXAM CHEST 1 VIEW: CPT

## 2019-02-20 PROCEDURE — 77010033678 HC OXYGEN DAILY

## 2019-02-20 PROCEDURE — 83735 ASSAY OF MAGNESIUM: CPT

## 2019-02-20 RX ORDER — HYDRALAZINE HYDROCHLORIDE 25 MG/1
25 TABLET, FILM COATED ORAL 3 TIMES DAILY
Status: DISCONTINUED | OUTPATIENT
Start: 2019-02-20 | End: 2019-02-21

## 2019-02-20 RX ORDER — HYDRALAZINE HYDROCHLORIDE 20 MG/ML
10 INJECTION INTRAMUSCULAR; INTRAVENOUS
Status: DISCONTINUED | OUTPATIENT
Start: 2019-02-20 | End: 2019-02-26 | Stop reason: HOSPADM

## 2019-02-20 RX ORDER — AMLODIPINE BESYLATE 5 MG/1
10 TABLET ORAL DAILY
Status: DISCONTINUED | OUTPATIENT
Start: 2019-02-21 | End: 2019-02-26 | Stop reason: HOSPADM

## 2019-02-20 RX ORDER — CALCIUM CARBONATE 200(500)MG
200 TABLET,CHEWABLE ORAL
Status: DISCONTINUED | OUTPATIENT
Start: 2019-02-20 | End: 2019-02-21

## 2019-02-20 RX ORDER — AMLODIPINE BESYLATE 5 MG/1
5 TABLET ORAL
Status: COMPLETED | OUTPATIENT
Start: 2019-02-20 | End: 2019-02-20

## 2019-02-20 RX ORDER — MORPHINE SULFATE 2 MG/ML
1-2 INJECTION, SOLUTION INTRAMUSCULAR; INTRAVENOUS
Status: DISCONTINUED | OUTPATIENT
Start: 2019-02-20 | End: 2019-02-20 | Stop reason: SDUPTHER

## 2019-02-20 RX ORDER — NICARDIPINE HYDROCHLORIDE 0.1 MG/ML
5-15 INJECTION INTRAVENOUS
Status: DISCONTINUED | OUTPATIENT
Start: 2019-02-20 | End: 2019-02-20

## 2019-02-20 RX ORDER — CARVEDILOL 12.5 MG/1
25 TABLET ORAL 2 TIMES DAILY WITH MEALS
Status: DISCONTINUED | OUTPATIENT
Start: 2019-02-20 | End: 2019-02-26 | Stop reason: HOSPADM

## 2019-02-20 RX ORDER — HYDROMORPHONE HYDROCHLORIDE 1 MG/ML
1 INJECTION, SOLUTION INTRAMUSCULAR; INTRAVENOUS; SUBCUTANEOUS
Status: DISCONTINUED | OUTPATIENT
Start: 2019-02-20 | End: 2019-02-22

## 2019-02-20 RX ORDER — MORPHINE SULFATE 2 MG/ML
INJECTION, SOLUTION INTRAMUSCULAR; INTRAVENOUS
Status: DISPENSED
Start: 2019-02-20 | End: 2019-02-20

## 2019-02-20 RX ORDER — CARVEDILOL 12.5 MG/1
12.5 TABLET ORAL
Status: COMPLETED | OUTPATIENT
Start: 2019-02-20 | End: 2019-02-20

## 2019-02-20 RX ORDER — ALBUTEROL SULFATE 0.83 MG/ML
2.5 SOLUTION RESPIRATORY (INHALATION)
Status: DISCONTINUED | OUTPATIENT
Start: 2019-02-20 | End: 2019-02-26 | Stop reason: HOSPADM

## 2019-02-20 RX ADMIN — SODIUM CHLORIDE 15 MG/HR: 900 INJECTION, SOLUTION INTRAVENOUS at 20:17

## 2019-02-20 RX ADMIN — ACETAMINOPHEN 650 MG: 160 SOLUTION ORAL at 21:25

## 2019-02-20 RX ADMIN — CARVEDILOL 12.5 MG: 12.5 TABLET, FILM COATED ORAL at 08:38

## 2019-02-20 RX ADMIN — ASPIRIN 81 MG 81 MG: 81 TABLET ORAL at 07:25

## 2019-02-20 RX ADMIN — CARVEDILOL 12.5 MG: 12.5 TABLET, FILM COATED ORAL at 07:25

## 2019-02-20 RX ADMIN — PIPERACILLIN SODIUM,TAZOBACTAM SODIUM 3.38 G: 3; .375 INJECTION, POWDER, FOR SOLUTION INTRAVENOUS at 14:24

## 2019-02-20 RX ADMIN — AMLODIPINE BESYLATE 5 MG: 5 TABLET ORAL at 09:25

## 2019-02-20 RX ADMIN — Medication 10 ML: at 23:18

## 2019-02-20 RX ADMIN — AMLODIPINE BESYLATE 5 MG: 5 TABLET ORAL at 07:25

## 2019-02-20 RX ADMIN — METOPROLOL TARTRATE 5 MG: 5 INJECTION INTRAVENOUS at 00:13

## 2019-02-20 RX ADMIN — PIPERACILLIN SODIUM,TAZOBACTAM SODIUM 3.38 G: 3; .375 INJECTION, POWDER, FOR SOLUTION INTRAVENOUS at 23:12

## 2019-02-20 RX ADMIN — SODIUM CHLORIDE 15 MG/HR: 900 INJECTION, SOLUTION INTRAVENOUS at 14:43

## 2019-02-20 RX ADMIN — SODIUM CHLORIDE 15 MG/HR: 900 INJECTION, SOLUTION INTRAVENOUS at 17:04

## 2019-02-20 RX ADMIN — ACETAMINOPHEN 650 MG: 160 SOLUTION ORAL at 17:32

## 2019-02-20 RX ADMIN — Medication 10 ML: at 06:09

## 2019-02-20 RX ADMIN — SODIUM CHLORIDE 15 MG/HR: 900 INJECTION, SOLUTION INTRAVENOUS at 23:23

## 2019-02-20 RX ADMIN — SODIUM CHLORIDE: 900 INJECTION, SOLUTION INTRAVENOUS at 12:29

## 2019-02-20 RX ADMIN — METOPROLOL TARTRATE 5 MG: 5 INJECTION INTRAVENOUS at 14:23

## 2019-02-20 RX ADMIN — HYDRALAZINE HYDROCHLORIDE 25 MG: 25 TABLET, FILM COATED ORAL at 23:13

## 2019-02-20 RX ADMIN — MORPHINE SULFATE 2 MG: 2 INJECTION, SOLUTION INTRAMUSCULAR; INTRAVENOUS at 03:41

## 2019-02-20 RX ADMIN — SODIUM CHLORIDE 5 MG/HR: 900 INJECTION, SOLUTION INTRAVENOUS at 04:12

## 2019-02-20 RX ADMIN — CARVEDILOL 25 MG: 12.5 TABLET, FILM COATED ORAL at 16:02

## 2019-02-20 RX ADMIN — METOPROLOL TARTRATE 5 MG: 5 INJECTION INTRAVENOUS at 20:29

## 2019-02-20 RX ADMIN — HYDRALAZINE HYDROCHLORIDE 25 MG: 25 TABLET, FILM COATED ORAL at 14:22

## 2019-02-20 RX ADMIN — HYDRALAZINE HYDROCHLORIDE 10 MG: 20 INJECTION INTRAMUSCULAR; INTRAVENOUS at 16:02

## 2019-02-20 RX ADMIN — ALBUTEROL SULFATE 2.5 MG: 2.5 SOLUTION RESPIRATORY (INHALATION) at 09:14

## 2019-02-20 RX ADMIN — ALBUTEROL SULFATE 2.5 MG: 2.5 SOLUTION RESPIRATORY (INHALATION) at 11:34

## 2019-02-20 RX ADMIN — SENNOSIDES AND DOCUSATE SODIUM 2 TABLET: 8.6; 5 TABLET ORAL at 23:13

## 2019-02-20 RX ADMIN — SODIUM CHLORIDE 10 MG/HR: 900 INJECTION, SOLUTION INTRAVENOUS at 07:24

## 2019-02-20 RX ADMIN — PRAVASTATIN SODIUM 20 MG: 10 TABLET ORAL at 23:13

## 2019-02-20 RX ADMIN — SODIUM CHLORIDE 10 MG/HR: 900 INJECTION, SOLUTION INTRAVENOUS at 07:15

## 2019-02-20 RX ADMIN — PANTOPRAZOLE SODIUM 40 MG: 40 TABLET, DELAYED RELEASE ORAL at 07:27

## 2019-02-20 RX ADMIN — OXYCODONE AND ACETAMINOPHEN 1 TABLET: 5; 325 TABLET ORAL at 02:02

## 2019-02-20 RX ADMIN — POTASSIUM CHLORIDE 20 MEQ: 1.5 POWDER, FOR SOLUTION ORAL at 07:31

## 2019-02-20 RX ADMIN — PIPERACILLIN SODIUM,TAZOBACTAM SODIUM 3.38 G: 3; .375 INJECTION, POWDER, FOR SOLUTION INTRAVENOUS at 07:26

## 2019-02-20 RX ADMIN — MORPHINE SULFATE 2 MG: 2 INJECTION, SOLUTION INTRAMUSCULAR; INTRAVENOUS at 21:24

## 2019-02-20 RX ADMIN — Medication 10 ML: at 14:30

## 2019-02-20 NOTE — PROGRESS NOTES
Report received from [address]  0730: RN noted that cardene drip had been restarted due to HTN. Orders to continue to do BP measurements in right arm per reportfrom night shift. AM BP medicine given early. Will notify MD.   2566: Spoke with Apolinra Roach NP. Updated orders to use Cardene to keep SBP less than 120, cuff goal is . Per NP does not matter which arm to use for BP measurements. 1882 :PT/OT worked with Patient, ambulated in chiu. Did very well per PT  [phone]: Looking over cardiology's note seeing that they would prefer to have BP's measured in right arm. Rechecked BP in right arm and received SBP value of 159. Dr. Parker Alvarado paged. Orders to start hydralazine 25 michelle TID. Will continue to monitor. Md stated he would like to have all future BP measurements in the right arm.   1500: Patient HR still elevated to low to mid 90's. Dr. Светлана Orourke notified. Orders for breathing treatments to be changed to PRN instead of scheduled. Will continue to monitor patient. 1551: Patient SBP still high 140's to low 150's. Dr. Parker Alvarado notified. Orders to monitor patient over night and see how changes to medications today effect patient's BP. Orders for hydralazine 10 mg IV Q 6 PRN for systolic greater than 266. Will continue to monitor patient  1920: Patient's evening temp was 101.3   orally. Patient has been febrile. Paged on call MD to notify. 1930: Dr. Ray Roach returned call, notified of temp. Orders for tylenol. Will continue to monitor.

## 2019-02-20 NOTE — PROGRESS NOTES
PULMONARY ASSOCIATES [facility] INTENSIVIST Consult Service Note  Pulmonary, Critical Care, and Sleep Medicine    Name: Ana Soto MRN: [id number]   : 1970 Hospital: [facility]   Date: 2019  Admission date: 2/15/2019 Hospital Day: 6           IMPRESSION:   1. Uncontrolled HTN on IV esmolol and Nicardipine  2. Acute type B aortic Dissection  3. Severe Chest pain  4. Nausea and vomiting  5. Abdominal pain  6. MARY  7. Urinary retention  Probable MARITZA  8. Additional workup outlined below  9. Multiorgan dysfunction as outlined above: Pt has one or more acute or chronic illnesses with severe exacerbation with progression or side effects of treatment that poses a threat to life or bodily function  10. Pt is unstable, unpredictable needing more CCU monitoring; at high risk of sudden decline and decompensation with life threatening consequenses and continued end organ dysfunction and failure  11. Pt is critically ill. Time spent with pt and staff actively rendering care, managing pt and coordinating care as stated below;  35 minutes, exclusive of any procedures      RECOMMENDATIONS/PLAN:   1. CCU monitoring  2. Critically ill  3. Empiric abx per vascular surgery  4. BP control with current drips, titrating down  5. Hold diuretics today again  6. Renal fx better  7. titate oxygen to keep sats > 90%  8. Antiemetics  9. Adjust pain meds  10. Bronchial hygiene with respiratory therapy techniques, bronchodilators  11. DVT, SUP prophylaxis  12. Will be available to assist in medical management while in the CCU pending disposition         Subjective/Initial History:   I have reviewed the flowsheet and previous days notes. Seen earlier today on rounds. Still on iv cardene drip to control his BP  No acute distress        ROS:A comprehensive review of systems was negative except for that written in the HPI.      MEDS:   Current Facility-Administered Medications   Medication    morphine 2 mg/mL injection    morphine injection 1-2 mg    niCARdipine (CARDENE) 25 mg in 0.9% sodium chloride 250 mL infusion    carvedilol (COREG) tablet 25 mg    potassium chloride (KLOR-CON) packet for solution 20 mEq    pantoprazole (PROTONIX) tablet 40 mg    piperacillin-tazobactam (ZOSYN) 3.375 g in 0.9% sodium chloride (MBP/ADV) 100 mL    aspirin chewable tablet 81 mg    pravastatin (PRAVACHOL) tablet 20 mg    senna-docusate (PERICOLACE) 8.6-50 mg per tablet 2 Tab    oxyCODONE-acetaminophen (PERCOCET) 5-325 mg per tablet 1 Tab    acetaminophen (TYLENOL) solution 650 mg    metoprolol (LOPRESSOR) injection 5 mg    sodium chloride (NS) flush 5-40 mL    sodium chloride (NS) flush 5-40 mL    albuterol (PROVENTIL VENTOLIN) nebulizer solution 2.5 mg    amLODIPine (NORVASC) tablet 5 mg    bisacodyl (DULCOLAX) suppository 10 mg    LORazepam (ATIVAN) injection 1 mg        Current Facility-Administered Medications:     morphine 2 mg/mL injection, , , ,     morphine injection 1-2 mg, 1-2 mg, IntraVENous, Q3H PRN, Ananth Brito MD, 2 mg at 02/20/19 0341    niCARdipine (CARDENE) 25 mg in 0.9% sodium chloride 250 mL infusion, 5-15 mg/hr, IntraVENous, TITRATE, Edd Sheets, Faiza P, NP, Last Rate: 100 mL/hr at 02/20/19 0907, 10 mg/hr at 02/20/19 0907    carvedilol (COREG) tablet 25 mg, 25 mg, Oral, BID WITH MEALS, Aric Garrido MD    potassium chloride (KLOR-CON) packet for solution 20 mEq, 20 mEq, Oral, DAILY, Margaret Catalan MD, 20 mEq at 02/20/19 0731    pantoprazole (PROTONIX) tablet 40 mg, 40 mg, Oral, ACB, Reilly, Faiza P, NP, 40 mg at 02/20/19 0727    [COMPLETED] piperacillin-tazobactam (ZOSYN) 3.375 g in 0.9% sodium chloride (MBP/ADV) 100 mL, 3.375 g, IntraVENous, ONCE, Last Rate: 200 mL/hr at 02/18/19 1147, 3.375 g at 02/18/19 1147 **FOLLOWED BY** piperacillin-tazobactam (ZOSYN) 3.375 g in 0.9% sodium chloride (MBP/ADV) 100 mL, 3.375 g, IntraVENous, Q8H, Hammad Ng MD, Last Rate: 25 mL/hr at 19 0726, 3.375 g at 19 1217    aspirin chewable tablet 81 mg, 81 mg, Oral, DAILY, Aric Garrido MD, 81 mg at 19 0725    pravastatin (PRAVACHOL) tablet 20 mg, 20 mg, Oral, QHS, Aric Garrido MD, 20 mg at 19 2241    senna-docusate (PERICOLACE) 8.6-50 mg per tablet 2 Tab, 2 Tab, Oral, QHS, Faiza Reilly NP, Stopped at 19 2238    oxyCODONE-acetaminophen (PERCOCET) 5-325 mg per tablet 1 Tab, 1 Tab, Oral, Q4H PRN, Jak Avalos NP, 1 Tab at 19 0202    acetaminophen (TYLENOL) solution 650 mg, 650 mg, Oral, Q6H PRN, Luz Maria Jefferson MD, 650 mg at 19 1756    metoprolol (LOPRESSOR) injection 5 mg, 5 mg, IntraVENous, Q6H PRN, Norma Georges MD, 5 mg at 19 0013    sodium chloride (NS) flush 5-40 mL, 5-40 mL, IntraVENous, Q8H, Melina Ortega MD, 10 mL at 19 0609    sodium chloride (NS) flush 5-40 mL, 5-40 mL, IntraVENous, PRN, Melina Ortega MD    albuterol (PROVENTIL VENTOLIN) nebulizer solution 2.5 mg, 2.5 mg, Inhalation, QID RT, Yi Reynolds MD, 2.5 mg at 19 2049    amLODIPine (NORVASC) tablet 5 mg, 5 mg, Oral, DAILY, Faiza Reilly NP, 5 mg at 19 0725    bisacodyl (DULCOLAX) suppository 10 mg, 10 mg, Rectal, DAILY PRN, Becca Moya MD    LORazepam (ATIVAN) injection 1 mg, 1 mg, IntraVENous, Q4H PRN, Becca Moya MD, 1 mg at 19 0235      Objective:     Vital Signs: Telemetry:    normal sinus rhythm Intake/Output:   Visit Vitals  /81   Pulse 91   Temp 98.9 °F (37.2 °C)   Resp 20   Ht 6' 5\" (1.956 m)   Wt 109.4 kg (241 lb 2.9 oz)   SpO2 97%   BMI 28.60 kg/m²       Temp (24hrs), Av.6 °F (37.6 °C), Min:98.2 °F (36.8 °C), Max:100.7 °F (38.2 °C)        O2 Device: Nasal cannula O2 Flow Rate (L/min): 1 l/min         Body mass index is 28.6 kg/m².     Wt Readings from Last 4 Encounters:   02/15/19 109.4 kg (241 lb 2.9 oz)   18 110.7 kg (244 lb)   18 109.4 kg (241 lb 2 oz)   04/18/18 110.2 kg (243 lb)          Intake/Output Summary (Last 24 hours) at 2/20/2019 0909  Last data filed at 2/20/2019 0856  Gross per 24 hour   Intake 2316.92 ml   Output 9675 ml   Net -7358.08 ml       Last shift:      02/20 0701 - 02/20 1900  In: 361.7 [I.V.:361.7]  Out: 925 [Urine:925]  Last 3 shifts: 02/18 1901 - 02/20 0700  In: 3907.6 [P.O.:2500; I.V.:1407.6]  Out: 80143 [Urine:33095]         Physical Exam:     General:  male; somnolent but arousable   HEAD: Normocephalic, without obvious abnormality, atraumatic   EYES: conjunctivae clear. PERRL,  AN Icteric sclerae   NOSE: nares normal, no drainage, no nasal flaring,    THROAT: mucous membranes dry; Lips, mucosa dry; No Thrush;     Neck: Supple, symmetrical, trachea midline,  No accessory mm use; No Stridor/ cuff leak, No goiter or thyroid tenderness   LYMPH: No abnormally enlarged lymph nodes. in neck   Chest: normal   Lungs: rales   Heart: Regular rate and rhythm; no edema   Abdomen: nondistended, hypoactive bowel sounds, tenderness marked and guarding - in the entire abdomen   : No Johnson; Extremity: negative, clubbing; no joint swelling or erythema   Neuro: speech fluent; withdraws to pain; unable to check gait and station; following simple commands   Psych: oriented to time, place and person ;  No agitation;    Devices:per sepsis flow sheet- reviewed with team during IDR   Skin: Warm;    Pulses:Bilateral, Radial, 2+   Capillary refill: warm, well perfused,      Labs:    Recent Labs     02/20/19 0347 02/19/19  0440 02/18/19  0431 02/17/19  1155   WBC 7.9 10.2 14.1* 12.5*   HGB 10.2* 9.9* 10.9* 10.6*   * 107* 99* 94*   INR  --   --   --  1.3*   APTT  --   --   --  33.0*     Recent Labs     02/20/19 0347 02/19/19  0440 02/18/19  0431    140 143   K 3.6 3.1* 3.7    107 111*   CO2 28 27 24   * 133* 126*   BUN 26* 34* 32*   CREA 1.70* 2.12* 1.88*   CA 7.6* 6.9* 6.7*   MG 1.9  --  2.2   PHOS 2.2*  --  2.3* ALB  --   --  2.7*   SGOT  --   --  72*   ALT  --   --  45     No results for input(s): PH, PCO2, PO2, HCO3, FIO2 in the last 72 hours. No results for input(s): CPK, CKNDX, TROIQ in the last 72 hours. No lab exists for component: CPKMB  No results found for: BNPP, BNP   Lab Results   Component Value Date/Time    Culture result: NO GROWTH 2 DAYS 02/18/2019 10:28 PM    Culture result: NO GROWTH 3 DAYS 02/17/2019 08:51 AM      No results found for: VANCT, CPK    Imaging:  I have personally reviewed the patients radiographs and have reviewed the reports:    CXR: pulmonary edema, bilateral effusions improved      During this entire length of time the patient's condition was unstable, unpredictable and critically ill in the CCU/ ICU. I was immediately available to the patient whose care required several interactions with nursing, multidisciplinary team members leading to multiple interventions with fluid resuscitation and medication adjustments to optimize respiratory support, hemodynamic treatment, medication changes based on repeat labs results, reviews, exams and assessments. The reason for providing this level of medical care was due to a critical illness that impaired one or more vital organ systems, such that there was a high probability of sudden or life threatening deterioration in the patient's condition. This care involved high complexity medical decision making to treat acute and unstable vital organ system failure, and to prevent further life threatening deterioration of the patients condition.  I personally:  · Reviewed the flowsheet and previous days notes  · Reviewed and summarized records or history from previous days note or discussions with staff, family  · Parenteral controlled substances - Reviewed/ Adjusted / Kassandra Robin / Started  · High Risk Drug therapy requiring intensive monitoring for toxicity: eg steroids, pressors, antibiotics  · Reviewed and/or ordered Clinical lab tests  · Reviewed and/or ordered Radiology tests  · Reviewed and/or ordered of Medicine tests  · Independently visualized radiologic Images  · Reviewed the patients ECG / Telemetry  · discussed my assessment/management with : Consultants, Nursing, Pharmacy, Case Management, PT, OT, Respiratory Therapy, Hospitalist and Family for coordination of care           Thank you for allowing us to participate in the care of this patient. We will follow along with you until they no longer require CCU services.     Jsaon Lawson MD

## 2019-02-20 NOTE — PROGRESS NOTES
Problem: Mobility Impaired (Adult and Pediatric)  Goal: *Acute Goals and Plan of Care (Insert Text)  Physical Therapy Goals  Initiated 2/20/2019  1. Patient will move from supine to sit and sit to supine , scoot up and down and roll side to side in bed with independence within 7 day(s). 2.  Patient will transfer from bed to chair and chair to bed with independence using the least restrictive device within 7 day(s). 3.  Patient will perform sit to stand with independence within 7 day(s). 4.  Patient will ambulate with independence for 300 feet with the least restrictive device within 7 day(s). 5.  Patient will ascend/descend 6 stairs with 1 handrail(s) with modified independence within 7 day(s). physical Therapy EVALUATION  Patient: Alfie Gallardo (12 y.o. male)  Date: 2/20/2019  Primary Diagnosis: Acute thoracic aortic dissection (HCC) [I71.01]  Procedure(s) (LRB):  THORACIC ANEURYSM REPAIR ENDOVASCULAR (TEVAR)  LEFT CAROTID TO SUBCLAVIAN BYPASS (N/A) 5 Days Post-Op   Precautions:  WBAT, Fall    ASSESSMENT :  Based on the objective data described below, the patient presents with impaired functional mobility secondary to impaired standing balance, generalized weakness, decreased AROM, impaired gait mechanics, mild BLE coordination impairments, increased pain, decreased independence, and decreased activity tolerance POD# 5 TEVAR. Pt received supine in bed on 2 L/min O2 with GF at bedside and agreeable to PT evaluation. Pt cleared by nursing for mobility. VSS at rest. Reported 8/10 pain in back due to being in bed too long per patient. Bed mobility performed with SBA and increased time. Sitting balance intact. VSS in sitting. Sit<>stand transfers completed with min-CGA x 1-2. Standing balance unsteady requiring at least CGA for safety for static and dynamic standing balance. VSS in standing. Ambulated 210' with min/CGA x 1 and cardiac cart for support.  Exhibits narrowed RICARDA, shortened step length RIP, shuffled gait pattern, mild scissoring, L lateral weight shift, and slow gait speed throughout gait training. Two minor LOBs during activity, requiring min A to recover. 0/10 pain noted with activity. Pt was assisted into bedside chair and left with all needs met, RN aware, VSS on 2 L/min O2, and GF present following therapy session. Educated pt on the importance of continued mobility, including being up in chair 3x/day and ambulating in hallways with nursing; pt verbalized understanding. Anticipate pt will progress well in acute PT and return home with increased family support and HHPT at discharge. PT will continue to follow to address mobility impairments as noted above. Recommend with nursing patient to complete as able in order to maintain strength, endurance and independence: OOB to chair 3x/day and walking daily with min/CG assist. Thank you for your assistance. Patient will benefit from skilled intervention to address the above impairments. Patients rehabilitation potential is considered to be Good  Factors which may influence rehabilitation potential include:   []         None noted  []         Mental ability/status  [x]         Medical condition  []         Home/family situation and support systems  []         Safety awareness  [x]         Pain tolerance/management  []         Other:      PLAN :  Recommendations and Planned Interventions:  [x]           Bed Mobility Training             []    Neuromuscular Re-Education  [x]           Transfer Training                   []    Orthotic/Prosthetic Training  [x]           Gait Training                         []    Modalities  [x]           Therapeutic Exercises           []    Edema Management/Control  [x]           Therapeutic Activities            [x]    Patient and Family Training/Education  []           Other (comment):    Frequency/Duration: Patient will be followed by physical therapy  5 times a week to address goals.   Discharge Recommendations: Home Health and increased family support  Further Equipment Recommendations for Discharge: Anticipate none     SUBJECTIVE:   Patient stated I want to walk.     OBJECTIVE DATA SUMMARY:   HISTORY:    Past Medical History:   Diagnosis Date    Foot fracture     Hypertension     Jaw fracture ([address]     Ruptured ear drum     Thumb fracture      Past Surgical History:   Procedure Laterality Date    HX APPENDECTOMY      HX ORTHOPAEDIC       Prior Level of Function/Home Situation: Pt is independent for mobility and ADLs at baseline. Lives with family and has supportive GF. Drives. Works - Owns food trunk. Reports one GLF in the past after syncopal episode. Active. Personal factors and/or comorbidities impacting plan of care: HTN    Home Situation  Home Environment: Private residence  # Steps to Enter: 6  Rails to Enter: Yes  Hand Rails : Right  Wheelchair Ramp: No  One/Two Story Residence: One story  Living Alone: No  Support Systems: Family member(s)  Patient Expects to be Discharged to[de-identified] Private residence  Current DME Used/Available at Home: None  Tub or Shower Type: Tub/Shower combination    EXAMINATION/PRESENTATION/DECISION MAKING:   Critical Behavior:  Neurologic State: Alert, Eyes open spontaneously  Orientation Level: Oriented X4  Cognition: Follows commands     Hearing:   Auditory  Auditory Impairment: None  Skin:  Surgical incision  Edema: None  Range Of Motion:  AROM: Generally decreased, functional           PROM: Generally decreased, functional           Strength:    Strength: Generally decreased, functional                    Tone & Sensation:   Tone: Normal                              Coordination:  Coordination: Generally decreased, functional  Vision:      Functional Mobility:  Bed Mobility:  Rolling: Stand-by assistance  Supine to Sit: Stand-by assistance     Scooting: Stand-by assistance  Transfers:  Sit to Stand: Contact guard assistance;Assist x2  Stand to Sit: Contact guard assistance(mildly uncontrolled descent into chair)                       Balance:   Sitting: Intact  Standing: Impaired; With support  Standing - Static: Good;Constant support  Standing - Dynamic : Fair  Ambulation/Gait Training:  Distance (ft): 210 Feet (ft)  Assistive Device: Gait belt(cardiac cart support)  Ambulation - Level of Assistance: Minimal assistance;Assist x1;Additional time     Gait Description (WDL): Exceptions to WDL  Gait Abnormalities: Decreased step clearance;Scissoring;Shuffling gait        Base of Support: Narrowed; Shift to left     Speed/Emilia: Pace decreased (<100 feet/min); Shuffled  Step Length: Left shortened;Right shortened    Functional Measure:  Barthel Index:    Bathin  Bladder: 0  Bowels: 5  Groomin  Dressin  Feeding: 10  Mobility: 10  Stairs: 5  Toilet Use: 5  Transfer (Bed to Chair and Back): 10  Total: 55         The Barthel ADL Index: Guidelines  1. The index should be used as a record of what a patient does, not as a record of what a patient could do. 2. The main aim is to establish degree of independence from any help, physical or verbal, however minor and for whatever reason. 3. The need for supervision renders the patient not independent. 4. A patient's performance should be established using the best available evidence. Asking the patient, friends/relatives and nurses are the usual sources, but direct observation and common sense are also important. However direct testing is not needed. 5. Usually the patient's performance over the preceding 24-48 hours is important, but occasionally longer periods will be relevant. 6. Middle categories imply that the patient supplies over 50 per cent of the effort. 7. Use of aids to be independent is allowed. Myra Ace., Barthel, D.W. (3034). Functional evaluation: the Barthel Index. [facility] (14)2. DARNELL Jimenez, Mariza Lucero., Mitchell Chaney., Mitzy, [address]  Measuring the change indisability after inpatient rehabilitation; comparison of the responsiveness of the Barthel Index and Functional [place] Measure. Journal of Neurology, Neurosurgery, and Psychiatry, 664), 524-365. COLE Parra, JERICHO Everett, & Domo Anderson M.A. (2004.) Assessment of post-stroke quality of life in cost-effectiveness studies: The usefulness of the Barthel Index and the EuroQoL-5D. Quality of Life Research, 15, 532-28          Physical Therapy Evaluation Charge Determination   History Examination Presentation Decision-Making   MEDIUM  Complexity : 1-2 comorbidities / personal factors will impact the outcome/ POC  HIGH Complexity : 4+ Standardized tests and measures addressing body structure, function, activity limitation and / or participation in recreation  MEDIUM Complexity : Evolving with changing characteristics  Other outcome measures Barthel Index  MEDIUM      Based on the above components, the patient evaluation is determined to be of the following complexity level: MEDIUM    Pain:  Pain Scale 1: Numeric (0 - 10)  Pain Intensity 1: 0  Pain Location 1: (left scapula)           Activity Tolerance:   Fair - 8/10 pain at rest due to back pain, however 0/10 pain with activity; VSS on 2 L/min O2; mild fatigue with activity; HR 80-90s; SBP 90s-100s  Please refer to the flowsheet for vital signs taken during this treatment. After treatment:   [x]         Patient left in no apparent distress sitting up in chair  []         Patient left in no apparent distress in bed  [x]         Call bell left within reach  [x]         Nursing notified  [x]         Caregiver present  []         Bed alarm activated    COMMUNICATION/EDUCATION:   The patients plan of care was discussed with: Physical Therapist and Registered Nurse. [x]         Fall prevention education was provided and the patient/caregiver indicated understanding. [x]         Patient/family have participated as able in goal setting and plan of care.   [x] Patient/family agree to work toward stated goals and plan of care. []         Patient understands intent and goals of therapy, but is neutral about his/her participation. []         Patient is unable to participate in goal setting and plan of care.     Thank you for this referral.  Yusef Dawn, PT, DPT   Time Calculation: 29 mins

## 2019-02-20 NOTE — PROGRESS NOTES
Vascular Surgery Progress Note Yvette Connolly ACNP-BC 
2/20/2019 Subjective:  
 
Mr. Berlin Aguayo is a 52 yo AAM with a pmhx significant for uncontrolled hypertension. He presented to the hospital with complaint of acute CP while having intercourse. He had associated nausea and vomiting. On arrival he was profoundly hypertensive as high as 223/111. CTA of the chest was + for a type B aortic dissection. He was admitted to the ICU and initiated on medical management with 2 agent IV antihypertensive regimen. Despite this his creatinine and lactic acid continued to rise. Repeat CTA of the chest, abd, and pelvis later the same day showed progression of his dissection and he was taken for an emergent TEVAR on 02/15/2019. His is also s/p left carotid to subclavian bypass, embolization of left subclavian artery, and left renal artery stenting. His post procedure course has been complicated by acute hypoxic respiratory failure due to volume overload requiring bi-pap administration. This has improved s/p diuresis. He is currently using oxygen at 2LPM via NC. He remains tachypneic. His creatinine has had a downward trend overall. He spiked a fever during admission thought to be secondary to renal infarction. His WBC Is normal today and  Is fever curve is trending down on IV Zosyn. CXR, UA and blood cultures are unremarkable. He complained of constipation. He has had multiple BM after initiation of a bowel regimen. Over the last 24hrs he complains of chest pain with radiation to the back that is associated with coughing. Cardene was resumed overnight. Nursing Data:  
 
Patient Vitals for the past 24 hrs: 
 BP Temp Pulse Resp SpO2  
02/20/19 1145 133/87  80 25   
02/20/19 1134     92 % 02/20/19 1130 120/77  82 15 98 % 02/20/19 1115 122/88  81 17 99 % 02/20/19 1100 109/73  81 21 96 % 02/20/19 1045 (!) 129/92  80 23 95 % 02/20/19 1030 110/68  81 20 97 % 02/20/19 1015 101/72  82 22 96 % 02/20/19 1000 98/60  83 23 97 % 02/20/19 0947 93/67  85 19   
02/20/19 0933 101/82  87 21   
02/20/19 0930 116/85  87 23 98 % 02/20/19 0925 116/78  85 21 97 % 02/20/19 0915   88 20 98 % 02/20/19 0914     97 % 02/20/19 0900 108/81  91 20 97 % 02/20/19 0845 120/81  86 19 98 % 02/20/19 0838 124/85  86    
02/20/19 0830 150/74  82 19 96 % 02/20/19 0815 149/70  80 17 98 % 02/20/19 0800 151/83  84 18 99 % 02/20/19 0745 155/79  81 22 99 % 02/20/19 0738  98.9 °F (37.2 °C)     
02/20/19 0730 160/88  86 17 99 % 02/20/19 0715 168/86  83 19 97 % 02/20/19 0700 161/80  81 18 97 % 02/20/19 0645 157/86  83 19 98 % 02/20/19 0630 130/89  83 20 98 % 02/20/19 0615 116/86  83 19 96 % 02/20/19 0600 115/84  85 16 96 % 02/20/19 0545 123/87  84 15 96 % 02/20/19 0530 131/88  85 16 97 % 02/20/19 0515 120/87  89 19 97 % 02/20/19 0500 (!) 129/92  89 19 97 % 02/20/19 0445 178/83  83 22 97 % 02/20/19 0430 184/87  82 20 98 % 02/20/19 0400 (!) 183/91 99.4 °F (37.4 °C) 79 20 98 % 02/20/19 0300 (!) 182/106  79 22 99 % 02/20/19 0200 (!) 167/97  83 25 98 % 02/20/19 0100 161/86  75 16 98 % 02/20/19 0013 161/87  83    
02/20/19 0000   77 19 98 % 02/19/19 2300 (!) 145/111  80 21 99 % 02/19/19 2200 (!) 147/107  89 21 95 % 02/19/19 2100 (!) 137/96  81 22 99 % 02/19/19 2051     99 % 02/19/19 2000 (!) 133/98 99.4 °F (37.4 °C) 87 21 97 % 02/19/19 1900 (!) 141/96  90 26 98 % 02/19/19 1800 133/87  95 23 99 % 02/19/19 1749 (!) 142/98      
02/19/19 1700 (!) 157/113  89 18 98 % 02/19/19 1630  (!) 100.7 °F (38.2 °C) 95 25 96 % 02/19/19 1600 (!) 129/91 (!) 100.7 °F (38.2 °C) 92 22 99 % 02/19/19 1557     98 % 02/19/19 1500 133/87  94 23 99 % 02/19/19 1400 130/90  88 22 97 % 02/19/19 1300 120/77  94 22 99 % 02/19/19 1200 115/86 98.2 °F (36.8 °C) 88 22 97 % --------------------------------------------------------------------------------------------------------- Intake/Output Summary (Last 24 hours) at 2/20/2019 1154 Last data filed at 2/20/2019 1137 Gross per 24 hour Intake 3300.26 ml Output 8850 ml Net -5549.74 ml Exam:  
 
Physical Exam  
Constitutional: He is oriented to person, place, and time. He appears well-developed and well-nourished. HENT:  
Head: Normocephalic. Eyes: Pupils are equal, round, and reactive to light. Neck: Normal range of motion. Right ICA central line. Cardiovascular: Normal rate and regular rhythm. Pulmonary/Chest: No accessory muscle usage. Tachypnea noted. No respiratory distress. He continues on oxygen via NC. Abdominal: Soft. He exhibits no distension. Musculoskeletal: Normal range of motion. Neurological: He is alert and oriented to person, place, and time. Skin:  
Groin incision dry and intact with dermabond. Dressing to left scapula dry and intact. Dressing to right chest (previous Merrill catheter) dry and intact. Multiple tattoos. Psychiatric: He has a normal mood and affect. His behavior is normal. Thought content normal.  
 
Lab Review:  
 
. Recent Results (from the past 24 hour(s)) CBC W/O DIFF Collection Time: 02/20/19  3:47 AM  
Result Value Ref Range WBC 7.9 4.1 - 11.1 K/uL  
 RBC 3.80 (L) 4.10 - 5.70 M/uL  
 HGB 10.2 (L) 12.1 - 17.0 g/dL HCT 31.3 (L) 36.6 - 50.3 % MCV 82.4 80.0 - 99.0 FL  
 MCH 26.8 26.0 - 34.0 PG  
 MCHC 32.6 30.0 - 36.5 g/dL  
 RDW 15.0 (H) 11.5 - 14.5 % PLATELET 770 (L) 728 - 400 K/uL MPV 11.3 8.9 - 12.9 FL  
 NRBC 0.0 0  WBC ABSOLUTE NRBC 0.00 0.00 - 0.01 K/uL METABOLIC PANEL, BASIC Collection Time: 02/20/19  3:47 AM  
Result Value Ref Range Sodium 139 136 - 145 mmol/L Potassium 3.6 3.5 - 5.1 mmol/L Chloride 105 97 - 108 mmol/L  
 CO2 28 21 - 32 mmol/L  Anion gap 6 5 - 15 mmol/L  
 Glucose 108 (H) 65 - 100 mg/dL BUN 26 (H) 6 - 20 MG/DL Creatinine 1.70 (H) 0.70 - 1.30 MG/DL  
 BUN/Creatinine ratio 15 12 - 20 GFR est AA 53 (L) >60 ml/min/1.73m2 GFR est non-AA 43 (L) >60 ml/min/1.73m2 Calcium 7.6 (L) 8.5 - 10.1 MG/DL MAGNESIUM Collection Time: 02/20/19  3:47 AM  
Result Value Ref Range Magnesium 1.9 1.6 - 2.4 mg/dL PHOSPHORUS Collection Time: 02/20/19  3:47 AM  
Result Value Ref Range Phosphorus 2.2 (L) 2.6 - 4.7 MG/DL Assessment/Plan:  
  
Principal problems Type B aortic dissection  
-s/p TEVAR, left subclavian embolization, and left carotid to subclavian bypass 02/15/19 Renal artery stenosis -s/p left renal artery stenting 02/15/2019 Patient continues on cardene this am.  Plan is for cardiology to adjust antihypertensive regimen this am.  Creatinine improved overnight. Diuretics per nephrology. Encourage OOB/PT/OT/IS. Pain control with oral Percocet. Active problems Subendocardial ischemia 
-troponin <0.04 on 2/15/19 Hypertensive urgency  
-resolved LV 
-on ASA, BBlocker, CCB, and statin. Chest pain 
-left scapular pain. Reproducible.   
-patient reports it is assocated with cough. On oral Percocet ordered PRN. Required morphine administration overnight. Not a candidate for NSaids due to MARY/CKD. -unrelieved with PPI 
-likely secondary to uncontrolled hypertension. Plan per cardiology. Fever 
-trending down on empiric Zosyn Hyperglycemia 
-UA, CXR, and blood cultures unremarkable. -WBC normal. 
-he currently does not meet SIRs criteria 
-secondary to renal infarction ? ?? 
-patient has significant hardware post procedure. Continue empiric Zosyn. Will hold off on Vancomycin for now due to MARY. -encourage IS. Acute hypoxic respiratory failure  
-improving s/p diuresis Bilateral pleural effusions Atelectasis -IV diuresis per nephrology. -will need outpatient sleep study Renal infarction MARY on CKD stage II 
-plan per nephrology Hypophosphotasia Hypokalemia  
-supplementation per nephrology and intensivist 
 
Lactic acidosis  
-resolved Thrombocytopenia 
-steadily improving Anemia of acute blood loss 
-not at levels to transfuse Constipation 
-tolerating clears, denies abd pain, nausea, and vomiting  
-Continue BM regimen. VTE prophylaxis: SCDs Disposition:  
TBD. PT evaluation today.

## 2019-02-20 NOTE — PROGRESS NOTES
Orders received, chart reviewed and patient evaluated by physical therapy. Recommend patient to discharge to home with HHPT and increased family support (AD needs TBD) pending progress with skilled acute care physical therapy. Recommend with nursing patient to complete as able in order to maintain strength, endurance and independence: OOB to chair 3x/day with min A x 1 and ambulating with min A x 1 and cardiac cart/RW. Thank you for your assistance. Full evaluation to follow.      Thank you,  Berhane Hernandez, PT, DPT

## 2019-02-20 NOTE — PROGRESS NOTES
PRogress Note    NAME: Anastacio Chester   :  1970   MRN:  [id number]     Date/Time:  2019          Assessment :    Plan:  MARY on CKD stage 2    HTN/LVH    type B aortic dissection with filling of the true and false lumen --S/P TEVAR, RA stenting, (L) carotid bypass    Fever-post op  THrombocytopenia  Resp failure improving           Creatinine 1.7  Back on cardene with low bps this am-need to wean-on norvasc 10, coreg 25 mg bid-want to avoid hypotension  Post atn diuresis --significant uop yesterday-no diuretics tody   No ace  repleting electrolytes --kphos  Platelets improving         Subjective:   CHIEF COMPLAINT:  No n/v/cp/sob    Past Medical History:   Diagnosis Date    Foot fracture     Hypertension     Jaw fracture (HCC)     Ruptured ear drum     Thumb fracture       Past Surgical History:   Procedure Laterality Date    HX APPENDECTOMY      HX ORTHOPAEDIC       Social History     Tobacco Use    Smoking status: Never Smoker    Smokeless tobacco: Never Used   Substance Use Topics    Alcohol use: No      Family History   Problem Relation Age of Onset    Diabetes Mother       No Known Allergies   Prior to Admission medications    Medication Sig Start Date End Date Taking? Authorizing Provider   amLODIPine (NORVASC) 5 mg tablet Take 1 Tab by mouth daily. 18   Kaylen Pagan NP   valsartan-hydroCHLOROthiazide (DIOVAN-HCT) 320-25 mg per tablet Take 1 Tab by mouth daily.  18   Kaylen Pagan NP     REVIEW OF SYSTEMS:    thirsty      Objective:   VITALS:    Visit Vitals  /73 (BP 1 Location: Left arm, BP Patient Position: Sitting)   Pulse 81   Temp 98.9 °F (37.2 °C)   Resp 21   Ht 6' 5\" (1.956 m)   Wt 109.4 kg (241 lb 2.9 oz)   SpO2 96%   BMI 28.60 kg/m²     PHYSICAL EXAM:  Gen:  [x]  WD [x]  WN  [] cachectic []  thin []  obese []  disheveled             []  ill apearing  []   Critical  []   Chronic    [x]  No acute distress    HEENT:   [x] NC/AT    [] pink conjunctivae [] pale conjunctivae                  PERRL  [] yes  [] no      [] moist mucosa    [] dry mucosa    hearing intact to voice [x] yes  [] No                   RESP:   [] CTA bilaterally/no wheezing/rhonchi/rales/crackles    [] rhonchi bilaterally - no dullness  [] wheezing   [] rhonchi   [x] crackles     use of accessory muscles [] yes [x] no    CARD:   [x]  regular rate and rhythm/No murmurs/rubs/gallops    murmur  [] yes ()  [] no      Rubs  [] yes  [] no       Gallops [] yes  [] no    Rate []  regular  []  irregular        carotid bruits  [] Right  []  Left                 LE edema [] yes  [x] no           JVP  []  yes   [x]  no    NEUR:   [x] cranial nerves II-XII grossly intact       [] Cranial nerves deficit                   LAB DATA REVIEWED:    Recent Labs     02/20/19 0347 02/19/19  0440   WBC 7.9 10.2   HGB 10.2* 9.9*   HCT 31.3* 30.0*   * 107*     Recent Labs     02/20/19  0347 02/19/19  0440 02/18/19  0431    140 143   K 3.6 3.1* 3.7    107 111*   CO2 28 27 24   BUN 26* 34* 32*   CREA 1.70* 2.12* 1.88*   * 133* 126*   CA 7.6* 6.9* 6.7*   MG 1.9  --  2.2   PHOS 2.2*  --  2.3*     Recent Labs     02/18/19  0431   SGOT 72*   ALT 45   AP 35*   TBILI 1.0   ALB 2.7*   GLOB 3.2     Recent Labs     02/17/19  1155   INR 1.3*   PTP 13.0*   APTT 33.0*      No results for input(s): FE, TIBC, PSAT, FERR in the last 72 hours. No results for input(s): PH, PCO2, PO2 in the last 72 hours. No results for input(s): CPK, CKMB in the last 72 hours.     No lab exists for component: TROPONINI  No results found for: GLUCPOC    Procedures: see electronic medical records for all procedures/Xrays and details which were not copied into this note but were reviewed prior to creation of Plan.    ________________________________________________________________________       ___________________________________________________  Consulting Physician: Jovan Kruse MD

## 2019-02-20 NOTE — PROGRESS NOTES
Spiritual Care Assessment/Progress Note  [facility]      NAME: Patsy Pineda      MRN: [id number]  AGE: 53 y.o. SEX: male  Mormonism Affiliation: No preference   Language: English     2/20/2019     Total Time (in minutes): 7     Spiritual Assessment begun in [facility] 2 CRITICAL CARE 3 through conversation with:         [x]Patient        [x] Family    [] Friend(s)        Reason for Consult: Initial/Spiritual assessment, critical care     Spiritual beliefs: (Please include comment if needed)     [x] Identifies with a diana tradition:         [x] Supported by a diana community:            [] Claims no spiritual orientation:           [] Seeking spiritual identity:                [] Adheres to an individual form of spirituality:           [] Not able to assess:                           Identified resources for coping:      [] Prayer                               [] Music                  [] Guided Imagery     [x] Family/friends                 [] Pet visits     [] Devotional reading                         [] Unknown     [] Other:                                              Interventions offered during this visit: (See comments for more details)    Patient Interventions: Affirmation of emotions/emotional suffering, Initial/Spiritual assessment, Critical care     Family/Friend(s):  Affirmation of emotions/emotional suffering, Affirmation of diana, Coping skills reviewed/reinforced, Normalization of emotional/spiritual concerns     Plan of Care:     [] Support spiritual and/or cultural needs    [] Support AMD and/or advance care planning process      [] Support grieving process   [] Coordinate Rites and/or Rituals    [] Coordination with community clergy   [] No spiritual needs identified at this time   [] Detailed Plan of Care below (See Comments)  [] Make referral to Music Therapy  [] Make referral to Pet Therapy     [] Make referral to Addiction services  [] Make referral to [facility]  [] Make referral to Spiritual Care Partner  [] No future visits requested        [x] Follow up visits as needed     Comments:   Initial spiritual assessment in CCU  Patient/family shared about concerns. Provided presence of ministry. Consulted with patient and patient's family. Advised of  Availability. GOOD [address] Paging Service 1[facility]([id number])

## 2019-02-20 NOTE — PROGRESS NOTES
Progress Note      2/20/2019 9:13 AM  NAME: Ines Hinton   MRN:  [id number]   Admit Diagnosis: Acute thoracic aortic dissection ([facility] [I71.01]                Assessment:       1. 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 2. No hx of CAD, equivicol troponin  3. Echo nl EF, LVH, mildly dilated ascending aorta with mild aortic regurgitation  4. Sinus with inferior and lateral TWI  5. HTN  6. Lipid ok  7. Snores, concern for sleep apnea, will need outpt sleep study  8. 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 Some atypical chest pain, resolved. Normal EF  Sinus  S/p TEVAR     1. Cont added aspirin 81mg daily  2. BP's labile, intermittantly on ggt, should have bp monitored ONLY in Right arm. 3. Increase added coreg 25mg bid  4. Cont added amlodipine currently on 10mg, may decrease to 5mg if edema. 5. Have been holding valsartan HCT,currently renal wishes to hold  6. PRN diuretic, resp status improving, follow bmp  7. Cont added statin             [x]        High complexity decision making was performed             Subjective:     Ines Hinton denies chest pain. +dyspnea, hungry. Discussed with RN events overnight.      Review of Systems:    Symptom Y/N Comments  Symptom Y/N Comments   Fever/Chills N   Chest Pain N    Poor Appetite N   Edema N    Cough N   Abdominal Pain N    Sputum N   Joint Pain N    SOB/ESCOBEDO N   Pruritis/Rash N    Nausea/vomit N   Tolerating PT/OT Y    Diarrhea N   Tolerating Diet Y    Constipation N   Other       Could NOT obtain due to:      Objective:      Physical Exam:    Last 24hrs VS reviewed since prior progress note. Most recent are:    Visit Vitals  /68   Pulse 81   Temp 98.9 °F (37.2 °C)   Resp 20   Ht 6' 5\" (1.956 m)   Wt 109.4 kg (241 lb 2.9 oz)   SpO2 97%   BMI 28.60 kg/m²       Intake/Output Summary (Last 24 hours) at 2/20/2019 1036  Last data filed at 2/20/2019 1000  Gross per 24 hour   Intake 3048.59 ml   Output 8900 ml   Net -5851.41 ml        General Appearance: Well developed, well nourished, alert & oriented x 3,    no acute distress. Ears/Nose/Mouth/Throat: Hearing grossly normal.  Neck: Supple. Chest: Lungs clear to auscultation bilaterally. Cardiovascular: Regular rate and rhythm, S1S2 normal, no murmur. Abdomen: Soft, non-tender, bowel sounds are active. Extremities: No edema bilaterally. Skin: Warm and dry. PMH/SH reviewed - no change compared to H&P    Data Review    Telemetry: normal sinus rhythm     Lab Data Personally Reviewed:    Recent Labs     02/20/19 0347 02/19/19  0440   WBC 7.9 10.2   HGB 10.2* 9.9*   HCT 31.3* 30.0*   * 107*     Recent Labs     02/17/19  1155   INR 1.3*   PTP 13.0*   APTT 33.0*      Recent Labs     02/20/19  0347 02/19/19  0440 02/18/19  0431    140 143   K 3.6 3.1* 3.7    107 111*   CO2 28 27 24   BUN 26* 34* 32*   CREA 1.70* 2.12* 1.88*   * 133* 126*   CA 7.6* 6.9* 6.7*   MG 1.9  --  2.2     No results for input(s): CPK, CKNDX, TROIQ in the last 72 hours. No lab exists for component: CPKMB  Lab Results   Component Value Date/Time    Cholesterol, total 146 04/18/2018 08:58 AM    HDL Cholesterol 38 (L) 04/18/2018 08:58 AM    LDL, calculated 95 04/18/2018 08:58 AM    Triglyceride 64 04/18/2018 08:58 AM       Recent Labs     02/18/19  0431   SGOT 72*   AP 35*   TP 5.9*   ALB 2.7*   GLOB 3.2     No results for input(s): PH, PCO2, PO2 in the last 72 hours.     Medications Personally Reviewed:    Current Facility-Administered Medications   Medication Dose Route Frequency    morphine injection 1-2 mg  1-2 mg IntraVENous Q3H PRN  morphine 2 mg/mL injection        niCARdipine (CARDENE) 25 mg in 0.9% sodium chloride 250 mL infusion  5-15 mg/hr IntraVENous TITRATE    carvedilol (COREG) tablet 25 mg  25 mg Oral BID WITH MEALS    [START ON 2/21/2019] amLODIPine (NORVASC) tablet 10 mg  10 mg Oral DAILY    calcium carbonate (TUMS) chewable tablet 200 mg [elemental]  200 mg Oral TID WITH MEALS    potassium chloride (KLOR-CON) packet for solution 20 mEq  20 mEq Oral DAILY    pantoprazole (PROTONIX) tablet 40 mg  40 mg Oral ACB    piperacillin-tazobactam (ZOSYN) 3.375 g in 0.9% sodium chloride (MBP/ADV) 100 mL  3.375 g IntraVENous Q8H    aspirin chewable tablet 81 mg  81 mg Oral DAILY    pravastatin (PRAVACHOL) tablet 20 mg  20 mg Oral QHS    senna-docusate (PERICOLACE) 8.6-50 mg per tablet 2 Tab  2 Tab Oral QHS    oxyCODONE-acetaminophen (PERCOCET) 5-325 mg per tablet 1 Tab  1 Tab Oral Q4H PRN    acetaminophen (TYLENOL) solution 650 mg  650 mg Oral Q6H PRN    metoprolol (LOPRESSOR) injection 5 mg  5 mg IntraVENous Q6H PRN    sodium chloride (NS) flush 5-40 mL  5-40 mL IntraVENous Q8H    sodium chloride (NS) flush 5-40 mL  5-40 mL IntraVENous PRN    albuterol (PROVENTIL VENTOLIN) nebulizer solution 2.5 mg  2.5 mg Inhalation QID RT    bisacodyl (DULCOLAX) suppository 10 mg  10 mg Rectal DAILY PRN    LORazepam (ATIVAN) injection 1 mg  1 mg IntraVENous Q4H PRN         Gokul Covarrubias MD

## 2019-02-20 NOTE — ADVANCED PRACTICE NURSE
1930  [facility]    [facility]30  Assessment as per flow. No c/o.    0030  No change in assessment. VSS. GF at bedside. 0215  Patient c/o 10/10 left scapula pain. This is identical to the herberth that he had earlier today and was deemed to be musculoskeletal.  Pain intensified with direct pressure. Describes it as a muscle pain. Rx'd with Percocet 5/325 mg once. Warm compress applied. 0300  Patient reports improvement to 7/10 pain with warm compress and Percocet. Will see if further relief from po medication. 0330  Paged and spoke with Dr. Robin Rdz regarding patient. Received order for Cardene gtt to get SBP <150 and morphine 1-2 mg q 3 hours for severe pain. 0430  Assessment as per flow. 0500  Pain level decreased to 4/10 after morphine dose. 0600 SBP remains elevated despite Cardene gtt. 7943  Manually checked left and right UE BP's. Left 124/94 and right 165/84.    0630  Paged Dr. Robin Rdz and relayed findings from manual BP check. Directed to follow the highest SBP which is the manual on the right. Pain decreased to 1/10 per patient. 0730  Report to GEOFF Land.

## 2019-02-21 LAB
ANION GAP SERPL CALC-SCNC: 9 MMOL/L (ref 5–15)
BUN SERPL-MCNC: 25 MG/DL (ref 6–20)
BUN/CREAT SERPL: 16 (ref 12–20)
CALCIUM SERPL-MCNC: 8.2 MG/DL (ref 8.5–10.1)
CHLORIDE SERPL-SCNC: 105 MMOL/L (ref 97–108)
CO2 SERPL-SCNC: 24 MMOL/L (ref 21–32)
CREAT SERPL-MCNC: 1.59 MG/DL (ref 0.7–1.3)
ERYTHROCYTE [DISTWIDTH] IN BLOOD BY AUTOMATED COUNT: 14.8 % (ref 11.5–14.5)
GLUCOSE SERPL-MCNC: 110 MG/DL (ref 65–100)
HCT VFR BLD AUTO: 31.7 % (ref 36.6–50.3)
HGB BLD-MCNC: 10.5 G/DL (ref 12.1–17)
MAGNESIUM SERPL-MCNC: 2.3 MG/DL (ref 1.6–2.4)
MCH RBC QN AUTO: 26.8 PG (ref 26–34)
MCHC RBC AUTO-ENTMCNC: 33.1 G/DL (ref 30–36.5)
MCV RBC AUTO: 80.9 FL (ref 80–99)
NRBC # BLD: 0 K/UL (ref 0–0.01)
NRBC BLD-RTO: 0 PER 100 WBC
PHOSPHATE SERPL-MCNC: 2.4 MG/DL (ref 2.6–4.7)
PLATELET # BLD AUTO: 143 K/UL (ref 150–400)
PMV BLD AUTO: 11.5 FL (ref 8.9–12.9)
POTASSIUM SERPL-SCNC: 3.6 MMOL/L (ref 3.5–5.1)
RBC # BLD AUTO: 3.92 M/UL (ref 4.1–5.7)
SODIUM SERPL-SCNC: 138 MMOL/L (ref 136–145)
WBC # BLD AUTO: 7 K/UL (ref 4.1–11.1)

## 2019-02-21 PROCEDURE — 74011250637 HC RX REV CODE- 250/637: Performed by: INTERNAL MEDICINE

## 2019-02-21 PROCEDURE — 36415 COLL VENOUS BLD VENIPUNCTURE: CPT

## 2019-02-21 PROCEDURE — 74011000258 HC RX REV CODE- 258: Performed by: SURGERY

## 2019-02-21 PROCEDURE — 74011250636 HC RX REV CODE- 250/636: Performed by: INTERNAL MEDICINE

## 2019-02-21 PROCEDURE — 74011250636 HC RX REV CODE- 250/636

## 2019-02-21 PROCEDURE — 74011000250 HC RX REV CODE- 250: Performed by: INTERNAL MEDICINE

## 2019-02-21 PROCEDURE — 74011250637 HC RX REV CODE- 250/637: Performed by: NURSE PRACTITIONER

## 2019-02-21 PROCEDURE — 80048 BASIC METABOLIC PNL TOTAL CA: CPT

## 2019-02-21 PROCEDURE — 83735 ASSAY OF MAGNESIUM: CPT

## 2019-02-21 PROCEDURE — 84100 ASSAY OF PHOSPHORUS: CPT

## 2019-02-21 PROCEDURE — 74011250637 HC RX REV CODE- 250/637: Performed by: SURGERY

## 2019-02-21 PROCEDURE — 65660000000 HC RM CCU STEPDOWN

## 2019-02-21 PROCEDURE — 74011000250 HC RX REV CODE- 250: Performed by: SURGERY

## 2019-02-21 PROCEDURE — 74011000258 HC RX REV CODE- 258: Performed by: INTERNAL MEDICINE

## 2019-02-21 PROCEDURE — 85027 COMPLETE CBC AUTOMATED: CPT

## 2019-02-21 RX ORDER — ISOSORBIDE DINITRATE 5 MG/1
20 TABLET ORAL 3 TIMES DAILY
Status: DISCONTINUED | OUTPATIENT
Start: 2019-02-21 | End: 2019-02-25

## 2019-02-21 RX ORDER — SODIUM,POTASSIUM PHOSPHATES 280-250MG
2 POWDER IN PACKET (EA) ORAL 4 TIMES DAILY
Status: COMPLETED | OUTPATIENT
Start: 2019-02-21 | End: 2019-02-21

## 2019-02-21 RX ORDER — HYDROMORPHONE HYDROCHLORIDE 2 MG/1
2 TABLET ORAL
Status: DISCONTINUED | OUTPATIENT
Start: 2019-02-21 | End: 2019-02-26 | Stop reason: HOSPADM

## 2019-02-21 RX ORDER — HYDRALAZINE HYDROCHLORIDE 25 MG/1
50 TABLET, FILM COATED ORAL 3 TIMES DAILY
Status: DISCONTINUED | OUTPATIENT
Start: 2019-02-21 | End: 2019-02-22

## 2019-02-21 RX ORDER — METOPROLOL TARTRATE 5 MG/5ML
5 INJECTION INTRAVENOUS
Status: DISCONTINUED | OUTPATIENT
Start: 2019-02-21 | End: 2019-02-26 | Stop reason: HOSPADM

## 2019-02-21 RX ADMIN — METOPROLOL TARTRATE 5 MG: 5 INJECTION INTRAVENOUS at 17:20

## 2019-02-21 RX ADMIN — AMLODIPINE BESYLATE 10 MG: 5 TABLET ORAL at 08:10

## 2019-02-21 RX ADMIN — POTASSIUM & SODIUM PHOSPHATES POWDER PACK 280-160-250 MG 2 PACKET: 280-160-250 PACK at 21:07

## 2019-02-21 RX ADMIN — HYDROMORPHONE HYDROCHLORIDE 2 MG: 2 TABLET ORAL at 19:51

## 2019-02-21 RX ADMIN — HYDROMORPHONE HYDROCHLORIDE 2 MG: 2 TABLET ORAL at 14:21

## 2019-02-21 RX ADMIN — PRAVASTATIN SODIUM 20 MG: 10 TABLET ORAL at 21:00

## 2019-02-21 RX ADMIN — HYDROMORPHONE HYDROCHLORIDE 1 MG: 1 INJECTION, SOLUTION INTRAMUSCULAR; INTRAVENOUS; SUBCUTANEOUS at 21:00

## 2019-02-21 RX ADMIN — ISOSORBIDE DINITRATE 20 MG: 5 TABLET ORAL at 20:59

## 2019-02-21 RX ADMIN — PANTOPRAZOLE SODIUM 40 MG: 40 TABLET, DELAYED RELEASE ORAL at 07:47

## 2019-02-21 RX ADMIN — Medication 10 ML: at 06:17

## 2019-02-21 RX ADMIN — PIPERACILLIN SODIUM,TAZOBACTAM SODIUM 3.38 G: 3; .375 INJECTION, POWDER, FOR SOLUTION INTRAVENOUS at 14:18

## 2019-02-21 RX ADMIN — ACETAMINOPHEN 650 MG: 160 SOLUTION ORAL at 13:01

## 2019-02-21 RX ADMIN — SODIUM CHLORIDE 15 MG/HR: 900 INJECTION, SOLUTION INTRAVENOUS at 02:36

## 2019-02-21 RX ADMIN — SENNOSIDES AND DOCUSATE SODIUM 2 TABLET: 8.6; 5 TABLET ORAL at 20:59

## 2019-02-21 RX ADMIN — Medication 10 ML: at 22:13

## 2019-02-21 RX ADMIN — Medication 10 ML: at 13:01

## 2019-02-21 RX ADMIN — POTASSIUM & SODIUM PHOSPHATES POWDER PACK 280-160-250 MG 2 PACKET: 280-160-250 PACK at 12:48

## 2019-02-21 RX ADMIN — HYDRALAZINE HYDROCHLORIDE 10 MG: 20 INJECTION INTRAMUSCULAR; INTRAVENOUS at 08:57

## 2019-02-21 RX ADMIN — PIPERACILLIN SODIUM,TAZOBACTAM SODIUM 3.38 G: 3; .375 INJECTION, POWDER, FOR SOLUTION INTRAVENOUS at 22:12

## 2019-02-21 RX ADMIN — METOPROLOL TARTRATE 5 MG: 5 INJECTION INTRAVENOUS at 02:50

## 2019-02-21 RX ADMIN — HYDRALAZINE HYDROCHLORIDE 25 MG: 25 TABLET, FILM COATED ORAL at 08:10

## 2019-02-21 RX ADMIN — METOPROLOL TARTRATE 5 MG: 5 INJECTION INTRAVENOUS at 20:58

## 2019-02-21 RX ADMIN — CARVEDILOL 25 MG: 12.5 TABLET, FILM COATED ORAL at 16:38

## 2019-02-21 RX ADMIN — HYDRALAZINE HYDROCHLORIDE 50 MG: 25 TABLET, FILM COATED ORAL at 15:46

## 2019-02-21 RX ADMIN — ACETAMINOPHEN 650 MG: 160 SOLUTION ORAL at 03:38

## 2019-02-21 RX ADMIN — HYDRALAZINE HYDROCHLORIDE 10 MG: 20 INJECTION INTRAMUSCULAR; INTRAVENOUS at 00:08

## 2019-02-21 RX ADMIN — POTASSIUM & SODIUM PHOSPHATES POWDER PACK 280-160-250 MG 2 PACKET: 280-160-250 PACK at 10:05

## 2019-02-21 RX ADMIN — CARVEDILOL 25 MG: 12.5 TABLET, FILM COATED ORAL at 08:10

## 2019-02-21 RX ADMIN — POTASSIUM CHLORIDE 20 MEQ: 1.5 POWDER, FOR SOLUTION ORAL at 08:14

## 2019-02-21 RX ADMIN — PIPERACILLIN SODIUM,TAZOBACTAM SODIUM 3.38 G: 3; .375 INJECTION, POWDER, FOR SOLUTION INTRAVENOUS at 07:46

## 2019-02-21 RX ADMIN — ISOSORBIDE DINITRATE 20 MG: 5 TABLET ORAL at 10:32

## 2019-02-21 RX ADMIN — HYDRALAZINE HYDROCHLORIDE 10 MG: 20 INJECTION INTRAMUSCULAR; INTRAVENOUS at 15:43

## 2019-02-21 RX ADMIN — SODIUM CHLORIDE 15 MG/HR: 900 INJECTION, SOLUTION INTRAVENOUS at 06:03

## 2019-02-21 RX ADMIN — HYDRALAZINE HYDROCHLORIDE 50 MG: 25 TABLET, FILM COATED ORAL at 21:00

## 2019-02-21 RX ADMIN — HYDROMORPHONE HYDROCHLORIDE 1 MG: 1 INJECTION, SOLUTION INTRAMUSCULAR; INTRAVENOUS; SUBCUTANEOUS at 03:38

## 2019-02-21 RX ADMIN — ASPIRIN 81 MG 81 MG: 81 TABLET ORAL at 08:10

## 2019-02-21 RX ADMIN — POTASSIUM & SODIUM PHOSPHATES POWDER PACK 280-160-250 MG 2 PACKET: 280-160-250 PACK at 17:20

## 2019-02-21 RX ADMIN — ISOSORBIDE DINITRATE 20 MG: 5 TABLET ORAL at 15:46

## 2019-02-21 RX ADMIN — SODIUM CHLORIDE 15 MG/HR: 900 INJECTION, SOLUTION INTRAVENOUS at 09:25

## 2019-02-21 NOTE — PROGRESS NOTES
[facility] INTENSIVIST Consult Service Note  Pulmonary, Critical Care, and Sleep Medicine    Name: Janell Fulton MRN: [id number]   : 1970 Hospital: [address] [facility]   Date: 2019  Admission date: 2/15/2019 Hospital Day: 7           IMPRESSION:   1. Uncontrolled HTN on IV esmolol and Nicardipine  2. Acute type B aortic Dissection  3. Severe Chest pain  4. Nausea and vomiting  5. Abdominal pain  6. MARY  7. Urinary retention  Probable MARITZA  8. Additional workup outlined below  9. Multiorgan dysfunction as outlined above: Pt has one or more acute or chronic illnesses with severe exacerbation with progression or side effects of treatment that poses a threat to life or bodily function  10. Pt is unstable, unpredictable needing more CCU monitoring; at high risk of sudden decline and decompensation with life threatening consequenses and continued end organ dysfunction and failure  11. Pt is critically ill. Time spent with pt and staff actively rendering care, managing pt and coordinating care as stated below;  35 minutes, exclusive of any procedures      RECOMMENDATIONS/PLAN:   1. Empiric abx per vascular surgery  2. BP control with current drips, titrating down, also on po medications  3. Hold diuretics   4. Renal fx better  5. Off O2  6. Antiemetics  7. Adjust pain meds  8. DVT, SUP prophylaxis  9. Will be available to assist in medical management while in the CCU pending disposition  10. Mobilize          Subjective/Initial History:   I have reviewed the flowsheet and previous days notes. Seen earlier today on rounds. Still on iv cardene drip to control his BP  No acute distress  No acute events overnight        ROS:A comprehensive review of systems was negative except for that written in the HPI.      MEDS:   Current Facility-Administered Medications   Medication    hydrALAZINE (APRESOLINE) tablet 50 mg    carvedilol (COREG) tablet 25 mg    amLODIPine (NORVASC) tablet 10 mg    calcium carbonate (TUMS) chewable tablet 200 mg [elemental]    albuterol (PROVENTIL VENTOLIN) nebulizer solution 2.5 mg    hydrALAZINE (APRESOLINE) 20 mg/mL injection 10 mg    niCARdipine (CARDENE) 50 mg in 0.9% sodium chloride 100 mL infusion    HYDROmorphone (PF) (DILAUDID) injection 1 mg    potassium chloride (KLOR-CON) packet for solution 20 mEq    pantoprazole (PROTONIX) tablet 40 mg    piperacillin-tazobactam (ZOSYN) 3.375 g in 0.9% sodium chloride (MBP/ADV) 100 mL    aspirin chewable tablet 81 mg    pravastatin (PRAVACHOL) tablet 20 mg    senna-docusate (PERICOLACE) 8.6-50 mg per tablet 2 Tab    oxyCODONE-acetaminophen (PERCOCET) 5-325 mg per tablet 1 Tab    acetaminophen (TYLENOL) solution 650 mg    metoprolol (LOPRESSOR) injection 5 mg    sodium chloride (NS) flush 5-40 mL    sodium chloride (NS) flush 5-40 mL    bisacodyl (DULCOLAX) suppository 10 mg        Current Facility-Administered Medications:     hydrALAZINE (APRESOLINE) tablet 50 mg, 50 mg, Oral, TID, Amie Garcia MD    carvedilol (COREG) tablet 25 mg, 25 mg, Oral, BID WITH MEALS, Aric Garrido MD, 25 mg at 02/21/19 0810    amLODIPine (NORVASC) tablet 10 mg, 10 mg, Oral, DAILY, Amie Garcia MD, 10 mg at 02/21/19 0810    calcium carbonate (TUMS) chewable tablet 200 mg [elemental], 200 mg, Oral, TID WITH MEALS, Amie Garcia MD, Stopped at 02/20/19 1200    albuterol (PROVENTIL VENTOLIN) nebulizer solution 2.5 mg, 2.5 mg, Inhalation, Q4H PRN, Lm Causey MD    hydrALAZINE (APRESOLINE) 20 mg/mL injection 10 mg, 10 mg, IntraVENous, Q6H PRN, Aric Garrido MD, 10 mg at 02/21/19 0008    niCARdipine (CARDENE) 50 mg in 0.9% sodium chloride 100 mL infusion, 5-15 mg/hr, IntraVENous, TITRATE, Modesto Goldman MD, Last Rate: 30 mL/hr at 02/21/19 0603, 15 mg/hr at 02/21/19 0603    HYDROmorphone (PF) (DILAUDID) injection 1 mg, 1 mg, IntraVENous, Q2H PRN, Isidro Grimes MD, 1 mg at 19 0338    potassium chloride (KLOR-CON) packet for solution 20 mEq, 20 mEq, Oral, DAILY, Margaret Catalan MD, 20 mEq at 19 0814    pantoprazole (PROTONIX) tablet 40 mg, 40 mg, Oral, ACB, Faiza Reilly, NP, 40 mg at 19 0747    [COMPLETED] piperacillin-tazobactam (ZOSYN) 3.375 g in 0.9% sodium chloride (MBP/ADV) 100 mL, 3.375 g, IntraVENous, ONCE, Last Rate: 200 mL/hr at 19 1147, 3.375 g at 19 1147 **FOLLOWED BY** piperacillin-tazobactam (ZOSYN) 3.375 g in 0.9% sodium chloride (MBP/ADV) 100 mL, 3.375 g, IntraVENous, Q8H, Franco Lennox, MD, Last Rate: 25 mL/hr at 19 0746, 3.375 g at 19 0746    aspirin chewable tablet 81 mg, 81 mg, Oral, DAILY, Aric Garrido MD, 81 mg at 19 0810    pravastatin (PRAVACHOL) tablet 20 mg, 20 mg, Oral, QHS, Aric Garrido MD, 20 mg at 19 2313    senna-docusate (PERICOLACE) 8.6-50 mg per tablet 2 Tab, 2 Tab, Oral, QHS, Faiza Reilly, NP, 2 Tab at 19 2313    oxyCODONE-acetaminophen (PERCOCET) 5-325 mg per tablet 1 Tab, 1 Tab, Oral, Q4H PRN, Abdiel Yoder NP, 1 Tab at 19 0202    acetaminophen (TYLENOL) solution 650 mg, 650 mg, Oral, Q6H PRN, Aliyah Ndiaye MD, 650 mg at 19 0338    metoprolol (LOPRESSOR) injection 5 mg, 5 mg, IntraVENous, Q6H PRN, Vanna Georges MD, 5 mg at 19 0250    sodium chloride (NS) flush 5-40 mL, 5-40 mL, IntraVENous, Q8H, Silke Ortega MD, 10 mL at 19 0617    sodium chloride (NS) flush 5-40 mL, 5-40 mL, IntraVENous, PRN, Huntre Dennis MD    bisacodyl (DULCOLAX) suppository 10 mg, 10 mg, Rectal, DAILY PRN, Janet Andre MD      Objective:     Vital Signs: Telemetry:    normal sinus rhythm Intake/Output:   Visit Vitals  /75   Pulse 80   Temp 98.1 °F (36.7 °C)   Resp 20   Ht 6' 5\" (1.956 m)   Wt 109.4 kg (241 lb 2.9 oz)   SpO2 98%   BMI 28.60 kg/m²       Temp (24hrs), Av.8 °F (37.7 °C), Min:98.1 °F (36.7 °C), Max:101.3 °F (38.5 °C)        O2 Device: Room air O2 Flow Rate (L/min): 2 l/min         Body mass index is 28.6 kg/m². Wt Readings from Last 4 Encounters:   02/15/19 109.4 kg (241 lb 2.9 oz)   08/07/18 110.7 kg (244 lb)   05/14/18 109.4 kg (241 lb 2 oz)   04/18/18 110.2 kg (243 lb)          Intake/Output Summary (Last 24 hours) at 2/21/2019 0826  Last data filed at 2/21/2019 4064  Gross per 24 hour   Intake 3630.34 ml   Output 4571 ml   Net -940.66 ml       Last shift:      02/21 0701 - 02/21 1900  In: 106.5 [I.V.:106.5]  Out: 200 [Urine:200]  Last 3 shifts: 02/19 1901 - 02/21 0700  In: 5085.5 [P.O.:2480; I.V.:2605.5]  Out: 30247 [Urine:77069]         Physical Exam:     General:  male; awake, alert   HEAD: Normocephalic, without obvious abnormality, atraumatic   EYES: conjunctivae clear. PERRL,  AN Icteric sclerae   NOSE: nares normal, no drainage, no nasal flaring,    THROAT: mucous membranes dry; Lips, mucosa dry; No Thrush;     Neck: Supple, symmetrical, trachea midline,  No accessory mm use; No Stridor/ cuff leak, No goiter or thyroid tenderness   LYMPH: No abnormally enlarged lymph nodes. in neck   Chest: normal   Lungs: rales   Heart: Regular rate and rhythm; no edema   Abdomen: nondistended, hypoactive bowel sounds, tenderness marked and guarding - in the entire abdomen   : No Johnson; Extremity: negative, clubbing; no joint swelling or erythema   Neuro: non focal    Psych: oriented to time, place and person ;  No agitation;    Devices:per sepsis flow sheet- reviewed with team during IDR   Skin: Warm;    Pulses:Bilateral, Radial, 2+   Capillary refill: warm, well perfused,      Labs:    Recent Labs     02/21/19  0351 02/20/19  0347 02/19/19  0440   WBC 7.0 7.9 10.2   HGB 10.5* 10.2* 9.9*   * 121* 107*     Recent Labs     02/21/19  0351 02/20/19  0347 02/19/19  0440    139 140   K 3.6 3.6 3.1*    105 107   CO2 24 28 27   * 108* 133*   BUN 25* 26* 34*   CREA 1.59* 1.70* 2.12*   CA 8.2* 7.6* 6.9*   MG 2.3 1.9  --    PHOS 2.4* 2.2*  --      No results for input(s): PH, PCO2, PO2, HCO3, FIO2 in the last 72 hours. No results for input(s): CPK, CKNDX, TROIQ in the last 72 hours. No lab exists for component: CPKMB  No results found for: BNPP, BNP   Lab Results   Component Value Date/Time    Culture result: NO GROWTH 3 DAYS 02/18/2019 10:28 PM    Culture result: NO GROWTH 4 DAYS 02/17/2019 08:51 AM      No results found for: VANCT, CPK    Imaging:  I have personally reviewed the patients radiographs and have reviewed the reports:        During this entire length of time the patient's condition was unstable, unpredictable and critically ill in the CCU/ ICU. I was immediately available to the patient whose care required several interactions with nursing, multidisciplinary team members leading to multiple interventions with fluid resuscitation and medication adjustments to optimize respiratory support, hemodynamic treatment, medication changes based on repeat labs results, reviews, exams and assessments. The reason for providing this level of medical care was due to a critical illness that impaired one or more vital organ systems, such that there was a high probability of sudden or life threatening deterioration in the patient's condition. This care involved high complexity medical decision making to treat acute and unstable vital organ system failure, and to prevent further life threatening deterioration of the patients condition.  I personally:  · Reviewed the flowsheet and previous days notes  · Reviewed and summarized records or history from previous days note or discussions with staff, family  · Parenteral controlled substances - Reviewed/ Adjusted / Zahra Sers / Started  · High Risk Drug therapy requiring intensive monitoring for toxicity: eg steroids, pressors, antibiotics  · Reviewed and/or ordered Clinical lab tests  · Reviewed and/or ordered Radiology tests  · Reviewed and/or ordered of Medicine tests  · Independently visualized radiologic Images  · Reviewed the patients ECG / Telemetry  · discussed my assessment/management with : Consultants, Nursing, Pharmacy, Case Management, PT, OT, Respiratory Therapy, Hospitalist and Family for coordination of care           Thank you for allowing us to participate in the care of this patient. We will follow along with you until they no longer require CCU services.     Sincere Roach MD

## 2019-02-21 NOTE — PROGRESS NOTES
1915  Received report from GEOFF Lazaro. Reviewed events of day. Plan is to get -120.    2030  . Metoprolol 5 mg IV per parameters. Goal is less than 120.    2135  Patient complained of 9/10 LBP \"right in the middle\". Rx'd with morphone 2 mg, APAP 650 mg and paged the on call vascular physician. Inspected lumbar area. Patient is exquisitely tender around his previous lumbar drain site. No fluctuance or cellulitis/warmth appreciated. Dual check with Rosaura Marlow RN. SBP now at 124.    2200  Called to repage Dr. Cezar Osborn. [address] with Dr. Victor Hugo Morales. Relayed patient's complaints, overview of hospitalization and recurrent fever. Relayed that lumbar spine does not appear to have any focal warmth, but it is very tender to touch/palpation. Received prn order for hydromorphone 1 mg IV Q3 hours as needed for pain. Also relayed current SBP and efforts to get down to 100-120.    2215  Went to patient and offered Dilaudid injection for pain if continues to be severe. Patient repositioned self on his back and reported that his pain is 0/10. I explained to him that when he is in severe pain it also affects his BP. He said that he will let me know if his pain returns. 0030  No change in assessment. 5396  Patient c/o 7/10 LBP identical to earlier pain. Offered Dilaudid and APAP.    0415  Labs drawn. VSS. Pain now 0/10 per patient. 0630  Patient doing well. Wanted to get out of bed. Pain is 0/10. Reports that he thinks that his pain does better with the Dilaudid. Educated patient regarding narcotic pain medications. 0710  Report given to GEOFF Lazaro.

## 2019-02-21 NOTE — PROGRESS NOTES
Vascular Surgery Progress Note  Arlys Shannon ACNP-BC  2/21/2019       Subjective:     Mr. Sari Armendariz is a 52 yo AAM with a pmhx significant for uncontrolled hypertension. He presented to the hospital with complaint of acute CP while having intercourse. He had associated nausea and vomiting. On arrival he was profoundly hypertensive as high as 223/111. CTA of the chest was + for a type B aortic dissection. He was admitted to the ICU and initiated on medical management with 2 agent IV antihypertensive regimen. Despite this his creatinine and lactic acid continued to rise. Repeat CTA of the chest, abd, and pelvis later the same day showed progression of his dissection and he was taken for an emergent TEVAR on 02/15/2019. His is also s/p left carotid to subclavian bypass, embolization of left subclavian artery, and left renal artery stenting. His post procedure course has been complicated by acute hypoxic respiratory failure due to volume overload, chest pain associated with hypertension, lower back pain, and fever. He is currently euvolemic. His respiratory status has dramatically improved. His chest pain has resolved with a BP of 140s/70s this am.  He is doing well with PT. His creatinine continues to improved. His fever has resolved with empiric Zosyn. CXR, UA and blood cultures are unremarkable.         He complained of lower back pain at the site of his epidural.      Nursing Data:     Patient Vitals for the past 24 hrs:   BP Temp Pulse Resp SpO2   02/21/19 0845 141/70 -- 83 19 99 %   02/21/19 0830 149/73 -- 78 21 96 %   02/21/19 0815 139/75 -- 80 20 98 %   02/21/19 0800 128/66 -- 78 18 97 %   02/21/19 0745 -- -- 78 22 98 %   02/21/19 0730 126/66 98.1 °F (36.7 °C) 76 21 98 %   02/21/19 0700 137/72 -- 78 21 98 %   02/21/19 0630 122/65 -- 81 16 (!) 83 %   02/21/19 0600 129/69 -- 79 18 95 %   02/21/19 0530 124/65 -- 83 18 95 %   02/21/19 0500 137/68 -- 85 16 93 %   02/21/19 0430 137/68 -- 84 16 96 % 02/21/19 0400 133/65 100 °F (37.8 °C) 85 20 95 %   02/21/19 0300 136/63 -- 80 18 93 %   02/21/19 0236 155/72 -- 85 -- --   02/21/19 0230 155/72 -- 86 20 95 %   02/21/19 0200 144/79 -- 84 21 95 %   02/21/19 0130 140/73 -- 85 23 96 %   02/21/19 0100 130/63 -- 81 18 92 %   02/21/19 0008 149/75 -- 83 -- --   02/21/19 0000 136/77 100.1 °F (37.8 °C) 80 17 93 %   02/20/19 2323 142/70 -- 82 -- --   02/20/19 2313 137/69 -- 83 -- --   02/20/19 2300 137/69 -- 83 19 94 %   02/20/19 2200 135/63 -- 81 18 90 %   02/20/19 2130 124/64 -- 85 21 92 %   02/20/19 2100 139/71 -- 81 21 95 %   02/20/19 2017 136/74 -- 83 -- --   02/20/19 2000 130/70 100.1 °F (37.8 °C) 84 21 95 %   02/20/19 1900 126/69 -- 86 20 96 %   02/20/19 1845 128/67 -- 87 16 94 %   02/20/19 1830 133/72 -- 87 22 96 %   02/20/19 1815 138/69 -- 90 22 96 %   02/20/19 1800 139/72 -- 94 23 96 %   02/20/19 1745 140/66 -- 91 22 94 %   02/20/19 1730 136/67 -- 91 23 93 %   02/20/19 1717 -- (!) 101.3 °F (38.5 °C) -- -- --   02/20/19 1715 158/84 -- 93 21 98 %   02/20/19 1700 161/83 -- 93 22 96 %   02/20/19 1645 150/85 -- 96 25 97 %   02/20/19 1630 146/82 -- 93 23 97 %   02/20/19 1615 145/82 -- 92 23 97 %   02/20/19 1600 158/74 -- 91 21 95 %   02/20/19 1545 157/76 -- 89 21 96 %   02/20/19 1530 142/78 -- 92 24 95 %   02/20/19 1515 (!) 149/93 -- 92 22 94 %   02/20/19 1505 142/89 -- 92 22 94 %   02/20/19 1504 169/90 -- 93 22 95 %   02/20/19 1500 172/89 -- 95 21 93 %   02/20/19 1445 156/84 -- 87 22 95 %   02/20/19 1443 147/84 -- 87 -- --   02/20/19 1430 147/84 -- 85 20 94 %   02/20/19 1415 157/85 -- 91 24 95 %   02/20/19 1408 159/84 -- 90 23 97 %   02/20/19 1400 105/83 -- 87 21 95 %   02/20/19 1345 125/83 -- 87 21 95 %   02/20/19 1330 123/84 -- 84 23 95 %   02/20/19 1300 130/89 -- 80 18 98 %   02/20/19 1245 (!) 129/95 -- 84 23 96 %   02/20/19 1230 118/89 -- 83 25 96 %   02/20/19 1200 121/82 -- 79 19 97 %   02/20/19 1159 -- 99.4 °F (37.4 °C) -- -- --   02/20/19 1145 133/87 -- 80 25 -- 02/20/19 1134 -- -- -- -- 92 %   02/20/19 1130 120/77 -- 82 15 98 %   02/20/19 1115 122/88 -- 81 17 99 %   02/20/19 1100 109/73 -- 81 21 96 %   02/20/19 1045 (!) 129/92 -- 80 23 95 %   02/20/19 1030 110/68 -- 81 20 97 %   02/20/19 1015 101/72 -- 82 22 96 %   02/20/19 1000 98/60 -- 83 23 97 %   02/20/19 0947 93/67 -- 85 19 --   02/20/19 0933 101/82 -- 87 21 --   02/20/19 0930 116/85 -- 87 23 98 %   02/20/19 0925 116/78 -- 85 21 97 %   02/20/19 0915 -- -- 88 20 98 %   02/20/19 0914 -- -- -- -- 97 %   02/20/19 0900 108/81 -- 91 20 97 %     ---------------------------------------------------------------------------------------------------------    Intake/Output Summary (Last 24 hours) at 2/21/2019 0858  Last data filed at 2/21/2019 0734  Gross per 24 hour   Intake 3630.34 ml   Output 3646 ml   Net -15.66 ml       Exam:     Physical Exam   Constitutional: He is oriented to person, place, and time. He appears well-developed and well-nourished. HENT:   Head: Normocephalic. Eyes: Pupils are equal, round, and reactive to light. Neck: Normal range of motion. Right ICA central line. Cardiovascular: Normal rate and regular rhythm. Pulmonary/Chest: No accessory muscle usage. No tachypnea. No respiratory distress. He continues on oxygen via NC. Abdominal: Soft. He exhibits no distension. Musculoskeletal: Normal range of motion. Lumbar: Mildly tender at epidural site. No erythema, edema, or fluctuance. Skin:   Groin incision dry and intact with dermabond. Dressing to left scapula dry and intact. Staples are well approximated. Dressing to right chest (previous Merrill catheter) dry and intact. Multiple tattoos. Psychiatric: He has a normal mood and affect. His behavior is normal. Thought content normal.     Lab Review:     .   Recent Results (from the past 24 hour(s))   CBC W/O DIFF    Collection Time: 02/21/19  3:51 AM   Result Value Ref Range    WBC 7.0 4.1 - 11.1 K/uL    RBC 3.92 (L) 4.10 - 5.70 M/uL    HGB 10.5 (L) 12.1 - 17.0 g/dL    HCT 31.7 (L) 36.6 - 50.3 %    MCV 80.9 80.0 - 99.0 FL    MCH 26.8 26.0 - 34.0 PG    MCHC 33.1 30.0 - 36.5 g/dL    RDW 14.8 (H) 11.5 - 14.5 %    PLATELET 560 (L) 913 - 400 K/uL    MPV 11.5 8.9 - 12.9 FL    NRBC 0.0 0  WBC    ABSOLUTE NRBC 0.00 0.00 - 9.90 K/uL   METABOLIC PANEL, BASIC    Collection Time: 02/21/19  3:51 AM   Result Value Ref Range    Sodium 138 136 - 145 mmol/L    Potassium 3.6 3.5 - 5.1 mmol/L    Chloride 105 97 - 108 mmol/L    CO2 24 21 - 32 mmol/L    Anion gap 9 5 - 15 mmol/L    Glucose 110 (H) 65 - 100 mg/dL    BUN 25 (H) 6 - 20 MG/DL    Creatinine 1.59 (H) 0.70 - 1.30 MG/DL    BUN/Creatinine ratio 16 12 - 20      GFR est AA 57 (L) >60 ml/min/1.73m2    GFR est non-AA 47 (L) >60 ml/min/1.73m2    Calcium 8.2 (L) 8.5 - 10.1 MG/DL   MAGNESIUM    Collection Time: 02/21/19  3:51 AM   Result Value Ref Range    Magnesium 2.3 1.6 - 2.4 mg/dL   PHOSPHORUS    Collection Time: 02/21/19  3:51 AM   Result Value Ref Range    Phosphorus 2.4 (L) 2.6 - 4.7 MG/DL          Assessment/Plan:      Principal problems  Type B aortic dissection   -s/p TEVAR, left subclavian embolization, and left carotid to subclavian bypass 02/15/19  Renal artery stenosis  -s/p left renal artery stenting 02/15/2019    Hydralazine added overnight and increased this am.  Wean Cardene as tolerated. CP free this am.  Creatinine continues to improved and fever curve is trending down. Continue to encourage OOB/PT/OT/IS. Modify pain regimen to IV and oral dilaudid PRN at patient request.         Active problems  Subendocardial ischemia  -likely secondary to infarction related to dissection  Hypertensive urgency   -resolved  LV  -on ASA, BBlocker, CCB, hydralazine, and statin. Appreciate input from cardiology. ARB and HCT discontinued for MARY. Currently controlled on CCB, hydralazine, and BBlocker. Continue ASA. Statin per cardiology.        Chest pain  -resolved  -reproducible and occurs with cough. Possibly pleuritic. PRN dilaudid for pain. Not a candidate for NSaids due to MARY/CKD. Fever  -trending down  Hyperglycemia  -trending down   -UA, CXR, and blood cultures unremarkable. -WBC normal.  -he currently does not meet SIRs criteria  -secondary to renal infarction and or atelectasis  -patient has significant hardware post procedure. Continue empiric Zosyn to currently day 3.5/5.    -encourage IS. Acute hypoxic respiratory failure   -improving s/p diuresis  Bilateral pleural effusions  Atelectasis   -Plan per nephrology and intensivist.    -pulmonary toileting per pulmonary   -will need outpatient sleep study     Renal infarction   MARY on CKD stage II  -plan per nephrology   Hypophosphotasia   Hypokalemia   -supplementation per nephrology and intensivist    Lactic acidosis   -resolved    Thrombocytopenia  -steadily improving     Anemia of acute blood loss  -stable and not at levels to transfuse    Constipation  -tolerating cardiac diet, denies abd pain, nausea, and vomiting   -BM 02/20/19  Continue BM regimen.      VTE prophylaxis:  SCDs    Disposition:   Likely home 2-3 days

## 2019-02-21 NOTE — PROGRESS NOTES
Vascular Surgery Progress Note   Basilia Max ACNP-BC   2/20/2019          Subjective:    Mr. Danyelle Vargas is a 52 yo AAM with a pmhx significant for uncontrolled hypertension. He presented to the hospital with complaint of acute CP while having intercourse. He had associated nausea and vomiting. On arrival he was profoundly hypertensive as high as 223/111. CTA of the chest was + for a type B aortic dissection. He was admitted to the ICU and initiated on medical management with 2 agent IV antihypertensive regimen. Despite this his creatinine and lactic acid continued to rise. Repeat CTA of the chest, abd, and pelvis later the same day showed progression of his dissection and he was taken for an emergent TEVAR on 02/15/2019. His is also s/p left carotid to subclavian bypass, embolization of left subclavian artery, and left renal artery stenting. His post procedure course has been complicated by acute hypoxic respiratory failure due to volume overload requiring bi-pap administration. This has improved s/p diuresis. He is currently using oxygen at 2LPM via NC. He remains tachypneic. His creatinine has had a downward trend overall. He spiked a fever during admission thought to be secondary to renal infarction. His WBC Is normal today and Is fever curve is trending down on IV Zosyn. CXR, UA and blood cultures are unremarkable. He complained of constipation. He has had multiple BM after initiation of a bowel regimen. Over the last 24hrs he complains of chest pain with radiation to the back that is associated with coughing. Cardene was resumed overnight.      Nursing Data:   Patient Vitals for the past 24 hrs:    BP Temp Pulse Resp SpO2   02/20/19 1145 133/87 -- 80 25 --   02/20/19 1134 -- -- -- -- 92 %   02/20/19 1130 120/77 -- 82 15 98 %   02/20/19 1115 122/88 -- 81 17 99 %   02/20/19 1100 109/73 -- 81 21 96 %   02/20/19 1045 (!) 129/92 -- 80 23 95 %   02/20/19 1030 110/68 -- 81 20 97 %   02/20/19 1015 101/72 -- 82 22 96 % 02/20/19 1000 98/60 -- 83 23 97 %   02/20/19 0947 93/67 -- 85 19 --   02/20/19 0933 101/82 -- 87 21 --   02/20/19 0930 116/85 -- 87 23 98 %   02/20/19 0925 116/78 -- 85 21 97 %   02/20/19 0915 -- -- 88 20 98 %   02/20/19 0914 -- -- -- -- 97 %   02/20/19 0900 108/81 -- 91 20 97 %   02/20/19 0845 120/81 -- 86 19 98 %   02/20/19 0838 124/85 -- 86 -- --   02/20/19 0830 150/74 -- 82 19 96 %   02/20/19 0815 149/70 -- 80 17 98 %   02/20/19 0800 151/83 -- 84 18 99 %   02/20/19 0745 155/79 -- 81 22 99 %   02/20/19 0738 -- 98.9 °F (37.2 °C) -- -- --   02/20/19 0730 160/88 -- 86 17 99 %   02/20/19 0715 168/86 -- 83 19 97 %   02/20/19 0700 161/80 -- 81 18 97 %   02/20/19 0645 157/86 -- 83 19 98 %   02/20/19 0630 130/89 -- 83 20 98 %   02/20/19 0615 116/86 -- 83 19 96 %   02/20/19 0600 115/84 -- 85 16 96 %   02/20/19 0545 123/87 -- 84 15 96 %   02/20/19 0530 131/88 -- 85 16 97 %   02/20/19 0515 120/87 -- 89 19 97 %   02/20/19 0500 (!) 129/92 -- 89 19 97 %   02/20/19 0445 178/83 -- 83 22 97 %   02/20/19 0430 184/87 -- 82 20 98 %   02/20/19 0400 (!) 183/91 99.4 °F (37.4 °C) 79 20 98 %   02/20/19 0300 (!) 182/106 -- 79 22 99 %   02/20/19 0200 (!) 167/97 -- 83 25 98 %   02/20/19 0100 161/86 -- 75 16 98 %   02/20/19 0013 161/87 -- 83 -- --   02/20/19 0000 -- -- 77 19 98 %   02/19/19 2300 (!) 145/111 -- 80 21 99 %   02/19/19 2200 (!) 147/107 -- 89 21 95 %   02/19/19 2100 (!) 137/96 -- 81 22 99 %   02/19/19 2051 -- -- -- -- 99 %   02/19/19 2000 (!) 133/98 99.4 °F (37.4 °C) 87 21 97 %   02/19/19 1900 (!) 141/96 -- 90 26 98 %   02/19/19 1800 133/87 -- 95 23 99 %   02/19/19 1749 (!) 142/98 -- -- -- --   02/19/19 1700 (!) 157/113 -- 89 18 98 %   02/19/19 1630 -- (!) 100.7 °F (38.2 °C) 95 25 96 %   02/19/19 1600 (!) 129/91 (!) 100.7 °F (38.2 °C) 92 22 99 %   02/19/19 1557 -- -- -- -- 98 %   02/19/19 1500 133/87 -- 94 23 99 %   02/19/19 1400 130/90 -- 88 22 97 %   02/19/19 1300 120/77 -- 94 22 99 %   02/19/19 1200 115/86 98.2 °F (36.8 °C) 88 22 97 %     ---------------------------------------------------------------------------------------------------------     Intake/Output Summary (Last 24 hours) at 2/20/2019 1154   Last data filed at 2/20/2019 1137       Gross per 24 hour   Intake 3300.26 ml   Output 8850 ml   Net -5549.74 ml     Exam:   Physical Exam   Constitutional: He is oriented to person, place, and time. He appears well-developed and well-nourished. HENT:   Head: Normocephalic. Eyes: Pupils are equal, round, and reactive to light. Neck: Normal range of motion. Right ICA central line. Cardiovascular: Normal rate and regular rhythm. Pulmonary/Chest: No accessory muscle usage. Tachypnea noted. No respiratory distress. He continues on oxygen via NC. Abdominal: Soft. He exhibits no distension. Musculoskeletal: Normal range of motion. Neurological: He is alert and oriented to person, place, and time. Skin:   Groin incision dry and intact with dermabond. Dressing to left scapula dry and intact. Dressing to right chest (previous Merrill catheter) dry and intact. Multiple tattoos. Psychiatric: He has a normal mood and affect. His behavior is normal. Thought content normal.     ASSESSMENT:  Principal problems   Type B aortic dissection   -s/p TEVAR, left subclavian embolization, and left carotid to subclavian bypass 02/15/19   Renal artery stenosis   -s/p left renal artery stenting 02/15/2019   Patient continues on cardene this am. Plan is for cardiology to adjust antihypertensive regimen this am. Creatinine improved overnight. Diuretics per nephrology. Encourage OOB/PT/OT/IS. Pain control with oral Percocet. Active problems   Subendocardial ischemia   -troponin <0.04 on 2/15/19   Hypertensive urgency   -resolved   LV   -on ASA, BBlocker, CCB, and statin. Chest pain   -left scapular pain. Reproducible.   -patient reports it is assocated with cough. On oral Percocet ordered PRN. Required morphine administration overnight. Not a candidate for NSaids due to MARY/CKD. -unrelieved with PPI   -likely secondary to uncontrolled hypertension. Plan per cardiology. Fever   -trending down on empiric Zosyn   Hyperglycemia   -UA, CXR, and blood cultures unremarkable. -WBC normal.   -he currently does not meet SIRs criteria   -secondary to renal infarction ? ??   -patient has significant hardware post procedure. Continue empiric Zosyn. Will hold off on Vancomycin for now due to MARY. -encourage IS. Acute hypoxic respiratory failure   -improving s/p diuresis   Bilateral pleural effusions   Atelectasis   -IV diuresis per nephrology. -will need outpatient sleep study   Renal infarction   MARY on CKD stage II   -plan per nephrology   Hypophosphotasia   Hypokalemia   -supplementation per nephrology and intensivist   Lactic acidosis   -resolved   Thrombocytopenia   -steadily improving   Anemia of acute blood loss   -not at levels to transfuse   Constipation   -tolerating clears, denies abd pain, nausea, and vomiting   -Continue BM regimen. VTE prophylaxis:   SCDs   Disposition:   TBD. PT evaluation today.

## 2019-02-21 NOTE — PROGRESS NOTES
Report received from [address] spoke with Dr. Rahel Lopez about BP . Increased Hydralazine to 50 mg TID. Will continue to monitor. 0857: Patient's BP has systolic over 461 consecutively x 2. PRN hydralazine given. 0900: Spoke with Marlen Quiles NP. Orders to discontinue patient's enamorado. NP also said she would switch IV dilaudid to PO. Will remove catheter and continue to monitor patient. 9468: Enamorado catheter removed. 1020: Spoke with Dr. Miranda Lincoln about BP. Md started Isordil. 1030: In rounds spoke with Dr. Rahel Lopez. Orders to wean Cardene. Orders to use PRN hydralazine and Metoprolol for systolic BP higher than 883. Orders to D/C central line once patient has good IV access. Confirmed order for central line removal with Marlen Quiles. 1200: Patient sitting in chair. Frustrated with lack of urination post removal of catheter. Patient informed that sometimes it takes time to start \"going again\" will continue to monitor. 1252: Patient voided 350 mL of urine. 1400: Patient moved to bed, resting comfortably. 1421: Patient stated he is having 8/10 back pain. PRN oral dilaudid given. 1645: Patient family at bedside. 1724: Family at bedside. Reminded patient that he needs to watch his blood pressure. 150 SBP. PRN metoprolol given. 1739: Patient and family informed that they would have to only have two visitors at a time  [phone]: Paged Dr. Miranda Lincoln regarding patient's BP. Orders to monitor overnight . 1800: Dr. Tono Marks at bedside, informed MD of recent pressures. No new orders, will continue to monitor.   1947: Report Given to [facility]

## 2019-02-21 NOTE — PROGRESS NOTES
PRogress Note    NAME: Kristen Adame   :  1970   MRN:  [id number]     Date/Time:  2019          Assessment :    Plan:  MARY on CKD stage 2    HTN/LVH    type B aortic dissection with filling of the true and false lumen --S/P TEVAR, RA stenting, (L) carotid bypass    Fever-post op  THrombocytopenia  Resp failure improving           Creatinine 1.6  Back on cardene with low bps this am-need to wean-on norvasc 10, coreg 25 mg bid-want to avoid hypotension, D/W cardiology adding isordil with hydralazine for afterload reduction-  Post atn diuresis --significant uop yesterday-no diuretics today , use prn  No ace  repleting electrolytes --kphos  Platelets improving         Subjective:   CHIEF COMPLAINT:  No n/v/cp/sob    Past Medical History:   Diagnosis Date    Foot fracture     Hypertension     Jaw fracture (HCC)     Ruptured ear drum     Thumb fracture       Past Surgical History:   Procedure Laterality Date    HX APPENDECTOMY      HX ORTHOPAEDIC       Social History     Tobacco Use    Smoking status: Never Smoker    Smokeless tobacco: Never Used   Substance Use Topics    Alcohol use: No      Family History   Problem Relation Age of Onset    Diabetes Mother       No Known Allergies   Prior to Admission medications    Medication Sig Start Date End Date Taking? Authorizing Provider   amLODIPine (NORVASC) 5 mg tablet Take 1 Tab by mouth daily. 18   Maryellen Leach NP   valsartan-hydroCHLOROthiazide (DIOVAN-HCT) 320-25 mg per tablet Take 1 Tab by mouth daily.  18   Maryellen Leach NP     REVIEW OF SYSTEMS:    thirsty      Objective:   VITALS:    Visit Vitals  /66   Pulse 80   Temp 98 °F (36.7 °C)   Resp 18   Ht 6' 5\" (1.956 m)   Wt 109.4 kg (241 lb 2.9 oz)   SpO2 97%   BMI 28.60 kg/m²     PHYSICAL EXAM:  Gen:  [x]  WD [x]  WN  [] cachectic []  thin []  obese []  disheveled             []  ill apearing  []   Critical  []   Chronic    [x]  No acute distress    HEENT:   [x] NC/AT    [] pink conjunctivae      [] pale conjunctivae                  PERRL  [] yes  [] no      [] moist mucosa    [] dry mucosa    hearing intact to voice [x] yes  [] No                   RESP:   [] CTA bilaterally/no wheezing/rhonchi/rales/crackles    [] rhonchi bilaterally - no dullness  [] wheezing   [] rhonchi   [x] crackles     use of accessory muscles [] yes [x] no    CARD:   [x]  regular rate and rhythm/No murmurs/rubs/gallops    murmur  [] yes ()  [] no      Rubs  [] yes  [] no       Gallops [] yes  [] no    Rate []  regular  []  irregular        carotid bruits  [] Right  []  Left                 LE edema [] yes  [x] no           JVP  []  yes   [x]  no    NEUR:   [x] cranial nerves II-XII grossly intact       [] Cranial nerves deficit                   LAB DATA REVIEWED:    Recent Labs     02/21/19 0351 02/20/19 0347   WBC 7.0 7.9   HGB 10.5* 10.2*   HCT 31.7* 31.3*   * 121*     Recent Labs     02/21/19 0351 02/20/19 0347 02/19/19  0440    139 140   K 3.6 3.6 3.1*    105 107   CO2 24 28 27   BUN 25* 26* 34*   CREA 1.59* 1.70* 2.12*   * 108* 133*   CA 8.2* 7.6* 6.9*   MG 2.3 1.9  --    PHOS 2.4* 2.2*  --      No results for input(s): SGOT, GPT, ALT, AP, TBIL, TBILI, ALB, GLOB, GGT, AML, LPSE in the last 72 hours. No lab exists for component: AMYP, HLPSE  No results for input(s): INR, PTP, APTT in the last 72 hours. No lab exists for component: INREXT, INREXT   No results for input(s): FE, TIBC, PSAT, FERR in the last 72 hours. No results for input(s): PH, PCO2, PO2 in the last 72 hours. No results for input(s): CPK, CKMB in the last 72 hours. No lab exists for component: TROPONINI  No results found for: GLUCPOC    Procedures: see electronic medical records for all procedures/Xrays and details which were not copied into this note but were reviewed prior to creation of Plan. ________________________________________________________________________       ___________________________________________________  Consulting Physician: Nile Left, MD

## 2019-02-21 NOTE — PROGRESS NOTES
Progress Note      2/21/2019 9:13 AM  NAME: Rossy Lindsay   MRN:  [id number]   Admit Diagnosis: Acute thoracic aortic dissection ([facility] [address] [I71.01]                Assessment:       1. 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 2. No hx of CAD, equivicol troponin  3. Echo nl EF, LVH, mildly dilated ascending aorta with mild aortic regurgitation  4. Sinus with inferior and lateral TWI  5. HTN  6. Lipid ok  7. Snores, concern for sleep apnea, will need outpt sleep study  8. 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 Some atypical chest pain, resolved. Normal EF  Sinus  S/p TEVAR     1. Cont added aspirin 81mg daily  2. BP's labile, intermittantly on ggt, should have bp monitored ONLY in Right arm. 3. Cont increased added coreg 25mg bid  4. Cont added amlodipine currently on 10mg, may decrease to 5mg if edema. 5. Agree with increasing added hydralazine 50mg tid  6. Add isordil 20mg po tid  7. Have been holding valsartan HCT,currently renal wishes to hold  8. PRN diuretic, resp status improving, follow bmp  9. Cont added statin             [x]        High complexity decision making was performed             Subjective:     Rossy Lindsay denies chest pain. +dyspnea, hungry. Discussed with RN events overnight.      Review of Systems:    Symptom Y/N Comments  Symptom Y/N Comments   Fever/Chills N   Chest Pain N    Poor Appetite N   Edema N    Cough N   Abdominal Pain N    Sputum N   Joint Pain N    SOB/ESCOBEDO N   Pruritis/Rash N    Nausea/vomit N   Tolerating PT/OT Y    Diarrhea N   Tolerating Diet Y    Constipation N   Other       Could NOT obtain due to:      Objective:      Physical Exam:    Last 24hrs VS reviewed since prior progress note. Most recent are:    Visit Vitals  /64   Pulse 77   Temp 98.1 °F (36.7 °C)   Resp 20   Ht 6' 5\" (1.956 m)   Wt 109.4 kg (241 lb 2.9 oz)   SpO2 98%   BMI 28.60 kg/m²       Intake/Output Summary (Last 24 hours) at 2/21/2019 1011  Last data filed at 2/21/2019 9852  Gross per 24 hour   Intake 3526.67 ml   Output 3746 ml   Net -219.33 ml        General Appearance: Well developed, well nourished, alert & oriented x 3,    no acute distress. Ears/Nose/Mouth/Throat: Hearing grossly normal.  Neck: Supple. Chest: Lungs clear to auscultation bilaterally. Cardiovascular: Regular rate and rhythm, S1S2 normal, no murmur. Abdomen: Soft, non-tender, bowel sounds are active. Extremities: No edema bilaterally. Skin: Warm and dry. PMH/SH reviewed - no change compared to H&P    Data Review    Telemetry: normal sinus rhythm     Lab Data Personally Reviewed:    Recent Labs     02/21/19  0351 02/20/19  0347   WBC 7.0 7.9   HGB 10.5* 10.2*   HCT 31.7* 31.3*   * 121*     No results for input(s): INR, PTP, APTT in the last 72 hours. No lab exists for component: Pierre Vanessa   Recent Labs     02/21/19  0351 02/20/19  0347 02/19/19  0440    139 140   K 3.6 3.6 3.1*    105 107   CO2 24 28 27   BUN 25* 26* 34*   CREA 1.59* 1.70* 2.12*   * 108* 133*   CA 8.2* 7.6* 6.9*   MG 2.3 1.9  --      No results for input(s): CPK, CKNDX, TROIQ in the last 72 hours. No lab exists for component: CPKMB  Lab Results   Component Value Date/Time    Cholesterol, total 146 04/18/2018 08:58 AM    HDL Cholesterol 38 (L) 04/18/2018 08:58 AM    LDL, calculated 95 04/18/2018 08:58 AM    Triglyceride 64 04/18/2018 08:58 AM       No results for input(s): SGOT, GPT, AP, TBIL, TP, ALB, GLOB, GGT, AML, LPSE in the last 72 hours.     No lab exists for component: AMYP, HLPSE  No results for input(s): PH, PCO2, PO2 in the last 72 hours.    Medications Personally Reviewed:    Current Facility-Administered Medications   Medication Dose Route Frequency    hydrALAZINE (APRESOLINE) tablet 50 mg  50 mg Oral TID    HYDROmorphone (DILAUDID) tablet 2 mg  2 mg Oral Q4H PRN    potassium, sodium phosphates (NEUTRA-PHOS) packet 2 Packet  2 Packet Oral QID    isosorbide dinitrate (ISORDIL) tablet 20 mg  20 mg Oral TID    carvedilol (COREG) tablet 25 mg  25 mg Oral BID WITH MEALS    amLODIPine (NORVASC) tablet 10 mg  10 mg Oral DAILY    albuterol (PROVENTIL VENTOLIN) nebulizer solution 2.5 mg  2.5 mg Inhalation Q4H PRN    hydrALAZINE (APRESOLINE) 20 mg/mL injection 10 mg  10 mg IntraVENous Q6H PRN    niCARdipine (CARDENE) 50 mg in 0.9% sodium chloride 100 mL infusion  5-15 mg/hr IntraVENous TITRATE    HYDROmorphone (PF) (DILAUDID) injection 1 mg  1 mg IntraVENous Q2H PRN    potassium chloride (KLOR-CON) packet for solution 20 mEq  20 mEq Oral DAILY    pantoprazole (PROTONIX) tablet 40 mg  40 mg Oral ACB    piperacillin-tazobactam (ZOSYN) 3.375 g in 0.9% sodium chloride (MBP/ADV) 100 mL  3.375 g IntraVENous Q8H    aspirin chewable tablet 81 mg  81 mg Oral DAILY    pravastatin (PRAVACHOL) tablet 20 mg  20 mg Oral QHS    senna-docusate (PERICOLACE) 8.6-50 mg per tablet 2 Tab  2 Tab Oral QHS    acetaminophen (TYLENOL) solution 650 mg  650 mg Oral Q6H PRN    metoprolol (LOPRESSOR) injection 5 mg  5 mg IntraVENous Q6H PRN    sodium chloride (NS) flush 5-40 mL  5-40 mL IntraVENous Q8H    sodium chloride (NS) flush 5-40 mL  5-40 mL IntraVENous PRN    bisacodyl (DULCOLAX) suppository 10 mg  10 mg Rectal DAILY PRN         Annette Mane MD

## 2019-02-21 NOTE — PROGRESS NOTES
Pharmacy Automatic Renal Dosing Protocol - Antimicrobials    Indication for Antimicrobials: Empiric per vascular surgery     Current Regimen of Each Antimicrobial:  Piperacillin-tazobactam 3.375 gram IV every 8 hours (Started 19; Day #4 of 7)      Previous Antimicrobial Therapy:  None ordered    Significant Cultures:   19 Blood culture = No growth x 3 days (Results pending)  19 Blood culture = No growth x 4 days (Results pending)    Labs:  Recent Labs     19  0351 19  0347 19  0440   CREA 1.59* 1.70* 2.12*   BUN 25* 26* 34*   WBC 7.0 7.9 10.2   Temp (24hrs), Av.8 °F (37.7 °C), Min:98.1 °F (36.7 °C), Max:101.3 °F (38.5 °C)    Creatinine Clearance (mL/min) or Dialysis: Greater than 50 mL/min    Impression/Plan:   · Piperacillin-tazobactam dosed appropriately based on indication and renal function. Continue current regimen. · Antimicrobial duration: 7 days     Pharmacy will follow daily and adjust medications as appropriate for renal function and/or serum levels.     Thank you,  TITI Martinez

## 2019-02-22 LAB
ANION GAP SERPL CALC-SCNC: 8 MMOL/L (ref 5–15)
BUN SERPL-MCNC: 24 MG/DL (ref 6–20)
BUN/CREAT SERPL: 14 (ref 12–20)
CALCIUM SERPL-MCNC: 8 MG/DL (ref 8.5–10.1)
CHLORIDE SERPL-SCNC: 102 MMOL/L (ref 97–108)
CO2 SERPL-SCNC: 26 MMOL/L (ref 21–32)
CREAT SERPL-MCNC: 1.66 MG/DL (ref 0.7–1.3)
GLUCOSE SERPL-MCNC: 107 MG/DL (ref 65–100)
PHOSPHATE SERPL-MCNC: 2.8 MG/DL (ref 2.6–4.7)
POTASSIUM SERPL-SCNC: 3.8 MMOL/L (ref 3.5–5.1)
SODIUM SERPL-SCNC: 136 MMOL/L (ref 136–145)

## 2019-02-22 PROCEDURE — 74011250636 HC RX REV CODE- 250/636: Performed by: INTERNAL MEDICINE

## 2019-02-22 PROCEDURE — 84100 ASSAY OF PHOSPHORUS: CPT

## 2019-02-22 PROCEDURE — 74011250637 HC RX REV CODE- 250/637: Performed by: INTERNAL MEDICINE

## 2019-02-22 PROCEDURE — 74011000250 HC RX REV CODE- 250: Performed by: INTERNAL MEDICINE

## 2019-02-22 PROCEDURE — 74011250637 HC RX REV CODE- 250/637: Performed by: SURGERY

## 2019-02-22 PROCEDURE — 74011250636 HC RX REV CODE- 250/636

## 2019-02-22 PROCEDURE — 80048 BASIC METABOLIC PNL TOTAL CA: CPT

## 2019-02-22 PROCEDURE — 36415 COLL VENOUS BLD VENIPUNCTURE: CPT

## 2019-02-22 PROCEDURE — 74011250637 HC RX REV CODE- 250/637: Performed by: NURSE PRACTITIONER

## 2019-02-22 PROCEDURE — 97116 GAIT TRAINING THERAPY: CPT

## 2019-02-22 PROCEDURE — 74011000258 HC RX REV CODE- 258: Performed by: INTERNAL MEDICINE

## 2019-02-22 PROCEDURE — 65270000029 HC RM PRIVATE

## 2019-02-22 RX ORDER — HYDRALAZINE HYDROCHLORIDE 25 MG/1
100 TABLET, FILM COATED ORAL ONCE
Status: COMPLETED | OUTPATIENT
Start: 2019-02-22 | End: 2019-02-22

## 2019-02-22 RX ORDER — HYDRALAZINE HYDROCHLORIDE 20 MG/ML
20 INJECTION INTRAMUSCULAR; INTRAVENOUS ONCE
Status: COMPLETED | OUTPATIENT
Start: 2019-02-22 | End: 2019-02-22

## 2019-02-22 RX ORDER — CYCLOBENZAPRINE HCL 10 MG
5 TABLET ORAL
Status: DISCONTINUED | OUTPATIENT
Start: 2019-02-22 | End: 2019-02-26 | Stop reason: HOSPADM

## 2019-02-22 RX ORDER — NICARDIPINE HYDROCHLORIDE 0.1 MG/ML
2.5-15 INJECTION INTRAVENOUS
Status: DISCONTINUED | OUTPATIENT
Start: 2019-02-22 | End: 2019-02-22

## 2019-02-22 RX ORDER — HYDRALAZINE HYDROCHLORIDE 50 MG/1
100 TABLET, FILM COATED ORAL 3 TIMES DAILY
Status: DISCONTINUED | OUTPATIENT
Start: 2019-02-22 | End: 2019-02-24

## 2019-02-22 RX ORDER — BUMETANIDE 0.25 MG/ML
1 INJECTION INTRAMUSCULAR; INTRAVENOUS EVERY 12 HOURS
Status: DISCONTINUED | OUTPATIENT
Start: 2019-02-22 | End: 2019-02-23

## 2019-02-22 RX ADMIN — Medication 10 ML: at 23:44

## 2019-02-22 RX ADMIN — ASPIRIN 81 MG 81 MG: 81 TABLET ORAL at 08:11

## 2019-02-22 RX ADMIN — Medication 10 ML: at 13:45

## 2019-02-22 RX ADMIN — ACETAMINOPHEN 650 MG: 160 SOLUTION ORAL at 23:57

## 2019-02-22 RX ADMIN — ISOSORBIDE DINITRATE 20 MG: 5 TABLET ORAL at 08:28

## 2019-02-22 RX ADMIN — CARVEDILOL 25 MG: 12.5 TABLET, FILM COATED ORAL at 08:10

## 2019-02-22 RX ADMIN — BUMETANIDE 1 MG: 0.25 INJECTION INTRAMUSCULAR; INTRAVENOUS at 08:45

## 2019-02-22 RX ADMIN — ISOSORBIDE DINITRATE 20 MG: 5 TABLET ORAL at 21:46

## 2019-02-22 RX ADMIN — HYDROMORPHONE HYDROCHLORIDE 2 MG: 2 TABLET ORAL at 08:10

## 2019-02-22 RX ADMIN — SODIUM CHLORIDE 5 MG/HR: 900 INJECTION, SOLUTION INTRAVENOUS at 06:59

## 2019-02-22 RX ADMIN — PIPERACILLIN SODIUM,TAZOBACTAM SODIUM 3.38 G: 3; .375 INJECTION, POWDER, FOR SOLUTION INTRAVENOUS at 23:55

## 2019-02-22 RX ADMIN — HYDRALAZINE HYDROCHLORIDE 20 MG: 20 INJECTION INTRAMUSCULAR; INTRAVENOUS at 05:00

## 2019-02-22 RX ADMIN — HYDRALAZINE HYDROCHLORIDE 100 MG: 50 TABLET, FILM COATED ORAL at 15:53

## 2019-02-22 RX ADMIN — BUMETANIDE 1 MG: 0.25 INJECTION INTRAMUSCULAR; INTRAVENOUS at 21:48

## 2019-02-22 RX ADMIN — POTASSIUM CHLORIDE 20 MEQ: 1.5 POWDER, FOR SOLUTION ORAL at 11:09

## 2019-02-22 RX ADMIN — CARVEDILOL 25 MG: 12.5 TABLET, FILM COATED ORAL at 18:11

## 2019-02-22 RX ADMIN — HYDRALAZINE HYDROCHLORIDE 10 MG: 20 INJECTION INTRAMUSCULAR; INTRAVENOUS at 01:09

## 2019-02-22 RX ADMIN — PRAVASTATIN SODIUM 20 MG: 10 TABLET ORAL at 21:45

## 2019-02-22 RX ADMIN — CYCLOBENZAPRINE HYDROCHLORIDE 5 MG: 10 TABLET, FILM COATED ORAL at 21:45

## 2019-02-22 RX ADMIN — SENNOSIDES AND DOCUSATE SODIUM 2 TABLET: 8.6; 5 TABLET ORAL at 21:45

## 2019-02-22 RX ADMIN — Medication 10 ML: at 03:18

## 2019-02-22 RX ADMIN — AMLODIPINE BESYLATE 10 MG: 5 TABLET ORAL at 08:28

## 2019-02-22 RX ADMIN — PANTOPRAZOLE SODIUM 40 MG: 40 TABLET, DELAYED RELEASE ORAL at 08:10

## 2019-02-22 RX ADMIN — ISOSORBIDE DINITRATE 20 MG: 5 TABLET ORAL at 15:52

## 2019-02-22 RX ADMIN — HYDRALAZINE HYDROCHLORIDE 100 MG: 50 TABLET, FILM COATED ORAL at 08:28

## 2019-02-22 RX ADMIN — PIPERACILLIN SODIUM,TAZOBACTAM SODIUM 3.38 G: 3; .375 INJECTION, POWDER, FOR SOLUTION INTRAVENOUS at 08:18

## 2019-02-22 RX ADMIN — HYDROMORPHONE HYDROCHLORIDE 1 MG: 1 INJECTION, SOLUTION INTRAMUSCULAR; INTRAVENOUS; SUBCUTANEOUS at 01:09

## 2019-02-22 RX ADMIN — METOPROLOL TARTRATE 5 MG: 5 INJECTION INTRAVENOUS at 03:18

## 2019-02-22 RX ADMIN — Medication 10 ML: at 16:19

## 2019-02-22 RX ADMIN — HYDROMORPHONE HYDROCHLORIDE 2 MG: 2 TABLET ORAL at 01:09

## 2019-02-22 RX ADMIN — PIPERACILLIN SODIUM,TAZOBACTAM SODIUM 3.38 G: 3; .375 INJECTION, POWDER, FOR SOLUTION INTRAVENOUS at 15:53

## 2019-02-22 RX ADMIN — HYDRALAZINE HYDROCHLORIDE 100 MG: 50 TABLET, FILM COATED ORAL at 21:45

## 2019-02-22 RX ADMIN — HYDRALAZINE HYDROCHLORIDE 100 MG: 25 TABLET, FILM COATED ORAL at 05:00

## 2019-02-22 NOTE — CONSULTS
Anesthesiology    7 Days Post-Op sp Procedure(s):  THORACIC ANEURYSM REPAIR ENDOVASCULAR (TEVAR)  LEFT CAROTID TO SUBCLAVIAN BYPASS. Visit Vitals  /65   Pulse 83   Temp 37.8 °C (100 °F)   Resp 19   Ht 6' 5\" (1.956 m)   Wt 109.4 kg (241 lb 2.9 oz)   SpO2 99%   BMI 28.60 kg/m²   . Asked to see patient for evaluation of spinal drain site secondary to back pain and fever of unknown origin. Drain has been dc'd x 3 days now. Back pain is mostly at night and described as severe. Site is clean, dry and intact. + tenderness of BL paraspinal muscles. No fluctuance at site. A/P:    Back pain likely secondary to muscular spasms, however if an abscess is suspected an MRI and/or Neurosurgical consult would be appropriate.

## 2019-02-22 NOTE — PROGRESS NOTES
Progress Note      2/22/2019 9:13 AM  NAME: Venora Kawasaki   MRN:  [id number]   Admit Diagnosis: Acute thoracic aortic dissection ([facility] [address] [I71.01]                Assessment:       1. 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 2. No hx of CAD, equivicol troponin  3. Echo nl EF, LVH, mildly dilated ascending aorta with mild aortic regurgitation  4. Sinus with inferior and lateral TWI  5. HTN  6. Lipid ok  7. Snores, concern for sleep apnea, will need outpt sleep study  8. 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 BP reasonable on current meds.                     Plan:        No hx of CAD. Some atypical chest pain, resolved. Normal EF  Sinus  S/p TEVAR     1. Cont added aspirin 81mg daily  2. BP's labile, intermittantly on ggt, should have bp monitored ONLY in Right arm. 3. Cont increased added coreg 25mg bid  4. Cont added amlodipine currently on 10mg, may decrease to 5mg if edema. 5. Agree with increasing added hydralazine   6. Add isordil 20mg po tid  7. Have been holding valsartan HCT,currently renal wishes to hold  8. PRN diuretic, resp status improving, follow bmp  9. Cont added statin             [x]        High complexity decision making was performed             Subjective:     Venora Kawasaki denies chest pain. +dyspnea, hungry. Discussed with RN events overnight.      Review of Systems:    Symptom Y/N Comments  Symptom Y/N Comments   Fever/Chills N   Chest Pain N    Poor Appetite N   Edema N    Cough N   Abdominal Pain N    Sputum N   Joint Pain N    SOB/ESCOBEDO N   Pruritis/Rash N    Nausea/vomit N   Tolerating PT/OT Y    Diarrhea N   Tolerating Diet Y    Constipation N   Other       Could NOT obtain due to:      Objective:      Physical Exam:    Last 24hrs VS reviewed since prior progress note. Most recent are:    Visit Vitals  /82 (BP 1 Location: Right arm, BP Patient Position: At rest;Supine)   Pulse 88   Temp 99 °F (37.2 °C)   Resp 16   Ht 6' 5\" (1.956 m)   Wt 109.4 kg (241 lb 2.9 oz)   SpO2 97%   BMI 28.60 kg/m²       Intake/Output Summary (Last 24 hours) at 2/22/2019 1611  Last data filed at 2/22/2019 1106  Gross per 24 hour   Intake 1070.83 ml   Output 2275 ml   Net -1204.17 ml        General Appearance: Well developed, well nourished, alert & oriented x 3,    no acute distress. Ears/Nose/Mouth/Throat: Hearing grossly normal.  Neck: Supple. Chest: Lungs clear to auscultation bilaterally. Cardiovascular: Regular rate and rhythm, S1S2 normal, no murmur. Abdomen: Soft, non-tender, bowel sounds are active. Extremities: No edema bilaterally. Skin: Warm and dry. PMH/SH reviewed - no change compared to H&P    Data Review    Telemetry: normal sinus rhythm     Lab Data Personally Reviewed:    Recent Labs     02/21/19  0351 02/20/19  0347   WBC 7.0 7.9   HGB 10.5* 10.2*   HCT 31.7* 31.3*   * 121*     No results for input(s): INR, PTP, APTT in the last 72 hours. No lab exists for component: Malinaelma Chi   Recent Labs     02/22/19  0325 02/21/19  0351 02/20/19  0347    138 139   K 3.8 3.6 3.6    105 105   CO2 26 24 28   BUN 24* 25* 26*   CREA 1.66* 1.59* 1.70*   * 110* 108*   CA 8.0* 8.2* 7.6*   MG  --  2.3 1.9     No results for input(s): CPK, CKNDX, TROIQ in the last 72 hours. No lab exists for component: CPKMB  Lab Results   Component Value Date/Time    Cholesterol, total 146 04/18/2018 08:58 AM    HDL Cholesterol 38 (L) 04/18/2018 08:58 AM    LDL, calculated 95 04/18/2018 08:58 AM    Triglyceride 64 04/18/2018 08:58 AM       No results for input(s): SGOT, GPT, AP, TBIL, TP, ALB, GLOB, GGT, AML, LPSE in the last 72 hours.     No lab exists for component: AMYP, HLPSE  No results for input(s): PH, PCO2, PO2 in the last 72 hours.     Medications Personally Reviewed:    Current Facility-Administered Medications   Medication Dose Route Frequency    hydrALAZINE (APRESOLINE) tablet 100 mg  100 mg Oral TID    bumetanide (BUMEX) injection 1 mg  1 mg IntraVENous Q12H    cyclobenzaprine (FLEXERIL) tablet 5 mg  5 mg Oral TID PRN    HYDROmorphone (DILAUDID) tablet 2 mg  2 mg Oral Q4H PRN    isosorbide dinitrate (ISORDIL) tablet 20 mg  20 mg Oral TID    metoprolol (LOPRESSOR) injection 5 mg  5 mg IntraVENous Q6H PRN    carvedilol (COREG) tablet 25 mg  25 mg Oral BID WITH MEALS    amLODIPine (NORVASC) tablet 10 mg  10 mg Oral DAILY    albuterol (PROVENTIL VENTOLIN) nebulizer solution 2.5 mg  2.5 mg Inhalation Q4H PRN    hydrALAZINE (APRESOLINE) 20 mg/mL injection 10 mg  10 mg IntraVENous Q6H PRN    potassium chloride (KLOR-CON) packet for solution 20 mEq  20 mEq Oral DAILY    pantoprazole (PROTONIX) tablet 40 mg  40 mg Oral ACB    piperacillin-tazobactam (ZOSYN) 3.375 g in 0.9% sodium chloride (MBP/ADV) 100 mL  3.375 g IntraVENous Q8H    aspirin chewable tablet 81 mg  81 mg Oral DAILY    pravastatin (PRAVACHOL) tablet 20 mg  20 mg Oral QHS    senna-docusate (PERICOLACE) 8.6-50 mg per tablet 2 Tab  2 Tab Oral QHS    acetaminophen (TYLENOL) solution 650 mg  650 mg Oral Q6H PRN    sodium chloride (NS) flush 5-40 mL  5-40 mL IntraVENous Q8H    sodium chloride (NS) flush 5-40 mL  5-40 mL IntraVENous PRN    bisacodyl (DULCOLAX) suppository 10 mg  10 mg Rectal DAILY PRN         Linda Crooks MD

## 2019-02-22 NOTE — PROGRESS NOTES
[facility] INTENSIVIST Consult Service Note  Pulmonary, Critical Care, and Sleep Medicine    Name: Beti Russo MRN: [id number]   : 1970 Hospital: [address]   Date: 2019  Admission date: 2/15/2019 Hospital Day: 8           IMPRESSION:   1. Uncontrolled HTN on IV esmolol and Nicardipine  2. Acute type B aortic Dissection  3. Severe Chest pain  4. Nausea and vomiting  5. Abdominal pain  6. MARY  7. Urinary retention  Probable MARITZA  8. Additional workup outlined below  9. Multiorgan dysfunction as outlined above: Pt has one or more acute or chronic illnesses with severe exacerbation with progression or side effects of treatment that poses a threat to life or bodily function  10. Pt is unstable, unpredictable needing more CCU monitoring; at high risk of sudden decline and decompensation with life threatening consequenses and continued end organ dysfunction and failure  11. Pt is critically ill. Time spent with pt and staff actively rendering care, managing pt and coordinating care as stated below;  35 minutes, exclusive of any procedures      RECOMMENDATIONS/PLAN:   1. Empiric abx per vascular surgery  2. BP control with current drips, titrating down, also on po medications  3. Diuretics today per renal  4. Renal fx better  5. Off O2  6. Antiemetics  7. Adjust pain meds  8. DVT, SUP prophylaxis  9. Will be available to assist in medical management while in the CCU pending disposition  10. Mobilize   11. Transfer to surgical floor later today         Subjective/Initial History:   I have reviewed the flowsheet and previous days notes. Seen earlier today on rounds. Back on iv cardene drip to control his BP  No acute distress  No acute events overnight        ROS:A comprehensive review of systems was negative except for that written in the HPI.      MEDS:   Current Facility-Administered Medications   Medication    hydrALAZINE (APRESOLINE) tablet 100 mg    niCARdipine (CARDENE) 25 mg in 0.9% sodium chloride 250 mL infusion    bumetanide (BUMEX) injection 1 mg    HYDROmorphone (DILAUDID) tablet 2 mg    isosorbide dinitrate (ISORDIL) tablet 20 mg    metoprolol (LOPRESSOR) injection 5 mg    carvedilol (COREG) tablet 25 mg    amLODIPine (NORVASC) tablet 10 mg    albuterol (PROVENTIL VENTOLIN) nebulizer solution 2.5 mg    hydrALAZINE (APRESOLINE) 20 mg/mL injection 10 mg    HYDROmorphone (PF) (DILAUDID) injection 1 mg    potassium chloride (KLOR-CON) packet for solution 20 mEq    pantoprazole (PROTONIX) tablet 40 mg    piperacillin-tazobactam (ZOSYN) 3.375 g in 0.9% sodium chloride (MBP/ADV) 100 mL    aspirin chewable tablet 81 mg    pravastatin (PRAVACHOL) tablet 20 mg    senna-docusate (PERICOLACE) 8.6-50 mg per tablet 2 Tab    acetaminophen (TYLENOL) solution 650 mg    sodium chloride (NS) flush 5-40 mL    sodium chloride (NS) flush 5-40 mL    bisacodyl (DULCOLAX) suppository 10 mg        Current Facility-Administered Medications:     hydrALAZINE (APRESOLINE) tablet 100 mg, 100 mg, Oral, TID, Kulwant Kirkland MD, 100 mg at 02/22/19 1478    niCARdipine (CARDENE) 25 mg in 0.9% sodium chloride 250 mL infusion, 0-15 mg/hr, IntraVENous, TITRATE, Aric Garrido MD, Last Rate: 50 mL/hr at 02/22/19 0659, 5 mg/hr at 02/22/19 0659    bumetanide (BUMEX) injection 1 mg, 1 mg, IntraVENous, Q12H, Margaret Catalan MD    HYDROmorphone (DILAUDID) tablet 2 mg, 2 mg, Oral, Q4H PRN, Faiza Curran NP, 2 mg at 02/22/19 0810    isosorbide dinitrate (ISORDIL) tablet 20 mg, 20 mg, Oral, TID, Aric Garrido MD, 20 mg at 02/22/19 0828    metoprolol (LOPRESSOR) injection 5 mg, 5 mg, IntraVENous, Q6H PRN, Logan Sultana MD, 5 mg at 02/22/19 0318    carvedilol (COREG) tablet 25 mg, 25 mg, Oral, BID WITH MEALS, Aric Garrido MD, 25 mg at 02/22/19 0810    amLODIPine (NORVASC) tablet 10 mg, 10 mg, Oral, DAILY, Logan Sultana MD, 10 mg at 02/22/19 5755    albuterol (PROVENTIL VENTOLIN) nebulizer solution 2.5 mg, 2.5 mg, Inhalation, Q4H PRN, Ivania Causey MD    hydrALAZINE (APRESOLINE) 20 mg/mL injection 10 mg, 10 mg, IntraVENous, Q6H PRN, Aric Garrido MD, 10 mg at 02/22/19 0109    HYDROmorphone (PF) (DILAUDID) injection 1 mg, 1 mg, IntraVENous, Q2H PRN, Tena Frankel MD, 1 mg at 02/22/19 0109    potassium chloride (KLOR-CON) packet for solution 20 mEq, 20 mEq, Oral, DAILY, Margaret Catalan MD, 20 mEq at 02/21/19 0814    pantoprazole (PROTONIX) tablet 40 mg, 40 mg, Oral, ACB, Faiza Reilly, NP, 40 mg at 02/22/19 0810    [COMPLETED] piperacillin-tazobactam (ZOSYN) 3.375 g in 0.9% sodium chloride (MBP/ADV) 100 mL, 3.375 g, IntraVENous, ONCE, Last Rate: 200 mL/hr at 02/18/19 1147, 3.375 g at 02/18/19 1147 **FOLLOWED BY** piperacillin-tazobactam (ZOSYN) 3.375 g in 0.9% sodium chloride (MBP/ADV) 100 mL, 3.375 g, IntraVENous, Q8H, Miguel Flores MD, Last Rate: 25 mL/hr at 02/22/19 0818, 3.375 g at 02/22/19 0818    aspirin chewable tablet 81 mg, 81 mg, Oral, DAILY, Aric Garrido MD, 81 mg at 02/22/19 8052    pravastatin (PRAVACHOL) tablet 20 mg, 20 mg, Oral, QHS, Aric Garrido MD, 20 mg at 02/21/19 2100    senna-docusate (PERICOLACE) 8.6-50 mg per tablet 2 Tab, 2 Tab, Oral, QHS, Faiza Reilly P, NP, 2 Tab at 02/21/19 2059    acetaminophen (TYLENOL) solution 650 mg, 650 mg, Oral, Q6H PRN, Josi Hawk MD, 650 mg at 02/21/19 1301    sodium chloride (NS) flush 5-40 mL, 5-40 mL, IntraVENous, Q8H, Jamee Ortega MD, 10 mL at 02/22/19 0318    sodium chloride (NS) flush 5-40 mL, 5-40 mL, IntraVENous, PRN, Jamee Ortega MD    bisacodyl (DULCOLAX) suppository 10 mg, 10 mg, Rectal, DAILY PRN, Abilio Aguirre MD      Objective:     Vital Signs: Telemetry:    normal sinus rhythm Intake/Output:   Visit Vitals  /74   Pulse 95   Temp 98.3 °F (36.8 °C)   Resp 20   Ht 6' 5\" (1.956 m)   Wt 109.4 kg (241 lb 2.9 oz)   SpO2 98%   BMI 28.60 kg/m²       Temp (24hrs), Av.3 °F (36.8 °C), Min:98 °F (36.7 °C), Max:98.5 °F (36.9 °C)        O2 Device: Room air O2 Flow Rate (L/min): 2 l/min         Body mass index is 28.6 kg/m². Wt Readings from Last 4 Encounters:   02/15/19 109.4 kg (241 lb 2.9 oz)   18 110.7 kg (244 lb)   18 109.4 kg (241 lb 2 oz)   18 110.2 kg (243 lb)          Intake/Output Summary (Last 24 hours) at 2019 0841  Last data filed at 2019 0700  Gross per 24 hour   Intake 1809.5 ml   Output 1625 ml   Net 184.5 ml       Last shift:      No intake/output data recorded. Last 3 shifts:  1901 -  0700  In: 3165 [P.O.:2100; I.V.:1065]  Out: 5600 [Urine:5600]         Physical Exam:     General:  male; awake, alert   HEAD: Normocephalic, without obvious abnormality, atraumatic   EYES: conjunctivae clear. PERRL,  AN Icteric sclerae   NOSE: nares normal, no drainage, no nasal flaring,    THROAT: mucous membranes dry; Lips, mucosa dry; No Thrush;     Neck: Supple, symmetrical, trachea midline,  No accessory mm use; No Stridor/ cuff leak, No goiter or thyroid tenderness   LYMPH: No abnormally enlarged lymph nodes. in neck   Chest: normal   Lungs: rales   Heart: Regular rate and rhythm; no edema   Abdomen: nondistended, hypoactive bowel sounds, tenderness marked and guarding - in the entire abdomen   : No Johnson; Extremity: negative, clubbing; no joint swelling or erythema   Neuro: non focal    Psych: oriented to time, place and person ;  No agitation;    Devices:per sepsis flow sheet- reviewed with team during IDR   Skin: Warm;    Pulses:Bilateral, Radial, 2+   Capillary refill: warm, well perfused,      Labs:    Recent Labs     19  0351 19  0347   WBC 7.0 7.9   HGB 10.5* 10.2*   * 121*     Recent Labs     19  0325 19  0351 19  0347    138 139   K 3.8 3.6 3.6    105 105   CO2 26 24 28   * 110* 108*   BUN 24* 25* 26*   CREA 1.66* 1.59* 1.70*   CA 8.0* 8.2* 7.6*   MG  --  2.3 1.9   PHOS 2.8 2.4* 2.2*     No results for input(s): PH, PCO2, PO2, HCO3, FIO2 in the last 72 hours. No results for input(s): CPK, CKNDX, TROIQ in the last 72 hours. No lab exists for component: CPKMB  No results found for: BNPP, BNP   Lab Results   Component Value Date/Time    Culture result: NO GROWTH 4 DAYS 02/18/2019 10:28 PM    Culture result: NO GROWTH 5 DAYS 02/17/2019 08:51 AM      No results found for: VANCT, CPK    Imaging:  I have personally reviewed the patients radiographs and have reviewed the reports:        During this entire length of time the patient's condition was unstable, unpredictable and critically ill in the CCU/ ICU. I was immediately available to the patient whose care required several interactions with nursing, multidisciplinary team members leading to multiple interventions with fluid resuscitation and medication adjustments to optimize respiratory support, hemodynamic treatment, medication changes based on repeat labs results, reviews, exams and assessments. The reason for providing this level of medical care was due to a critical illness that impaired one or more vital organ systems, such that there was a high probability of sudden or life threatening deterioration in the patient's condition. This care involved high complexity medical decision making to treat acute and unstable vital organ system failure, and to prevent further life threatening deterioration of the patients condition.  I personally:  · Reviewed the flowsheet and previous days notes  · Reviewed and summarized records or history from previous days note or discussions with staff, family  · Parenteral controlled substances - Reviewed/ Adjusted / [place] / Started  · High Risk Drug therapy requiring intensive monitoring for toxicity: eg steroids, pressors, antibiotics  · Reviewed and/or ordered Clinical lab tests  · Reviewed and/or ordered Radiology tests  · Reviewed and/or ordered of Medicine tests  · Independently visualized radiologic Images  · Reviewed the patients ECG / Telemetry  · discussed my assessment/management with : Consultants, Nursing, Pharmacy, Case Management, PT, OT, Respiratory Therapy, Hospitalist and Family for coordination of care           Thank you for allowing us to participate in the care of this patient. We will follow along with you until they no longer require CCU services.     Yong Ferrer MD

## 2019-02-22 NOTE — PROGRESS NOTES
PRogress Note    NAME: Anastacio Chester   :  1970   MRN:  [id number]     Date/Time:  2019          Assessment :    Plan:  MARY on CKD stage 2    HTN/LVH    type B aortic dissection with filling of the true and false lumen --S/P TEVAR, RA stenting, (L) carotid bypass    Fever-post op  THrombocytopenia  Resp failure improving           Creatinine 1.66-may be his baseline  Back on cardene with low bps this am-need to wean-on norvasc 10, coreg 25 mg bid-want to avoid hypotension, D/W cardiology adding isordil with hydralazine for afterload reduction-  Post atn diuresis complete? I/os equal overnight-resume bumex  No ace  repleting electrolytes prn  Platelets improving         Subjective:   CHIEF COMPLAINT:  No n/v/cp/sob    Past Medical History:   Diagnosis Date    Foot fracture     Hypertension     Jaw fracture (HCC)     Ruptured ear drum     Thumb fracture       Past Surgical History:   Procedure Laterality Date    HX APPENDECTOMY      HX ORTHOPAEDIC       Social History     Tobacco Use    Smoking status: Never Smoker    Smokeless tobacco: Never Used   Substance Use Topics    Alcohol use: No      Family History   Problem Relation Age of Onset    Diabetes Mother       No Known Allergies   Prior to Admission medications    Medication Sig Start Date End Date Taking? Authorizing Provider   amLODIPine (NORVASC) 5 mg tablet Take 1 Tab by mouth daily. 18   Kaylen Pagan NP   valsartan-hydroCHLOROthiazide (DIOVAN-HCT) 320-25 mg per tablet Take 1 Tab by mouth daily.  18   Kaylen Pagan NP     REVIEW OF SYSTEMS:    thirsty      Objective:   VITALS:    Visit Vitals  /79   Pulse 94   Temp 100 °F (37.8 °C)   Resp 18   Ht 6' 5\" (1.956 m)   Wt 109.4 kg (241 lb 2.9 oz)   SpO2 96%   BMI 28.60 kg/m²     PHYSICAL EXAM:  Gen:  [x]  WD [x]  WN  [] cachectic []  thin []  obese []  disheveled             []  ill apearing  []   Critical  []   Chronic    [x]  No acute distress    HEENT:   [x] NC/AT    [] pink conjunctivae      [] pale conjunctivae                  PERRL  [] yes  [] no      [] moist mucosa    [] dry mucosa    hearing intact to voice [x] yes  [] No                   RESP:   [] CTA bilaterally/no wheezing/rhonchi/rales/crackles    [] rhonchi bilaterally - no dullness  [] wheezing   [] rhonchi   [x] crackles     use of accessory muscles [] yes [x] no    CARD:   [x]  regular rate and rhythm/No murmurs/rubs/gallops    murmur  [] yes ()  [] no      Rubs  [] yes  [] no       Gallops [] yes  [] no    Rate []  regular  []  irregular        carotid bruits  [] Right  []  Left                 LE edema [] yes  [x] no           JVP  []  yes   [x]  no    NEUR:   [x] cranial nerves II-XII grossly intact       [] Cranial nerves deficit                   LAB DATA REVIEWED:    Recent Labs     02/21/19  0351 02/20/19  0347   WBC 7.0 7.9   HGB 10.5* 10.2*   HCT 31.7* 31.3*   * 121*     Recent Labs     02/22/19  0325 02/21/19  0351 02/20/19  0347    138 139   K 3.8 3.6 3.6    105 105   CO2 26 24 28   BUN 24* 25* 26*   CREA 1.66* 1.59* 1.70*   * 110* 108*   CA 8.0* 8.2* 7.6*   MG  --  2.3 1.9   PHOS 2.8 2.4* 2.2*     No results for input(s): SGOT, GPT, ALT, AP, TBIL, TBILI, ALB, GLOB, GGT, AML, LPSE in the last 72 hours. No lab exists for component: AMYP, HLPSE  No results for input(s): INR, PTP, APTT in the last 72 hours. No lab exists for component: INREXT, INREXT   No results for input(s): FE, TIBC, PSAT, FERR in the last 72 hours. No results for input(s): PH, PCO2, PO2 in the last 72 hours. No results for input(s): CPK, CKMB in the last 72 hours. No lab exists for component: TROPONINI  No results found for: GLUCPOC    Procedures: see electronic medical records for all procedures/Xrays and details which were not copied into this note but were reviewed prior to creation of Plan. ________________________________________________________________________       ___________________________________________________  Consulting Physician: Ariane Mantle, MD

## 2019-02-22 NOTE — PROGRESS NOTES
Problem: Falls - Risk of  Goal: *Absence of Falls  Document Layton Fall Risk and appropriate interventions in the flowsheet. Outcome: Progressing Towards Goal  Fall Risk Interventions:  Mobility Interventions: Bed/chair exit alarm, Assess mobility with egress test, Communicate number of staff needed for ambulation/transfer, OT consult for ADLs, Patient to call before getting OOB, PT Consult for mobility concerns, PT Consult for assist device competence, Strengthening exercises (ROM-active/passive)    Mentation Interventions: Bed/chair exit alarm    Medication Interventions: Assess postural VS orthostatic hypotension, Bed/chair exit alarm, Evaluate medications/consider consulting pharmacy, Patient to call before getting OOB, Teach patient to arise slowly    Elimination Interventions: Bed/chair exit alarm, Call light in reach, Patient to call for help with toileting needs, Toilet paper/wipes in reach, Toileting schedule/hourly rounds, Urinal in reach    History of Falls Interventions: Bed/chair exit alarm, Consult care management for discharge planning, Door open when patient unattended, Evaluate medications/consider consulting pharmacy, Room close to nurse's station, Investigate reason for fall        Problem: Pressure Injury - Risk of  Goal: *Prevention of pressure injury  Document Holden Scale and appropriate interventions in the flowsheet. Outcome: Progressing Towards Goal  Pressure Injury Interventions:  Sensory Interventions: Assess changes in LOC, Assess need for specialty bed, Avoid rigorous massage over bony prominences, Check visual cues for pain, Discuss PT/OT consult with provider, Float heels, Keep linens dry and wrinkle-free, Maintain/enhance activity level, Minimize linen layers, Monitor skin under medical devices, Pad between skin to skin, Pressure redistribution bed/mattress (bed type), Turn and reposition approx.  every two hours (pillows and wedges if needed), Use 30-degree side-lying position, Chair cushion         Activity Interventions: Assess need for specialty bed, Chair cushion, Increase time out of bed, Pressure redistribution bed/mattress(bed type), PT/OT evaluation    Mobility Interventions: Assess need for specialty bed, Float heels, Chair cushion, HOB 30 degrees or less, Pressure redistribution bed/mattress (bed type), PT/OT evaluation, Turn and reposition approx.  every two hours(pillow and wedges)    Nutrition Interventions: Document food/fluid/supplement intake, Discuss nutritional consult with provider, Offer support with meals,snacks and hydration    Friction and Shear Interventions: Apply protective barrier, creams and emollients, Feet elevated on foot rest, Foam dressings/transparent film/skin sealants, HOB 30 degrees or less, Lift sheet, Lift team/patient mobility team, Minimize layers, Transferring/repositioning devices

## 2019-02-22 NOTE — PROGRESS NOTES
Problem: Mobility Impaired (Adult and Pediatric)  Goal: *Acute Goals and Plan of Care (Insert Text)  Physical Therapy Goals  Initiated 2/20/2019  1. Patient will move from supine to sit and sit to supine , scoot up and down and roll side to side in bed with independence within 7 day(s). 2.  Patient will transfer from bed to chair and chair to bed with independence using the least restrictive device within 7 day(s). 3.  Patient will perform sit to stand with independence within 7 day(s). 4.  Patient will ambulate with independence for 300 feet with the least restrictive device within 7 day(s). 5.  Patient will ascend/descend 6 stairs with 1 handrail(s) with modified independence within 7 day(s). physical Therapy TREATMENT  Patient: Brooks Angeles (85 y.o. male)  Date: 2/22/2019  Diagnosis: Acute thoracic aortic dissection (HCC) [I71.01] <principal problem not specified>  Procedure(s) (LRB):  THORACIC ANEURYSM REPAIR ENDOVASCULAR (TEVAR)  LEFT CAROTID TO SUBCLAVIAN BYPASS (N/A) 7 Days Post-Op  Precautions: WBAT, Fall  Chart, physical therapy assessment, plan of care and goals were reviewed. ASSESSMENT:  Patient was lying in bed when PT arrived. Agreed to therapy and cleared by his nurse. He demonstrated independent skill with supine to sit transfers and sba for sit to stand from edge of the bed. Progressed ambulation to 225 feet with patient pushing cardiac cart, but needed cg assistance. Gait is narrow with intermittent path deviations and scissoring x 1. His vitals were stable at rest and during activity. He was left sitting on the edge of bed for lunch with spouse in room. Acute care PT will continue for strengthening, balance re-ed and mobility training to help patient regain functional independence. Recommend  PT upon discharge.     Progression toward goals:  [x]    Improving appropriately and progressing toward goals  []    Improving slowly and progressing toward goals  []    Not making progress toward goals and plan of care will be adjusted     PLAN:  Patient continues to benefit from skilled intervention to address the above impairments. Continue treatment per established plan of care. Discharge Recommendations:  Home Health PT  Further Equipment Recommendations for Discharge:  TBD     SUBJECTIVE:   Patient stated I'm feeling better    OBJECTIVE DATA SUMMARY:   Critical Behavior:  Neurologic State: Alert  Orientation Level: Oriented X4  Cognition: Appropriate decision making, Appropriate for age attention/concentration, Appropriate safety awareness, Follows commands  Safety/Judgement: Awareness of environment, Good awareness of safety precautions  Functional Mobility Training:  Bed Mobility:  Rolling: Independent  Supine to Sit: Independent     Scooting: Independent        Transfers:  Sit to Stand: Stand-by assistance  Stand to Sit: Stand-by assistance        Bed to Chair: Stand-by assistance                    Balance:  Sitting: Intact  Standing: Impaired  Standing - Static: Good;Occassional  Standing - Dynamic : Fair  Ambulation/Gait Training:  Distance (ft): 225 Feet (ft)  Assistive Device: Gait belt; Other (comment)(cardiac cart)  Ambulation - Level of Assistance: Contact guard assistance        Gait Abnormalities: Path deviations;Scissoring        Base of Support: Narrowed     Speed/Emilia: Pace decreased (<100 feet/min)  Step Length: Right shortened;Left shortened                 Pain:  Pain Scale 1: Numeric (0 - 10)  Pain Intensity 1: 0  Pain Location 1: Back;Head           Activity Tolerance:   Good  Please refer to the flowsheet for vital signs taken during this treatment.   After treatment:   []    Patient left in no apparent distress sitting up in chair  [x]    Patient left in no apparent distress on edge of bed  [x]    Call bell left within reach  [x]    Nursing notified  [x]    Caregiver present  []    Bed alarm activated    COMMUNICATION/COLLABORATION:   The patients plan of care was discussed with: Registered Nurse    Prince Paredes, PT   Time Calculation: 18 mins

## 2019-02-22 NOTE — PROGRESS NOTES
Nutrition Assessment:    RECOMMENDATIONS:   Continue Cardiac diet     DIETITIANS INTERVENTIONS/PLAN:   Continue diet as tolerated  Monitor appetite/PO intake    ASSESSMENT:   Pt admitted with acute aortic dissection. PMH: HTN. Chart reviewed for LOS, case discussed during CCU rounds. Pt is s/p TEVAR. MST negative for previous nutritional risk factors. His appetite has been good postop. Labs reviewed, WNL. BM noted yesterday. SUBJECTIVE/OBJECTIVE:     Diet Order: Cardiac  % Eaten:    Patient Vitals for the past 72 hrs:   % Diet Eaten   02/22/19 1000 50 %   02/21/19 1310 80 %   02/21/19 0909 75 %   02/20/19 1815 100 %   02/20/19 1252 100 %   02/20/19 1000 100 %     Pertinent Medications:bumex, protonix, zosyn, KCl, pericolace. Chemistries:  Lab Results   Component Value Date/Time    Sodium 136 02/22/2019 03:25 AM    Potassium 3.8 02/22/2019 03:25 AM    Chloride 102 02/22/2019 03:25 AM    CO2 26 02/22/2019 03:25 AM    Anion gap 8 02/22/2019 03:25 AM    Glucose 107 (H) 02/22/2019 03:25 AM    BUN 24 (H) 02/22/2019 03:25 AM    Creatinine 1.66 (H) 02/22/2019 03:25 AM    BUN/Creatinine ratio 14 02/22/2019 03:25 AM    GFR est AA 54 (L) 02/22/2019 03:25 AM    GFR est non-AA 45 (L) 02/22/2019 03:25 AM    Calcium 8.0 (L) 02/22/2019 03:25 AM    AST (SGOT) 72 (H) 02/18/2019 04:31 AM    Alk. phosphatase 35 (L) 02/18/2019 04:31 AM    Protein, total 5.9 (L) 02/18/2019 04:31 AM    Albumin 2.7 (L) 02/18/2019 04:31 AM    Globulin 3.2 02/18/2019 04:31 AM    A-G Ratio 0.8 (L) 02/18/2019 04:31 AM    ALT (SGPT) 45 02/18/2019 04:31 AM      Anthropometrics: Height: 6' 5\" (195.6 cm) Weight: 109.4 kg (241 lb 2.9 oz)  [x]bed scale (2/15)   []stated   []unknown    IBW (%IBW):   ( ) UBW (%UBW):   (  %)    BMI: Body mass index is 28.6 kg/m².     This BMI is indicative of:  []Underweight   []Normal   [x]Overweight   [] Obesity   [] Extreme Obesity (BMI>40)  Estimated Nutrition Needs (Based on): 8859 Kcals/day(MSJ 2087 x 1.2) , 87 g(0.8gPro/kg) Protein  Carbohydrate: At Least 130 g/day  Fluids: 2500 mL/day    Last BM: 2/21   [x]Active     []Hyperactive  []Hypoactive       [] Absent   BS  Skin:    [] Intact   [x] Incision  [] Breakdown   [] DTI   [] Tears/Excoriation/Abrasion  []Edema [] Other: Wt Readings from Last 30 Encounters:   02/15/19 109.4 kg (241 lb 2.9 oz)   08/07/18 110.7 kg (244 lb)   05/14/18 109.4 kg (241 lb 2 oz)   04/18/18 110.2 kg (243 lb)      NUTRITION DIAGNOSES:   Problem:  No nutritional diagnosis at this time        NUTRITION INTERVENTIONS:  Meals/Snacks: General/healthful diet                  GOAL:   Pt will consume >75% of meals in 5-7 days.      Cultural, Buddhist, or Ethnic Dietary Needs: None     LEARNING NEEDS (Diet, Food/Nutrient-Drug Interaction):    [x] None Identified   [] Identified and Education Provided/Documented   [] Identified and Pt declined/was not appropriate      [x] Interdisciplinary Care Plan Reviewed/Documented    [x] Participated in Discharge Planning: Cardiac diet    [x] Interdisciplinary Rounds     NUTRITION RISK:    [] High              [] Moderate           []  Low  [x]  Minimal/Uncompromised    PT SEEN FOR:    []  MD Consult: []Calorie Count      []Diabetic Diet Education        []Diet Education     []Electrolyte Management     []General Nutrition Management and Supplements     []Management of Tube Feeding     []TPN Recommendations    []  RN Referral:  []MST score >=2     []Enteral/Parenteral Nutrition PTA     []Pregnant: Gestational DM or Multigestation   []  Low BMI  []  Re-Screen   [x]  LOS   []  NPO/clears x 5 days   []  New TF/TPN    Saeed Johns RD, [address] [place]    Pager [phone]  Weekend Pager [phone]

## 2019-02-22 NOTE — PROGRESS NOTES
General Surgery End of Shift Nursing Note    Bedside shift change report given to Claude Ida RN  (oncoming nurse) by   Nayely Anglin nurse). Report included the following information SBAR, Kardex, Intake/Output and MAR. Shift worked:   [id number]     Summary of shift:   pt transferred in with no issues. There were no reports of pain, sig other is at bed side. Issues for physician to address:         Number times ambulated in hallway past shift: 0      Number of times OOB to chair past shift: 0      Pain Management:  Current medication:    Patient states pain is manageable on current pain medication: YES    GI:    Current diet:  DIET CARDIAC Regular    Tolerating current diet: YES  Passing flatus: YES  Last Bowel Movement: yesterday   Appearance:       Respiratory:    Incentive Spirometer at bedside: YES  Patient instructed on use: YES    Patient Safety:    Falls Score: 1  Bed Alarm On? No  Sitter?  No    Rochelle Cm, RN

## 2019-02-22 NOTE — INTERDISCIPLINARY ROUNDS
Interdisciplinary team rounds were held 2/22/2019 with the following team members:Care Management, Diabetes Treatment Specialist, Nursing, Nutrition, Pharmacy, Physician and Respiratory Therapy. Plan of care discussed. See clinical pathway and/or care plan for interventions and desired outcomes.

## 2019-02-22 NOTE — PROGRESS NOTES
1900  Bedside report from GEOFF Land.    8072  Pt has had multiple PRN dose of Hydralizine and metoprolol for high SBP. Last dose Metoprolol give at 0300 at 0400  . Paged Dr. Maria Isabel Wang for further orders. [address] [place] [address] with Dr. Maria Isabel Wang. Orders given to increased Hydralizine doses to 100mg. Orders for Hydralizine 20mg IV NOW and Hydralizine 100mg PO NOW. If high BP does not resolve restart the Cardene gtt.

## 2019-02-22 NOTE — PROGRESS NOTES
Vascular Surgery Progress Note  Thompson Moranmaverick ACNP-BC  2/22/2019       Subjective:     Mr. Mono Friedman is a 52 yo AAM with a pmhx significant for uncontrolled hypertension. He presented to the hospital with complaint of acute CP while having intercourse. He had associated nausea and vomiting. On arrival he was profoundly hypertensive as high as 223/111. CTA of the chest was + for a type B aortic dissection. He was admitted to the ICU and initiated on medical management. Despite this his creatinine and lactic acid continued to rise. Repeat CTA of the chest, abd, and pelvis later the same day showed progression of his dissection and he was taken for an emergent TEVAR on 02/15/2019 with left carotid to subclavian bypass, embolization of left subclavian artery, and left renal artery stenting. His post procedure course has been complicated by acute hypoxic respiratory failure due to volume overload, MARY, chest pain associated with hypertension, lower back pain, and fever. This am he continues to complain of LBP the site of the epidural.  Overnight he has developed a pounding headache. His BP remains uncontrolled on 4 agents with multiple increases in doses and cardene was resumed overnight. His respiratory status remains improved. He continues on nasal cannula. His creatinine continues to improve. Nephrology has evaluated and IV Bumex has been resumed. He spiked a fever overnight on Zosyn. CXR, UA and blood cultures are unremarkable.      Nursing Data:     Patient Vitals for the past 24 hrs:   BP Temp Pulse Resp SpO2   02/22/19 0810 152/74 -- -- -- --   02/22/19 0700 161/75 -- 95 20 98 %   02/22/19 0600 172/86 -- 91 26 97 %   02/22/19 0500 158/75 -- 91 19 98 %   02/22/19 0432 (!) 185/100 -- 90 21 (!) 89 %   02/22/19 0400 (!) 184/103 -- 89 21 98 %   02/22/19 0300 167/89 98.3 °F (36.8 °C) 93 17 99 %   02/22/19 0200 145/74 -- 89 15 99 %   02/22/19 0100 171/89 -- -- -- 99 %   02/21/19 2215 150/81 -- 87 20 97 % 02/21/19 2200 161/87 -- 87 21 96 %   02/21/19 2130 (!) 125/93 -- 91 23 96 %   02/21/19 2115 165/89 -- 88 24 99 %   02/21/19 2030 (!) 176/91 -- 84 26 98 %   02/21/19 2015 170/87 -- 85 24 98 %   02/21/19 2000 -- -- 84 27 97 %   02/21/19 1945 156/84 -- 85 27 99 %   02/21/19 1930 (!) 160/93 -- 88 23 100 %   02/21/19 1915 155/87 98.5 °F (36.9 °C) 85 25 100 %   02/21/19 1900 161/89 -- 86 20 98 %   02/21/19 1845 156/77 -- 88 23 100 %   02/21/19 1830 144/75 -- 91 16 97 %   02/21/19 1815 147/84 -- 89 21 99 %   02/21/19 1800 147/81 -- 89 24 99 %   02/21/19 1745 (!) 177/99 -- 93 21 97 %   02/21/19 1730 150/86 -- 86 20 97 %   02/21/19 1715 150/90 -- 84 22 96 %   02/21/19 1700 148/86 -- 85 21 98 %   02/21/19 1645 146/80 -- 85 24 98 %   02/21/19 1640 -- 98.3 °F (36.8 °C) -- -- --   02/21/19 1630 136/73 -- 84 22 99 %   02/21/19 1615 128/66 -- 86 18 97 %   02/21/19 1600 127/73 -- 87 20 100 %   02/21/19 1545 150/80 -- 82 23 98 %   02/21/19 1530 153/79 -- 79 19 96 %   02/21/19 1515 145/76 -- 82 19 97 %   02/21/19 1500 133/68 -- 81 21 96 %   02/21/19 1445 132/71 -- 81 20 96 %   02/21/19 1430 124/65 -- 80 24 96 %   02/21/19 1415 128/67 -- 83 23 99 %   02/21/19 1400 147/76 -- 80 21 98 %   02/21/19 1345 131/75 -- 88 22 96 %   02/21/19 1330 147/74 -- 83 20 95 %   02/21/19 1315 141/79 -- 81 25 98 %   02/21/19 1300 140/62 -- 79 23 98 %   02/21/19 1245 136/44 -- 79 20 97 %   02/21/19 1230 131/66 -- 80 18 97 %   02/21/19 1215 117/70 -- 80 22 97 %   02/21/19 1206 136/71 98 °F (36.7 °C) 80 21 96 %   02/21/19 1145 125/62 -- 84 21 91 %   02/21/19 1130 118/61 -- 79 21 95 %   02/21/19 1115 119/63 -- 77 17 100 %   02/21/19 1100 127/58 -- 74 20 96 %   02/21/19 1045 131/62 -- 75 20 99 %   02/21/19 1030 127/65 -- 75 17 98 %   02/21/19 1015 109/64 -- 84 21 (!) 85 %   02/21/19 1000 130/55 -- 75 18 97 %   02/21/19 0945 132/64 -- 77 20 98 %   02/21/19 0930 120/60 -- 78 24 100 %   02/21/19 0915 123/60 -- 78 19 95 %   02/21/19 0900 126/63 -- 83 20 96 % 02/21/19 0845 141/70 -- 83 19 99 %     ---------------------------------------------------------------------------------------------------------    Intake/Output Summary (Last 24 hours) at 2/22/2019 8086  Last data filed at 2/22/2019 0700  Gross per 24 hour   Intake 1809.5 ml   Output 1625 ml   Net 184.5 ml       Exam:     Physical Exam   Constitutional: He is oriented to person, place, and time. He appears well-developed and well-nourished. He appears uncomfortable. HENT:   Head: Normocephalic. Eyes: Pupils are equal, round, and reactive to light. Neck: Normal range of motion. Right ICA central line. Cardiovascular: He is tachycardic with regular rhythm. Pulmonary/Chest: No accessory muscle usage. No tachypnea. No respiratory distress. He continues on oxygen via NC. Abdominal: Soft. He exhibits no distension. Musculoskeletal: Normal range of motion. Lumbar: Tender at epidural site. No erythema, edema, or fluctuance. Skin:   Groin incision dry and intact with dermabond. Dressing to left scapula dry and intact. Staples are well approximated. Multiple tattoos. Psychiatric: He has a normal mood and affect. His behavior is normal. Thought content normal.     Lab Review:     .   Recent Results (from the past 24 hour(s))   METABOLIC PANEL, BASIC    Collection Time: 02/22/19  3:25 AM   Result Value Ref Range    Sodium 136 136 - 145 mmol/L    Potassium 3.8 3.5 - 5.1 mmol/L    Chloride 102 97 - 108 mmol/L    CO2 26 21 - 32 mmol/L    Anion gap 8 5 - 15 mmol/L    Glucose 107 (H) 65 - 100 mg/dL    BUN 24 (H) 6 - 20 MG/DL    Creatinine 1.66 (H) 0.70 - 1.30 MG/DL    BUN/Creatinine ratio 14 12 - 20      GFR est AA 54 (L) >60 ml/min/1.73m2    GFR est non-AA 45 (L) >60 ml/min/1.73m2    Calcium 8.0 (L) 8.5 - 10.1 MG/DL   PHOSPHORUS    Collection Time: 02/22/19  3:25 AM   Result Value Ref Range    Phosphorus 2.8 2.6 - 4.7 MG/DL          Assessment/Plan:      Principal problems  Type B aortic dissection   -s/p TEVAR, left subclavian embolization, and left carotid to subclavian bypass 02/15/19  Renal artery stenosis  -s/p left renal artery stenting 02/15/2019    Multiple increases in blood pressure medication in the last 48hrs with little improvement. Cardene has been resumed overnight. Denies CP. Creatinine continues to improved. Persistent intermittent fever. Continue to encourage OOB/PT/OT/IS. Continue IV and oral dilaudid PRN. Active problems  Subendocardial ischemia  -likely secondary to infarction related to dissection  Hypertensive urgency   -persists  -volume overload contributing factor ? ? LV  Chest pain  -resolved  -reproducible and occurs with cough. Possibly pleuritic. PRN dilaudid for pain. Not a candidate for NSaids due to MARY/CKD. -plan per cardiology      Headache  -likely multifactorial.  Rebound from narcotics? Hypertensive headache?   -less likely meningitis. Will defer evaluation to anesthesia. SIRs  Fever  -intermittent spikes with persistent low grade fever this am  -tachycardic with fever  -UA, CXR, and blood cultures unremarkable. Renal infarct? Atelectasis?  -WBC normal    -due to complaint of pain at the epidural site, fever, and headache will ask anesthesia to evaluate.  -continue to encourage IS  -continue empiric Zosyn.         Hyperglycemia  -trending down     Acute hypoxic respiratory failure   -stable   Bilateral pleural effusions  -diuresis resumed.  -Plan per nephrology and intensivist.    -pulmonary toileting per pulmonary   -will need outpatient sleep study     Renal infarction   MARY on CKD stage II  -plan per nephrology   -IV diuresis resumed this am.   Hypophosphotasia   Hypokalemia   -supplementation per nephrology and intensivist    Lactic acidosis   -resolved    Thrombocytopenia  -improved     Anemia of acute blood loss  -stable and not at levels to transfuse    Constipation  -tolerating cardiac diet, denies abd pain, nausea, and vomiting   -BM 02/20/19  Continue BM regimen. VTE prophylaxis:  SCDs    Disposition:   Home some time next week.

## 2019-02-23 LAB
ANION GAP SERPL CALC-SCNC: 9 MMOL/L (ref 5–15)
BACTERIA SPEC CULT: NORMAL
BUN SERPL-MCNC: 26 MG/DL (ref 6–20)
BUN/CREAT SERPL: 12 (ref 12–20)
CALCIUM SERPL-MCNC: 8.3 MG/DL (ref 8.5–10.1)
CHLORIDE SERPL-SCNC: 101 MMOL/L (ref 97–108)
CO2 SERPL-SCNC: 26 MMOL/L (ref 21–32)
CREAT SERPL-MCNC: 2.11 MG/DL (ref 0.7–1.3)
ERYTHROCYTE [DISTWIDTH] IN BLOOD BY AUTOMATED COUNT: 15 % (ref 11.5–14.5)
GLUCOSE SERPL-MCNC: 136 MG/DL (ref 65–100)
HCT VFR BLD AUTO: 28.6 % (ref 36.6–50.3)
HGB BLD-MCNC: 9.4 G/DL (ref 12.1–17)
MCH RBC QN AUTO: 26.8 PG (ref 26–34)
MCHC RBC AUTO-ENTMCNC: 32.9 G/DL (ref 30–36.5)
MCV RBC AUTO: 81.5 FL (ref 80–99)
NRBC # BLD: 0 K/UL (ref 0–0.01)
NRBC BLD-RTO: 0 PER 100 WBC
PLATELET # BLD AUTO: 203 K/UL (ref 150–400)
PMV BLD AUTO: 11.5 FL (ref 8.9–12.9)
POTASSIUM SERPL-SCNC: 3.9 MMOL/L (ref 3.5–5.1)
RBC # BLD AUTO: 3.51 M/UL (ref 4.1–5.7)
SERVICE CMNT-IMP: NORMAL
SODIUM SERPL-SCNC: 136 MMOL/L (ref 136–145)
WBC # BLD AUTO: 8.2 K/UL (ref 4.1–11.1)

## 2019-02-23 PROCEDURE — 65270000029 HC RM PRIVATE

## 2019-02-23 PROCEDURE — 74011250637 HC RX REV CODE- 250/637: Performed by: INTERNAL MEDICINE

## 2019-02-23 PROCEDURE — 74011250637 HC RX REV CODE- 250/637: Performed by: SURGERY

## 2019-02-23 PROCEDURE — 85027 COMPLETE CBC AUTOMATED: CPT

## 2019-02-23 PROCEDURE — 36415 COLL VENOUS BLD VENIPUNCTURE: CPT

## 2019-02-23 PROCEDURE — 74011000258 HC RX REV CODE- 258: Performed by: INTERNAL MEDICINE

## 2019-02-23 PROCEDURE — 74011000250 HC RX REV CODE- 250: Performed by: INTERNAL MEDICINE

## 2019-02-23 PROCEDURE — 74011250637 HC RX REV CODE- 250/637: Performed by: NURSE PRACTITIONER

## 2019-02-23 PROCEDURE — 80048 BASIC METABOLIC PNL TOTAL CA: CPT

## 2019-02-23 PROCEDURE — 74011250636 HC RX REV CODE- 250/636: Performed by: INTERNAL MEDICINE

## 2019-02-23 RX ORDER — BUMETANIDE 1 MG/1
1 TABLET ORAL DAILY
Status: DISCONTINUED | OUTPATIENT
Start: 2019-02-24 | End: 2019-02-26 | Stop reason: HOSPADM

## 2019-02-23 RX ORDER — LISINOPRIL 5 MG/1
2.5 TABLET ORAL DAILY
Status: DISCONTINUED | OUTPATIENT
Start: 2019-02-23 | End: 2019-02-24

## 2019-02-23 RX ADMIN — BUMETANIDE 1 MG: 0.25 INJECTION INTRAMUSCULAR; INTRAVENOUS at 08:39

## 2019-02-23 RX ADMIN — SENNOSIDES AND DOCUSATE SODIUM 2 TABLET: 8.6; 5 TABLET ORAL at 22:38

## 2019-02-23 RX ADMIN — Medication 10 ML: at 16:16

## 2019-02-23 RX ADMIN — PRAVASTATIN SODIUM 20 MG: 10 TABLET ORAL at 22:38

## 2019-02-23 RX ADMIN — PIPERACILLIN SODIUM,TAZOBACTAM SODIUM 3.38 G: 3; .375 INJECTION, POWDER, FOR SOLUTION INTRAVENOUS at 22:39

## 2019-02-23 RX ADMIN — CARVEDILOL 25 MG: 12.5 TABLET, FILM COATED ORAL at 18:13

## 2019-02-23 RX ADMIN — HYDRALAZINE HYDROCHLORIDE 100 MG: 50 TABLET, FILM COATED ORAL at 22:38

## 2019-02-23 RX ADMIN — ISOSORBIDE DINITRATE 20 MG: 5 TABLET ORAL at 16:15

## 2019-02-23 RX ADMIN — AMLODIPINE BESYLATE 10 MG: 5 TABLET ORAL at 08:39

## 2019-02-23 RX ADMIN — PANTOPRAZOLE SODIUM 40 MG: 40 TABLET, DELAYED RELEASE ORAL at 08:38

## 2019-02-23 RX ADMIN — CYCLOBENZAPRINE HYDROCHLORIDE 5 MG: 10 TABLET, FILM COATED ORAL at 17:53

## 2019-02-23 RX ADMIN — LISINOPRIL 2.5 MG: 5 TABLET ORAL at 10:51

## 2019-02-23 RX ADMIN — PIPERACILLIN SODIUM,TAZOBACTAM SODIUM 3.38 G: 3; .375 INJECTION, POWDER, FOR SOLUTION INTRAVENOUS at 08:36

## 2019-02-23 RX ADMIN — PIPERACILLIN SODIUM,TAZOBACTAM SODIUM 3.38 G: 3; .375 INJECTION, POWDER, FOR SOLUTION INTRAVENOUS at 16:15

## 2019-02-23 RX ADMIN — Medication 10 ML: at 06:16

## 2019-02-23 RX ADMIN — ISOSORBIDE DINITRATE 20 MG: 5 TABLET ORAL at 22:38

## 2019-02-23 RX ADMIN — CARVEDILOL 25 MG: 12.5 TABLET, FILM COATED ORAL at 08:39

## 2019-02-23 RX ADMIN — ACETAMINOPHEN 650 MG: 160 SOLUTION ORAL at 18:49

## 2019-02-23 RX ADMIN — HYDRALAZINE HYDROCHLORIDE 100 MG: 50 TABLET, FILM COATED ORAL at 08:39

## 2019-02-23 RX ADMIN — ISOSORBIDE DINITRATE 20 MG: 5 TABLET ORAL at 08:40

## 2019-02-23 RX ADMIN — ASPIRIN 81 MG 81 MG: 81 TABLET ORAL at 08:39

## 2019-02-23 RX ADMIN — POTASSIUM CHLORIDE 20 MEQ: 1.5 POWDER, FOR SOLUTION ORAL at 08:38

## 2019-02-23 RX ADMIN — HYDRALAZINE HYDROCHLORIDE 100 MG: 50 TABLET, FILM COATED ORAL at 16:16

## 2019-02-23 NOTE — PROGRESS NOTES
General Surgery End of Shift Nursing Note    Bedside shift change report given to Bianka Garner RN (oncoming nurse) . Report included the following information SBAR, Kardex, OR Summary, Intake/Output, MAR and Recent Results. Shift worked:   [id number]   Summary of shift:    Temp max 101. No chills. Issues for physician to address:        Number times ambulated in hallway past shift:     Number of times OOB to chair past shift: up ad jenny    Pain Management:  Current medication: flexeril  Patient states pain is manageable on current pain medication: YES    GI:    Current diet:  DIET CARDIAC Regular    Tolerating current diet: YES  Passing flatus: YES  Last Bowel Movement: yesterday   Appearance:     Respiratory:    Incentive Spirometer at bedside: YES  Patient instructed on use: YES    Patient Safety:    Falls Score: 1  Bed Alarm On? No  Sitter?  No    Jillian Sullivan RN

## 2019-02-23 NOTE — PROGRESS NOTES
Pharmacy Automatic Renal Dosing Protocol - Antimicrobials    Indication for Antimicrobials: Empiric per vascular surgery     Current Regimen of Each Antimicrobial:  Piperacillin-tazobactam 3.375 gram IV every 8 hours (Started 19; Day #6 of 7)      Previous Antimicrobial Therapy:  None ordered    Significant Cultures:   19 Blood culture = No growth x 4 days (Results pending)  19 Blood culture = No growth x 5 days (Results pending)    Labs:  Recent Labs     19  0223 19  0325 19  0351   CREA 2.11* 1.66* 1.59*   BUN 26* 24* 25*   WBC 8.2  --  7.0   Temp (24hrs), Av.5 °F (37.5 °C), Min:98.5 °F (36.9 °C), Max:101 °F (38.3 °C)    Creatinine Clearance (mL/min) or Dialysis:   Estimated Creatinine Clearance: 59.5 mL/min (A) (based on SCr of 2.11 mg/dL (H)). Estimated Creatinine Clearance (using IBW):54.5 mL/min    Impression/Plan:   · SCr rise, afebrile  · Considering SCr rise, would consider change from Piperacillin-tazobactam / if SCr increases or discontinue empiric therapy given ng Cx and afebrile  ·   · Antimicrobial duration: 7 days     Pharmacy will follow daily and adjust medications as appropriate for renal function and/or serum levels.     Thank you,  Abraham Grimaldo, [facility]

## 2019-02-23 NOTE — PROGRESS NOTES
PRogress Note    NAME: Beti Russo   :  1970   MRN:  [id number]     Date/Time:  2019          Assessment :    Plan:  MARY on CKD stage 2    HTN/LVH    type B aortic dissection with filling of the true and false lumen --S/P TEVAR, RA stenting, (L) carotid bypass    Fever    Thrombocytopenia - resolved    Resp failure improving         Creatinine 1.66 to 2.1; back on bumex 1 mg iv bid yesterday (looks euvolemic; stop iv bumex and will start PO bumex 1 mg daily tomorrow)    SBP ~ 130 to 160; on norvasc 10, coreg 25 mg bid, hydralazine 100 tid, isordil 20 tid-want to avoid hypotension; I have no problem with a RAAS inh - will start low dose Lisinopril and slowly titrate up    electrolytes stable; on PO K           Subjective:   CHIEF COMPLAINT:  No n/v/cp/sob; feeling better today. BP up this am.    Past Medical History:   Diagnosis Date    Foot fracture     Hypertension     Jaw fracture ([address]     Ruptured ear drum     Thumb fracture       Past Surgical History:   Procedure Laterality Date    HX APPENDECTOMY      HX ORTHOPAEDIC       Social History     Tobacco Use    Smoking status: Never Smoker    Smokeless tobacco: Never Used   Substance Use Topics    Alcohol use: No      Family History   Problem Relation Age of Onset    Diabetes Mother       No Known Allergies   Prior to Admission medications    Medication Sig Start Date End Date Taking? Authorizing Provider   amLODIPine (NORVASC) 5 mg tablet Take 1 Tab by mouth daily. 18   Pinky Morgan NP   valsartan-hydroCHLOROthiazide (DIOVAN-HCT) 320-25 mg per tablet Take 1 Tab by mouth daily.  18   Pinky Morgan NP     REVIEW OF SYSTEMS:    thirsty      Objective:   VITALS:    Visit Vitals  /77 (BP 1 Location: Left arm, BP Patient Position: At rest)   Pulse 79   Temp 99.3 °F (37.4 °C)   Resp 14   Ht 6' 5\" (1.956 m)   Wt 109.4 kg (241 lb 2.9 oz)   SpO2 96%   BMI 28.60 kg/m²     PHYSICAL EXAM:  Gen:  [x]  WD [x]  WN  [] cachectic []  thin []  obese []  disheveled             []  ill apearing  []   Critical  []   Chronic    [x]  No acute distress    HEENT:   [x] NC/AT    [] pink conjunctivae      [] pale conjunctivae                  PERRL  [] yes  [] no      [] moist mucosa    [] dry mucosa    hearing intact to voice [x] yes  [] No                   RESP:   [] CTA bilaterally/no wheezing/rhonchi/rales/crackles    [] rhonchi bilaterally - no dullness  [] wheezing   [] rhonchi   [x] crackles     use of accessory muscles [] yes [x] no    CARD:   [x]  regular rate and rhythm/No murmurs/rubs/gallops    murmur  [] yes ()  [] no      Rubs  [] yes  [] no       Gallops [] yes  [] no    Rate []  regular  []  irregular        carotid bruits  [] Right  []  Left                 LE edema [] yes  [x] no           JVP  []  yes   [x]  no    NEUR:   [x] cranial nerves II-XII grossly intact       [] Cranial nerves deficit                   LAB DATA REVIEWED:    Recent Labs     02/23/19 0223 02/21/19 0351   WBC 8.2 7.0   HGB 9.4* 10.5*   HCT 28.6* 31.7*    143*     Recent Labs     02/23/19 0223 02/22/19 0325 02/21/19 0351    136 138   K 3.9 3.8 3.6    102 105   CO2 26 26 24   BUN 26* 24* 25*   CREA 2.11* 1.66* 1.59*   * 107* 110*   CA 8.3* 8.0* 8.2*   MG  --   --  2.3   PHOS  --  2.8 2.4*     No results for input(s): SGOT, GPT, ALT, AP, TBIL, TBILI, ALB, GLOB, GGT, AML, LPSE in the last 72 hours. No lab exists for component: AMYP, HLPSE  No results for input(s): INR, PTP, APTT in the last 72 hours. No lab exists for component: INREXT, INREXT   No results for input(s): FE, TIBC, PSAT, FERR in the last 72 hours. No results for input(s): PH, PCO2, PO2 in the last 72 hours. No results for input(s): CPK, CKMB in the last 72 hours.     No lab exists for component: TROPONINI  No results found for: GLUCPOC    Procedures: see electronic medical records for all procedures/Xrays and details which were not copied into this note but were reviewed prior to creation of Plan.    ________________________________________________________________________       ___________________________________________________  Consulting Physician:  Tanvi Girard MD

## 2019-02-23 NOTE — PROGRESS NOTES
Progress Note      2/23/2019 9:13 AM  NAME: Gorge Hope   MRN:  [id number]   Admit Diagnosis: Acute thoracic aortic dissection ([facility] [address] [I71.01]                Assessment:       1. 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 2. No hx of CAD, equivicol troponin  3. Echo nl EF, LVH, mildly dilated ascending aorta with mild aortic regurgitation  4. Sinus with inferior and lateral TWI  5. HTN  6. Lipid ok  7. Snores, concern for sleep apnea, will need outpt sleep study  8. 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 BP reasonable on current meds. 2/23  No changes from cardiac standpoint.                     Plan:        No hx of CAD. Some atypical chest pain, resolved. Normal EF  Sinus  S/p TEVAR     1. Cont added aspirin 81mg daily  2. BP's labile, intermittantly on ggt, should have bp monitored ONLY in Right arm. 3. Cont increased added coreg 25mg bid  4. Cont added amlodipine currently on 10mg, may decrease to 5mg if edema. 5. Agree with increasing added hydralazine   6. Add isordil 20mg po tid  7. Have been holding valsartan HCT,currently renal wishes to hold  8. PRN diuretic, resp status improving, follow bmp  9. Cont added statin             [x]        High complexity decision making was performed             Subjective:     Gorge Hope denies chest pain. +dyspnea, hungry. Discussed with RN events overnight.      Review of Systems:    Symptom Y/N Comments  Symptom Y/N Comments   Fever/Chills N   Chest Pain N    Poor Appetite N   Edema N    Cough N   Abdominal Pain N    Sputum N   Joint Pain N    SOB/ESCOBEDO N   Pruritis/Rash N    Nausea/vomit N   Tolerating PT/OT Y    Diarrhea N   Tolerating Diet Y    Constipation N   Other       Could NOT obtain due to:      Objective:      Physical Exam:    Last 24hrs VS reviewed since prior progress note. Most recent are:    Visit Vitals  /71 (BP 1 Location: Left arm, BP Patient Position: At rest)   Pulse 82   Temp 99.8 °F (37.7 °C)   Resp 18   Ht 6' 5\" (1.956 m)   Wt 109.4 kg (241 lb 2.9 oz)   SpO2 95%   BMI 28.60 kg/m²       Intake/Output Summary (Last 24 hours) at 2/23/2019 1409  Last data filed at 2/23/2019 5007  Gross per 24 hour   Intake 800 ml   Output 1300 ml   Net -500 ml        General Appearance: Well developed, well nourished, alert & oriented x 3,    no acute distress. Ears/Nose/Mouth/Throat: Hearing grossly normal.  Neck: Supple. Chest: Lungs clear to auscultation bilaterally. Cardiovascular: Regular rate and rhythm, S1S2 normal, no murmur. Abdomen: Soft, non-tender, bowel sounds are active. Extremities: No edema bilaterally. Skin: Warm and dry. PMH/SH reviewed - no change compared to H&P    Data Review    Telemetry: normal sinus rhythm     Lab Data Personally Reviewed:    Recent Labs     02/23/19 0223 02/21/19  0351   WBC 8.2 7.0   HGB 9.4* 10.5*   HCT 28.6* 31.7*    143*     No results for input(s): INR, PTP, APTT in the last 72 hours. No lab exists for component: Isac Carvajal   Recent Labs     02/23/19 0223 02/22/19  0325 02/21/19  0351    136 138   K 3.9 3.8 3.6    102 105   CO2 26 26 24   BUN 26* 24* 25*   CREA 2.11* 1.66* 1.59*   * 107* 110*   CA 8.3* 8.0* 8.2*   MG  --   --  2.3     No results for input(s): CPK, CKNDX, TROIQ in the last 72 hours. No lab exists for component: CPKMB  Lab Results   Component Value Date/Time    Cholesterol, total 146 04/18/2018 08:58 AM    HDL Cholesterol 38 (L) 04/18/2018 08:58 AM    LDL, calculated 95 04/18/2018 08:58 AM    Triglyceride 64 04/18/2018 08:58 AM       No results for input(s): SGOT, GPT, AP, TBIL, TP, ALB, GLOB, GGT, AML, LPSE in the last 72 hours.     No lab exists for component: AMYP, HLPSE  No results for input(s): PH, PCO2, PO2 in the last 72 hours.     Medications Personally Reviewed:    Current Facility-Administered Medications   Medication Dose Route Frequency    [START ON 2/24/2019] bumetanide (BUMEX) tablet 1 mg  1 mg Oral DAILY    lisinopril (PRINIVIL, ZESTRIL) tablet 2.5 mg  2.5 mg Oral DAILY    hydrALAZINE (APRESOLINE) tablet 100 mg  100 mg Oral TID    cyclobenzaprine (FLEXERIL) tablet 5 mg  5 mg Oral TID PRN    HYDROmorphone (DILAUDID) tablet 2 mg  2 mg Oral Q4H PRN    isosorbide dinitrate (ISORDIL) tablet 20 mg  20 mg Oral TID    metoprolol (LOPRESSOR) injection 5 mg  5 mg IntraVENous Q6H PRN    carvedilol (COREG) tablet 25 mg  25 mg Oral BID WITH MEALS    amLODIPine (NORVASC) tablet 10 mg  10 mg Oral DAILY    albuterol (PROVENTIL VENTOLIN) nebulizer solution 2.5 mg  2.5 mg Inhalation Q4H PRN    hydrALAZINE (APRESOLINE) 20 mg/mL injection 10 mg  10 mg IntraVENous Q6H PRN    potassium chloride (KLOR-CON) packet for solution 20 mEq  20 mEq Oral DAILY    pantoprazole (PROTONIX) tablet 40 mg  40 mg Oral ACB    piperacillin-tazobactam (ZOSYN) 3.375 g in 0.9% sodium chloride (MBP/ADV) 100 mL  3.375 g IntraVENous Q8H    aspirin chewable tablet 81 mg  81 mg Oral DAILY    pravastatin (PRAVACHOL) tablet 20 mg  20 mg Oral QHS    senna-docusate (PERICOLACE) 8.6-50 mg per tablet 2 Tab  2 Tab Oral QHS    acetaminophen (TYLENOL) solution 650 mg  650 mg Oral Q6H PRN    sodium chloride (NS) flush 5-40 mL  5-40 mL IntraVENous Q8H    sodium chloride (NS) flush 5-40 mL  5-40 mL IntraVENous PRN    bisacodyl (DULCOLAX) suppository 10 mg  10 mg Rectal DAILY PRN         Luciana Wheeler MD

## 2019-02-23 NOTE — PROGRESS NOTES
General Surgery End of Shift Nursing Note    Bedside shift change report given to [address] [place] [address] by [phone]. Report included the following information     Shift worked:   [id number]   Summary of shift:    Pt BP meds changed. Pt tolerating BP regimen. No complaints of pain except for back spasms. Issues for physician to address:   Want to shower? Discharge planning?  Low grade fever 100.5 this shift      Number times ambulated in hallway past shift: 2  Number of times OOB to chair past shift: 2    Pain Management:  Current medication: flexeril  Patient states pain is manageable on current pain medication: yes    GI:    Current diet:  DIET CARDIAC Regular    Tolerating current diet: yes  Passing flatus: yes      Respiratory:    Incentive Spirometer at bedside: yes  Patient instructed on use:yes    Patient Safety:    Falls Score: [address] [facility]

## 2019-02-23 NOTE — PROGRESS NOTES
Vascular  VSS  No complaints  No flank or abdominal pain  Voiding well  Cr creeping up to 2.11 from low of 1.59  Will get post void residual  Renal following

## 2019-02-24 LAB
ANION GAP SERPL CALC-SCNC: 8 MMOL/L (ref 5–15)
BACTERIA SPEC CULT: NORMAL
BUN SERPL-MCNC: 24 MG/DL (ref 6–20)
BUN/CREAT SERPL: 12 (ref 12–20)
CALCIUM SERPL-MCNC: 8.2 MG/DL (ref 8.5–10.1)
CHLORIDE SERPL-SCNC: 100 MMOL/L (ref 97–108)
CO2 SERPL-SCNC: 28 MMOL/L (ref 21–32)
CREAT SERPL-MCNC: 1.99 MG/DL (ref 0.7–1.3)
GLUCOSE SERPL-MCNC: 143 MG/DL (ref 65–100)
POTASSIUM SERPL-SCNC: 3.7 MMOL/L (ref 3.5–5.1)
SERVICE CMNT-IMP: NORMAL
SODIUM SERPL-SCNC: 136 MMOL/L (ref 136–145)

## 2019-02-24 PROCEDURE — 74011000258 HC RX REV CODE- 258: Performed by: INTERNAL MEDICINE

## 2019-02-24 PROCEDURE — 74011250637 HC RX REV CODE- 250/637: Performed by: INTERNAL MEDICINE

## 2019-02-24 PROCEDURE — 36415 COLL VENOUS BLD VENIPUNCTURE: CPT

## 2019-02-24 PROCEDURE — 74011250637 HC RX REV CODE- 250/637: Performed by: NURSE PRACTITIONER

## 2019-02-24 PROCEDURE — 65270000029 HC RM PRIVATE

## 2019-02-24 PROCEDURE — 80048 BASIC METABOLIC PNL TOTAL CA: CPT

## 2019-02-24 PROCEDURE — 74011250637 HC RX REV CODE- 250/637: Performed by: SURGERY

## 2019-02-24 PROCEDURE — 74011250636 HC RX REV CODE- 250/636: Performed by: INTERNAL MEDICINE

## 2019-02-24 RX ORDER — HYDRALAZINE HYDROCHLORIDE 50 MG/1
50 TABLET, FILM COATED ORAL 3 TIMES DAILY
Status: DISCONTINUED | OUTPATIENT
Start: 2019-02-24 | End: 2019-02-26 | Stop reason: HOSPADM

## 2019-02-24 RX ORDER — LISINOPRIL 5 MG/1
5 TABLET ORAL DAILY
Status: DISCONTINUED | OUTPATIENT
Start: 2019-02-25 | End: 2019-02-25

## 2019-02-24 RX ADMIN — PANTOPRAZOLE SODIUM 40 MG: 40 TABLET, DELAYED RELEASE ORAL at 08:18

## 2019-02-24 RX ADMIN — HYDRALAZINE HYDROCHLORIDE 50 MG: 50 TABLET, FILM COATED ORAL at 15:57

## 2019-02-24 RX ADMIN — CARVEDILOL 25 MG: 12.5 TABLET, FILM COATED ORAL at 17:25

## 2019-02-24 RX ADMIN — CARVEDILOL 25 MG: 12.5 TABLET, FILM COATED ORAL at 08:17

## 2019-02-24 RX ADMIN — HYDROMORPHONE HYDROCHLORIDE 2 MG: 2 TABLET ORAL at 22:46

## 2019-02-24 RX ADMIN — LISINOPRIL 2.5 MG: 5 TABLET ORAL at 08:18

## 2019-02-24 RX ADMIN — PRAVASTATIN SODIUM 20 MG: 10 TABLET ORAL at 22:45

## 2019-02-24 RX ADMIN — POTASSIUM CHLORIDE 20 MEQ: 1.5 POWDER, FOR SOLUTION ORAL at 08:18

## 2019-02-24 RX ADMIN — ASPIRIN 81 MG 81 MG: 81 TABLET ORAL at 08:18

## 2019-02-24 RX ADMIN — CYCLOBENZAPRINE HYDROCHLORIDE 5 MG: 10 TABLET, FILM COATED ORAL at 20:28

## 2019-02-24 RX ADMIN — PIPERACILLIN SODIUM,TAZOBACTAM SODIUM 3.38 G: 3; .375 INJECTION, POWDER, FOR SOLUTION INTRAVENOUS at 23:42

## 2019-02-24 RX ADMIN — ACETAMINOPHEN 650 MG: 160 SOLUTION ORAL at 20:01

## 2019-02-24 RX ADMIN — Medication 10 ML: at 22:48

## 2019-02-24 RX ADMIN — HYDROMORPHONE HYDROCHLORIDE 2 MG: 2 TABLET ORAL at 04:28

## 2019-02-24 RX ADMIN — ISOSORBIDE DINITRATE 20 MG: 5 TABLET ORAL at 08:17

## 2019-02-24 RX ADMIN — AMLODIPINE BESYLATE 10 MG: 5 TABLET ORAL at 08:17

## 2019-02-24 RX ADMIN — BUMETANIDE 1 MG: 1 TABLET ORAL at 08:18

## 2019-02-24 RX ADMIN — HYDRALAZINE HYDROCHLORIDE 100 MG: 50 TABLET, FILM COATED ORAL at 08:18

## 2019-02-24 RX ADMIN — PIPERACILLIN SODIUM,TAZOBACTAM SODIUM 3.38 G: 3; .375 INJECTION, POWDER, FOR SOLUTION INTRAVENOUS at 14:17

## 2019-02-24 RX ADMIN — HYDROMORPHONE HYDROCHLORIDE 2 MG: 2 TABLET ORAL at 08:11

## 2019-02-24 RX ADMIN — ISOSORBIDE DINITRATE 20 MG: 5 TABLET ORAL at 15:57

## 2019-02-24 RX ADMIN — PIPERACILLIN SODIUM,TAZOBACTAM SODIUM 3.38 G: 3; .375 INJECTION, POWDER, FOR SOLUTION INTRAVENOUS at 08:33

## 2019-02-24 RX ADMIN — Medication 10 ML: at 15:57

## 2019-02-24 RX ADMIN — CYCLOBENZAPRINE HYDROCHLORIDE 5 MG: 10 TABLET, FILM COATED ORAL at 01:49

## 2019-02-24 NOTE — PROGRESS NOTES
RAPID RESPONSE TEAM    Rounded on patient due to recent transfer out of CCU. Discussed with primary RN Gaby. No acute concerns. No patient complaints. Vitals stable. MEWS 1. No RRT interventions indicated at this time. RN encouraged to call with any questions or concerns.     [facility]  Rapid Response RN  Willie Kimbrough

## 2019-02-24 NOTE — PROGRESS NOTES
As per Dr. Roque Betts request, TigerText sent to notify of BMP results now available in [facility].

## 2019-02-24 NOTE — PROGRESS NOTES
Renal    Ordered BMP not done. Low dose ace inh started yesterday. BP now on the low side. Just placed an order for a stat BMP.     Anjali Parish

## 2019-02-24 NOTE — PROGRESS NOTES
Rapid Response Team:    Rounded on patient this AM due to recent tx from CCU on 2/22. VSS, NSR on tele monitor, NAD. No RRT interventions necessary at this time.     Lin Whalen, FERNIEN, RN, CCRN, JAYMIE, CPEN  Rapid Response RN

## 2019-02-24 NOTE — PROGRESS NOTES
Bedside and Verbal shift change report given to Lukasz Bird (oncoming nurse) by Mary Duran (offgoing nurse). Report included the following information SBAR.

## 2019-02-24 NOTE — PROGRESS NOTES
Progress Note      2/24/2019 9:13 AM  NAME: Raquel Morales   MRN:  [id number]   Admit Diagnosis: Acute thoracic aortic dissection ([facility] [address] [I71.01]                Assessment:       1. 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 2. No hx of CAD, equivicol troponin  3. Echo nl EF, LVH, mildly dilated ascending aorta with mild aortic regurgitation  4. Sinus with inferior and lateral TWI  5. HTN  6. Lipid ok  7. Snores, concern for sleep apnea, will need outpt sleep study  8. 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 BP reasonable on current meds. 2/23  No changes from cardiac standpoint. 2/24  Stable cardiac status.                     Plan:        No hx of CAD. Some atypical chest pain, resolved. Normal EF  Sinus  S/p TEVAR     1. Cont added aspirin 81mg daily  2. BP's labile, intermittantly on ggt, should have bp monitored ONLY in Right arm. 3. Cont increased added coreg 25mg bid  4. Cont added amlodipine currently on 10mg, may decrease to 5mg if edema. 5. Agree with increasing added hydralazine   6. Add isordil 20mg po tid  7. Have been holding valsartan HCT,currently renal wishes to hold  8. PRN diuretic, resp status improving, follow bmp  9. Cont added statin             [x]        High complexity decision making was performed             Subjective:     Raquel Morales denies chest pain. +dyspnea, hungry. Discussed with RN events overnight.      Review of Systems:    Symptom Y/N Comments  Symptom Y/N Comments   Fever/Chills N   Chest Pain N    Poor Appetite N   Edema N    Cough N   Abdominal Pain N    Sputum N   Joint Pain N    SOB/ESCOBEDO N   Pruritis/Rash N    Nausea/vomit N   Tolerating PT/OT Y    Diarrhea N   Tolerating Diet Y    Constipation N Other       Could NOT obtain due to:      Objective:      Physical Exam:    Last 24hrs VS reviewed since prior progress note. Most recent are:    Visit Vitals  /70 (BP 1 Location: Left arm, BP Patient Position: At rest)   Pulse 80   Temp 98.2 °F (36.8 °C)   Resp 18   Ht 6' 5\" (1.956 m)   Wt 109.4 kg (241 lb 2.9 oz)   SpO2 95%   BMI 28.60 kg/m²     No intake or output data in the 24 hours ending 02/24/19 1236     General Appearance: Well developed, well nourished, alert & oriented x 3,    no acute distress. Ears/Nose/Mouth/Throat: Hearing grossly normal.  Neck: Supple. Chest: Lungs clear to auscultation bilaterally. Cardiovascular: Regular rate and rhythm, S1S2 normal, no murmur. Abdomen: Soft, non-tender, bowel sounds are active. Extremities: No edema bilaterally. Skin: Warm and dry. PMH/ reviewed - no change compared to H&P    Data Review    Telemetry: normal sinus rhythm     Lab Data Personally Reviewed:    Recent Labs     02/23/19 0223   WBC 8.2   HGB 9.4*   HCT 28.6*        No results for input(s): INR, PTP, APTT in the last 72 hours. No lab exists for component: Achilles Blizzard   Recent Labs     02/23/19 0223 02/22/19  0325    136   K 3.9 3.8    102   CO2 26 26   BUN 26* 24*   CREA 2.11* 1.66*   * 107*   CA 8.3* 8.0*     No results for input(s): CPK, CKNDX, TROIQ in the last 72 hours. No lab exists for component: CPKMB  Lab Results   Component Value Date/Time    Cholesterol, total 146 04/18/2018 08:58 AM    HDL Cholesterol 38 (L) 04/18/2018 08:58 AM    LDL, calculated 95 04/18/2018 08:58 AM    Triglyceride 64 04/18/2018 08:58 AM       No results for input(s): SGOT, GPT, AP, TBIL, TP, ALB, GLOB, GGT, AML, LPSE in the last 72 hours. No lab exists for component: AMYP, HLPSE  No results for input(s): PH, PCO2, PO2 in the last 72 hours.     Medications Personally Reviewed:    Current Facility-Administered Medications   Medication Dose Route Frequency    bumetanide (BUMEX) tablet 1 mg  1 mg Oral DAILY    lisinopril (PRINIVIL, ZESTRIL) tablet 2.5 mg  2.5 mg Oral DAILY    hydrALAZINE (APRESOLINE) tablet 100 mg  100 mg Oral TID    cyclobenzaprine (FLEXERIL) tablet 5 mg  5 mg Oral TID PRN    HYDROmorphone (DILAUDID) tablet 2 mg  2 mg Oral Q4H PRN    isosorbide dinitrate (ISORDIL) tablet 20 mg  20 mg Oral TID    metoprolol (LOPRESSOR) injection 5 mg  5 mg IntraVENous Q6H PRN    carvedilol (COREG) tablet 25 mg  25 mg Oral BID WITH MEALS    amLODIPine (NORVASC) tablet 10 mg  10 mg Oral DAILY    albuterol (PROVENTIL VENTOLIN) nebulizer solution 2.5 mg  2.5 mg Inhalation Q4H PRN    hydrALAZINE (APRESOLINE) 20 mg/mL injection 10 mg  10 mg IntraVENous Q6H PRN    potassium chloride (KLOR-CON) packet for solution 20 mEq  20 mEq Oral DAILY    pantoprazole (PROTONIX) tablet 40 mg  40 mg Oral ACB    piperacillin-tazobactam (ZOSYN) 3.375 g in 0.9% sodium chloride (MBP/ADV) 100 mL  3.375 g IntraVENous Q8H    aspirin chewable tablet 81 mg  81 mg Oral DAILY    pravastatin (PRAVACHOL) tablet 20 mg  20 mg Oral QHS    senna-docusate (PERICOLACE) 8.6-50 mg per tablet 2 Tab  2 Tab Oral QHS    acetaminophen (TYLENOL) solution 650 mg  650 mg Oral Q6H PRN    sodium chloride (NS) flush 5-40 mL  5-40 mL IntraVENous Q8H    sodium chloride (NS) flush 5-40 mL  5-40 mL IntraVENous PRN    bisacodyl (DULCOLAX) suppository 10 mg  10 mg Rectal DAILY PRN         Joe Ruth MD

## 2019-02-24 NOTE — PROGRESS NOTES
Vascular  VSS  Complain of mild swelling and pian in area of carotid subclavian bypass  tmax of 101  Normal wbc  Wounds all clean  No pain with urination or cough  Plan to mobilize and \"tweak\" antibypertensives  Cr down to 2.11

## 2019-02-25 ENCOUNTER — APPOINTMENT (OUTPATIENT)
Dept: CT IMAGING | Age: 49
DRG: 173 | End: 2019-02-25
Attending: SURGERY
Payer: MEDICAID

## 2019-02-25 LAB
ANION GAP SERPL CALC-SCNC: 7 MMOL/L (ref 5–15)
BUN SERPL-MCNC: 24 MG/DL (ref 6–20)
BUN/CREAT SERPL: 12 (ref 12–20)
CALCIUM SERPL-MCNC: 8.2 MG/DL (ref 8.5–10.1)
CHLORIDE SERPL-SCNC: 102 MMOL/L (ref 97–108)
CK SERPL-CCNC: 56 U/L (ref 39–308)
CO2 SERPL-SCNC: 27 MMOL/L (ref 21–32)
CREAT SERPL-MCNC: 1.96 MG/DL (ref 0.7–1.3)
GLUCOSE SERPL-MCNC: 138 MG/DL (ref 65–100)
POTASSIUM SERPL-SCNC: 4.2 MMOL/L (ref 3.5–5.1)
SODIUM SERPL-SCNC: 136 MMOL/L (ref 136–145)
TROPONIN I SERPL-MCNC: 0.12 NG/ML

## 2019-02-25 PROCEDURE — 80048 BASIC METABOLIC PNL TOTAL CA: CPT

## 2019-02-25 PROCEDURE — 74011250636 HC RX REV CODE- 250/636: Performed by: INTERNAL MEDICINE

## 2019-02-25 PROCEDURE — 74011250637 HC RX REV CODE- 250/637: Performed by: INTERNAL MEDICINE

## 2019-02-25 PROCEDURE — 74176 CT ABD & PELVIS W/O CONTRAST: CPT

## 2019-02-25 PROCEDURE — 36415 COLL VENOUS BLD VENIPUNCTURE: CPT

## 2019-02-25 PROCEDURE — 82550 ASSAY OF CK (CPK): CPT

## 2019-02-25 PROCEDURE — 65270000029 HC RM PRIVATE

## 2019-02-25 PROCEDURE — 71250 CT THORAX DX C-: CPT

## 2019-02-25 PROCEDURE — 74011000258 HC RX REV CODE- 258: Performed by: INTERNAL MEDICINE

## 2019-02-25 PROCEDURE — 93005 ELECTROCARDIOGRAM TRACING: CPT

## 2019-02-25 PROCEDURE — 84484 ASSAY OF TROPONIN QUANT: CPT

## 2019-02-25 PROCEDURE — 74011250637 HC RX REV CODE- 250/637: Performed by: NURSE PRACTITIONER

## 2019-02-25 RX ORDER — ISOSORBIDE MONONITRATE 30 MG/1
60 TABLET, EXTENDED RELEASE ORAL DAILY
Status: DISCONTINUED | OUTPATIENT
Start: 2019-02-25 | End: 2019-02-26 | Stop reason: HOSPADM

## 2019-02-25 RX ORDER — LISINOPRIL 5 MG/1
5 TABLET ORAL 2 TIMES DAILY
Status: COMPLETED | OUTPATIENT
Start: 2019-02-25 | End: 2019-02-25

## 2019-02-25 RX ORDER — HYDROMORPHONE HYDROCHLORIDE 2 MG/1
2 TABLET ORAL
Qty: 20 TAB | Refills: 0 | Status: SHIPPED | OUTPATIENT
Start: 2019-02-25 | End: 2019-05-14

## 2019-02-25 RX ORDER — LISINOPRIL 10 MG/1
10 TABLET ORAL DAILY
Status: DISCONTINUED | OUTPATIENT
Start: 2019-02-26 | End: 2019-02-26 | Stop reason: HOSPADM

## 2019-02-25 RX ADMIN — ASPIRIN 81 MG 81 MG: 81 TABLET ORAL at 08:38

## 2019-02-25 RX ADMIN — PRAVASTATIN SODIUM 20 MG: 10 TABLET ORAL at 21:31

## 2019-02-25 RX ADMIN — PIPERACILLIN SODIUM,TAZOBACTAM SODIUM 3.38 G: 3; .375 INJECTION, POWDER, FOR SOLUTION INTRAVENOUS at 06:21

## 2019-02-25 RX ADMIN — ISOSORBIDE MONONITRATE 60 MG: 30 TABLET, EXTENDED RELEASE ORAL at 15:25

## 2019-02-25 RX ADMIN — Medication 10 ML: at 15:26

## 2019-02-25 RX ADMIN — BUMETANIDE 1 MG: 1 TABLET ORAL at 08:38

## 2019-02-25 RX ADMIN — PANTOPRAZOLE SODIUM 40 MG: 40 TABLET, DELAYED RELEASE ORAL at 06:21

## 2019-02-25 RX ADMIN — HYDRALAZINE HYDROCHLORIDE 10 MG: 20 INJECTION INTRAMUSCULAR; INTRAVENOUS at 04:00

## 2019-02-25 RX ADMIN — LISINOPRIL 5 MG: 5 TABLET ORAL at 17:37

## 2019-02-25 RX ADMIN — HYDROMORPHONE HYDROCHLORIDE 2 MG: 2 TABLET ORAL at 03:48

## 2019-02-25 RX ADMIN — POTASSIUM CHLORIDE 20 MEQ: 1.5 POWDER, FOR SOLUTION ORAL at 08:38

## 2019-02-25 RX ADMIN — HYDRALAZINE HYDROCHLORIDE 50 MG: 50 TABLET, FILM COATED ORAL at 08:38

## 2019-02-25 RX ADMIN — CARVEDILOL 25 MG: 12.5 TABLET, FILM COATED ORAL at 08:38

## 2019-02-25 RX ADMIN — HYDRALAZINE HYDROCHLORIDE 50 MG: 50 TABLET, FILM COATED ORAL at 15:23

## 2019-02-25 RX ADMIN — HYDROMORPHONE HYDROCHLORIDE 2 MG: 2 TABLET ORAL at 17:49

## 2019-02-25 RX ADMIN — ISOSORBIDE DINITRATE 20 MG: 5 TABLET ORAL at 08:38

## 2019-02-25 RX ADMIN — AMLODIPINE BESYLATE 10 MG: 5 TABLET ORAL at 08:38

## 2019-02-25 RX ADMIN — CARVEDILOL 25 MG: 12.5 TABLET, FILM COATED ORAL at 17:37

## 2019-02-25 RX ADMIN — HYDRALAZINE HYDROCHLORIDE 50 MG: 50 TABLET, FILM COATED ORAL at 21:31

## 2019-02-25 RX ADMIN — LISINOPRIL 5 MG: 5 TABLET ORAL at 08:38

## 2019-02-25 RX ADMIN — Medication 10 ML: at 21:31

## 2019-02-25 RX ADMIN — Medication 10 ML: at 05:14

## 2019-02-25 NOTE — PROGRESS NOTES
General Surgery End of Shift Nursing Note    Bedside shift change report given to Guerline Donald (oncoming nurse) by Josefa Laurent (offgoing nurse). Report included the following information SBAR, Kardex, Intake/Output and MAR.     Shift worked:   7pm to [employer] of shift:    See previous note  Pt ambulated twice in the hallway   Issues for physician to address:   none     Number times ambulated in hallway past shift: 2    Number of times OOB to chair past shift: 0    Pain Management:  Current medication: see MAR  Patient states pain is manageable on current pain medication: NO    GI:    Current diet:  DIET CARDIAC Regular    Tolerating current diet: YES  Passing flatus: YES  Last Bowel Movement: yesterday     Respiratory:    Incentive Spirometer at bedside: YES  Patient instructed on use: YES        Kasey Mahan RN

## 2019-02-25 NOTE — PROGRESS NOTES
Vascular Surgery Progress Note  Wali Poon ACNP-BC  2/25/2019       Subjective:     Mr. Toni Pickard is a 54 yo AAM with a pmhx significant for uncontrolled hypertension. He presented to the hospital with complaint of acute CP while having intercourse. He had associated nausea and vomiting. On arrival he was profoundly hypertensive as high as 223/111. CTA of the chest was + for a type B aortic dissection. He was admitted to the ICU and initiated on medical management. Despite this his creatinine and lactic acid continued to rise. Repeat CTA of the chest, abd, and pelvis later the same day showed progression of his dissection and he was taken for an emergent TEVAR on 02/15/2019 with left carotid to subclavian bypass, embolization of left subclavian artery, and left renal artery stenting. His post procedure course has been complicated by acute hypoxic respiratory failure due to volume overload, MARY, chest pain associated with hypertension, lower back pain, and fever. His respiratory failure is resolved. His creatinine is elevated but stable and tolerating oral diuresis. His CP is resolved and his back pain has improved. He continues to have intermittent fevers with a normal WBC. CXR, UA and blood cultures are unremarkable.       Nursing Data:     Patient Vitals for the past 24 hrs:   BP Temp Pulse Resp SpO2   02/25/19 0848 (!) 189/108 -- (P) 95 -- (P) 97 %   02/25/19 0817 (!) 141/101 99.5 °F (37.5 °C) 88 18 97 %   02/25/19 0544 129/79 -- -- -- --   02/25/19 0349 (!) 143/109 99.2 °F (37.3 °C) 87 18 98 %   02/25/19 0021 121/70 99.8 °F (37.7 °C) 84 18 97 %   02/24/19 2154 139/82 99.9 °F (37.7 °C) 88 18 95 %   02/24/19 1944 (!) 85/45 (!) 101.1 °F (38.4 °C) 89 18 95 %   02/24/19 1608 111/59 99.8 °F (37.7 °C) 89 18 96 %   02/24/19 1157 119/70 98.2 °F (36.8 °C) 80 18 95 %     ---------------------------------------------------------------------------------------------------------    Intake/Output Summary (Last 24 hours) at 2/25/2019 0955  Last data filed at 2/25/2019 0350  Gross per 24 hour   Intake 540 ml   Output 800 ml   Net -260 ml       Exam:     Physical Exam   Constitutional: He is oriented to person, place, and time. He appears well-developed and well-nourished. He appears comfortable. HENT:   Head: Normocephalic. Eyes: Pupils are equal, round, and reactive to light. Neck: Normal range of motion. Cardiovascular: RRR  Pulmonary/Chest: No accessory muscle usage. No tachypnea. No respiratory distress. Abdominal: Soft. He exhibits no distension. Musculoskeletal: Normal range of motion. Lumbar: No erythema, edema, or fluctuance. Skin: Groin incision dry and intact with dermabond. Incision to the left scapula dry and intact. Staples are well approximated. Multiple tattoos. Psychiatric: He has a normal mood and affect. His behavior is normal. Thought content normal.     Lab Review:     .   Recent Results (from the past 24 hour(s))   METABOLIC PANEL, BASIC    Collection Time: 02/24/19  2:15 PM   Result Value Ref Range    Sodium 136 136 - 145 mmol/L    Potassium 3.7 3.5 - 5.1 mmol/L    Chloride 100 97 - 108 mmol/L    CO2 28 21 - 32 mmol/L    Anion gap 8 5 - 15 mmol/L    Glucose 143 (H) 65 - 100 mg/dL    BUN 24 (H) 6 - 20 MG/DL    Creatinine 1.99 (H) 0.70 - 1.30 MG/DL    BUN/Creatinine ratio 12 12 - 20      GFR est AA 44 (L) >60 ml/min/1.73m2    GFR est non-AA 36 (L) >60 ml/min/1.73m2    Calcium 8.2 (L) 8.5 - 78.0 MG/DL   METABOLIC PANEL, BASIC    Collection Time: 02/25/19  1:33 AM   Result Value Ref Range    Sodium 136 136 - 145 mmol/L    Potassium 4.2 3.5 - 5.1 mmol/L    Chloride 102 97 - 108 mmol/L    CO2 27 21 - 32 mmol/L    Anion gap 7 5 - 15 mmol/L    Glucose 138 (H) 65 - 100 mg/dL    BUN 24 (H) 6 - 20 MG/DL    Creatinine 1.96 (H) 0.70 - 1.30 MG/DL    BUN/Creatinine ratio 12 12 - 20      GFR est AA 45 (L) >60 ml/min/1.73m2    GFR est non-AA 37 (L) >60 ml/min/1.73m2    Calcium 8.2 (L) 8.5 - 10.1 MG/DL          Assessment/Plan:      Principal problems  Type B aortic dissection   -s/p TEVAR, left subclavian embolization, and left carotid to subclavian bypass 02/15/19  Renal artery stenosis  -s/p left renal artery stenting 02/15/2019    BP remains labile. Medication held last evening now hypertensive. Creatinine stable. Persistent intermittent fever. Continue to encourage OOB/PT/OT/IS. Continue oral dilaudid PRN and flexeril PRN for pain . Active problems  Subendocardial ischemia  -likely secondary to infarction related to dissection  Hypertensive urgency   -plan per cardiology and nephrology  LV  Chest pain  -resolved    Headache  -likely multifactorial.  Rebound from narcotics? Hypertensive headache? Nitrates ?  -improved     SIRs  Fever  -intermittent spikes   -UA, CXR, and blood cultures unremarkable. Renal infarct? Atelectasis?  -WBC normal    -completed course of Zosyn-discontinue      Hyperglycemia  -trending down   -add HA1c    Acute hypoxic respiratory failure   -resolved   Bilateral pleural effusions  -oral diuresis  -plan per nephrology  -pulmonary toileting per pulmonary   -will need outpatient sleep study     Renal infarction   MARY on CKD stage II  -plan per nephrology   Hypophosphotasia   -resolved   Hypokalemia   -resolved     Lactic acidosis   -resolved    Thrombocytopenia  -resolved     Anemia of acute blood loss  -not at levels to transfuse    Constipation  -tolerating cardiac diet, denies abd pain, nausea, and vomiting   -continue BM regimen. VTE prophylaxis:  SCDs    Disposition:   Home 1-2 once BP is improved.

## 2019-02-25 NOTE — PROGRESS NOTES
Pts vitals on 2/24 at 1944:  Temp: 101.1  Bp: 85/45  HR 58    MD notified    Per MD, Tylenol given for temperature. 500 mL of NS given for bp and held nighttime bp medications.      At 0349 on 2/25 bp was 143/109  PRN hydralazine given

## 2019-02-25 NOTE — PROGRESS NOTES
Progress Note    NAME: Rakesh Jeffery   :  1970   MRN:  [id number]     Date/Time:  2019          Assessment :    Plan:  MARY on CKD stage 2    HTN/LVH    type B aortic dissection with filling of the true and false lumen --S/P TEVAR, L Renal artery stenting, (L) carotid bypass    Fever-resolved           Creatinine 2.1 to 1.99 to 1.96; K ok    BP labile but overall improved with addition of lisinopril. Renal fxn stable in spite of lisinopril    Slowly titrate lisinopril- increase to 10 mg every day (5 mg BID today and then 10 mg every day from tomm with hold parameters placed)    Hold parameters placed for hydralazine-down titrate slowly based on BP trend    AM labs    electrolytes stable; continue on PO K, but may be able to come off as he is now on lisinopril. Remains to be seen where he levels off. He may have new baseline Cr (likely stage 3 CKD) going forward           Subjective:   CHIEF COMPLAINT:  Sleeping. Snoring. Not easily arousable. Did not wake up    ROS- deferred. No Known Allergies   Prior to Admission medications    Medication Sig Start Date End Date Taking? Authorizing Provider   HYDROmorphone (DILAUDID) 2 mg tablet Take 1 Tab by mouth every four (4) hours as needed for Pain. Max Daily Amount: 12 mg. 19  Yes Faiza Reilly, NP   amLODIPine (NORVASC) 5 mg tablet Take 1 Tab by mouth daily. 18   Monica Reyes NP   valsartan-hydroCHLOROthiazide (DIOVAN-HCT) 320-25 mg per tablet Take 1 Tab by mouth daily.  18   Monica Reyes NP         Objective:   VITALS:    Visit Vitals  /83 (BP 1 Location: Left arm, BP Patient Position: At rest)   Pulse 80   Temp 99.8 °F (37.7 °C)   Resp 18   Ht 6' 5\" (1.956 m)   Wt 109.4 kg (241 lb 2.9 oz)   SpO2 98%   BMI 28.60 kg/m²     PHYSICAL EXAM:  WB/WN in NAD  Sleepy  Clear  RRR, distant  No edema                     LAB DATA REVIEWED:    Recent Labs     19  0223   WBC 8.2   HGB 9.4*   HCT 28.6*        Recent Labs     02/25/19  0133 02/24/19  1415 02/23/19  0223    136 136   K 4.2 3.7 3.9    100 101   CO2 27 28 26   BUN 24* 24* 26*   CREA 1.96* 1.99* 2.11*   * 143* 136*   CA 8.2* 8.2* 8.3*     Procedures: see electronic medical records for all procedures/Xrays and details which were not copied into this note but were reviewed prior to creation of Plan.    ________________________________________________________________________       ___________________________________________________  Consulting Physician: Jermaine Lopez MD

## 2019-02-25 NOTE — PROGRESS NOTES
Progress Note      2/25/2019 9:13 AM  NAME: Saw Michelle   MRN:  [id number]   Admit Diagnosis: Acute thoracic aortic dissection ([facility] [address] [I71.01]                Assessment:       1. 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 2. No hx of CAD, equivicol troponin  3. Echo nl EF, LVH, mildly dilated ascending aorta with mild aortic regurgitation  4. Sinus with inferior and lateral TWI  5. HTN  6. Lipid ok  7. Snores, concern for sleep apnea, will need outpt sleep study  8. 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 Some atypical chest pain, resolved. Normal EF  Sinus  S/p TEVAR     1. Cont added aspirin 81mg daily  2. BP's labile, intermittantly on ggt, should have bp monitored ONLY in Right arm. 3. Cont added coreg 25mg bid  4. Cont added amlodipine currently on 10mg, may decrease to 5mg if edema. 5. Cont added hydralazine   6. Cont added isordil 20mg po tid  7. Agree with addition of low dose lisinopril, titrate as renal function allows  8. Cont added bumex 1mg  9. Cont added statin     Last night, coreg, hydralazine, isordil were held for isolated low bp, this AM bp elevated. Would not hold multiple medications, renal to see if lisinopril can be titrated, then would taper hydralazine. Isordil can be changed to imdur 60mg.          [x]        High complexity decision making was performed             Subjective:     Saw Michelle denies chest pain. +dyspnea, hungry. Discussed with RN events overnight.      Review of Systems:    Symptom Y/N Comments  Symptom Y/N Comments   Fever/Chills N   Chest Pain N    Poor Appetite N   Edema N    Cough N   Abdominal Pain N    Sputum N   Joint Pain N    SOB/ESCOBEDO N   Pruritis/Rash N    Nausea/vomit N Tolerating PT/OT Y    Diarrhea N   Tolerating Diet Y    Constipation N   Other       Could NOT obtain due to:      Objective:      Physical Exam:    Last 24hrs VS reviewed since prior progress note. Most recent are:    Visit Vitals  BP (!) 189/108   Pulse 88   Temp 99.5 °F (37.5 °C)   Resp 18   Ht 6' 5\" (1.956 m)   Wt 109.4 kg (241 lb 2.9 oz)   SpO2 97%   BMI 28.60 kg/m²       Intake/Output Summary (Last 24 hours) at 2/25/2019 0853  Last data filed at 2/25/2019 0350  Gross per 24 hour   Intake 540 ml   Output 800 ml   Net -260 ml        General Appearance: Well developed, well nourished, alert & oriented x 3,    no acute distress. Ears/Nose/Mouth/Throat: Hearing grossly normal.  Neck: Supple. Chest: Lungs clear to auscultation bilaterally. Cardiovascular: Regular rate and rhythm, S1S2 normal, no murmur. Abdomen: Soft, non-tender, bowel sounds are active. Extremities: No edema bilaterally. Skin: Warm and dry. PMH/SH reviewed - no change compared to H&P    Data Review    Telemetry: normal sinus rhythm     Lab Data Personally Reviewed:    Recent Labs     02/23/19 0223   WBC 8.2   HGB 9.4*   HCT 28.6*        No results for input(s): INR, PTP, APTT in the last 72 hours. No lab exists for component: [place] Neither   Recent Labs     02/25/19  0133 02/24/19  1415 02/23/19  0223    136 136   K 4.2 3.7 3.9    100 101   CO2 27 28 26   BUN 24* 24* 26*   CREA 1.96* 1.99* 2.11*   * 143* 136*   CA 8.2* 8.2* 8.3*     No results for input(s): CPK, CKNDX, TROIQ in the last 72 hours. No lab exists for component: CPKMB  Lab Results   Component Value Date/Time    Cholesterol, total 146 04/18/2018 08:58 AM    HDL Cholesterol 38 (L) 04/18/2018 08:58 AM    LDL, calculated 95 04/18/2018 08:58 AM    Triglyceride 64 04/18/2018 08:58 AM       No results for input(s): SGOT, GPT, AP, TBIL, TP, ALB, GLOB, GGT, AML, LPSE in the last 72 hours.     No lab exists for component: AMYP, HLPSE  No results for input(s): PH, PCO2, PO2 in the last 72 hours.     Medications Personally Reviewed:    Current Facility-Administered Medications   Medication Dose Route Frequency    lisinopril (PRINIVIL, ZESTRIL) tablet 5 mg  5 mg Oral DAILY    hydrALAZINE (APRESOLINE) tablet 50 mg  50 mg Oral TID    bumetanide (BUMEX) tablet 1 mg  1 mg Oral DAILY    cyclobenzaprine (FLEXERIL) tablet 5 mg  5 mg Oral TID PRN    HYDROmorphone (DILAUDID) tablet 2 mg  2 mg Oral Q4H PRN    isosorbide dinitrate (ISORDIL) tablet 20 mg  20 mg Oral TID    metoprolol (LOPRESSOR) injection 5 mg  5 mg IntraVENous Q6H PRN    carvedilol (COREG) tablet 25 mg  25 mg Oral BID WITH MEALS    amLODIPine (NORVASC) tablet 10 mg  10 mg Oral DAILY    albuterol (PROVENTIL VENTOLIN) nebulizer solution 2.5 mg  2.5 mg Inhalation Q4H PRN    hydrALAZINE (APRESOLINE) 20 mg/mL injection 10 mg  10 mg IntraVENous Q6H PRN    potassium chloride (KLOR-CON) packet for solution 20 mEq  20 mEq Oral DAILY    pantoprazole (PROTONIX) tablet 40 mg  40 mg Oral ACB    aspirin chewable tablet 81 mg  81 mg Oral DAILY    pravastatin (PRAVACHOL) tablet 20 mg  20 mg Oral QHS    senna-docusate (PERICOLACE) 8.6-50 mg per tablet 2 Tab  2 Tab Oral QHS    acetaminophen (TYLENOL) solution 650 mg  650 mg Oral Q6H PRN    sodium chloride (NS) flush 5-40 mL  5-40 mL IntraVENous Q8H    sodium chloride (NS) flush 5-40 mL  5-40 mL IntraVENous PRN    bisacodyl (DULCOLAX) suppository 10 mg  10 mg Rectal DAILY PRN         Charli Salinas MD

## 2019-02-25 NOTE — PROGRESS NOTES
CM acknowledges consult to CM. CM sent referral to [facility] for services as pt is a catarino pt. UPDATE 4:21PM  CM informed that [facility] does not service pt area. CM will continue to follow pt for d/c planning needs.      Allie Shaikh, Care Manager  [phone]

## 2019-02-25 NOTE — PROGRESS NOTES
0830 Patient complained of chest pain, took /108. HR 95 O2 97.  was notified: Dr saw patient. 1009 Patient states \"feeling good. No more chest pain. \"    Patient complains of chest pain again. Labs ordered, drawn and sent. Patient ambulated hallway times one    General Surgery End of Shift Nursing Note    Bedside shift change report given to [facility] (oncoming nurse) by Simi Lanza (offgoing nurse). Report included the following information SBAR, Kardex and Intake/Output. Shift worked:   7a-7p   Summary of shift:    See above   Issues for physician to address:   See above     Number times ambulated in hallway past shift: 1    Number of times OOB to chair past shift: 1    Pain Management:  Current medication: see mar  Patient states pain is manageable on current pain medication: YES    GI:    Current diet:  DIET CARDIAC Regular    Tolerating current diet: YES  Passing flatus: YES  Last Bowel Movement: several days ago   Appearance: brown    Respiratory:    Incentive Spirometer at bedside: YES  Patient instructed on use: YES    Patient Safety:    Falls Score: 1  Bed Alarm On? No  Sitter?  No    Kenyon Gonzalez

## 2019-02-25 NOTE — PROGRESS NOTES
Physical Therapy Note:    Chart reviewed, PT treatment attempted and deferred. Per RN pt reporting chest pain at this time and working to manage. Will continue to follow per POC when pt stable and appropriate.

## 2019-02-25 NOTE — PROGRESS NOTES
General Surgery End of Shift Nursing Note    Bedside shift change report given to [facility] (oncoming nurse) by Diane Fontaine (offgoing nurse). Report included the following information SBAR, Kardex and Intake/Output. Shift worked:   7a-7p   Summary of shift:    Patient ambulated hallways times 2 today. Issues for physician to address:   See above     Number times ambulated in hallway past shift: 2    Number of times OOB to chair past shift: 0    Pain Management:  Current medication: see mar  Patient states pain is manageable on current pain medication: YES    GI:    Current diet:  DIET CARDIAC Regular    Tolerating current diet: YES  Passing flatus: YES  Last Bowel Movement: yesterday   Appearance: brown formed    Respiratory:    Incentive Spirometer at bedside: YES  Patient instructed on use: YES    Patient Safety:    Falls Score: 1  Bed Alarm On? No  Sitter?  No    Evaristo Cunningham

## 2019-02-26 VITALS
WEIGHT: 241.18 LBS | SYSTOLIC BLOOD PRESSURE: 173 MMHG | OXYGEN SATURATION: 97 % | HEART RATE: 86 BPM | HEIGHT: 77 IN | BODY MASS INDEX: 28.48 KG/M2 | DIASTOLIC BLOOD PRESSURE: 93 MMHG | TEMPERATURE: 98.5 F | RESPIRATION RATE: 17 BRPM

## 2019-02-26 LAB
ANION GAP SERPL CALC-SCNC: 6 MMOL/L (ref 5–15)
ATRIAL RATE: 87 BPM
BUN SERPL-MCNC: 25 MG/DL (ref 6–20)
BUN/CREAT SERPL: 12 (ref 12–20)
CALCIUM SERPL-MCNC: 7.8 MG/DL (ref 8.5–10.1)
CALCULATED P AXIS, ECG09: 52 DEGREES
CALCULATED R AXIS, ECG10: 4 DEGREES
CALCULATED T AXIS, ECG11: -10 DEGREES
CHLORIDE SERPL-SCNC: 98 MMOL/L (ref 97–108)
CK SERPL-CCNC: 47 U/L (ref 39–308)
CO2 SERPL-SCNC: 28 MMOL/L (ref 21–32)
CREAT SERPL-MCNC: 2.01 MG/DL (ref 0.7–1.3)
DIAGNOSIS, 93000: NORMAL
GLUCOSE SERPL-MCNC: 212 MG/DL (ref 65–100)
P-R INTERVAL, ECG05: 162 MS
POTASSIUM SERPL-SCNC: 4.1 MMOL/L (ref 3.5–5.1)
Q-T INTERVAL, ECG07: 372 MS
QRS DURATION, ECG06: 90 MS
QTC CALCULATION (BEZET), ECG08: 447 MS
SODIUM SERPL-SCNC: 132 MMOL/L (ref 136–145)
TROPONIN I SERPL-MCNC: 0.09 NG/ML
VENTRICULAR RATE, ECG03: 87 BPM

## 2019-02-26 PROCEDURE — 74011250637 HC RX REV CODE- 250/637: Performed by: INTERNAL MEDICINE

## 2019-02-26 PROCEDURE — 36415 COLL VENOUS BLD VENIPUNCTURE: CPT

## 2019-02-26 PROCEDURE — 97116 GAIT TRAINING THERAPY: CPT | Performed by: PHYSICAL THERAPIST

## 2019-02-26 PROCEDURE — 80048 BASIC METABOLIC PNL TOTAL CA: CPT

## 2019-02-26 PROCEDURE — 74011250637 HC RX REV CODE- 250/637: Performed by: NURSE PRACTITIONER

## 2019-02-26 PROCEDURE — 84484 ASSAY OF TROPONIN QUANT: CPT

## 2019-02-26 PROCEDURE — 82550 ASSAY OF CK (CPK): CPT

## 2019-02-26 RX ORDER — HYDRALAZINE HYDROCHLORIDE 50 MG/1
50 TABLET, FILM COATED ORAL 3 TIMES DAILY
Qty: 90 TAB | Refills: 2 | Status: ON HOLD | OUTPATIENT
Start: 2019-02-26 | End: 2019-05-16 | Stop reason: SDUPTHER

## 2019-02-26 RX ORDER — BUMETANIDE 1 MG/1
1 TABLET ORAL DAILY
Qty: 30 TAB | Refills: 2 | Status: SHIPPED | OUTPATIENT
Start: 2019-02-26 | End: 2019-05-14

## 2019-02-26 RX ORDER — FAMOTIDINE 40 MG/1
40 TABLET, FILM COATED ORAL DAILY
Qty: 30 TAB | Refills: 2 | Status: SHIPPED | OUTPATIENT
Start: 2019-02-26 | End: 2019-12-11 | Stop reason: SDUPTHER

## 2019-02-26 RX ORDER — AMLODIPINE BESYLATE 10 MG/1
10 TABLET ORAL DAILY
Qty: 30 TAB | Refills: 2 | Status: ON HOLD | OUTPATIENT
Start: 2019-02-26 | End: 2019-05-16 | Stop reason: SDUPTHER

## 2019-02-26 RX ORDER — PRAVASTATIN SODIUM 20 MG/1
20 TABLET ORAL
Qty: 30 TAB | Refills: 0 | Status: SHIPPED | OUTPATIENT
Start: 2019-02-26 | End: 2019-06-07 | Stop reason: SDUPTHER

## 2019-02-26 RX ORDER — LISINOPRIL 10 MG/1
10 TABLET ORAL DAILY
Qty: 30 TAB | Refills: 2 | Status: SHIPPED | OUTPATIENT
Start: 2019-02-26 | End: 2019-05-14

## 2019-02-26 RX ORDER — CARVEDILOL 25 MG/1
25 TABLET ORAL 2 TIMES DAILY WITH MEALS
Qty: 60 TAB | Refills: 2 | Status: SHIPPED | OUTPATIENT
Start: 2019-02-26 | End: 2019-07-08 | Stop reason: SDUPTHER

## 2019-02-26 RX ORDER — ASPIRIN 81 MG/1
81 TABLET ORAL DAILY
Qty: 100 TAB | Refills: 0 | Status: SHIPPED | OUTPATIENT
Start: 2019-02-26 | End: 2019-07-08 | Stop reason: SDUPTHER

## 2019-02-26 RX ORDER — POTASSIUM CHLORIDE 1500 MG/1
20 TABLET, FILM COATED, EXTENDED RELEASE ORAL DAILY
Qty: 30 TAB | Refills: 2 | Status: SHIPPED | OUTPATIENT
Start: 2019-02-26 | End: 2019-05-14

## 2019-02-26 RX ORDER — ISOSORBIDE MONONITRATE 60 MG/1
60 TABLET, EXTENDED RELEASE ORAL DAILY
Qty: 30 TAB | Refills: 2 | Status: SHIPPED | OUTPATIENT
Start: 2019-02-26 | End: 2019-05-14

## 2019-02-26 RX ADMIN — HYDRALAZINE HYDROCHLORIDE 50 MG: 50 TABLET, FILM COATED ORAL at 10:43

## 2019-02-26 RX ADMIN — POTASSIUM CHLORIDE 20 MEQ: 1.5 POWDER, FOR SOLUTION ORAL at 10:44

## 2019-02-26 RX ADMIN — ISOSORBIDE MONONITRATE 60 MG: 30 TABLET, EXTENDED RELEASE ORAL at 10:44

## 2019-02-26 RX ADMIN — HYDROMORPHONE HYDROCHLORIDE 2 MG: 2 TABLET ORAL at 03:08

## 2019-02-26 RX ADMIN — CARVEDILOL 25 MG: 12.5 TABLET, FILM COATED ORAL at 10:43

## 2019-02-26 RX ADMIN — ASPIRIN 81 MG 81 MG: 81 TABLET ORAL at 10:43

## 2019-02-26 RX ADMIN — Medication 10 ML: at 10:47

## 2019-02-26 RX ADMIN — PANTOPRAZOLE SODIUM 40 MG: 40 TABLET, DELAYED RELEASE ORAL at 06:44

## 2019-02-26 RX ADMIN — AMLODIPINE BESYLATE 10 MG: 5 TABLET ORAL at 10:44

## 2019-02-26 RX ADMIN — BUMETANIDE 1 MG: 1 TABLET ORAL at 10:44

## 2019-02-26 RX ADMIN — LISINOPRIL 10 MG: 10 TABLET ORAL at 10:43

## 2019-02-26 RX ADMIN — Medication 10 ML: at 05:33

## 2019-02-26 NOTE — PROGRESS NOTES
General Surgery End of Shift Nursing Note    Bedside shift change report given to  (oncoming nurse) by William Wolfe (offgoing nurse). Report included the following information SBAR, Kardex, Intake/Output and MAR. Shift worked:   7pm to [employer] of shift:    Pt went for CT earlier in the shift for chest pain complaints. VSS. Pt has been resting throughout the night.     Issues for physician to address:  none     Number times ambulated in hallway past shift: 0    Number of times OOB to chair past shift: 0    Pain Management:  Current medication: see MAR  Patient states pain is manageable on current pain medication: YES    GI:    Current diet:  DIET CARDIAC Regular    Tolerating current diet: YES  Passing flatus: YES  Last Bowel Movement: yesterday       Suyapa Larson RN

## 2019-02-26 NOTE — DISCHARGE INSTRUCTIONS
Patient may shower. Dry the wound carefully. It can be left open to air. Patient should not attempt to drive a car until they are comfortable and NOT taking pain medication. No heavy lifting (3 lbs limit). Mild exercise and light duties around the house are OK. Patient may ambulate but should not work out including lifting weights or push ups or sit ups. Call the office at [phone] if there are any problems.

## 2019-02-26 NOTE — PROGRESS NOTES
Progress Note      2/26/2019 9:13 AM  NAME: Anastacio Chester   MRN:  [id number]   Admit Diagnosis: Acute thoracic aortic dissection ([address] [I71.01]                Assessment:       1. 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 2. No hx of CAD, equivicol troponin  3. Echo nl EF, LVH, mildly dilated ascending aorta with mild aortic regurgitation  4. Sinus with inferior and lateral TWI  5. HTN  6. Lipid ok  7. Snores, concern for sleep apnea, will need outpt sleep study  8. 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 Some atypical chest pain, resolved. Normal EF  Sinus  S/p TEVAR     1. Cont added aspirin 81mg daily  2. BP's labile, should have bp monitored ONLY in Right arm. 3. Cont added coreg 25mg bid  4. Cont added amlodipine currently on 10mg, may decrease to 5mg if edema. 5. Cont added hydralazine    6. Cont added imdur 60mg  7. Agree with addition of low dose lisinopril, titrate as renal function allows  8. Cont added bumex 1mg  9. Cont added statin     Feeling better this AM.  Some diffuse chest pain yesterday, ECG and enzymes x 2 minimally equivicol, not consistent with ACS, reviewed with Dr. Katya Laurent, still some residual aortic pain, non-contrast CT of chest appears stable.          [x]        High complexity decision making was performed             Subjective:     Anastacio Chester denies chest pain. +dyspnea, hungry. Discussed with RN events overnight.      Review of Systems:    Symptom Y/N Comments  Symptom Y/N Comments   Fever/Chills N   Chest Pain N    Poor Appetite N   Edema N    Cough N   Abdominal Pain N    Sputum N   Joint Pain N    SOB/ESCOBEDO N   Pruritis/Rash N    Nausea/vomit N   Tolerating PT/OT Y    Diarrhea N   Tolerating Diet Y Constipation N   Other       Could NOT obtain due to:      Objective:      Physical Exam:    Last 24hrs VS reviewed since prior progress note. Most recent are:    Visit Vitals  BP (!) 154/98   Pulse 78   Temp 98.5 °F (36.9 °C)   Resp 17   Ht 6' 5\" (1.956 m)   Wt 109.4 kg (241 lb 2.9 oz)   SpO2 97%   BMI 28.60 kg/m²       Intake/Output Summary (Last 24 hours) at 2/26/2019 3651  Last data filed at 2/26/2019 0225  Gross per 24 hour   Intake 100 ml   Output 2500 ml   Net -2400 ml        General Appearance: Well developed, well nourished, alert & oriented x 3,    no acute distress. Ears/Nose/Mouth/Throat: Hearing grossly normal.  Neck: Supple. Chest: Lungs clear to auscultation bilaterally. Cardiovascular: Regular rate and rhythm, S1S2 normal, no murmur. Abdomen: Soft, non-tender, bowel sounds are active. Extremities: No edema bilaterally. Skin: Warm and dry. PMH/ reviewed - no change compared to H&P    Data Review    Telemetry: normal sinus rhythm     Lab Data Personally Reviewed:    No results for input(s): WBC, HGB, HCT, PLT, HGBEXT, HCTEXT, PLTEXT, HGBEXT, HCTEXT, PLTEXT in the last 72 hours. No results for input(s): INR, PTP, APTT in the last 72 hours. No lab exists for component: Stacy Webb   Recent Labs     02/26/19 0217 02/25/19  0133 02/24/19  1415   * 136 136   K 4.1 4.2 3.7   CL 98 102 100   CO2 28 27 28   BUN 25* 24* 24*   CREA 2.01* 1.96* 1.99*   * 138* 143*   CA 7.8* 8.2* 8.2*     Recent Labs     02/26/19 0217 02/25/19  1540   TROIQ 0.09* 0.12*     Lab Results   Component Value Date/Time    Cholesterol, total 146 04/18/2018 08:58 AM    HDL Cholesterol 38 (L) 04/18/2018 08:58 AM    LDL, calculated 95 04/18/2018 08:58 AM    Triglyceride 64 04/18/2018 08:58 AM       No results for input(s): SGOT, GPT, AP, TBIL, TP, ALB, GLOB, GGT, AML, LPSE in the last 72 hours.     No lab exists for component: AMYP, HLPSE  No results for input(s): PH, PCO2, PO2 in the last 72 hours.    Medications Personally Reviewed:    Current Facility-Administered Medications   Medication Dose Route Frequency    isosorbide mononitrate ER (IMDUR) tablet 60 mg  60 mg Oral DAILY    lisinopril (PRINIVIL, ZESTRIL) tablet 10 mg  10 mg Oral DAILY    hydrALAZINE (APRESOLINE) tablet 50 mg  50 mg Oral TID    bumetanide (BUMEX) tablet 1 mg  1 mg Oral DAILY    cyclobenzaprine (FLEXERIL) tablet 5 mg  5 mg Oral TID PRN    HYDROmorphone (DILAUDID) tablet 2 mg  2 mg Oral Q4H PRN    metoprolol (LOPRESSOR) injection 5 mg  5 mg IntraVENous Q6H PRN    carvedilol (COREG) tablet 25 mg  25 mg Oral BID WITH MEALS    amLODIPine (NORVASC) tablet 10 mg  10 mg Oral DAILY    albuterol (PROVENTIL VENTOLIN) nebulizer solution 2.5 mg  2.5 mg Inhalation Q4H PRN    hydrALAZINE (APRESOLINE) 20 mg/mL injection 10 mg  10 mg IntraVENous Q6H PRN    potassium chloride (KLOR-CON) packet for solution 20 mEq  20 mEq Oral DAILY    pantoprazole (PROTONIX) tablet 40 mg  40 mg Oral ACB    aspirin chewable tablet 81 mg  81 mg Oral DAILY    pravastatin (PRAVACHOL) tablet 20 mg  20 mg Oral QHS    senna-docusate (PERICOLACE) 8.6-50 mg per tablet 2 Tab  2 Tab Oral QHS    acetaminophen (TYLENOL) solution 650 mg  650 mg Oral Q6H PRN    sodium chloride (NS) flush 5-40 mL  5-40 mL IntraVENous Q8H    sodium chloride (NS) flush 5-40 mL  5-40 mL IntraVENous PRN    bisacodyl (DULCOLAX) suppository 10 mg  10 mg Rectal DAILY PRN         Tierney Broderick MD

## 2019-02-26 NOTE — PROGRESS NOTES
Progress Note    NAME: Venora Kawasaki   :  1970   MRN:  [id number]     Date/Time:  2019          Assessment :    Plan:  MARY on CKD stage 2    HTN/LVH    type B aortic dissection with filling of the true and false lumen --S/P TEVAR, L Renal artery stenting, (L) carotid bypass    Fever-resolved    Mild hyponatremia           Cr levelled off around 1.8 to 2 range. BP labile but overall improved with addition of lisinopril. Renal fxn stable in spite of lisinopril    AM BP are high. Increase lisinopril to 20 mg by tomm AM if BP remains high today    D/C oral KCL while pt has MARY/CKD and now on ACE-I    Hold parameters placed for hydralazine-down titrate slowly based on BP trend    AM labs    Remains to be seen where he levels off. He may have new baseline Cr (likely stage 3 CKD) going forward    Discussed with pt to get MARITZA study done and use CPAP if +    Will need outpt renal f/u after d/c. Will arrange thru our office after d/c           Subjective:   CHIEF COMPLAINT:  Oob. Feels better. BP higher this am  Had chest pain last night. Non contrast CT chest OK. Mild equivocal trop. Felt to be aortic root pain    ROS- denies n/v/sob. +cp as above. None at present. +loud snoring       Objective:   VITALS:    Visit Vitals  BP (!) 173/93   Pulse 86   Temp 98.5 °F (36.9 °C)   Resp 17   Ht 6' 5\" (1.956 m)   Wt 109.4 kg (241 lb 2.9 oz)   SpO2 97%   BMI 28.60 kg/m²     PHYSICAL EXAM:  WB/WN in NAD  Clear  RRR  No edema  AOx3                     LAB DATA REVIEWED:    No results for input(s): WBC, HGB, HCT, PLT, HGBEXT, HCTEXT, PLTEXT, HGBEXT, HCTEXT, PLTEXT in the last 72 hours.   Recent Labs     19  0217 19  0133 19  1415   * 136 136   K 4.1 4.2 3.7   CL 98 102 100   CO2 28 27 28   BUN 25* 24* 24*   CREA 2.01* 1.96* 1.99*   * 138* 143*   CA 7.8* 8.2* 8.2*     Procedures: see electronic medical records for all procedures/Xrays and details which were not copied into this note but were reviewed prior to creation of Plan.    ________________________________________________________________________       ___________________________________________________  Consulting Physician: Marianna Glover, MD

## 2019-02-26 NOTE — DISCHARGE SUMMARY
Vascular Surgery Discharge Summary     Patient ID:  Brooks Angeles  [id number]  53 y.o.  1970    Admitting Provider: Reina Dudley MD  Discharging Provider:    Марина Mathew Date: 2/15/2019    Discharge Date: 2/26/2019    Discharge Diagnoses:    Type B aortic dissection POA yes   Renal artery stenosis POA yes   Subendocardial ischemia POA yes   Hypertensive urgency POA yes   LVH POA yes   Chest pain POA yes   Headache POA unknown   -resolved   SIRs POA yes   Fever POA unknown   Hyperglycemia POA unknown   Acute hypoxic respiratory failure POA no    -resolved   Bilateral pleural effusions POA unknown   Renal infarction POA yes   MARY on CKD stage II POA yes   Hypophosphotasia POA unknown   -resolved   Hypokalemia POA unknown   -resolved   Lactic acidosis POA yes   -resolved    Thrombocytopenia POA unknown   -resolved   Anemia of acute blood loss POA no   Constipation POA no  -resolved     Procedures:  TEVAR, left subclavian embolization, and left carotid to subclavian bypass 02/15/19     Left renal artery stenting 02/15/2019    Hospital Course:   Mr. Toni Pickard is a 54 yo AAM with a pmhx significant for uncontrolled hypertension. He presented to the hospital with complaint of acute CP while having intercourse. He had associated nausea and vomiting. On arrival he was profoundly hypertensive as high as 223/111. CTA of the chest was + for a type B aortic dissection. He was admitted to the ICU and initiated on medical management. Despite this his creatinine and lactic acid continued to rise. Repeat CTA of the chest, abd, and pelvis later the same day showed progression of his dissection and he was taken for an emergent TEVAR on 02/15/2019 with left carotid to subclavian bypass, embolization of left subclavian artery, and left renal artery stenting.   His post procedure course has been complicated by acute hypoxic respiratory failure due to volume overload, MARY, chest pain associated with hypertension, lower back pain, and fever.       His respiratory failure is resolved. He was weaned from oxygen prior to discharge. Patient will follow up for an outpateint sleep study at discharge. His creatinine is elevated but stable and tolerating oral diuresis. He will follow up with nephrology as an outpatient. He continues to have intermittent chest pain associated with activity and hypertension. Evaluation for ACS was unremarkable. Repeat CT of the chest, abd, and pelvis on 02/24/2019 was stable. His back pain has improved. He was followed throughout admission by cardiology and will continue to be followed as an outpatient. He continues to have intermittent fevers with a normal WBC, CXR, UA and blood cultures are unremarkable. This was attributed to renal infarction. He complete a course of IV Zosyn while admitted.       Pertinent Results:   Recent Results (from the past 24 hour(s))   CK W/ REFLX CKMB    Collection Time: 02/25/19  3:40 PM   Result Value Ref Range    CK 56 39 - 308 U/L   TROPONIN I    Collection Time: 02/25/19  3:40 PM   Result Value Ref Range    Troponin-I, Qt. 0.12 (H) <0.05 ng/mL   CK W/ REFLX CKMB    Collection Time: 02/26/19  2:17 AM   Result Value Ref Range    CK 47 39 - 308 U/L   TROPONIN I    Collection Time: 02/26/19  2:17 AM   Result Value Ref Range    Troponin-I, Qt. 0.09 (H) <0.50 ng/mL   METABOLIC PANEL, BASIC    Collection Time: 02/26/19  2:17 AM   Result Value Ref Range    Sodium 132 (L) 136 - 145 mmol/L    Potassium 4.1 3.5 - 5.1 mmol/L    Chloride 98 97 - 108 mmol/L    CO2 28 21 - 32 mmol/L    Anion gap 6 5 - 15 mmol/L    Glucose 212 (H) 65 - 100 mg/dL    BUN 25 (H) 6 - 20 MG/DL    Creatinine 2.01 (H) 0.70 - 1.30 MG/DL    BUN/Creatinine ratio 12 12 - 20      GFR est AA 43 (L) >60 ml/min/1.73m2    GFR est non-AA 36 (L) >60 ml/min/1.73m2    Calcium 7.8 (L) 8.5 - 10.1 MG/DL       Vital signs:   Patient Vitals for the past 24 hrs:   BP Temp Pulse Resp SpO2   02/26/19 0803 (!) 154/98 98.5 °F (36.9 °C) 78 17 97 %   02/26/19 0225 120/77 99.4 °F (37.4 °C) 86 18 98 %   02/25/19 2129 111/71 100.4 °F (38 °C) 89 18 98 %   02/25/19 1818 100/62 (!) 100.9 °F (38.3 °C) 90 18 95 %   02/25/19 1519 123/87 99.8 °F (37.7 °C) 85 18 95 %   02/25/19 1411 166/83 -- 87 -- --   02/25/19 1055 110/83 99.8 °F (37.7 °C) 80 18 98 %       Patient Weight:   Last 3 Recorded Weights in this Encounter    02/15/19 0010 02/15/19 0330   Weight: 111.6 kg (246 lb) 109.4 kg (241 lb 2.9 oz)       Consults: Cardiology, Nephrology, Pulmonary/Critical Care and Anesthesia, Cardiothoracic Surgery     Patient Condition at Discharge: stable    Disposition: home    Patient Instructions:   Current Discharge Medication List      START taking these medications    Details   aspirin delayed-release 81 mg tablet Take 1 Tab by mouth daily. Qty: 100 Tab, Refills: 0      bumetanide (BUMEX) 1 mg tablet Take 1 Tab by mouth daily. Qty: 30 Tab, Refills: 2      carvedilol (COREG) 25 mg tablet Take 1 Tab by mouth two (2) times daily (with meals). Qty: 60 Tab, Refills: 2      hydrALAZINE (APRESOLINE) 50 mg tablet Take 1 Tab by mouth three (3) times daily. Qty: 90 Tab, Refills: 2      isosorbide mononitrate ER (IMDUR) 60 mg CR tablet Take 1 Tab by mouth daily. Qty: 30 Tab, Refills: 2      lisinopril (PRINIVIL, ZESTRIL) 10 mg tablet Take 1 Tab by mouth daily. Qty: 30 Tab, Refills: 2      famotidine (PEPCID) 40 mg tablet Take 1 Tab by mouth daily. Qty: 30 Tab, Refills: 2      potassium chloride SR (K-TAB) 20 mEq tablet Take 1 Tab by mouth daily. Qty: 30 Tab, Refills: 2      pravastatin (PRAVACHOL) 20 mg tablet Take 1 Tab by mouth nightly. Qty: 30 Tab, Refills: 0      HYDROmorphone (DILAUDID) 2 mg tablet Take 1 Tab by mouth every four (4) hours as needed for Pain.  Max Daily Amount: 12 mg.  Qty: 20 Tab, Refills: 0    Associated Diagnoses: Dissection of thoracoabdominal aorta ([facility] [address]         CONTINUE these medications which have CHANGED    Details   amLODIPine (NORVASC) 10 mg tablet Take 1 Tab by mouth daily. Qty: 30 Tab, Refills: 2         STOP taking these medications       valsartan-hydroCHLOROthiazide (DIOVAN-HCT) 320-25 mg per tablet Comments:   Reason for Stopping:               Diet: Cardiac Diet    Activity/Wound Care:     Patient may shower. Dry the wound carefully. Incisions can be left open to air. Patient should not attempt to drive a car until they are comfortable and NOT taking pain medication. No heavy lifting (3 lbs limit). Mild exercise and light duties around the house are OK. Patient may ambulate but should not work out including lifting weights or push ups or sit ups. Call the office at [phone] if there are any problems.     Follow-up Information     Follow up With Specialties Details Why Contact Info    None    None ([phone]) Patient stated that they have no PCP      Gayle Morocho MD Cardiology In 2 weeks  [address]  [id number]  [facility]  [phone]      Evelyn Marie MD Nephrology In 2 weeks  [place] [address]  [address]  [facility]      Erica Adair MD Vascular Surgery  4-6 weeks  [address]  [facility] [phone]      [facility] [place] [address].   Sleep study  [address]  [address] [facility] [address]          Signed:  Morales Isaac NP  2/26/2019  9:47 AM

## 2019-02-26 NOTE — PROGRESS NOTES
Problem: Mobility Impaired (Adult and Pediatric)  Goal: *Acute Goals and Plan of Care (Insert Text)  Physical Therapy Goals  Initiated 2/20/2019  1. Patient will move from supine to sit and sit to supine , scoot up and down and roll side to side in bed with independence within 7 day(s). 2.  Patient will transfer from bed to chair and chair to bed with independence using the least restrictive device within 7 day(s). 3.  Patient will perform sit to stand with independence within 7 day(s). 4.  Patient will ambulate with independence for 300 feet with the least restrictive device within 7 day(s). 5.  Patient will ascend/descend 6 stairs with 1 handrail(s) with modified independence within 7 day(s). physical Therapy TREATMENT  Patient: Cassius Henson (89 y.o. male)  Date: 2/26/2019  Diagnosis: Acute thoracic aortic dissection (HCC) [I71.01] <principal problem not specified>  Procedure(s) (LRB):  THORACIC ANEURYSM REPAIR ENDOVASCULAR (TEVAR)  LEFT CAROTID TO SUBCLAVIAN BYPASS (N/A) 11 Days Post-Op  Precautions: WBAT, Fall  Chart, physical therapy assessment, plan of care and goals were reviewed. ASSESSMENT:  The patient presents with Independent functional transfers, Independent gait, 500 feet ambulated, and with no device. Returned to the room for standing ex of heel lifts, semisquats, marching, hip abd, hip ext x 10 bilat. Minimal fatigue with this exertion. Ok for disch per PT. No home needs. The following are barriers to independence while in acute care:   -Cognitive and/or behavioral: none  -Medical condition: functional endurance and precautions    -Other:       Prior level of function: Independent ambulation, drives, works. PLAN:  Patient continues to benefit from skilled intervention to address the above impairments. Continue treatment per established plan of care. Recommendations and/or planned interventions for staff:  Up ad jenny. Discharge recommendations: Outpatient-does not need HH. Patient's barriers to discharging home, in addition to above impairments: none. Equipment recommendations for successful discharge (if) home: none     SUBJECTIVE:   Patient stated I want to do some push ups but they told me not to.     OBJECTIVE DATA SUMMARY:   Critical Behavior:  Neurologic State: Alert  Orientation Level: Oriented X4  Cognition: Appropriate for age attention/concentration  Safety/Judgement: Awareness of environment, Good awareness of safety precautions  Functional Mobility Training:  Bed Mobility:      Patient is up ad jenny in the room. Transfers:  Sit to Stand: Independent  Stand to Sit: Independent                             Balance:  Sitting: Intact  Standing: Intact  Standing - Static: Good  Standing - Dynamic : Good  Ambulation/Gait Training:  Distance (ft): 500 Feet (ft)  Assistive Device: Other (comment)(none)  Ambulation - Level of Assistance: Supervision                 Base of Support: Narrowed     Speed/Emilia: Pace decreased (<100 feet/min)  Step Length: Right shortened;Left shortened   Ambulated slowly with no AD in the hallway. Therapeutic Exercises:   Standing ex x 10 reps bilat of heel lifts, semisquats, hip abd, hip ext, and marching. Pain:  Activity Tolerance:   Good  Please refer to the flowsheet for vital signs taken during this treatment.     After treatment patient left:   Nurse at bedside     COMMUNICATION/COLLABORATION:   The patients plan of care was discussed with: Registered Nurse    Alec Polo, PT   Time Calculation: 20 mins

## 2019-02-26 NOTE — PROGRESS NOTES
Reviewed discharge instructions and prescription with patient & family. Patient is A&Ox3, up ad jenny.    discontinued sl.  No bleeding

## 2019-02-27 ENCOUNTER — APPOINTMENT (OUTPATIENT)
Dept: CT IMAGING | Age: 49
End: 2019-02-27
Attending: EMERGENCY MEDICINE
Payer: MEDICAID

## 2019-02-27 ENCOUNTER — HOSPITAL ENCOUNTER (OUTPATIENT)
Age: 49
Setting detail: OBSERVATION
Discharge: HOME | End: 2019-02-28
Attending: EMERGENCY MEDICINE | Admitting: SURGERY
Payer: MEDICAID

## 2019-02-27 ENCOUNTER — APPOINTMENT (OUTPATIENT)
Dept: GENERAL RADIOLOGY | Age: 49
End: 2019-02-27
Attending: EMERGENCY MEDICINE
Payer: MEDICAID

## 2019-02-27 DIAGNOSIS — R10.13 ABDOMINAL PAIN, EPIGASTRIC: ICD-10-CM

## 2019-02-27 DIAGNOSIS — R07.9 CHEST PAIN, UNSPECIFIED TYPE: Primary | ICD-10-CM

## 2019-02-27 PROBLEM — R10.9 ABDOMINAL PAIN: Status: ACTIVE | Noted: 2019-02-27

## 2019-02-27 LAB
ALBUMIN SERPL-MCNC: 2.9 G/DL (ref 3.5–5)
ALBUMIN/GLOB SERPL: 0.5 {RATIO} (ref 1.1–2.2)
ALP SERPL-CCNC: 123 U/L (ref 45–117)
ALT SERPL-CCNC: 66 U/L (ref 12–78)
ANION GAP SERPL CALC-SCNC: 9 MMOL/L (ref 5–15)
APPEARANCE UR: CLEAR
AST SERPL-CCNC: 21 U/L (ref 15–37)
ATRIAL RATE: 84 BPM
BACTERIA URNS QL MICRO: NEGATIVE /HPF
BASOPHILS # BLD: 0 K/UL (ref 0–0.1)
BASOPHILS NFR BLD: 0 % (ref 0–1)
BILIRUB SERPL-MCNC: 0.5 MG/DL (ref 0.2–1)
BILIRUB UR QL: NEGATIVE
BUN SERPL-MCNC: 28 MG/DL (ref 6–20)
BUN/CREAT SERPL: 16 (ref 12–20)
CALCIUM SERPL-MCNC: 8.8 MG/DL (ref 8.5–10.1)
CALCULATED P AXIS, ECG09: 53 DEGREES
CALCULATED R AXIS, ECG10: 29 DEGREES
CALCULATED T AXIS, ECG11: 32 DEGREES
CHLORIDE SERPL-SCNC: 97 MMOL/L (ref 97–108)
CK SERPL-CCNC: 56 U/L (ref 39–308)
CO2 SERPL-SCNC: 27 MMOL/L (ref 21–32)
COLOR UR: ABNORMAL
COMMENT, HOLDF: NORMAL
CREAT SERPL-MCNC: 1.77 MG/DL (ref 0.7–1.3)
DIAGNOSIS, 93000: NORMAL
DIFFERENTIAL METHOD BLD: ABNORMAL
EOSINOPHIL # BLD: 0.1 K/UL (ref 0–0.4)
EOSINOPHIL NFR BLD: 1 % (ref 0–7)
EPITH CASTS URNS QL MICRO: ABNORMAL /LPF
ERYTHROCYTE [DISTWIDTH] IN BLOOD BY AUTOMATED COUNT: 14.8 % (ref 11.5–14.5)
GLOBULIN SER CALC-MCNC: 5.5 G/DL (ref 2–4)
GLUCOSE SERPL-MCNC: 175 MG/DL (ref 65–100)
GLUCOSE UR STRIP.AUTO-MCNC: NEGATIVE MG/DL
HCT VFR BLD AUTO: 32.2 % (ref 36.6–50.3)
HGB BLD-MCNC: 10.3 G/DL (ref 12.1–17)
HGB UR QL STRIP: NEGATIVE
HYALINE CASTS URNS QL MICRO: ABNORMAL /LPF (ref 0–5)
IMM GRANULOCYTES # BLD AUTO: 0.1 K/UL (ref 0–0.04)
IMM GRANULOCYTES NFR BLD AUTO: 1 % (ref 0–0.5)
KETONES UR QL STRIP.AUTO: NEGATIVE MG/DL
LACTATE SERPL-SCNC: 1.5 MMOL/L (ref 0.4–2)
LEUKOCYTE ESTERASE UR QL STRIP.AUTO: NEGATIVE
LYMPHOCYTES # BLD: 1.1 K/UL (ref 0.8–3.5)
LYMPHOCYTES NFR BLD: 12 % (ref 12–49)
MCH RBC QN AUTO: 26.7 PG (ref 26–34)
MCHC RBC AUTO-ENTMCNC: 32 G/DL (ref 30–36.5)
MCV RBC AUTO: 83.4 FL (ref 80–99)
MONOCYTES # BLD: 0.9 K/UL (ref 0–1)
MONOCYTES NFR BLD: 9 % (ref 5–13)
NEUTS SEG # BLD: 7.4 K/UL (ref 1.8–8)
NEUTS SEG NFR BLD: 78 % (ref 32–75)
NITRITE UR QL STRIP.AUTO: NEGATIVE
NRBC # BLD: 0 K/UL (ref 0–0.01)
NRBC BLD-RTO: 0 PER 100 WBC
P-R INTERVAL, ECG05: 164 MS
PH UR STRIP: 5 [PH] (ref 5–8)
PLATELET # BLD AUTO: 395 K/UL (ref 150–400)
PMV BLD AUTO: 10.3 FL (ref 8.9–12.9)
POTASSIUM SERPL-SCNC: 4.2 MMOL/L (ref 3.5–5.1)
PROT SERPL-MCNC: 8.4 G/DL (ref 6.4–8.2)
PROT UR STRIP-MCNC: NEGATIVE MG/DL
Q-T INTERVAL, ECG07: 374 MS
QRS DURATION, ECG06: 86 MS
QTC CALCULATION (BEZET), ECG08: 441 MS
RBC # BLD AUTO: 3.86 M/UL (ref 4.1–5.7)
RBC #/AREA URNS HPF: ABNORMAL /HPF (ref 0–5)
SAMPLES BEING HELD,HOLD: NORMAL
SODIUM SERPL-SCNC: 133 MMOL/L (ref 136–145)
SP GR UR REFRACTOMETRY: 1.01 (ref 1–1.03)
TROPONIN I SERPL-MCNC: 0.07 NG/ML
UA: UC IF INDICATED,UAUC: ABNORMAL
UROBILINOGEN UR QL STRIP.AUTO: 1 EU/DL (ref 0.2–1)
VENTRICULAR RATE, ECG03: 84 BPM
WBC # BLD AUTO: 9.5 K/UL (ref 4.1–11.1)
WBC URNS QL MICRO: ABNORMAL /HPF (ref 0–4)

## 2019-02-27 PROCEDURE — 82550 ASSAY OF CK (CPK): CPT

## 2019-02-27 PROCEDURE — 36415 COLL VENOUS BLD VENIPUNCTURE: CPT

## 2019-02-27 PROCEDURE — 96365 THER/PROPH/DIAG IV INF INIT: CPT

## 2019-02-27 PROCEDURE — 74174 CTA ABD&PLVS W/CONTRAST: CPT

## 2019-02-27 PROCEDURE — 71045 X-RAY EXAM CHEST 1 VIEW: CPT

## 2019-02-27 PROCEDURE — 96374 THER/PROPH/DIAG INJ IV PUSH: CPT

## 2019-02-27 PROCEDURE — 74011636320 HC RX REV CODE- 636/320: Performed by: EMERGENCY MEDICINE

## 2019-02-27 PROCEDURE — 83605 ASSAY OF LACTIC ACID: CPT

## 2019-02-27 PROCEDURE — 99218 HC RM OBSERVATION: CPT

## 2019-02-27 PROCEDURE — 85025 COMPLETE CBC W/AUTO DIFF WBC: CPT

## 2019-02-27 PROCEDURE — 81001 URINALYSIS AUTO W/SCOPE: CPT

## 2019-02-27 PROCEDURE — 74011250636 HC RX REV CODE- 250/636: Performed by: EMERGENCY MEDICINE

## 2019-02-27 PROCEDURE — 96361 HYDRATE IV INFUSION ADD-ON: CPT

## 2019-02-27 PROCEDURE — 80053 COMPREHEN METABOLIC PANEL: CPT

## 2019-02-27 PROCEDURE — 74011250637 HC RX REV CODE- 250/637: Performed by: NURSE PRACTITIONER

## 2019-02-27 PROCEDURE — 96366 THER/PROPH/DIAG IV INF ADDON: CPT

## 2019-02-27 PROCEDURE — 74011000250 HC RX REV CODE- 250: Performed by: EMERGENCY MEDICINE

## 2019-02-27 PROCEDURE — 93005 ELECTROCARDIOGRAM TRACING: CPT

## 2019-02-27 PROCEDURE — 71275 CT ANGIOGRAPHY CHEST: CPT

## 2019-02-27 PROCEDURE — 96375 TX/PRO/DX INJ NEW DRUG ADDON: CPT

## 2019-02-27 PROCEDURE — 84484 ASSAY OF TROPONIN QUANT: CPT

## 2019-02-27 PROCEDURE — 99283 EMERGENCY DEPT VISIT LOW MDM: CPT

## 2019-02-27 RX ORDER — PRAVASTATIN SODIUM 10 MG/1
20 TABLET ORAL
Status: DISCONTINUED | OUTPATIENT
Start: 2019-02-27 | End: 2019-02-28 | Stop reason: HOSPADM

## 2019-02-27 RX ORDER — SODIUM CHLORIDE 0.9 % (FLUSH) 0.9 %
10 SYRINGE (ML) INJECTION
Status: COMPLETED | OUTPATIENT
Start: 2019-02-27 | End: 2019-02-27

## 2019-02-27 RX ORDER — SODIUM CHLORIDE 9 MG/ML
50 INJECTION, SOLUTION INTRAVENOUS CONTINUOUS
Status: DISCONTINUED | OUTPATIENT
Start: 2019-02-28 | End: 2019-02-28 | Stop reason: HOSPADM

## 2019-02-27 RX ORDER — HYDRALAZINE HYDROCHLORIDE 50 MG/1
50 TABLET, FILM COATED ORAL 3 TIMES DAILY
Status: DISCONTINUED | OUTPATIENT
Start: 2019-02-27 | End: 2019-02-28 | Stop reason: HOSPADM

## 2019-02-27 RX ORDER — FAMOTIDINE 20 MG/1
40 TABLET, FILM COATED ORAL DAILY
Status: DISCONTINUED | OUTPATIENT
Start: 2019-02-28 | End: 2019-02-28 | Stop reason: HOSPADM

## 2019-02-27 RX ORDER — ISOSORBIDE MONONITRATE 30 MG/1
60 TABLET, EXTENDED RELEASE ORAL DAILY
Status: DISCONTINUED | OUTPATIENT
Start: 2019-02-28 | End: 2019-02-28 | Stop reason: HOSPADM

## 2019-02-27 RX ORDER — BUMETANIDE 1 MG/1
1 TABLET ORAL DAILY
Status: DISCONTINUED | OUTPATIENT
Start: 2019-02-28 | End: 2019-02-28 | Stop reason: HOSPADM

## 2019-02-27 RX ORDER — FAMOTIDINE 10 MG/ML
20 INJECTION INTRAVENOUS
Status: COMPLETED | OUTPATIENT
Start: 2019-02-27 | End: 2019-02-27

## 2019-02-27 RX ORDER — SODIUM BICARBONATE 1 MEQ/ML
50 SYRINGE (ML) INTRAVENOUS
Status: COMPLETED | OUTPATIENT
Start: 2019-02-27 | End: 2019-02-27

## 2019-02-27 RX ORDER — CARVEDILOL 12.5 MG/1
25 TABLET ORAL 2 TIMES DAILY WITH MEALS
Status: DISCONTINUED | OUTPATIENT
Start: 2019-02-27 | End: 2019-02-28 | Stop reason: HOSPADM

## 2019-02-27 RX ORDER — SODIUM BICARBONATE IN D5W 150/1000ML
PLASTIC BAG, INJECTION (ML) INTRAVENOUS CONTINUOUS
Status: DISPENSED | OUTPATIENT
Start: 2019-02-27 | End: 2019-02-27

## 2019-02-27 RX ORDER — LISINOPRIL 5 MG/1
10 TABLET ORAL DAILY
Status: DISCONTINUED | OUTPATIENT
Start: 2019-02-28 | End: 2019-02-28 | Stop reason: HOSPADM

## 2019-02-27 RX ORDER — SODIUM BICARBONATE 1 MEQ/ML
300 SYRINGE (ML) INTRAVENOUS
Status: DISCONTINUED | OUTPATIENT
Start: 2019-02-27 | End: 2019-02-27

## 2019-02-27 RX ORDER — HYDROMORPHONE HYDROCHLORIDE 2 MG/1
2 TABLET ORAL
Status: DISCONTINUED | OUTPATIENT
Start: 2019-02-27 | End: 2019-02-28 | Stop reason: HOSPADM

## 2019-02-27 RX ORDER — SODIUM CHLORIDE 9 MG/ML
50 INJECTION, SOLUTION INTRAVENOUS ONCE
Status: COMPLETED | OUTPATIENT
Start: 2019-02-27 | End: 2019-02-27

## 2019-02-27 RX ORDER — AMLODIPINE BESYLATE 5 MG/1
10 TABLET ORAL DAILY
Status: DISCONTINUED | OUTPATIENT
Start: 2019-02-28 | End: 2019-02-28 | Stop reason: HOSPADM

## 2019-02-27 RX ORDER — POTASSIUM CHLORIDE 750 MG/1
20 TABLET, FILM COATED, EXTENDED RELEASE ORAL DAILY
Status: DISCONTINUED | OUTPATIENT
Start: 2019-02-28 | End: 2019-02-28 | Stop reason: HOSPADM

## 2019-02-27 RX ORDER — HYDROMORPHONE HYDROCHLORIDE 1 MG/ML
1 INJECTION, SOLUTION INTRAMUSCULAR; INTRAVENOUS; SUBCUTANEOUS
Status: DISCONTINUED | OUTPATIENT
Start: 2019-02-27 | End: 2019-02-28 | Stop reason: HOSPADM

## 2019-02-27 RX ORDER — ASPIRIN 81 MG/1
81 TABLET ORAL DAILY
Status: DISCONTINUED | OUTPATIENT
Start: 2019-02-28 | End: 2019-02-28 | Stop reason: HOSPADM

## 2019-02-27 RX ADMIN — Medication: at 16:51

## 2019-02-27 RX ADMIN — CARVEDILOL 25 MG: 12.5 TABLET, FILM COATED ORAL at 18:58

## 2019-02-27 RX ADMIN — HYDROMORPHONE HYDROCHLORIDE 2 MG: 2 TABLET ORAL at 21:38

## 2019-02-27 RX ADMIN — SODIUM CHLORIDE 50 ML/HR: 900 INJECTION, SOLUTION INTRAVENOUS at 16:43

## 2019-02-27 RX ADMIN — SODIUM CHLORIDE 1000 ML: 900 INJECTION, SOLUTION INTRAVENOUS at 15:17

## 2019-02-27 RX ADMIN — IOPAMIDOL 100 ML: 755 INJECTION, SOLUTION INTRAVENOUS at 11:00

## 2019-02-27 RX ADMIN — FAMOTIDINE 20 MG: 10 INJECTION, SOLUTION INTRAVENOUS at 15:17

## 2019-02-27 RX ADMIN — Medication 10 ML: at 15:39

## 2019-02-27 RX ADMIN — PRAVASTATIN SODIUM 20 MG: 10 TABLET ORAL at 21:39

## 2019-02-27 RX ADMIN — SODIUM BICARBONATE 50 MEQ: 84 INJECTION, SOLUTION INTRAVENOUS at 18:22

## 2019-02-27 RX ADMIN — HYDRALAZINE HYDROCHLORIDE 50 MG: 50 TABLET ORAL at 21:38

## 2019-02-27 NOTE — ED PROVIDER NOTES
EMERGENCY DEPARTMENT HISTORY AND PHYSICAL EXAM      Date: 2/27/2019  Patient Name: Alexus Roche    History of Presenting Illness     Chief Complaint   Patient presents with    Abdominal Pain     Pain started in abd since 0100 last night, denies N/V/D.  Chest Pain     Pain at mid chest started with abd pain at 0100. Reports discharged yesterday from ED [facility] for stent placement       History Provided By: Patient    HPI: Alexus Roche, 53 y.o. male with PMHx significant for HTN, recent , presents ambulatory to the ED with cc of sharp lower abd and chest pain since 1:00 AM today. Pt reports recent cardiovascular surgery with Dr. Ernie Woodson for thoracic aneurysm repair. Pt reports that he was discharged from the hospital yesterday, and felt okay. However, he notes the sudden onset of the pain this morning, which woke him up at 1:00 AM. Since then, pt reports drinking ginger ale and states that the pain has somewhat subsided. Pt denies eating any spicy or abrupt changes in eating habits. Pt superficially denies lightheadedness and SOB. There are no other complaints, changes, or physical findings at this time. PCP: None    No current facility-administered medications on file prior to encounter. Current Outpatient Medications on File Prior to Encounter   Medication Sig Dispense Refill    amLODIPine (NORVASC) 10 mg tablet Take 1 Tab by mouth daily. 30 Tab 2    aspirin delayed-release 81 mg tablet Take 1 Tab by mouth daily. 100 Tab 0    bumetanide (BUMEX) 1 mg tablet Take 1 Tab by mouth daily. 30 Tab 2    carvedilol (COREG) 25 mg tablet Take 1 Tab by mouth two (2) times daily (with meals). 60 Tab 2    hydrALAZINE (APRESOLINE) 50 mg tablet Take 1 Tab by mouth three (3) times daily. 90 Tab 2    isosorbide mononitrate ER (IMDUR) 60 mg CR tablet Take 1 Tab by mouth daily. 30 Tab 2    lisinopril (PRINIVIL, ZESTRIL) 10 mg tablet Take 1 Tab by mouth daily.  30 Tab 2    famotidine (PEPCID) 40 mg tablet Take 1 Tab by mouth daily. 30 Tab 2    potassium chloride SR (K-TAB) 20 mEq tablet Take 1 Tab by mouth daily. 30 Tab 2    pravastatin (PRAVACHOL) 20 mg tablet Take 1 Tab by mouth nightly. 30 Tab 0    HYDROmorphone (DILAUDID) 2 mg tablet Take 1 Tab by mouth every four (4) hours as needed for Pain. Max Daily Amount: 12 mg. 20 Tab 0       Past History     Past Medical History:  Past Medical History:   Diagnosis Date    Foot fracture     Hypertension     Jaw fracture ([address]     Ruptured ear drum     Thumb fracture        Past Surgical History:  Past Surgical History:   Procedure Laterality Date    HX APPENDECTOMY      HX ORTHOPAEDIC         Family History:  Family History   Problem Relation Age of Onset    Diabetes Mother        Social History:  Social History     Tobacco Use    Smoking status: Never Smoker    Smokeless tobacco: Never Used   Substance Use Topics    Alcohol use: No    Drug use: No       Allergies:  No Known Allergies      Review of Systems   Review of Systems   Constitutional: Negative for activity change, appetite change, chills, fever and unexpected weight change. HENT: Negative for congestion. Eyes: Negative for pain and visual disturbance. Respiratory: Negative for cough and shortness of breath. Cardiovascular: Positive for chest pain. Gastrointestinal: Positive for abdominal pain (lower). Negative for diarrhea, nausea and vomiting. Genitourinary: Negative for dysuria. Musculoskeletal: Negative for back pain. Skin: Negative for rash. Neurological: Negative for headaches. Physical Exam   Physical Exam   Constitutional: He is oriented to person, place, and time. He appears well-developed and well-nourished. Well appearing middle-aged male in mild distress   HENT:   Head: Normocephalic and atraumatic. Mouth/Throat: Oropharynx is clear and moist.   Eyes: Conjunctivae and EOM are normal. Pupils are equal, round, and reactive to light. Right eye exhibits no discharge.  Left eye exhibits no discharge. Neck: Normal range of motion. Neck supple. Cardiovascular: Normal rate, regular rhythm and normal heart sounds. No murmur heard. No peripheral edema   Pulmonary/Chest: Effort normal and breath sounds normal. No respiratory distress. He has no wheezes. He has no rales. 4 inch incision in the left chest wall staples in place without surrounding edema, erythema, or tenderness   Abdominal: Soft. He exhibits no distension. Bowel sounds are increased. There is no tenderness. No evidence of palpable abd mass   Musculoskeletal: Normal range of motion. He exhibits no edema. Neurological: He is alert and oriented to person, place, and time. No cranial nerve deficit. He exhibits normal muscle tone. Skin: Skin is warm and dry. No rash noted. He is not diaphoretic. Nursing note and vitals reviewed. Diagnostic Study Results     Labs -     No results found for this or any previous visit (from the past 12 hour(s)). Radiologic Studies -   CTA CHEST W OR W WO CONT   Final Result   IMPRESSION:      There is been interval placement of thoracic aortic stent graft into the   posterior dissection which extends from the thoracic arch throughout the aorta   into the common iliac arteries bilaterally. The origin of the left subclavian artery has been embolized. There is a patent   left carotid to left subclavian graft. With placement of the stent graft there is considerable improvement in the   caliber of the true lumen within the abdomen with patency of celiac, SMA and   right renal artery. The left renal artery has been stented. CTA ABDOMEN PELV W CONT   Final Result   IMPRESSION:      There is been interval placement of thoracic aortic stent graft into the   posterior dissection which extends from the thoracic arch throughout the aorta   into the common iliac arteries bilaterally. The origin of the left subclavian artery has been embolized.  There is a patent   left carotid to left subclavian graft. With placement of the stent graft there is considerable improvement in the   caliber of the true lumen within the abdomen with patency of celiac, SMA and   right renal artery. The left renal artery has been stented. XR CHEST PORT   Final Result   IMPRESSION: No acute abnormality. CT Results  (Last 48 hours)    None        CXR Results  (Last 48 hours)    None            Medical Decision Making   I am the first provider for this patient. I reviewed the vital signs, available nursing notes, past medical history, past surgical history, family history and social history. Vital Signs-Reviewed the patient's vital signs. No data found. Pulse Oximetry Analysis - 97% on RA    Cardiac Monitor:   Rate: 84 bpm  Rhythm: Normal Sinus Rhythm     EKG interpretation: (Preliminary) 1253  Rhythm: normal sinus rhythm; and regular . Rate (approx.): 84 bpm; Axis: normal; MI interval: normal; QRS interval: normal ; ST/T wave: normal;    Records Reviewed: Nursing Notes and Old Medical Records    Provider Notes (Medical Decision Making):   Post-operative endovascular pt with both chest and abd pain concerning for possible complication will discuss with Dr. Milena Orozco for further recommendations as well as initiate CT to evaluate for complication. ED Course:   Initial assessment performed. The patients presenting problems have been discussed, and they are in agreement with the care plan formulated and outlined with them. I have encouraged them to ask questions as they arise throughout their visit. CONSULT NOTE:   3:24 PM  Bruna Ruffin MD spoke with Dr. Milena Orozco,   Specialty: Cardiovascular Surgery  Discussed pt's hx, disposition, and available diagnostic and imaging results. Reviewed care plans. Consultant agrees with plans as outlined. Consultant will admit the pt. Consultant wishes bicarb drip for elevated creatinine, and wishes CT chest and abd/pelvis. Consultant wishes pt in observation and will come see him in the hospital.  Written by Hina Perez, ED Scribe, as dictated by Arleen Galloway MD.       Critical Care Time:   None    Disposition:  ADMIT NOTE:  7:26 PM  The patient is being admitted to the hospital by Dr. Annie Gayle. The results of their tests and reasons for their admission have been discussed with the patient and/or available family. They convey agreement and understanding for the need to be admitted and for their admission diagnosis. PLAN:  1. Admit by Dr. Annie Gayle    Diagnosis     Clinical Impression:   1. Chest pain, unspecified type    2. Abdominal pain, epigastric        Attestations: This note is prepared by Hina Perez, acting as Scribe for Arleen Galloway MD.    Arleen Galloway MD: The scribe's documentation has been prepared under my direction and personally reviewed by me in its entirety. I confirm that the note above accurately reflects all work, treatment, procedures, and medical decision making performed by me.

## 2019-02-27 NOTE — ED NOTES
Pt reports this morning he felt pain in the abdomen that felt like he had on 2/14/19. Pain is near navel. Last BM yesterday. (+) flatus today. Pt did eat cereal today. Last took pain medication around 0930 Today  Pt denies nausea or vomiting.

## 2019-02-27 NOTE — H&P
Vascular Surgery History & Physical    Subjective:     Mr. Mikayla Crystal is a 54 yo AAM with a pmhx significant for uncontrolled hypertension. Kat Merritt presented to the hospital with complaint of acute CP while having intercourse. Kat Merritt had associated nausea and vomiting.  On arrival he was profoundly hypertensive as high as 223/111.  CTA of the chest was + for a type B aortic dissection.  He was admitted to the ICU and initiated on medical management.  Despite this his creatinine and lactic acid continued to rise.  Repeat CTA of the chest, abd, and pelvis later the same day showed progression of his dissection and he was taken for an emergent TEVAR on 02/15/2019 with left carotid to subclavian bypass, embolization of left subclavian artery, and left renal artery stenting.  His post procedure course was complicated by acute hypoxic respiratory failure due to volume overload, MARY, chest pain associated with hypertension, lower back pain, and fever.  He was discharged from the hospital yesterday. Prior to discharge respiratory failure had resolved. Kat Merritt was weaned from oxygen. His creatinine remained elevated but stable and he was tolerating oral diuresis.  He continued to have intermittent chest pain associated with activity and hypertension. Evaluation for ACS was unremarkable. Repeat CT of the chest, abd, and pelvis on 02/24/2019 was stable. His back pain has improved.  He was followed throughout admission by cardiology. He continued to have intermittent fevers with a normal WBC, CXR, UA and blood cultures were unremarkable. This was attributed to renal infarction. He complete a course of IV Zosyn while admitted. He represent to the hospital this am after being awoke by severe abd pain at 1am on the day of presentation. His pain radiated to the chest.  His last BM was yesterday. His pain persisted and he presented to the emergency room for evaluation. CTA of the abd and pelvis revealed a stable stent graft.    This am he reports that he believes it was indigestion after eating hamburger helper as he had a lot of \"belching\" associated with the episodes and the pain improved with Tums. Past Medical History    Type B aortic dissection    Renal artery stenosis    Hypertensive urgency    LVH   Bilateral pleural effusions   Renal infarction   CKD stage II-III  Anemia   Constipation       Past Surgical History:   Procedure Laterality Date    HX APPENDECTOMY      HX ORTHOPAEDIC       Family History   Problem Relation Age of Onset    Diabetes Mother       Social History     Tobacco Use    Smoking status: Never Smoker    Smokeless tobacco: Never Used   Substance Use Topics    Alcohol use: No       Prior to Admission medications    Medication Sig Start Date End Date Taking? Authorizing Provider   amLODIPine (NORVASC) 10 mg tablet Take 1 Tab by mouth daily. 2/26/19   Devin Goodwin NP   aspirin delayed-release 81 mg tablet Take 1 Tab by mouth daily. 2/26/19   Gt HANSEN NP   bumetanide (BUMEX) 1 mg tablet Take 1 Tab by mouth daily. 2/26/19   Devin Goodwin NP   carvedilol (COREG) 25 mg tablet Take 1 Tab by mouth two (2) times daily (with meals). 2/26/19   Gt HANSEN NP   hydrALAZINE (APRESOLINE) 50 mg tablet Take 1 Tab by mouth three (3) times daily. 2/26/19   Devin Goodwin NP   isosorbide mononitrate ER (IMDUR) 60 mg CR tablet Take 1 Tab by mouth daily. 2/26/19   Gt HANSEN NP   lisinopril (PRINIVIL, ZESTRIL) 10 mg tablet Take 1 Tab by mouth daily. 2/26/19   Devin Goodwin NP   famotidine (PEPCID) 40 mg tablet Take 1 Tab by mouth daily. 2/26/19   Gt HANSEN NP   potassium chloride SR (K-TAB) 20 mEq tablet Take 1 Tab by mouth daily. 2/26/19   Gt HANSEN NP   pravastatin (PRAVACHOL) 20 mg tablet Take 1 Tab by mouth nightly. 2/26/19   Gt HANSEN NP   HYDROmorphone (DILAUDID) 2 mg tablet Take 1 Tab by mouth every four (4) hours as needed for Pain.  Max Daily Amount: 12 mg. 2/25/19   Alexia Miner NP     No Known Allergies     Review of Systems   Constitutional: Positive for activity change, fatigue and fever. Negative for chills and diaphoresis. HENT: Negative. Eyes: Negative. Respiratory: Positive for chest tightness. Negative for cough and shortness of breath. Cardiovascular: Positive for chest pain. Negative for palpitations and leg swelling. Gastrointestinal: Negative for nausea and vomiting. Endocrine: Negative for polydipsia and polyuria. Genitourinary: Negative. Musculoskeletal: Negative for gait problem. Skin: Positive for wound. Allergic/Immunologic: Negative. Neurological: Negative for weakness. Hematological: Negative. Psychiatric/Behavioral: Negative. Objective:     Patient Vitals for the past 24 hrs:   BP Temp Pulse Resp SpO2 Height Weight   02/27/19 1812 -- -- 87 -- 97 % -- --   02/27/19 1735 -- -- 88 -- 97 % -- --   02/27/19 1730 (!) 130/92 -- 87 21 97 % -- --   02/27/19 1654 -- -- 83 -- 97 % -- --   02/27/19 1649 -- -- 83 -- 97 % -- --   02/27/19 1642 (!) 132/98 -- -- -- -- -- --   02/27/19 1252 155/85 98 °F (36.7 °C) 84 16 97 % 6' 5\" (1.956 m) 103.9 kg (229 lb 0.9 oz)     Physical Exam  Constitutional: He is oriented to person, place, and time. He appears well-developed and well-nourished. HENT:   Head: Normocephalic. Eyes: Pupils are equal, round, and reactive to light. Neck: Normal range of motion.    Cardiovascular: RRR  Pulmonary/Chest: No accessory muscle usage. No tachypnea. No respiratory distress.   Abdominal: Soft. He exhibits no distension. Musculoskeletal: Normal range of motion. Skin: Groin incision dry and intact with dermabond. Staples to the left scapula dry and intact. Multiple tattoos.    Psychiatric: He has a normal mood and affect.  His behavior is normal. Thought content normal.     Data Review:   Recent Results (from the past 24 hour(s))   EKG, 12 LEAD, INITIAL    Collection Time: 02/27/19 12:53 PM   Result Value Ref Range    Ventricular Rate 84 BPM    Atrial Rate 84 BPM    P-R Interval 164 ms    QRS Duration 86 ms    Q-T Interval 374 ms    QTC Calculation (Bezet) 441 ms    Calculated P Axis 53 degrees    Calculated R Axis 29 degrees    Calculated T Axis 32 degrees    Diagnosis       Normal sinus rhythm  Possible Left atrial enlargement  Confirmed by Krista Cardoza ([id number]) on 2/27/2019 3:56:17 PM     CBC WITH AUTOMATED DIFF    Collection Time: 02/27/19  1:00 PM   Result Value Ref Range    WBC 9.5 4.1 - 11.1 K/uL    RBC 3.86 (L) 4.10 - 5.70 M/uL    HGB 10.3 (L) 12.1 - 17.0 g/dL    HCT 32.2 (L) 36.6 - 50.3 %    MCV 83.4 80.0 - 99.0 FL    MCH 26.7 26.0 - 34.0 PG    MCHC 32.0 30.0 - 36.5 g/dL    RDW 14.8 (H) 11.5 - 14.5 %    PLATELET 443 661 - 362 K/uL    MPV 10.3 8.9 - 12.9 FL    NRBC 0.0 0  WBC    ABSOLUTE NRBC 0.00 0.00 - 0.01 K/uL    NEUTROPHILS 78 (H) 32 - 75 %    LYMPHOCYTES 12 12 - 49 %    MONOCYTES 9 5 - 13 %    EOSINOPHILS 1 0 - 7 %    BASOPHILS 0 0 - 1 %    IMMATURE GRANULOCYTES 1 (H) 0.0 - 0.5 %    ABS. NEUTROPHILS 7.4 1.8 - 8.0 K/UL    ABS. LYMPHOCYTES 1.1 0.8 - 3.5 K/UL    ABS. MONOCYTES 0.9 0.0 - 1.0 K/UL    ABS. EOSINOPHILS 0.1 0.0 - 0.4 K/UL    ABS. BASOPHILS 0.0 0.0 - 0.1 K/UL    ABS. IMM. GRANS. 0.1 (H) 0.00 - 0.04 K/UL    DF AUTOMATED     METABOLIC PANEL, COMPREHENSIVE    Collection Time: 02/27/19  1:00 PM   Result Value Ref Range    Sodium 133 (L) 136 - 145 mmol/L    Potassium 4.2 3.5 - 5.1 mmol/L    Chloride 97 97 - 108 mmol/L    CO2 27 21 - 32 mmol/L    Anion gap 9 5 - 15 mmol/L    Glucose 175 (H) 65 - 100 mg/dL    BUN 28 (H) 6 - 20 MG/DL    Creatinine 1.77 (H) 0.70 - 1.30 MG/DL    BUN/Creatinine ratio 16 12 - 20      GFR est AA 50 (L) >60 ml/min/1.73m2    GFR est non-AA 41 (L) >60 ml/min/1.73m2    Calcium 8.8 8.5 - 10.1 MG/DL    Bilirubin, total 0.5 0.2 - 1.0 MG/DL    ALT (SGPT) 66 12 - 78 U/L    AST (SGOT) 21 15 - 37 U/L    Alk.  phosphatase 123 (H) 45 - 117 U/L Protein, total 8.4 (H) 6.4 - 8.2 g/dL    Albumin 2.9 (L) 3.5 - 5.0 g/dL    Globulin 5.5 (H) 2.0 - 4.0 g/dL    A-G Ratio 0.5 (L) 1.1 - 2.2     CK W/ REFLX CKMB    Collection Time: 02/27/19  1:00 PM   Result Value Ref Range    CK 56 39 - 308 U/L   TROPONIN I    Collection Time: 02/27/19  1:00 PM   Result Value Ref Range    Troponin-I, Qt. 0.07 (H) <0.05 ng/mL   SAMPLES BEING HELD    Collection Time: 02/27/19  1:00 PM   Result Value Ref Range    SAMPLES BEING HELD [id number]     COMMENT        Add-on orders for these samples will be processed based on acceptable specimen integrity and analyte stability, which may vary by analyte. URINALYSIS W/ REFLEX CULTURE    Collection Time: 02/27/19  1:18 PM   Result Value Ref Range    Color YELLOW/STRAW      Appearance CLEAR CLEAR      Specific gravity 1.011 1.003 - 1.030      pH (UA) 5.0 5.0 - 8.0      Protein NEGATIVE  NEG mg/dL    Glucose NEGATIVE  NEG mg/dL    Ketone NEGATIVE  NEG mg/dL    Bilirubin NEGATIVE  NEG      Blood NEGATIVE  NEG      Urobilinogen 1.0 0.2 - 1.0 EU/dL    Nitrites NEGATIVE  NEG      Leukocyte Esterase NEGATIVE  NEG      WBC 0-4 0 - 4 /hpf    RBC 0-5 0 - 5 /hpf    Epithelial cells FEW FEW /lpf    Bacteria NEGATIVE  NEG /hpf    UA:UC IF INDICATED NO INTERPRETATION AVAILABLE (A) CNI      Hyaline cast 2-5 0 - 5 /lpf       Assessment/Plan:     Principal problems  Abd pain with radiation to the chest  -troponin normal  Type B aortic dissection   -s/p TEVAR, left subclavian embolization, and left carotid to subclavian bypass 02/15/19  Renal artery stenosis  -s/p left renal artery stenting 02/15/2019  Hypertension  LVH     BP continues to be labile. Continue current regimen. Afebrile. CTA of the abd and pelvis unremarkable.  Cardiology consult today for possible stress test.       Active problems  Diabetes Mellitus II  -new diagnosis   -HA1c 6.7  - diabetes education      Bilateral pleural effusions  -contine oral diuresis  -f/u for outpatient sleep study    Renal infarction   CKD stage III  -stable despite CTA       Anemia of acute blood loss  -stable     Constipation  -denies nausea, and vomiting   -continue BM regimen.      VTE prophylaxis:  SCDs     Disposition:   Likely will discharge home once cleared by cardiologist.

## 2019-02-27 NOTE — ED NOTES
Pt concerned about doing IV dye due to was told he could not do the dye due to kidney issues. MD Chung made aware and she has discussed case with CT.

## 2019-02-28 ENCOUNTER — APPOINTMENT (OUTPATIENT)
Dept: NON INVASIVE DIAGNOSTICS | Age: 49
End: 2019-02-28
Attending: INTERNAL MEDICINE
Payer: MEDICAID

## 2019-02-28 ENCOUNTER — APPOINTMENT (OUTPATIENT)
Dept: NUCLEAR MEDICINE | Age: 49
End: 2019-02-28
Attending: INTERNAL MEDICINE
Payer: MEDICAID

## 2019-02-28 VITALS
TEMPERATURE: 98.9 F | HEART RATE: 84 BPM | OXYGEN SATURATION: 97 % | SYSTOLIC BLOOD PRESSURE: 121 MMHG | DIASTOLIC BLOOD PRESSURE: 75 MMHG | HEIGHT: 77 IN | BODY MASS INDEX: 27.05 KG/M2 | RESPIRATION RATE: 16 BRPM | WEIGHT: 229.06 LBS

## 2019-02-28 LAB
ANION GAP SERPL CALC-SCNC: 9 MMOL/L (ref 5–15)
BUN SERPL-MCNC: 25 MG/DL (ref 6–20)
BUN/CREAT SERPL: 15 (ref 12–20)
CALCIUM SERPL-MCNC: 8.5 MG/DL (ref 8.5–10.1)
CHLORIDE SERPL-SCNC: 98 MMOL/L (ref 97–108)
CO2 SERPL-SCNC: 30 MMOL/L (ref 21–32)
CREAT SERPL-MCNC: 1.72 MG/DL (ref 0.7–1.3)
ERYTHROCYTE [DISTWIDTH] IN BLOOD BY AUTOMATED COUNT: 14.7 % (ref 11.5–14.5)
EST. AVERAGE GLUCOSE BLD GHB EST-MCNC: 146 MG/DL
GLUCOSE SERPL-MCNC: 124 MG/DL (ref 65–100)
HBA1C MFR BLD: 6.7 % (ref 4.2–6.3)
HCT VFR BLD AUTO: 29 % (ref 36.6–50.3)
HGB BLD-MCNC: 9.2 G/DL (ref 12.1–17)
MCH RBC QN AUTO: 26.5 PG (ref 26–34)
MCHC RBC AUTO-ENTMCNC: 31.7 G/DL (ref 30–36.5)
MCV RBC AUTO: 83.6 FL (ref 80–99)
NRBC # BLD: 0 K/UL (ref 0–0.01)
NRBC BLD-RTO: 0 PER 100 WBC
PLATELET # BLD AUTO: 355 K/UL (ref 150–400)
PMV BLD AUTO: 10.7 FL (ref 8.9–12.9)
POTASSIUM SERPL-SCNC: 4 MMOL/L (ref 3.5–5.1)
RBC # BLD AUTO: 3.47 M/UL (ref 4.1–5.7)
SODIUM SERPL-SCNC: 137 MMOL/L (ref 136–145)
STRESS BASELINE HR: 79 BPM
STRESS ESTIMATED WORKLOAD: 1 METS
STRESS EXERCISE DUR MIN: NORMAL
STRESS PEAK DIAS BP: 79 MMHG
STRESS PEAK SYS BP: 129 MMHG
STRESS PERCENT HR ACHIEVED: 65 %
STRESS POST PEAK HR: 96 BPM
STRESS RATE PRESSURE PRODUCT: NORMAL BPM*MMHG
STRESS ST DEPRESSION: 0 MM
STRESS ST ELEVATION: 0 MM
STRESS TARGET HR: 147 BPM
WBC # BLD AUTO: 8.2 K/UL (ref 4.1–11.1)

## 2019-02-28 PROCEDURE — 80048 BASIC METABOLIC PNL TOTAL CA: CPT

## 2019-02-28 PROCEDURE — 85027 COMPLETE CBC AUTOMATED: CPT

## 2019-02-28 PROCEDURE — 93017 CV STRESS TEST TRACING ONLY: CPT

## 2019-02-28 PROCEDURE — 74011250637 HC RX REV CODE- 250/637: Performed by: NURSE PRACTITIONER

## 2019-02-28 PROCEDURE — A9500 TC99M SESTAMIBI: HCPCS

## 2019-02-28 PROCEDURE — 74011250636 HC RX REV CODE- 250/636: Performed by: INTERNAL MEDICINE

## 2019-02-28 PROCEDURE — 36415 COLL VENOUS BLD VENIPUNCTURE: CPT

## 2019-02-28 PROCEDURE — 99218 HC RM OBSERVATION: CPT

## 2019-02-28 PROCEDURE — 83036 HEMOGLOBIN GLYCOSYLATED A1C: CPT

## 2019-02-28 RX ORDER — SODIUM CHLORIDE 0.9 % (FLUSH) 0.9 %
10 SYRINGE (ML) INJECTION AS NEEDED
Status: DISCONTINUED | OUTPATIENT
Start: 2019-02-28 | End: 2019-02-28 | Stop reason: HOSPADM

## 2019-02-28 RX ADMIN — ISOSORBIDE MONONITRATE 60 MG: 30 TABLET, EXTENDED RELEASE ORAL at 09:43

## 2019-02-28 RX ADMIN — REGADENOSON 0.4 MG: 0.08 INJECTION, SOLUTION INTRAVENOUS at 12:25

## 2019-02-28 RX ADMIN — HYDRALAZINE HYDROCHLORIDE 50 MG: 50 TABLET ORAL at 16:00

## 2019-02-28 RX ADMIN — BUMETANIDE 1 MG: 1 TABLET ORAL at 09:00

## 2019-02-28 RX ADMIN — Medication 10 ML: at 12:25

## 2019-02-28 RX ADMIN — CARVEDILOL 25 MG: 12.5 TABLET, FILM COATED ORAL at 08:00

## 2019-02-28 RX ADMIN — CARVEDILOL 25 MG: 12.5 TABLET, FILM COATED ORAL at 17:00

## 2019-02-28 RX ADMIN — HYDRALAZINE HYDROCHLORIDE 50 MG: 50 TABLET ORAL at 09:43

## 2019-02-28 RX ADMIN — AMLODIPINE BESYLATE 10 MG: 5 TABLET ORAL at 09:00

## 2019-02-28 RX ADMIN — POTASSIUM CHLORIDE 20 MEQ: 10 TABLET, FILM COATED, EXTENDED RELEASE ORAL at 09:00

## 2019-02-28 RX ADMIN — ASPIRIN 81 MG: 81 TABLET, DELAYED RELEASE ORAL at 09:00

## 2019-02-28 RX ADMIN — LISINOPRIL 10 MG: 5 TABLET ORAL at 09:44

## 2019-02-28 RX ADMIN — FAMOTIDINE 40 MG: 20 TABLET, FILM COATED ORAL at 09:00

## 2019-02-28 NOTE — DISCHARGE INSTRUCTIONS
Patient Discharge Instructions    Rossy Lindsay / [id number] : 1970    Admitted 2019 Discharged: 2019     Take Home Medications       · It is important that you take the medication exactly as they are prescribed. · Keep your medication in the bottles provided by the pharmacist and keep a list of the medication names, dosages, and times to be taken in your wallet. · Do not take other medications without consulting your doctor. What to do at [address] [place] [address] [place]: None needed    Recommended diet: AHA    Recommended activity: As Tolerated. No Strenuous activity or heavy lifting    If you experience any of the following symptoms severe, chest, back, or abdominal pain, please go to ER. Follow-up with Dr Loyda Cerrato in 3-4 weeks [phone]        Information obtained by :  I understand that if any problems occur once I am at home I am to contact my physician. I understand and acknowledge receipt of the instructions indicated above.                                                                                                                                            Physician's or R.N.'s Signature                                                                  Date/Time                                                                                                                                              Patient or Representative Signature                                                          Date/Time

## 2019-02-28 NOTE — PROGRESS NOTES
Oncology End of Shift Note      Bedside shift change report given to GEOFF Graves (incoming nurse) by Pedro Day RN (outgoing nurse) on [facility]. Report included the following information SBAR and Kardex. Shift Summary: pt rested with no complaints      Issues for Physician to Address:  discharge     Patient on Cardiac Monitoring?    [] Yes  [x] No    Rhythm:          Shift Events      Pedro Day RN

## 2019-02-28 NOTE — DIABETES MGMT
DTC Consult Note    Recommendations/ Comments: If appropriate, please consider poc glucose checks and [employer] insulin with high sensitivity    Current hospital DM medication: none    Consult received for:     [x]             New diagnosis         Chart reviewed and initial evaluation complete on [facility] Jovel. Met with patient and SO. Patient laying on couch on SO lap during teaching. Patient did not open eyes during conversation. SO states patient's mother with diabetes. States that patient does occasionally skip meals and will indulge in sweet tea. Patient is a 53 y.o. male with new diagnosis of diabetes at home. BG monitoring at home- provided patient with AccuChek Guide meter as the strips are available with a low copay without insurance. Medicaid pending per patient. Assessed and instructed patient on the following:   ·  interpretation of lab results, blood sugar goals, complications of diabetes mellitus, exercise, SMBG skills, nutrition and referred to Diabetes Educator    Provided patient with the following: [x]             Survival skills education materials               []             Insulin education materials               [x]             CHO counting education materials               [x]             Outpatient DTC contact number               [x]             Glucometer               Discussed with patient and/or family need for follow up appointment for diabetes management after discharge.      Wife/patient declined teaching of glucometer- \"his mother has diabetes and he knows how to use it.'     A1c:   Lab Results   Component Value Date/Time    Hemoglobin A1c 6.7 (H) 02/28/2019 03:33 AM       Recent Glucose Results:   Lab Results   Component Value Date/Time     (H) 02/28/2019 03:33 AM        Lab Results   Component Value Date/Time    Creatinine 1.72 (H) 02/28/2019 03:33 AM     Estimated Creatinine Clearance: 66.9 mL/min (A) (based on SCr of 1.72 mg/dL (H)).    Active Orders   Diet    DIET DIABETIC CONSISTENT CARB Regular; AHA-LOW-CHOL FAT        PO intake: No data found. Will continue to follow as needed. Thank you.   Luz Marina Aburto RD, [id number]  Office:  [phone]        Time spent: 45 minutes

## 2019-02-28 NOTE — PROGRESS NOTES
Patient being discharged home  Follow up appointment made  IV removed  Will follow up with Dr. Jeannette Hernandez in 3-4wks  Opportunity for questions and clarification provided

## 2019-02-28 NOTE — PROGRESS NOTES
NUC MED: Lexiscan stress in progress. Pt to return to [facility] @ 2:00pm for images. Please check with Physician regarding pt diet.

## 2019-02-28 NOTE — PROGRESS NOTES
TRANSFER - IN REPORT:    Verbal report received from Cynthia(name) on Haydee Worley  being received from ED(unit) for routine progression of care      Report consisted of patients Situation, Background, Assessment and   Recommendations(SBAR). Information from the following report(s) SBAR, Kardex, Intake/Output, MAR and Recent Results was reviewed with the receiving nurse. Opportunity for questions and clarification was provided. Assessment completed upon patients arrival to unit and care assumed.

## 2019-02-28 NOTE — PROGRESS NOTES
Progress Note      2/28/2019 9:40 AM  NAME: Paddy Deleon   MRN:  [id number]   Admit Diagnosis: Abdominal pain [R10.9]                Assessment:       1. 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 2. No hx of CAD, equivicol troponin  3. Echo nl EF, LVH, mildly dilated ascending aorta with mild aortic regurgitation  4. Sinus with inferior and lateral TWI  5. HTN  6. Lipid ok  7. Snores, concern for sleep apnea, will need outpt sleep study  8. 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 ECG and enzymes not suggestive of ischemia. CTA ok.     No hx of CAD. Normal EF  Sinus  S/p TEVAR     1. Cont added aspirin 81mg daily  2. BP's labile, should have bp monitored ONLY in Right arm. 3. Cont added coreg 25mg bid  4. Cont added amlodipine currently on 10mg  5. Cont added hydralazine    6. Cont added imdur 60mg  7. Cont lisinopril  8. Cont added bumex 1mg  9. Cont added statin     Feeling better this AM.    Proceed with pharmacologic nuclear stress today, home if no ischemia. Cont current meds.          [x]        High complexity decision making was performed                Subjective:     Paddy Deleon denies chest pain, dyspnea. Discussed with RN events overnight. Review of Systems:    Symptom Y/N Comments  Symptom Y/N Comments   Fever/Chills N   Chest Pain N    Poor Appetite N   Edema N    Cough N   Abdominal Pain N    Sputum N   Joint Pain N    SOB/ESCOBEDO N   Pruritis/Rash N    Nausea/vomit N   Tolerating PT/OT Y    Diarrhea N   Tolerating Diet Y    Constipation N   Other       Could NOT obtain due to:      Objective:      Physical Exam:    Last 24hrs VS reviewed since prior progress note.  Most recent are:    Visit Vitals  BP (!) 157/93 (BP 1 Location: Left arm, BP Patient Position: At rest)   Pulse 76   Temp 98.9 °F (37.2 °C)   Resp 16   Ht 6' 5\" (1.956 m)   Wt 103.9 kg (229 lb 0.9 oz)   SpO2 97%   BMI 27.16 kg/m²       Intake/Output Summary (Last 24 hours) at 2/28/2019 0940  Last data filed at 2/28/2019 8924  Gross per 24 hour   Intake 1240 ml   Output --   Net 1240 ml        General Appearance: Well developed, well nourished, alert & oriented x 3,    no acute distress. Ears/Nose/Mouth/Throat: Hearing grossly normal.  Neck: Supple. Chest: Lungs clear to auscultation bilaterally. Cardiovascular: Regular rate and rhythm, S1S2 normal, no murmur. Abdomen: Soft, non-tender, bowel sounds are active. Extremities: No edema bilaterally. Skin: Warm and dry. PMH/SH reviewed - no change compared to H&P    Data Review    Telemetry: normal sinus rhythm     Lab Data Personally Reviewed:    Recent Labs     02/28/19  0333 02/27/19  1300   WBC 8.2 9.5   HGB 9.2* 10.3*   HCT 29.0* 32.2*    395     No results for input(s): INR, PTP, APTT in the last 72 hours. No lab exists for component: INREXT   Recent Labs     02/28/19  0333 02/27/19  1300 02/26/19  0217    133* 132*   K 4.0 4.2 4.1   CL 98 97 98   CO2 30 27 28   BUN 25* 28* 25*   CREA 1.72* 1.77* 2.01*   * 175* 212*   CA 8.5 8.8 7.8*     Recent Labs     02/27/19  1300 02/26/19  0217 02/25/19  1540   TROIQ 0.07* 0.09* 0.12*     Lab Results   Component Value Date/Time    Cholesterol, total 146 04/18/2018 08:58 AM    HDL Cholesterol 38 (L) 04/18/2018 08:58 AM    LDL, calculated 95 04/18/2018 08:58 AM    Triglyceride 64 04/18/2018 08:58 AM       Recent Labs     02/27/19  1300   SGOT 21   *   TP 8.4*   ALB 2.9*   GLOB 5.5*     No results for input(s): PH, PCO2, PO2 in the last 72 hours.     Medications Personally Reviewed:    Current Facility-Administered Medications   Medication Dose Route Frequency    0.9% sodium chloride infusion  50 mL/hr IntraVENous CONTINUOUS    amLODIPine (NORVASC) tablet 10 mg  10 mg Oral DAILY    aspirin delayed-release tablet 81 mg  81 mg Oral DAILY    bumetanide (BUMEX) tablet 1 mg  1 mg Oral DAILY    carvedilol (COREG) tablet 25 mg  25 mg Oral BID WITH MEALS    famotidine (PEPCID) tablet 40 mg  40 mg Oral DAILY    hydrALAZINE (APRESOLINE) tablet 50 mg  50 mg Oral TID    HYDROmorphone (DILAUDID) tablet 2 mg  2 mg Oral Q4H PRN    isosorbide mononitrate ER (IMDUR) tablet 60 mg  60 mg Oral DAILY    lisinopril (PRINIVIL, ZESTRIL) tablet 10 mg  10 mg Oral DAILY    potassium chloride SR (KLOR-CON 10) tablet 20 mEq  20 mEq Oral DAILY    pravastatin (PRAVACHOL) tablet 20 mg  20 mg Oral QHS    HYDROmorphone (PF) (DILAUDID) injection 1 mg  1 mg IntraVENous Q4H PRN         Karen Mclean MD

## 2019-02-28 NOTE — PROGRESS NOTES
Oncology End of Shift Note      Bedside shift change report given to Jamie Morales RN (incoming nurse) by Francine Rodriguez RN (outgoing nurse) on [facility]. Report included the following information SBAR, Kardex, Intake/Output, MAR and Recent Results. Shift Summary: observation patient. Issues for Physician to Address:  none     Patient on Cardiac Monitoring?     [] Yes  [x] No    Rhythm:          Shift Events        Shaye Walden RN

## 2019-02-28 NOTE — PROGRESS NOTES
Reason for Readmission:    Abdominal Pain         RRAT Score and Risk Level:    11      Level of Readmission:    Low to Moderate      Care Conference scheduled:   Care plan discussed w/patient and significant other. Resources/supports as identified by patient/family:          Top Challenges facing patient (as identified by patient/family and CM): Finances/Medication cost?     Patient is pending Medicaid approval.  Has no health insurance and/or prescription coverage. Transportation    Pt does drive. Will have family/friends transport to medical appts' upon discharge. Support system or lack thereof? Support system is family, significant other, and friends. Emergency Contact is Michael Wen @ [phone]. Living arrangements? Pt doesn't live alone, has support at home. Lives in a ranch style home, everything all on one level. Self-care/ADLs/Cognition? Pt is independent w/all ADL's & IADL's. Current Advanced Directive/Advance Care Plan:  No ACP in place. Offered for Pastoral Care to come and speak w/him-pt declined. Voiced his adult children (son/daughter) is his decision maker, and are aware of his wishes. SW reiterated the importance of having wishes in writing-pt declined offer again. Plan for utilizing home health:   Pt has no prior HH/SNF/inpatient rehab in the past.  If the recommendation is home w/HH patient is agreeable. Likelihood of additional readmission:   Low to moderate. Transition of Care Plan:    Based on readmission, the patient's previous Plan of Care   has been evaluated and/or modified. The current Transition of Care Plan is:           Pt has no PCP. Desires to establish care w/[facility] PCP office [address] [place] [address], as it's close to his home ([place], [place]).       Pt is scheduled for post hospital follow up discharge appt on March 14th, 2019 @ 10:30 w/NP Lennox Client @ [facility]. SW to consult w/NN Jakub Moran, RN @ [facility]. CM to continue to assist.      Care Management Interventions  PCP Verified by CM: Yes(Pt doesn't have an established PCP.  )  Mode of Transport at Discharge: Other (see comment)(Pt to be transported by family at time of discharge.  )  Transition of Care Consult (CM Consult): Discharge Planning(TBD.)    Elizabeth Harrell MSW  Ext [phone].

## 2019-02-28 NOTE — DIABETES MGMT
DTC- attempted to see patient for new diagnosis of diabetes. Patient currently out of her room at this time. Will return at a later time to complete education.      Roque Juarez, [address] [place] [address], [address]  Office:  [phone]

## 2019-03-01 NOTE — OP NOTES
[address]  OPERATIVE REPORT    Name:  David Johnson  MR#:  [id number]  :  1970  ACCOUNT #:  [de-identified]  DATE OF SERVICE:  02/15/2019    PREOPERATIVE DIAGNOSIS:  Complicated type B thoracic aortic dissection. POSTOPERATIVE DIAGNOSIS:  Complicated type B thoracic aortic dissection. PROCEDURES PERFORMED:  1. Percutaneous endovascular repair of thoracic aortic dissection with coverage of the left subclavian artery. 2.  Left carotid subclavian bypass. 3.  Embolization of left subclavian artery. 4.  Stenting of left renal artery. 5.  Percutaneous delivery and closure of large sheath (22-Macanese) for delivery of aortic endograft. 6.  Thoracic aortogram.  7.  Abdominal aortogram.  8.  Selective left renal and left upper extremity arteriograms. 9.  Intravascular ultrasound of the right iliac artery and thoracic and abdominal aorta    SURGEON:  Saleem Rivers MD.    ASSISTANT:  Jone Decker MD    ANESTHESIA:  General.    COMPLICATIONS:  None. SPECIMENS REMOVED:  None. DRAINS:  None. IMPLANTS:  An 8-mm Propaten [place]-Walter graft, 14 mm Amplatzer II occlusion plug, Valiant thoracic stent graft (34 x 34 x 200, 34 x 34 x 200), 7 x 40 EverFlex stent, 7 x 27 Express stent. ESTIMATED BLOOD LOSS:  200 mL. INDICATIONS:  The patient is a 56-year-old male with a history of hypertension and chronic kidney disease who presented with severe back and chest pain. CT angiogram of the chest upon presentation showed a type B dissection of the thoracic aorta. The abdominal arteries were not entirely visible on that scan. The celiac and superior mesenteric arteries appeared to arise from the true lumen and appeared to have adequate perfusion. The right renal artery appeared to have arise from the true lumen and did not appear to have flow limiting stenosis. The upper pole of the right and left kidneys had similar contrast nephrograms.   The left renal artery was not visible. A repeat CT angiogram of the abdomen could not be done at that time due to the patient's chronic kidney disease and concerns about contrast exposure. The patient was admitted and medically managed. He developed abdominal pain with some nausea and vomiting. He also developed a rapid rise in his creatinine and became severely oliguric. Due to the concern for mesenteric and renal malperfusion, a repeat CT angiogram of the chest, abdomen and pelvis was done. This showed relatively dramatic progression of the dissection with an extremely small true lumen. The dissection flap was almost occlusive in front of the orifice of both the superior mesenteric and celiac arteries. There was an extremely narrow flow channel from the true lumen to the right renal artery with obvious compromise of the perfusion of the right kidney. There was extensive dissection involving the left renal artery with apparent infarction or evolving infarction of the lower pole of the left kidney and poor perfusion of the upper pole of the left kidney. There was compromise of the perfusion to the left iliac artery with absence of left pedal pulses and NA of 0.8 on the left and 1.0 on the right. Because of the severe malperfusion, the patient was taken emergently for endovascular repair and adjuvant stenting as needed. PROCEDURE:  After informed consent was obtained, the patient was given preoperative intravenous antibiotics within 1 hour of the incision. Central venous lines and a lumbar cerebrospinal fluid drain were placed by anesthesia, and the patient was taken to the operating room. After induction of adequate general anesthesia, the left neck and clavicular area and both groins were prepped and draped as a sterile field. Attention was first directed to the left neck where an incision was made just above the clavicle and dissection was carried down through the platysma and scalene fat pad.   The anterior scalene muscle was identified. The phrenic nerve was identified just medial to the anterior scalene. The anterior scalene muscle was divided just prior to its insertion on the first rib and the underlying subclavian artery was exposed and controlled with vessel loops. The thoracic duct was identified and protected from injury. The internal jugular vein was mobilized and the common carotid artery was dissected free going anterior and medial to the internal jugular vein. The vagus nerve was identified in its usual posterolateral position and protected from injury. A small tunnel tract was created posterior to the internal jugular vein for using tunneling the graft after the bypass. The patient was systemically heparinized and an end-to-side anastomosis was created between the common carotid artery and an 8 mm Propaten [place]-Walter graft using running 5-0 [place]-Walter suture. When this was completed, it was hemostatic. There was an excellent pulse in the graft. The graft was then tunneled posterior to the jugular vein and an end-to-side anastomosis was created between the graft and the subclavian artery. This was also done with running 5-0 [place]-Walter suture. Flushing maneuvers were performed prior to completion. Once this was completed, the wound was irrigated with antibiotic irrigation and was confirmed to be hemostatic. The scalene fat pad was approximated to its normal position with 3-0 Vicryl suture. The platysma was closed with running 3-0 Vicryl suture and the skin was closed with jenifer. Dr. Terra Dodd was present and assisted for the carotid subclavian bypass. Following that portion of the surgery, Dr. Terra Dodd was no longer present. Next, attention was directed toward both groins where under real-time ultrasound guidance, micropuncture access was obtained to the common femoral arteries bilaterally.   On the right side, two separate Perclose devices were placed in the usual pre-close fashion and then an 8-[place] sheath was placed. On the left side, a 6-Cameroonian sheath was placed. The patient was given additional heparin. From the right femoral approach, a Glidewire was navigated under fluoroscopic guidance from the right iliac up through the true lumen into the aortic arch. Intravascular ultrasound was used to examine the iliac artery and the entire aorta and confirmed that the wire traverse the true lumen throughout. Intravascular ultrasound also showed severe compromise of flow to the right renal artery lumen and severe dissection into the left renal artery with severe compromise of flow. The dissection flap was immediately across the orifice of the SMA and celiac with significant dynamic compromise of the flow to both of these vessels. The true lumen throughout the abdominal aorta was virtually a sliver with 95% - 99% of the aortic diameter comprised of the false lumen. Throughout the course of the entire aorta, the true was extremely compressed. The anterior tear was just distal to the left subclavian artery. There was no evidence of a type A dissection. Next, the left subclavian artery was selectively catheterized and a left upper extremity arteriogram was done. The location of the vertebral artery and the origin of the left subclavian artery were marked. There were no abnormalities in the subclavian, axillary, or proximal brachial arteries. The vertebral artery was also noted to be normal.  A 14 mm  Amplatzer II vascular plug was deployed in the subclavian artery between the origin and the takeoff of the vertebral artery. This landed in good position. Completion angiogram showed perfect orientation of the plug with appropriate occlusion. A wire was again advanced around the arch and exchanged through an IVUS catheter for a Lunderquist wire. From left femoral approach, a Glidewire was navigated up the true lumen to the aortic arch and a marker pigtail catheter was placed in the proximal arch.   The main body of a 34 x 34 x 200 Medtronic Valiant thoracic endograft was then advanced from a right femoral approach and this was placed in the proximal arch in significant VANE obliquity, the appropriate parallax was obtained and an arch aortogram was performed. The origin of the left carotid artery was identified. The systolic blood pressure was lowered to between 80 and 90 and the first component of the thoracic endograft was deployed landing immediately distal to the left common carotid and extending down into the proximal to mid descending thoracic aorta. This landed in excellent position. Next, the marker catheter was brought down over a Glidewire to below the first endograft and then it was advanced up into the first endograft and abdominal aortogram was performed in an AP projection and the origin of the celiac artery was clearly identified. In accordance with the preoperative plan, the distal landing zone was planned to be about 3 cm above the celiac artery. A second Medtronic Valiant thoracic stent graft (34 x 34 x 200) was then deployed overlapping significantly with the first graft and extending down to the planned distal landing zone a few centimeters above the celiac artery. After this was done, the delivery system was removed and a 20 Somali sheath was placed in the right common femoral artery since the endograft components had been 22-Somali. Intravascular ultrasound was now used to examine the aorta again. The true lumen in the abdominal aorta was not dramatically expanded and comprised approximately 60% - 70% of the aortic diameter. There was now excellent perfusion of the celiac and superior mesenteric arteries. There was excellent perfusion of the right renal artery all of which arose from the true lumen and were no longer compromise. There appeared to still be significant dissection involving the origin of the left renal artery.   The stent graft was well expanded throughout the thoracic aorta and was in good position relative to the left common carotid artery with no evidence of any type of retrograde type A dissection. The marker catheter was positioned at the expected location of the renal arteries and an abdominal aortogram was performed. This did confirm excellent perfusion of the right renal artery and mesenteric arteries and a severe dissection extending well out into the left renal artery with compromise of flow. The complex dissection extended out the left renal artery to within a little less than 1 cm of the terminal segment of the main renal artery where it trifurcated into three lobar arteries. There was some downward trajectory of the left renal artery. So, the true lumen was catheterized with a Glidewire using a reverse curve catheter. The Glidewire was advanced out into the upper pole artery and this was exchanged over a glide catheter for a Clements wire. A 7 x 40 EverFlex bare metal self-expanding stent was then deployed landing just prior to the trifurcation of the main renal artery, but distal to the dissection tears and extending out to almost flush with the native renal artery orifice. The stent deployed nicely in profile, but did not extend far enough across the false lumen to ensure that there would be no static or dynamic compromise from the dissection flap. Therefore, a 7 x 27 balloon expandable bare metal Express stent was deployed overlapping with the self-expanding stent in the renal artery and extending across the false lumen into the true lumen to  ensure that flow to the left renal artery was restored and was secured from any dynamic compromise from the flap or motion in the false lumen. Completion angiogram demonstrated excellent flow to the left kidney with good position of the stents relative to the true lumen. The wires and catheters were withdrawn and the right femoral artery puncture site was closed with the previously placed Perclose devices with good hemostasis.   A Mynx closure device was used to close the left femoral artery puncture site. The right femoral puncture site was sutured with 4-0 Monocryl suture and Dermabond was applied as a dressing  Dermabond was applied to the left femoral artery puncture site. Both sites were hemostatic. The feet were inspected. There was no evidence of thromboembolic complications and there were now palpable pedal pulses bilaterally. There was a palpable left radial pulse bilaterally. Dry dressing was applied to the left neck incision and the patient was then extubated and transferred to the intensive care unit in stable condition with no evidence of neurologic compromise. All counts were correct.       Lawyer Morgan MD      [id number]  D:  03/01/2019 13:23  T:  03/01/2019 15:55  JOB #:  [id number]

## 2019-05-14 ENCOUNTER — APPOINTMENT (OUTPATIENT)
Dept: CT IMAGING | Age: 49
DRG: 871 | End: 2019-05-14
Attending: EMERGENCY MEDICINE
Payer: MEDICAID

## 2019-05-14 ENCOUNTER — HOSPITAL ENCOUNTER (OUTPATIENT)
Age: 49
Setting detail: OBSERVATION
Discharge: HOME | DRG: 871 | End: 2019-05-16
Attending: EMERGENCY MEDICINE | Admitting: SURGERY
Payer: MEDICAID

## 2019-05-14 ENCOUNTER — APPOINTMENT (OUTPATIENT)
Dept: GENERAL RADIOLOGY | Age: 49
DRG: 871 | End: 2019-05-14
Attending: EMERGENCY MEDICINE
Payer: MEDICAID

## 2019-05-14 DIAGNOSIS — A41.9 SEPSIS, DUE TO UNSPECIFIED ORGANISM: ICD-10-CM

## 2019-05-14 DIAGNOSIS — R07.9 CHEST PAIN, UNSPECIFIED TYPE: Primary | ICD-10-CM

## 2019-05-14 LAB
ALBUMIN SERPL-MCNC: 3.7 G/DL (ref 3.5–5)
ALBUMIN/GLOB SERPL: 0.7 {RATIO} (ref 1.1–2.2)
ALP SERPL-CCNC: 63 U/L (ref 45–117)
ALT SERPL-CCNC: 19 U/L (ref 12–78)
ANION GAP SERPL CALC-SCNC: 7 MMOL/L (ref 5–15)
APPEARANCE UR: CLEAR
AST SERPL-CCNC: 18 U/L (ref 15–37)
BACTERIA URNS QL MICRO: NEGATIVE /HPF
BASOPHILS # BLD: 0 K/UL (ref 0–0.1)
BASOPHILS NFR BLD: 0 % (ref 0–1)
BILIRUB SERPL-MCNC: 1.5 MG/DL (ref 0.2–1)
BILIRUB UR QL: NEGATIVE
BUN SERPL-MCNC: 22 MG/DL (ref 6–20)
BUN/CREAT SERPL: 11 (ref 12–20)
CALCIUM SERPL-MCNC: 8.8 MG/DL (ref 8.5–10.1)
CHLORIDE SERPL-SCNC: 102 MMOL/L (ref 97–108)
CK MB CFR SERPL CALC: 0.3 % (ref 0–2.5)
CK MB SERPL-MCNC: 2 NG/ML (ref 5–25)
CK SERPL-CCNC: 584 U/L (ref 39–308)
CO2 SERPL-SCNC: 29 MMOL/L (ref 21–32)
COLOR UR: ABNORMAL
CREAT BLD-MCNC: 2.1 MG/DL (ref 0.6–1.3)
CREAT SERPL-MCNC: 1.93 MG/DL (ref 0.7–1.3)
DIFFERENTIAL METHOD BLD: ABNORMAL
EOSINOPHIL # BLD: 0 K/UL (ref 0–0.4)
EOSINOPHIL NFR BLD: 0 % (ref 0–7)
EPITH CASTS URNS QL MICRO: ABNORMAL /LPF
ERYTHROCYTE [DISTWIDTH] IN BLOOD BY AUTOMATED COUNT: 17.1 % (ref 11.5–14.5)
GLOBULIN SER CALC-MCNC: 5.1 G/DL (ref 2–4)
GLUCOSE SERPL-MCNC: 87 MG/DL (ref 65–100)
GLUCOSE UR STRIP.AUTO-MCNC: NEGATIVE MG/DL
HCT VFR BLD AUTO: 30.5 % (ref 36.6–50.3)
HGB BLD-MCNC: 9.7 G/DL (ref 12.1–17)
HGB UR QL STRIP: ABNORMAL
HYALINE CASTS URNS QL MICRO: ABNORMAL /LPF (ref 0–5)
IMM GRANULOCYTES # BLD AUTO: 0 K/UL (ref 0–0.04)
IMM GRANULOCYTES NFR BLD AUTO: 0 % (ref 0–0.5)
INR PPP: 1.2 (ref 0.9–1.1)
KETONES UR QL STRIP.AUTO: NEGATIVE MG/DL
LACTATE SERPL-SCNC: 0.8 MMOL/L (ref 0.4–2)
LEUKOCYTE ESTERASE UR QL STRIP.AUTO: NEGATIVE
LIPASE SERPL-CCNC: 75 U/L (ref 73–393)
LYMPHOCYTES # BLD: 1.8 K/UL (ref 0.8–3.5)
LYMPHOCYTES NFR BLD: 23 % (ref 12–49)
MAGNESIUM SERPL-MCNC: 1.8 MG/DL (ref 1.6–2.4)
MCH RBC QN AUTO: 24.6 PG (ref 26–34)
MCHC RBC AUTO-ENTMCNC: 31.8 G/DL (ref 30–36.5)
MCV RBC AUTO: 77.2 FL (ref 80–99)
MONOCYTES # BLD: 1 K/UL (ref 0–1)
MONOCYTES NFR BLD: 12 % (ref 5–13)
NEUTS SEG # BLD: 5.3 K/UL (ref 1.8–8)
NEUTS SEG NFR BLD: 65 % (ref 32–75)
NITRITE UR QL STRIP.AUTO: NEGATIVE
NRBC # BLD: 0 K/UL (ref 0–0.01)
NRBC BLD-RTO: 0 PER 100 WBC
PH UR STRIP: 7 [PH] (ref 5–8)
PLATELET # BLD AUTO: 207 K/UL (ref 150–400)
PMV BLD AUTO: 10.6 FL (ref 8.9–12.9)
POTASSIUM SERPL-SCNC: 3.5 MMOL/L (ref 3.5–5.1)
PROT SERPL-MCNC: 8.8 G/DL (ref 6.4–8.2)
PROT UR STRIP-MCNC: 30 MG/DL
PROTHROMBIN TIME: 12.4 SEC (ref 9–11.1)
RBC # BLD AUTO: 3.95 M/UL (ref 4.1–5.7)
RBC #/AREA URNS HPF: ABNORMAL /HPF (ref 0–5)
SODIUM SERPL-SCNC: 138 MMOL/L (ref 136–145)
SP GR UR REFRACTOMETRY: 1.02 (ref 1–1.03)
TROPONIN I SERPL-MCNC: <0.05 NG/ML
UA: UC IF INDICATED,UAUC: ABNORMAL
UROBILINOGEN UR QL STRIP.AUTO: 4 EU/DL (ref 0.2–1)
WBC # BLD AUTO: 8.2 K/UL (ref 4.1–11.1)
WBC URNS QL MICRO: ABNORMAL /HPF (ref 0–4)

## 2019-05-14 PROCEDURE — 81001 URINALYSIS AUTO W/SCOPE: CPT

## 2019-05-14 PROCEDURE — 74011000250 HC RX REV CODE- 250: Performed by: EMERGENCY MEDICINE

## 2019-05-14 PROCEDURE — 83735 ASSAY OF MAGNESIUM: CPT

## 2019-05-14 PROCEDURE — 85025 COMPLETE CBC W/AUTO DIFF WBC: CPT

## 2019-05-14 PROCEDURE — 99285 EMERGENCY DEPT VISIT HI MDM: CPT

## 2019-05-14 PROCEDURE — 65660000000 HC RM CCU STEPDOWN

## 2019-05-14 PROCEDURE — 84484 ASSAY OF TROPONIN QUANT: CPT

## 2019-05-14 PROCEDURE — 71045 X-RAY EXAM CHEST 1 VIEW: CPT

## 2019-05-14 PROCEDURE — 74011000258 HC RX REV CODE- 258: Performed by: EMERGENCY MEDICINE

## 2019-05-14 PROCEDURE — 74011250636 HC RX REV CODE- 250/636: Performed by: EMERGENCY MEDICINE

## 2019-05-14 PROCEDURE — 82550 ASSAY OF CK (CPK): CPT

## 2019-05-14 PROCEDURE — 74011250636 HC RX REV CODE- 250/636: Performed by: SURGERY

## 2019-05-14 PROCEDURE — 36415 COLL VENOUS BLD VENIPUNCTURE: CPT

## 2019-05-14 PROCEDURE — 74174 CTA ABD&PLVS W/CONTRAST: CPT

## 2019-05-14 PROCEDURE — 96361 HYDRATE IV INFUSION ADD-ON: CPT

## 2019-05-14 PROCEDURE — 99218 HC RM OBSERVATION: CPT

## 2019-05-14 PROCEDURE — 85610 PROTHROMBIN TIME: CPT

## 2019-05-14 PROCEDURE — 71275 CT ANGIOGRAPHY CHEST: CPT

## 2019-05-14 PROCEDURE — 74011636320 HC RX REV CODE- 636/320: Performed by: EMERGENCY MEDICINE

## 2019-05-14 PROCEDURE — 93005 ELECTROCARDIOGRAM TRACING: CPT

## 2019-05-14 PROCEDURE — 83605 ASSAY OF LACTIC ACID: CPT

## 2019-05-14 PROCEDURE — 83690 ASSAY OF LIPASE: CPT

## 2019-05-14 PROCEDURE — 82565 ASSAY OF CREATININE: CPT

## 2019-05-14 PROCEDURE — 96367 TX/PROPH/DG ADDL SEQ IV INF: CPT

## 2019-05-14 PROCEDURE — 65270000029 HC RM PRIVATE

## 2019-05-14 PROCEDURE — 80053 COMPREHEN METABOLIC PANEL: CPT

## 2019-05-14 PROCEDURE — 87040 BLOOD CULTURE FOR BACTERIA: CPT

## 2019-05-14 PROCEDURE — 96365 THER/PROPH/DIAG IV INF INIT: CPT

## 2019-05-14 PROCEDURE — 96375 TX/PRO/DX INJ NEW DRUG ADDON: CPT

## 2019-05-14 RX ORDER — DEXTROSE 50 % IN WATER (D50W) INTRAVENOUS SYRINGE
12.5-25 AS NEEDED
Status: DISCONTINUED | OUTPATIENT
Start: 2019-05-14 | End: 2019-05-16 | Stop reason: HOSPADM

## 2019-05-14 RX ORDER — FAMOTIDINE 20 MG/1
40 TABLET, FILM COATED ORAL DAILY
Status: DISCONTINUED | OUTPATIENT
Start: 2019-05-15 | End: 2019-05-15

## 2019-05-14 RX ORDER — ACETAMINOPHEN 325 MG/1
650 TABLET ORAL
Status: DISCONTINUED | OUTPATIENT
Start: 2019-05-14 | End: 2019-05-16 | Stop reason: HOSPADM

## 2019-05-14 RX ORDER — DIPHENHYDRAMINE HYDROCHLORIDE 50 MG/ML
25 INJECTION, SOLUTION INTRAMUSCULAR; INTRAVENOUS
Status: COMPLETED | OUTPATIENT
Start: 2019-05-14 | End: 2019-05-14

## 2019-05-14 RX ORDER — INSULIN LISPRO 100 [IU]/ML
INJECTION, SOLUTION INTRAVENOUS; SUBCUTANEOUS
Status: DISCONTINUED | OUTPATIENT
Start: 2019-05-14 | End: 2019-05-15 | Stop reason: SDUPTHER

## 2019-05-14 RX ORDER — ASPIRIN 81 MG/1
81 TABLET ORAL DAILY
Status: DISCONTINUED | OUTPATIENT
Start: 2019-05-15 | End: 2019-05-16 | Stop reason: HOSPADM

## 2019-05-14 RX ORDER — PRAVASTATIN SODIUM 10 MG/1
20 TABLET ORAL
Status: DISCONTINUED | OUTPATIENT
Start: 2019-05-14 | End: 2019-05-16 | Stop reason: HOSPADM

## 2019-05-14 RX ORDER — SODIUM CHLORIDE 0.9 % (FLUSH) 0.9 %
10 SYRINGE (ML) INJECTION
Status: COMPLETED | OUTPATIENT
Start: 2019-05-14 | End: 2019-05-14

## 2019-05-14 RX ORDER — CETIRIZINE HCL 10 MG
10 TABLET ORAL
Status: DISCONTINUED | OUTPATIENT
Start: 2019-05-14 | End: 2019-05-16 | Stop reason: HOSPADM

## 2019-05-14 RX ORDER — ACETAMINOPHEN 325 MG/1
325-650 TABLET ORAL
COMMUNITY

## 2019-05-14 RX ORDER — VANCOMYCIN/0.9 % SOD CHLORIDE 1.5G/250ML
1500 PLASTIC BAG, INJECTION (ML) INTRAVENOUS
Status: DISCONTINUED | OUTPATIENT
Start: 2019-05-15 | End: 2019-05-16

## 2019-05-14 RX ORDER — ONDANSETRON 2 MG/ML
4 INJECTION INTRAMUSCULAR; INTRAVENOUS
Status: DISCONTINUED | OUTPATIENT
Start: 2019-05-14 | End: 2019-05-16 | Stop reason: HOSPADM

## 2019-05-14 RX ORDER — DIPHENHYDRAMINE HYDROCHLORIDE 50 MG/ML
25 INJECTION, SOLUTION INTRAMUSCULAR; INTRAVENOUS EVERY 8 HOURS
Status: DISCONTINUED | OUTPATIENT
Start: 2019-05-14 | End: 2019-05-16

## 2019-05-14 RX ORDER — FENTANYL CITRATE 50 UG/ML
50 INJECTION, SOLUTION INTRAMUSCULAR; INTRAVENOUS
Status: COMPLETED | OUTPATIENT
Start: 2019-05-14 | End: 2019-05-14

## 2019-05-14 RX ORDER — SODIUM CHLORIDE 9 MG/ML
125 INJECTION, SOLUTION INTRAVENOUS CONTINUOUS
Status: DISCONTINUED | OUTPATIENT
Start: 2019-05-14 | End: 2019-05-15

## 2019-05-14 RX ORDER — CETIRIZINE HCL 10 MG
10 TABLET ORAL
COMMUNITY

## 2019-05-14 RX ORDER — MAGNESIUM SULFATE 100 %
4 CRYSTALS MISCELLANEOUS AS NEEDED
Status: DISCONTINUED | OUTPATIENT
Start: 2019-05-14 | End: 2019-05-16 | Stop reason: HOSPADM

## 2019-05-14 RX ORDER — VANCOMYCIN 1.75 GRAM/500 ML IN 0.9 % SODIUM CHLORIDE INTRAVENOUS
1750
Status: COMPLETED | OUTPATIENT
Start: 2019-05-14 | End: 2019-05-14

## 2019-05-14 RX ADMIN — CEFTRIAXONE 1 G: 1 INJECTION, POWDER, FOR SOLUTION INTRAMUSCULAR; INTRAVENOUS at 19:04

## 2019-05-14 RX ADMIN — DIPHENHYDRAMINE HYDROCHLORIDE 25 MG: 50 INJECTION, SOLUTION INTRAMUSCULAR; INTRAVENOUS at 19:23

## 2019-05-14 RX ADMIN — Medication 10 ML: at 16:57

## 2019-05-14 RX ADMIN — METHYLPREDNISOLONE SODIUM SUCCINATE 125 MG: 125 INJECTION, POWDER, FOR SOLUTION INTRAMUSCULAR; INTRAVENOUS at 19:23

## 2019-05-14 RX ADMIN — FENTANYL CITRATE 50 MCG: 50 INJECTION, SOLUTION INTRAMUSCULAR; INTRAVENOUS at 16:18

## 2019-05-14 RX ADMIN — VANCOMYCIN HYDROCHLORIDE 1750 MG: 10 INJECTION, POWDER, LYOPHILIZED, FOR SOLUTION INTRAVENOUS at 20:09

## 2019-05-14 RX ADMIN — SODIUM CHLORIDE 125 ML/HR: 900 INJECTION, SOLUTION INTRAVENOUS at 23:00

## 2019-05-14 RX ADMIN — FAMOTIDINE 20 MG: 10 INJECTION, SOLUTION INTRAVENOUS at 19:23

## 2019-05-14 RX ADMIN — SODIUM CHLORIDE 1000 ML: 900 INJECTION, SOLUTION INTRAVENOUS at 17:30

## 2019-05-14 RX ADMIN — SODIUM CHLORIDE 1000 ML: 900 INJECTION, SOLUTION INTRAVENOUS at 16:11

## 2019-05-14 RX ADMIN — IOPAMIDOL 100 ML: 755 INJECTION, SOLUTION INTRAVENOUS at 16:57

## 2019-05-14 NOTE — PROGRESS NOTES
Pharmacy Clarification of Prior to Admission Medication Regimen     The patient was interviewed regarding clarification of the prior to admission medication regimen. The patients family was present in room and obtained permission from patient to discuss drug regimen with visitor(s) present. The patient was questioned regarding use of any other inhalers, topical products, over the counter medications, herbal medications, vitamin products or ophthalmic/nasal/otic medication use. Information Obtained From: RX Bottles, RX Query, Patient    Pertinent Pharmacy Findings: None      PTA medication list was corrected to the following:     Prior to Admission Medications   Prescriptions Last Dose Informant Patient Reported? Taking?   acetaminophen (TYLENOL) 325 mg tablet 5/13/2019 at Unknown time Self Yes Yes   Sig: Take 325-650 mg by mouth every four (4) hours as needed for Pain. amLODIPine (NORVASC) 10 mg tablet 5/13/2019 at Unknown time Self No Yes   Sig: Take 1 Tab by mouth daily. aspirin delayed-release 81 mg tablet 5/13/2019 at Unknown time Self No Yes   Sig: Take 1 Tab by mouth daily. carvedilol (COREG) 25 mg tablet 5/13/2019 at Unknown time Self No Yes   Sig: Take 1 Tab by mouth two (2) times daily (with meals). cetirizine (ZYRTEC) 10 mg tablet 4/14/2019 at Unknown time Self Yes Yes   Sig: Take 10 mg by mouth daily as needed for Allergies. famotidine (PEPCID) 40 mg tablet 5/13/2019 at Unknown time Self No Yes   Sig: Take 1 Tab by mouth daily. hydrALAZINE (APRESOLINE) 50 mg tablet 5/13/2019 at Unknown time Self No Yes   Sig: Take 1 Tab by mouth three (3) times daily. pravastatin (PRAVACHOL) 20 mg tablet 5/13/2019 at Unknown time Self No Yes   Sig: Take 1 Tab by mouth nightly.       Facility-Administered Medications: None          Thank you,  Nicole Radford Courser, [facility]  Medication History Pharmacy Technician

## 2019-05-14 NOTE — ED PROVIDER NOTES
EMERGENCY DEPARTMENT HISTORY AND PHYSICAL EXAM      Date: 5/14/2019  Patient Name: June Araujo    Please note that this dictation was completed with VeriTran, the computer voice recognition software. Quite often unanticipated grammatical, syntax, homophones, and other interpretive errors are inadvertently transcribed by the computer software. Please disregard these errors. Please excuse any errors that have escaped final proofreading. History of Presenting Illness     Chief Complaint   Patient presents with    Chest Pain     pt reports chest pain beginning last night       History Provided By: Patient    HPI: June Araujo, 53 y.o. male, with a past medical history significant for aortic dissection status post graft in February. He is presenting the emergency department complaining of severe tearing type chest pain, fever, shortness of breath. Denies any source of fever, denies any sinus congestion, sore throat, cough, nausea, vomiting, diarrhea. Pain radiates through his chest to his back. He rates the pain is severe. Found to be hypotensive in triage. PCP: None    No current facility-administered medications on file prior to encounter. Current Outpatient Medications on File Prior to Encounter   Medication Sig Dispense Refill    amLODIPine (NORVASC) 10 mg tablet Take 1 Tab by mouth daily. 30 Tab 2    aspirin delayed-release 81 mg tablet Take 1 Tab by mouth daily. 100 Tab 0    bumetanide (BUMEX) 1 mg tablet Take 1 Tab by mouth daily. 30 Tab 2    carvedilol (COREG) 25 mg tablet Take 1 Tab by mouth two (2) times daily (with meals). 60 Tab 2    hydrALAZINE (APRESOLINE) 50 mg tablet Take 1 Tab by mouth three (3) times daily. 90 Tab 2    isosorbide mononitrate ER (IMDUR) 60 mg CR tablet Take 1 Tab by mouth daily. 30 Tab 2    lisinopril (PRINIVIL, ZESTRIL) 10 mg tablet Take 1 Tab by mouth daily. 30 Tab 2    famotidine (PEPCID) 40 mg tablet Take 1 Tab by mouth daily.  30 Tab 2    potassium chloride SR (K-TAB) 20 mEq tablet Take 1 Tab by mouth daily. 30 Tab 2    pravastatin (PRAVACHOL) 20 mg tablet Take 1 Tab by mouth nightly. 30 Tab 0    HYDROmorphone (DILAUDID) 2 mg tablet Take 1 Tab by mouth every four (4) hours as needed for Pain. Max Daily Amount: 12 mg. 20 Tab 0       Past History     Past Medical History:  Past Medical History:   Diagnosis Date    Foot fracture     Hypertension     Jaw fracture ([address]     Ruptured ear drum     Thumb fracture        Past Surgical History:  Past Surgical History:   Procedure Laterality Date    HX APPENDECTOMY      HX ORTHOPAEDIC         Family History:  Family History   Problem Relation Age of Onset    Diabetes Mother        Social History:  Social History     Tobacco Use    Smoking status: Never Smoker    Smokeless tobacco: Never Used   Substance Use Topics    Alcohol use: No    Drug use: No       Allergies:  No Known Allergies      Review of Systems   Review of Systems   Constitutional: Positive for fever. Negative for chills. HENT: Negative for congestion and sore throat. Eyes: Negative for visual disturbance. Respiratory: Negative for cough and shortness of breath. Cardiovascular: Positive for chest pain. Negative for leg swelling. Gastrointestinal: Negative for abdominal pain, blood in stool, diarrhea and nausea. Endocrine: Negative for polyuria. Genitourinary: Negative for dysuria and testicular pain. Musculoskeletal: Negative for arthralgias, joint swelling and myalgias. Skin: Negative for rash. Allergic/Immunologic: Negative for immunocompromised state. Neurological: Negative for weakness and headaches. Hematological: Does not bruise/bleed easily. Psychiatric/Behavioral: Negative for confusion. Physical Exam   Physical Exam   Constitutional: oriented to person, place, and time. appears well-developed and well-nourished. Unwell appearing male, mild distress. HENT:   Head: Normocephalic and atraumatic.    Moist mucous membranes   Eyes: Pupils are equal, round, and reactive to light. Conjunctivae are normal. Right eye exhibits no discharge. Left eye exhibits no discharge. Neck: Normal range of motion. Neck supple. No tracheal deviation present. Cardiovascular: Normal rate, regular rhythm and normal heart sounds. No murmur heard. Pulmonary/Chest: Effort normal and breath sounds normal. No respiratory distress. no wheezes. no rales. Abdominal: Soft. Bowel sounds are normal. There is no tenderness. There is no rebound and no guarding. Musculoskeletal: Normal range of motion. exhibits no edema, tenderness or deformity. Neurological: alert and oriented to person, place, and time. Skin: Skin is warm and dry. No rash noted. No erythema. Psychiatric: behavior is normal.   Nursing note and vitals reviewed.     Diagnostic Study Results     Labs -     Recent Results (from the past 12 hour(s))   EKG, 12 LEAD, INITIAL    Collection Time: 05/14/19  3:38 PM   Result Value Ref Range    Ventricular Rate 101 BPM    Atrial Rate 101 BPM    P-R Interval 166 ms    QRS Duration 88 ms    Q-T Interval 336 ms    QTC Calculation (Bezet) 435 ms    Calculated P Axis 35 degrees    Calculated R Axis 9 degrees    Calculated T Axis 13 degrees    Diagnosis       Sinus tachycardia  Possible Left atrial enlargement  When compared with ECG of 27-FEB-2019 12:53,  No significant change was found     POC CREATININE    Collection Time: 05/14/19  3:45 PM   Result Value Ref Range    Creatinine (POC) 2.1 (H) 0.6 - 1.3 mg/dL    GFRAA, POC 41 (L) >60 ml/min/1.73m2    GFRNA, POC 34 (L) >60 ml/min/1.73m2   CBC WITH AUTOMATED DIFF    Collection Time: 05/14/19  3:55 PM   Result Value Ref Range    WBC 8.2 4.1 - 11.1 K/uL    RBC 3.95 (L) 4.10 - 5.70 M/uL    HGB 9.7 (L) 12.1 - 17.0 g/dL    HCT 30.5 (L) 36.6 - 50.3 %    MCV 77.2 (L) 80.0 - 99.0 FL    MCH 24.6 (L) 26.0 - 34.0 PG    MCHC 31.8 30.0 - 36.5 g/dL    RDW 17.1 (H) 11.5 - 14.5 %    PLATELET 721 414 - 400 K/uL    MPV 10.6 8.9 - 12.9 FL    NRBC 0.0 0  WBC    ABSOLUTE NRBC 0.00 0.00 - 0.01 K/uL    NEUTROPHILS 65 32 - 75 %    LYMPHOCYTES 23 12 - 49 %    MONOCYTES 12 5 - 13 %    EOSINOPHILS 0 0 - 7 %    BASOPHILS 0 0 - 1 %    IMMATURE GRANULOCYTES 0 0.0 - 0.5 %    ABS. NEUTROPHILS 5.3 1.8 - 8.0 K/UL    ABS. LYMPHOCYTES 1.8 0.8 - 3.5 K/UL    ABS. MONOCYTES 1.0 0.0 - 1.0 K/UL    ABS. EOSINOPHILS 0.0 0.0 - 0.4 K/UL    ABS. BASOPHILS 0.0 0.0 - 0.1 K/UL    ABS. IMM. GRANS. 0.0 0.00 - 0.04 K/UL    DF AUTOMATED         Radiologic Studies -   XR CHEST PA LAT    (Results Pending)   XR CHEST PORT    (Results Pending)     CT Results  (Last 48 hours)    None        CXR Results  (Last 48 hours)    None            Medical Decision Making   I am the first provider for this patient. I reviewed the vital signs, available nursing notes, past medical history, past surgical history, family history and social history. Vital Signs-Reviewed the patient's vital signs. Patient Vitals for the past 12 hrs:   Temp Pulse Resp BP SpO2   05/14/19 1605 -- -- -- (!) 74/41 --   05/14/19 1601 -- -- -- (!) 59/38 --   05/14/19 1534 (!) 101.1 °F (38.4 °C) (!) 107 16 (!) 146/117 98 %       Pulse Oximetry Analysis - 96% on RA    Cardiac Monitor:   Sinus rhythm rate 93     EKG interpretation: (Preliminary)   1538  Rhythm: sinus tachycardia; and regular . Rate (approx.): 101; Axis: normal; SD interval: 166; QRS interval: 88; QT/QTc: 336/435; ST/T wave: normal    Records Reviewed: Nursing Notes and Old Medical Records    Provider Notes (Medical Decision Making):   Patient with a history of any aortic dissection status post graft placement. There is no obvious source of infection on physical exam.  No evidence of pneumonia on CT imaging. Given the patient's chest pain and history of aortic dissection a CTA was ordered. The risks including renal failure were discussed with the patient.   The emergent need for the imaging study was discussed with the patient. He consented verbally to the CTA. Will provide IV rehydration. Concern for the possibility of an infected bypass graft, concern for the possibility of bacteremia. ED Course:   Initial assessment performed. The patients presenting problems have been discussed, and they are in agreement with the care plan formulated and outlined with them. I have encouraged them to ask questions as they arise throughout their visit. 1630 1 PM: Discussed with Dr. Vicente Finnegan, radiologist.  She agrees that the patient should have CTA there, given GFR she is recommending hydration, I discussed the risks of renal failure and worsening kidney function with the patient, possible need for dialysis. We also discussed the possibility that imaging may be nondiagnostic, but given how sick the patient appears in my concern for life-threatening infection or graft complication the patient should be evaluated with CT imaging. [phone]: Vascular surgery paged    1830: Discussed with Dr. Bhupendra Mobley he will review the films    1838: Dr. Bhupendra Mobley reviewed the films, he will see and admit the patient here to [address] [place] [address] Avenue: Patient found to have angioedema of the lips and tongue, per family was present on arrival , but appears to be worsening now, no new medications pta, no medicines here he has not had before. No ACE or ARB.     Critical Care Time:   CRITICAL CARE NOTE :      IMPENDING DETERIORATION -Cardiovascular, Metabolic and Renal    ASSOCIATED RISK FACTORS - Hypotension and Metabolic changes    MANAGEMENT- Bedside Assessment and Supervision of Care    INTERPRETATION -  Xrays, CT Scan, ECG and Blood Pressure    INTERVENTIONS - hemodynamic mngmt and Metobolic interventions    CASE REVIEW - Hospitalist, Medical Sub-Specialist, Nursing and Family    TREATMENT RESPONSE -Improved and Stable    PERFORMED BY - Self        NOTES   :      I have spent 60 minutes of critical care time involved in lab review, consultations with specialist, family decision- making, bedside attention and documentation. During this entire length of time I was immediately available to the patient . Angie Stacy DO        Disposition:  Patient will be Admitted by Dr. Dave Berumen. He will see and evaluate the patient. PLAN:      Diagnosis     Clinical Impression:   1. Chest pain, unspecified type    2. Sepsis, due to unspecified organism [facility])        Attestations:   This note was completed by Angie Stacy DO

## 2019-05-15 LAB
ALBUMIN SERPL-MCNC: 3 G/DL (ref 3.5–5)
ALBUMIN/GLOB SERPL: 0.7 {RATIO} (ref 1.1–2.2)
ALP SERPL-CCNC: 52 U/L (ref 45–117)
ALT SERPL-CCNC: 17 U/L (ref 12–78)
ANION GAP SERPL CALC-SCNC: 7 MMOL/L (ref 5–15)
APPEARANCE UR: CLEAR
AST SERPL-CCNC: 18 U/L (ref 15–37)
ATRIAL RATE: 101 BPM
BACTERIA URNS QL MICRO: NEGATIVE /HPF
BILIRUB SERPL-MCNC: 0.9 MG/DL (ref 0.2–1)
BILIRUB UR QL: NEGATIVE
BUN SERPL-MCNC: 25 MG/DL (ref 6–20)
BUN/CREAT SERPL: 15 (ref 12–20)
CALCIUM SERPL-MCNC: 8.1 MG/DL (ref 8.5–10.1)
CALCULATED P AXIS, ECG09: 35 DEGREES
CALCULATED R AXIS, ECG10: 9 DEGREES
CALCULATED T AXIS, ECG11: 13 DEGREES
CHLORIDE SERPL-SCNC: 104 MMOL/L (ref 97–108)
CO2 SERPL-SCNC: 25 MMOL/L (ref 21–32)
COLOR UR: ABNORMAL
CREAT SERPL-MCNC: 1.64 MG/DL (ref 0.7–1.3)
DIAGNOSIS, 93000: NORMAL
EPITH CASTS URNS QL MICRO: ABNORMAL /LPF
ERYTHROCYTE [DISTWIDTH] IN BLOOD BY AUTOMATED COUNT: 17.2 % (ref 11.5–14.5)
GLOBULIN SER CALC-MCNC: 4.5 G/DL (ref 2–4)
GLUCOSE BLD STRIP.AUTO-MCNC: 182 MG/DL (ref 65–100)
GLUCOSE BLD STRIP.AUTO-MCNC: 213 MG/DL (ref 65–100)
GLUCOSE BLD STRIP.AUTO-MCNC: 222 MG/DL (ref 65–100)
GLUCOSE BLD STRIP.AUTO-MCNC: 283 MG/DL (ref 65–100)
GLUCOSE SERPL-MCNC: 200 MG/DL (ref 65–100)
GLUCOSE UR STRIP.AUTO-MCNC: NEGATIVE MG/DL
HCT VFR BLD AUTO: 28.4 % (ref 36.6–50.3)
HGB BLD-MCNC: 8.7 G/DL (ref 12.1–17)
HGB UR QL STRIP: ABNORMAL
HYALINE CASTS URNS QL MICRO: ABNORMAL /LPF (ref 0–5)
KETONES UR QL STRIP.AUTO: NEGATIVE MG/DL
LEUKOCYTE ESTERASE UR QL STRIP.AUTO: NEGATIVE
MCH RBC QN AUTO: 24.5 PG (ref 26–34)
MCHC RBC AUTO-ENTMCNC: 30.6 G/DL (ref 30–36.5)
MCV RBC AUTO: 80 FL (ref 80–99)
NITRITE UR QL STRIP.AUTO: NEGATIVE
NRBC # BLD: 0 K/UL (ref 0–0.01)
NRBC BLD-RTO: 0 PER 100 WBC
P-R INTERVAL, ECG05: 166 MS
PH UR STRIP: 7 [PH] (ref 5–8)
PLATELET # BLD AUTO: 184 K/UL (ref 150–400)
PMV BLD AUTO: 10.4 FL (ref 8.9–12.9)
POTASSIUM SERPL-SCNC: 4.1 MMOL/L (ref 3.5–5.1)
PROCALCITONIN SERPL-MCNC: 1.4 NG/ML
PROT SERPL-MCNC: 7.5 G/DL (ref 6.4–8.2)
PROT UR STRIP-MCNC: 100 MG/DL
Q-T INTERVAL, ECG07: 336 MS
QRS DURATION, ECG06: 88 MS
QTC CALCULATION (BEZET), ECG08: 435 MS
RBC # BLD AUTO: 3.55 M/UL (ref 4.1–5.7)
RBC #/AREA URNS HPF: ABNORMAL /HPF (ref 0–5)
SERVICE CMNT-IMP: ABNORMAL
SODIUM SERPL-SCNC: 136 MMOL/L (ref 136–145)
SP GR UR REFRACTOMETRY: 1.02 (ref 1–1.03)
UROBILINOGEN UR QL STRIP.AUTO: 4 EU/DL (ref 0.2–1)
VENTRICULAR RATE, ECG03: 101 BPM
WBC # BLD AUTO: 7.9 K/UL (ref 4.1–11.1)
WBC URNS QL MICRO: ABNORMAL /HPF (ref 0–4)

## 2019-05-15 PROCEDURE — 74011000258 HC RX REV CODE- 258: Performed by: HOSPITALIST

## 2019-05-15 PROCEDURE — 74011250636 HC RX REV CODE- 250/636: Performed by: HOSPITALIST

## 2019-05-15 PROCEDURE — 65660000000 HC RM CCU STEPDOWN

## 2019-05-15 PROCEDURE — 74011250636 HC RX REV CODE- 250/636: Performed by: NURSE PRACTITIONER

## 2019-05-15 PROCEDURE — 96367 TX/PROPH/DG ADDL SEQ IV INF: CPT

## 2019-05-15 PROCEDURE — 84145 PROCALCITONIN (PCT): CPT

## 2019-05-15 PROCEDURE — 96361 HYDRATE IV INFUSION ADD-ON: CPT

## 2019-05-15 PROCEDURE — 85027 COMPLETE CBC AUTOMATED: CPT

## 2019-05-15 PROCEDURE — 80053 COMPREHEN METABOLIC PANEL: CPT

## 2019-05-15 PROCEDURE — 36415 COLL VENOUS BLD VENIPUNCTURE: CPT

## 2019-05-15 PROCEDURE — 96366 THER/PROPH/DIAG IV INF ADDON: CPT

## 2019-05-15 PROCEDURE — 74011000250 HC RX REV CODE- 250: Performed by: HOSPITALIST

## 2019-05-15 PROCEDURE — 99218 HC RM OBSERVATION: CPT

## 2019-05-15 PROCEDURE — 74011250637 HC RX REV CODE- 250/637: Performed by: SURGERY

## 2019-05-15 PROCEDURE — 74011636637 HC RX REV CODE- 636/637: Performed by: INTERNAL MEDICINE

## 2019-05-15 PROCEDURE — 74011250636 HC RX REV CODE- 250/636: Performed by: SURGERY

## 2019-05-15 PROCEDURE — 82962 GLUCOSE BLOOD TEST: CPT

## 2019-05-15 PROCEDURE — 96376 TX/PRO/DX INJ SAME DRUG ADON: CPT

## 2019-05-15 PROCEDURE — 81001 URINALYSIS AUTO W/SCOPE: CPT

## 2019-05-15 RX ORDER — INSULIN LISPRO 100 [IU]/ML
5 INJECTION, SOLUTION INTRAVENOUS; SUBCUTANEOUS ONCE
Status: COMPLETED | OUTPATIENT
Start: 2019-05-15 | End: 2019-05-15

## 2019-05-15 RX ORDER — HEPARIN SODIUM 5000 [USP'U]/ML
5000 INJECTION, SOLUTION INTRAVENOUS; SUBCUTANEOUS EVERY 8 HOURS
Status: DISCONTINUED | OUTPATIENT
Start: 2019-05-15 | End: 2019-05-16 | Stop reason: HOSPADM

## 2019-05-15 RX ORDER — INSULIN LISPRO 100 [IU]/ML
INJECTION, SOLUTION INTRAVENOUS; SUBCUTANEOUS
Status: DISCONTINUED | OUTPATIENT
Start: 2019-05-15 | End: 2019-05-16 | Stop reason: HOSPADM

## 2019-05-15 RX ADMIN — FAMOTIDINE 20 MG: 10 INJECTION, SOLUTION INTRAVENOUS at 22:37

## 2019-05-15 RX ADMIN — FAMOTIDINE 40 MG: 20 TABLET ORAL at 10:12

## 2019-05-15 RX ADMIN — INSULIN LISPRO 5 UNITS: 100 INJECTION, SOLUTION INTRAVENOUS; SUBCUTANEOUS at 13:25

## 2019-05-15 RX ADMIN — METHYLPREDNISOLONE SODIUM SUCCINATE 80 MG: 40 INJECTION, POWDER, FOR SOLUTION INTRAMUSCULAR; INTRAVENOUS at 14:18

## 2019-05-15 RX ADMIN — CEFEPIME HYDROCHLORIDE 2 G: 2 INJECTION, POWDER, FOR SOLUTION INTRAVENOUS at 22:27

## 2019-05-15 RX ADMIN — DIPHENHYDRAMINE HYDROCHLORIDE 25 MG: 50 INJECTION, SOLUTION INTRAMUSCULAR; INTRAVENOUS at 06:14

## 2019-05-15 RX ADMIN — DIPHENHYDRAMINE HYDROCHLORIDE 25 MG: 50 INJECTION, SOLUTION INTRAMUSCULAR; INTRAVENOUS at 22:34

## 2019-05-15 RX ADMIN — METHYLPREDNISOLONE SODIUM SUCCINATE 80 MG: 40 INJECTION, POWDER, FOR SOLUTION INTRAMUSCULAR; INTRAVENOUS at 06:16

## 2019-05-15 RX ADMIN — METHYLPREDNISOLONE SODIUM SUCCINATE 80 MG: 40 INJECTION, POWDER, FOR SOLUTION INTRAMUSCULAR; INTRAVENOUS at 22:29

## 2019-05-15 RX ADMIN — DIPHENHYDRAMINE HYDROCHLORIDE 25 MG: 50 INJECTION, SOLUTION INTRAMUSCULAR; INTRAVENOUS at 14:18

## 2019-05-15 RX ADMIN — SODIUM CHLORIDE 125 ML/HR: 900 INJECTION, SOLUTION INTRAVENOUS at 06:19

## 2019-05-15 RX ADMIN — HEPARIN SODIUM 5000 UNITS: 5000 INJECTION INTRAVENOUS; SUBCUTANEOUS at 22:24

## 2019-05-15 RX ADMIN — CEFEPIME HYDROCHLORIDE 2 G: 2 INJECTION, POWDER, FOR SOLUTION INTRAVENOUS at 14:21

## 2019-05-15 RX ADMIN — PRAVASTATIN SODIUM 20 MG: 10 TABLET ORAL at 22:26

## 2019-05-15 RX ADMIN — FAMOTIDINE 20 MG: 10 INJECTION, SOLUTION INTRAVENOUS at 10:12

## 2019-05-15 RX ADMIN — CEFEPIME HYDROCHLORIDE 2 G: 2 INJECTION, POWDER, FOR SOLUTION INTRAVENOUS at 06:08

## 2019-05-15 RX ADMIN — ASPIRIN 81 MG: 81 TABLET, COATED ORAL at 10:11

## 2019-05-15 RX ADMIN — INSULIN LISPRO 2 UNITS: 100 INJECTION, SOLUTION INTRAVENOUS; SUBCUTANEOUS at 22:22

## 2019-05-15 RX ADMIN — INSULIN LISPRO 3 UNITS: 100 INJECTION, SOLUTION INTRAVENOUS; SUBCUTANEOUS at 17:12

## 2019-05-15 RX ADMIN — VANCOMYCIN HYDROCHLORIDE 1500 MG: 10 INJECTION, POWDER, LYOPHILIZED, FOR SOLUTION INTRAVENOUS at 14:21

## 2019-05-15 NOTE — H&P
Vascular Surgery History & Physical    Subjective:     Mr. Jelani Mckinnon is a 54 yo AAM with a pmhx significant for Type B aortic dissection, renal artery stenosis/infarction, LVH, CKD III, and anemia. He is known to the service for a TEVAR and L CCA-SCA bypass  on 02/15/2019. Since his procedure he has had recurrent chest pain. He had a nuclear stress test under the care of Dr. Holli Martin in late February of 2019 that was unremarkable. He has a repeat CTA of the chest abd and pelvis on 02/24/2019 that was unremarkable with a stable stent graft. On this occasion he presents to the hospital with acute onset of sharp epigastric pain with radiation to the back that was exacerbated by deep inhalation. On arrival he was febrile. He was tachycardic with a normal RR. On arrival he was initiatlly hypertensive but shortly thereafter he became profoundly hypotensive. He denies congestion, sore throat, cough, or N/V. His WBC count on arrival was normal. He had a CTA of the chest/abd that was unchanged. While in the emergency room he developed significant angioedema and diffuse intense itching. Past Medical History    Type B aortic dissection  -s/p TEVAR and L CCA-SCA bypass 02/2019    Renal artery stenosis/infarction      Hypertensive urgency    LVH   Bilateral pleural effusions   CKD stage II-III  Anemia   Constipation    Foot fracture   Jaw fracture  Ruptured ear drum  Thumb fracture     Past Procedural History in addition to above  Appendenctomy   Orthopedic     Family History   Problem Relation Age of Onset    Diabetes Mother       Social History     Tobacco Use    Smoking status: Never Smoker    Smokeless tobacco: Never Used   Substance Use Topics    Alcohol use: No       Prior to Admission medications    Medication Sig Start Date End Date Taking? Authorizing Provider   acetaminophen (TYLENOL) 325 mg tablet Take 325-650 mg by mouth every four (4) hours as needed for Pain.    Yes Provider, Historical cetirizine (ZYRTEC) 10 mg tablet Take 10 mg by mouth daily as needed for Allergies. Yes Provider, Historical   amLODIPine (NORVASC) 10 mg tablet Take 1 Tab by mouth daily. 2/26/19  Yes Faiza Reilly, NP   aspirin delayed-release 81 mg tablet Take 1 Tab by mouth daily. 2/26/19  Yes Faiza Reilly, NP   carvedilol (COREG) 25 mg tablet Take 1 Tab by mouth two (2) times daily (with meals). 2/26/19  Yes Faiza Reilly, NP   hydrALAZINE (APRESOLINE) 50 mg tablet Take 1 Tab by mouth three (3) times daily. 2/26/19  Yes Faiza Reilly, NP   famotidine (PEPCID) 40 mg tablet Take 1 Tab by mouth daily. 2/26/19  Yes Faiza Reilly, NP   pravastatin (PRAVACHOL) 20 mg tablet Take 1 Tab by mouth nightly. 2/26/19  Yes Debby Liu NP     No Known Allergies     Review of Systems   Constitutional: Positive for fever. Negative for activity change, appetite change and chills. HENT: Negative for congestion. Eyes: Negative for visual disturbance. Respiratory: Positive for shortness of breath. Negative for cough and chest tightness. Cardiovascular: Positive for chest pain. Negative for leg swelling. Gastrointestinal: Negative for nausea and vomiting. Endocrine: Negative for polydipsia and polyuria. Genitourinary: Negative. Musculoskeletal: Negative. Skin: Negative. Allergic/Immunologic: Negative for immunocompromised state. Neurological: Negative for weakness. Hematological: Negative. Psychiatric/Behavioral: Negative.         Objective:     Patient Vitals for the past 24 hrs:   BP Temp Pulse Resp SpO2 Height Weight   05/15/19 0720 95/71 -- 76 18 99 % -- --   05/15/19 0610 99/77 97.7 °F (36.5 °C) 75 19 94 % -- --   05/15/19 0600 94/70 -- 74 17 97 % -- --   05/15/19 0550 98/64 -- 74 19 95 % -- --   05/14/19 2230 98/70 -- 93 27 96 % -- --   05/14/19 2210 97/69 -- 95 28 97 % -- --   05/14/19 2200 96/69 -- 95 29 98 % -- --   05/14/19 2150 101/72 -- 97 17 97 % -- --   05/14/19 2140 93/63 -- 96 20 98 % -- --   05/14/19 2130 113/78 -- 96 17 98 % -- --   05/14/19 2120 101/68 -- 99 19 97 % -- --   05/14/19 2110 98/76 -- 95 18 97 % -- --   05/14/19 2050 97/72 -- 97 19 99 % -- --   05/14/19 2040 100/72 -- 99 17 98 % -- --   05/14/19 2020 94/68 -- 96 22 99 % -- --   05/14/19 2010 101/74 -- 98 24 99 % -- --   05/14/19 2000 101/72 -- 96 24 99 % -- --   05/14/19 1950 99/75 -- 97 26 98 % -- --   05/14/19 1940 109/79 -- 100 24 99 % -- --   05/14/19 1930 107/79 -- (!) 101 20 98 % -- --   05/14/19 1920 99/73 -- (!) 102 20 99 % -- --   05/14/19 1910 99/70 -- (!) 102 23 98 % -- --   05/14/19 1900 100/72 -- (!) 102 15 98 % -- --   05/14/19 1840 97/74 -- (!) 102 19 99 % -- --   05/14/19 1800 105/73 -- (!) 101 26 95 % -- --   05/14/19 1750 107/77 -- (!) 105 23 97 % -- --   05/14/19 1740 105/69 -- (!) 104 25 97 % -- --   05/14/19 1730 110/76 -- (!) 102 28 99 % -- --   05/14/19 1720 106/67 -- (!) 103 26 98 % -- --   05/14/19 1710 116/70 -- (!) 105 25 94 % -- --   05/14/19 1700 114/75 -- (!) 106 21 100 % -- --   05/14/19 1658 112/77 -- -- -- -- -- --   05/14/19 1630 111/76 -- 98 28 96 % -- --   05/14/19 1623 95/71 -- (!) 101 23 94 % -- --   05/14/19 1622 95/71 -- 100 22 94 % -- --   05/14/19 1620 130/75 -- 99 23 93 % -- --   05/14/19 1618 126/69 -- 99 27 -- -- --   05/14/19 1610 94/49 -- (!) 103 25 -- -- --   05/14/19 1605 (!) 74/41 -- -- -- -- -- --   05/14/19 1601 (!) 59/38 -- -- -- -- -- --   05/14/19 1534 (!) 146/117 (!) 101.1 °F (38.4 °C) (!) 107 16 98 % 6' 5\" (1.956 m) 108.6 kg (239 lb 6.7 oz)     Physical Exam   Constitutional: He is oriented to person, place, and time. He appears well-developed and well-nourished. HENT:   Head:       Eyes: Pupils are equal, round, and reactive to light. EOM are normal.   Neck: Normal range of motion. Neck supple. Cardiovascular: Regular rhythm. Tachycardia present. Pulmonary/Chest: Effort normal. No respiratory distress. Abdominal: Soft. He exhibits no distension. Musculoskeletal: Normal range of motion. Neurological: He is alert and oriented to person, place, and time. Skin: Skin is warm and dry. Psychiatric: His behavior is normal. Judgment and thought content normal.     Data Review:   Recent Results (from the past 24 hour(s))   EKG, 12 LEAD, INITIAL    Collection Time: 05/14/19  3:38 PM   Result Value Ref Range    Ventricular Rate 101 BPM    Atrial Rate 101 BPM    P-R Interval 166 ms    QRS Duration 88 ms    Q-T Interval 336 ms    QTC Calculation (Bezet) 435 ms    Calculated P Axis 35 degrees    Calculated R Axis 9 degrees    Calculated T Axis 13 degrees    Diagnosis       Sinus tachycardia  Possible Left atrial enlargement  When compared with ECG of 27-FEB-2019 12:53,  No significant change was found     POC CREATININE    Collection Time: 05/14/19  3:45 PM   Result Value Ref Range    Creatinine (POC) 2.1 (H) 0.6 - 1.3 mg/dL    GFRAA, POC 41 (L) >60 ml/min/1.73m2    GFRNA, POC 34 (L) >60 ml/min/1.73m2   LACTIC ACID    Collection Time: 05/14/19  3:48 PM   Result Value Ref Range    Lactic acid 0.8 0.4 - 2.0 MMOL/L   CULTURE, BLOOD, PAIRED    Collection Time: 05/14/19  3:48 PM   Result Value Ref Range    Special Requests: NO SPECIAL REQUESTS      Culture result: NO GROWTH AFTER 15 HOURS     CBC WITH AUTOMATED DIFF    Collection Time: 05/14/19  3:55 PM   Result Value Ref Range    WBC 8.2 4.1 - 11.1 K/uL    RBC 3.95 (L) 4.10 - 5.70 M/uL    HGB 9.7 (L) 12.1 - 17.0 g/dL    HCT 30.5 (L) 36.6 - 50.3 %    MCV 77.2 (L) 80.0 - 99.0 FL    MCH 24.6 (L) 26.0 - 34.0 PG    MCHC 31.8 30.0 - 36.5 g/dL    RDW 17.1 (H) 11.5 - 14.5 %    PLATELET 943 063 - 279 K/uL    MPV 10.6 8.9 - 12.9 FL    NRBC 0.0 0  WBC    ABSOLUTE NRBC 0.00 0.00 - 0.01 K/uL    NEUTROPHILS 65 32 - 75 %    LYMPHOCYTES 23 12 - 49 %    MONOCYTES 12 5 - 13 %    EOSINOPHILS 0 0 - 7 %    BASOPHILS 0 0 - 1 %    IMMATURE GRANULOCYTES 0 0.0 - 0.5 %    ABS. NEUTROPHILS 5.3 1.8 - 8.0 K/UL    ABS.  LYMPHOCYTES 1.8 0.8 - 3.5 K/UL    ABS. MONOCYTES 1.0 0.0 - 1.0 K/UL    ABS. EOSINOPHILS 0.0 0.0 - 0.4 K/UL    ABS. BASOPHILS 0.0 0.0 - 0.1 K/UL    ABS. IMM. GRANS. 0.0 0.00 - 0.04 K/UL    DF AUTOMATED     METABOLIC PANEL, COMPREHENSIVE    Collection Time: 05/14/19  3:55 PM   Result Value Ref Range    Sodium 138 136 - 145 mmol/L    Potassium 3.5 3.5 - 5.1 mmol/L    Chloride 102 97 - 108 mmol/L    CO2 29 21 - 32 mmol/L    Anion gap 7 5 - 15 mmol/L    Glucose 87 65 - 100 mg/dL    BUN 22 (H) 6 - 20 MG/DL    Creatinine 1.93 (H) 0.70 - 1.30 MG/DL    BUN/Creatinine ratio 11 (L) 12 - 20      GFR est AA 45 (L) >60 ml/min/1.73m2    GFR est non-AA 38 (L) >60 ml/min/1.73m2    Calcium 8.8 8.5 - 10.1 MG/DL    Bilirubin, total 1.5 (H) 0.2 - 1.0 MG/DL    ALT (SGPT) 19 12 - 78 U/L    AST (SGOT) 18 15 - 37 U/L    Alk.  phosphatase 63 45 - 117 U/L    Protein, total 8.8 (H) 6.4 - 8.2 g/dL    Albumin 3.7 3.5 - 5.0 g/dL    Globulin 5.1 (H) 2.0 - 4.0 g/dL    A-G Ratio 0.7 (L) 1.1 - 2.2     TROPONIN I    Collection Time: 05/14/19  3:55 PM   Result Value Ref Range    Troponin-I, Qt. <0.05 <0.05 ng/mL   CK W/ CKMB & INDEX    Collection Time: 05/14/19  3:55 PM   Result Value Ref Range     (H) 39 - 308 U/L    CK - MB 2.0 <3.6 NG/ML    CK-MB Index 0.3 0.0 - 2.5     PROTHROMBIN TIME + INR    Collection Time: 05/14/19  3:55 PM   Result Value Ref Range    INR 1.2 (H) 0.9 - 1.1      Prothrombin time 12.4 (H) 9.0 - 11.1 sec   MAGNESIUM    Collection Time: 05/14/19  3:55 PM   Result Value Ref Range    Magnesium 1.8 1.6 - 2.4 mg/dL   LIPASE    Collection Time: 05/14/19  3:55 PM   Result Value Ref Range    Lipase 75 73 - 393 U/L   URINALYSIS W/ REFLEX CULTURE    Collection Time: 05/14/19  5:21 PM   Result Value Ref Range    Color DARK YELLOW      Appearance CLEAR CLEAR      Specific gravity 1.024 1.003 - 1.030      pH (UA) 7.0 5.0 - 8.0      Protein 30 (A) NEG mg/dL    Glucose NEGATIVE  NEG mg/dL    Ketone NEGATIVE  NEG mg/dL    Bilirubin NEGATIVE  NEG      Blood TRACE (A) NEG      Urobilinogen 4.0 (H) 0.2 - 1.0 EU/dL    Nitrites NEGATIVE  NEG      Leukocyte Esterase NEGATIVE  NEG      WBC 0-4 0 - 4 /hpf    RBC 0-5 0 - 5 /hpf    Epithelial cells FEW FEW /lpf    Bacteria NEGATIVE  NEG /hpf    UA:UC IF INDICATED CULTURE NOT INDICATED BY UA RESULT CNI      Hyaline cast 2-5 0 - 5 /lpf   URINALYSIS W/ RFLX MICROSCOPIC    Collection Time: 05/15/19  4:33 AM   Result Value Ref Range    Color DARK YELLOW      Appearance CLEAR CLEAR      Specific gravity 1.024 1.003 - 1.030      pH (UA) 7.0 5.0 - 8.0      Protein 100 (A) NEG mg/dL    Glucose NEGATIVE  NEG mg/dL    Ketone NEGATIVE  NEG mg/dL    Bilirubin NEGATIVE  NEG      Blood TRACE (A) NEG      Urobilinogen 4.0 (H) 0.2 - 1.0 EU/dL    Nitrites NEGATIVE  NEG      Leukocyte Esterase NEGATIVE  NEG      WBC 0-4 0 - 4 /hpf    RBC 0-5 0 - 5 /hpf    Epithelial cells FEW FEW /lpf    Bacteria NEGATIVE  NEG /hpf    Hyaline cast 2-5 0 - 5 /lpf   CBC W/O DIFF    Collection Time: 05/15/19  4:33 AM   Result Value Ref Range    WBC 7.9 4.1 - 11.1 K/uL    RBC 3.55 (L) 4.10 - 5.70 M/uL    HGB 8.7 (L) 12.1 - 17.0 g/dL    HCT 28.4 (L) 36.6 - 50.3 %    MCV 80.0 80.0 - 99.0 FL    MCH 24.5 (L) 26.0 - 34.0 PG    MCHC 30.6 30.0 - 36.5 g/dL    RDW 17.2 (H) 11.5 - 14.5 %    PLATELET 832 893 - 378 K/uL    MPV 10.4 8.9 - 12.9 FL    NRBC 0.0 0  WBC    ABSOLUTE NRBC 0.00 0.00 - 1.10 K/uL   METABOLIC PANEL, COMPREHENSIVE    Collection Time: 05/15/19  4:33 AM   Result Value Ref Range    Sodium 136 136 - 145 mmol/L    Potassium 4.1 3.5 - 5.1 mmol/L    Chloride 104 97 - 108 mmol/L    CO2 25 21 - 32 mmol/L    Anion gap 7 5 - 15 mmol/L    Glucose 200 (H) 65 - 100 mg/dL    BUN 25 (H) 6 - 20 MG/DL    Creatinine 1.64 (H) 0.70 - 1.30 MG/DL    BUN/Creatinine ratio 15 12 - 20      GFR est AA 55 (L) >60 ml/min/1.73m2    GFR est non-AA 45 (L) >60 ml/min/1.73m2    Calcium 8.1 (L) 8.5 - 10.1 MG/DL    Bilirubin, total 0.9 0.2 - 1.0 MG/DL    ALT (SGPT) 17 12 - 78 U/L    AST (SGOT) 18 15 - 37 U/L    Alk. phosphatase 52 45 - 117 U/L    Protein, total 7.5 6.4 - 8.2 g/dL    Albumin 3.0 (L) 3.5 - 5.0 g/dL    Globulin 4.5 (H) 2.0 - 4.0 g/dL    A-G Ratio 0.7 (L) 1.1 - 2.2         Assessment/Plan:     Principal Problems:  Acute chest, back, and abd pain  -improved this am  -source unclear  -troponin nl    Fever   -resolved this am  -UA clean  -blood cx NGTD  -rule out possible early PNA vs pneumonic effusion  Hypotension with hx of HTN   -slow to improved   Broad spectrum IV antibx for now. Angioedema  -contrast allergy? ?  -continue pepcid, benadryl, and methylprednisolone     Left pleural effusion  Hx of LVH  CKD III  -hx of renal artery stenosis/infarction  -discontinue IVF creatinine near baseline     Steroid induced hyperglycemia   -SSI/POCT  Plan per hospitalists    Active problems:   Hx of Type B aortic dissection  -s/p TEVAR and L CCA-SCA bypass 02/2019   -repeat CTA of the chest and abd stable  Anemia  -stable      VTE prophylaxis:  [facility]    Disposition:   Likely home in 1-2 days     Signed By: Les Koehler NP     May 15, 2019

## 2019-05-15 NOTE — PROGRESS NOTES
52 yo male HTN s/p TEVAR + L CCA-SCA bypass for complex Type B dissection presents to ER with chest/back/abd pain, fever, and hypotension. Since admission he has had an allergic reaction with angioedema and diffuse itching. His pain has resolved since admission to ER and a dose of fentanyl. Itching and edema improved with benadryl, pepcid, and steroids. Abd exam unremarkable  L cervical bypass patent  Lungs clear    CTA chest/abd look unchanged to me from 2/27/19 except slight increase in aortic diameter around distal most aspect of graft. Labs OK (creat at baseline)  Blood Cx pending  U/A clear      Will admit until we can figure out source of fever and hypotension and for hydration following dye load.

## 2019-05-15 NOTE — ED NOTES
Bedside shift change report given to [facility] (oncoming nurse) by Karen Gupta (offgoing nurse). Report included the following information SBAR, Kardex and Recent Results.

## 2019-05-15 NOTE — CONSULTS
Hospitalist Consultation Note    NAME:  Darek Barragan   :   1970   MRN:   [id number]     ATTENDING: No admitting provider for patient encounter. PCP:  None    Date/Time:  2019 8:11 PM      Recommendations/Plan:     Angioedema  -unknown cause  Sx: lips swelling /itching at the infusion site   Started in ED after NS IVP on presentation  Getting better s/p solumedrol  -monitor clinically   -cont IV solumedrol / pepcid/ benadryl     Sepsis ( fever/ tachycardia)   Chest pain   S/p TEVAR + L CCA-SCA bypass for complex Type B dissection 2019   -unknown exact source, r/o infected graft    UA/ cxray clear   -recommending empiric AB: vanco + cefepime given hypotension  -follow BC  -aggressive IVF  - if remain hypotensive may need pressors ( management per primary team)      Pre diabetes  -BS 87 on admission, hba1c 6.7 2019  -monitor for hypoglycemia and hyperglycemia with steroids use     HTN  -BP low side 90s  -hold home BP meds: coreg/ norvasc/hydralazine     CKD stage III  -Cr at baseline ~ 1.7, slightly up today at 1.9  -monitor closely s/p IV dye  Avoid nephrotoxic drugs, adjust all meds to GFR. Hyperlipidemia  -cont statin            Code Status: Full code   DVT Prophylaxis: per primary team     Thank you very much for allowing us to participate in this patient care. We will follow this pt with you. Dr Miriam Thomas will assume care of this pt in am         Subjective:   REQUESTING PHYSICIAN: Dr Yolanda Mcleod: lips swelling / sepsis   Deysi Crane is a 53 y.o.  male who I was asked to see for above complaint. Pt presented to ED c/o CP. CP started last night. Pt has h/o aortic dissection status post graft in February. He describe CP as severe, tearing in nature. CP was associated with fever and SOB. Pain radiates through his chest to his back. Pt was found to be hypotensive in triage. No chills. No headache. His pain has resolved since admission to ER and a dose of fentanyl. No recent injections/ travel/ sick contact/ dental work. Pt started with lip swelling and itching at the site of infusion right after IV was inserted and normal saline push was given. Swelling is getting better now per pt s/p solumedrol. No tongue swelling. No SOB. Vs: 101.1 °F (38.4 °C) - 107 -  59/38 - 16 - 98% RA       Past Medical History:   Diagnosis Date    Foot fracture     Hypertension     Jaw fracture (HCC)     Ruptured ear drum     Thumb fracture       Past Surgical History:   Procedure Laterality Date    HX APPENDECTOMY      HX ORTHOPAEDIC       Social History     Tobacco Use    Smoking status: Never Smoker    Smokeless tobacco: Never Used   Substance Use Topics    Alcohol use: No      Family History   Problem Relation Age of Onset    Diabetes Mother        No Known Allergies   Prior to Admission medications    Medication Sig Start Date End Date Taking? Authorizing Provider   acetaminophen (TYLENOL) 325 mg tablet Take 325-650 mg by mouth every four (4) hours as needed for Pain. Yes Provider, Historical   cetirizine (ZYRTEC) 10 mg tablet Take 10 mg by mouth daily as needed for Allergies. Yes Provider, Historical   amLODIPine (NORVASC) 10 mg tablet Take 1 Tab by mouth daily. 2/26/19  Yes Faiza Reilly, NP   aspirin delayed-release 81 mg tablet Take 1 Tab by mouth daily. 2/26/19  Yes Faiza Reilly NP   carvedilol (COREG) 25 mg tablet Take 1 Tab by mouth two (2) times daily (with meals). 2/26/19  Yes Faiza Reilly, NP   hydrALAZINE (APRESOLINE) 50 mg tablet Take 1 Tab by mouth three (3) times daily. 2/26/19  Yes Faiza Reilly NP   famotidine (PEPCID) 40 mg tablet Take 1 Tab by mouth daily. 2/26/19  Yes Faiza Reilly, NP   pravastatin (PRAVACHOL) 20 mg tablet Take 1 Tab by mouth nightly. 2/26/19  Yes Angelica Duckworth NP       REVIEW OF SYSTEMS:     Total of 12 systems reviewed as follows:   I am not able to complete the review of systems because:    The patient is intubated and sedated    The patient has altered mental status due to his acute medical problems    The patient has baseline aphasia from prior stroke(s)    The patient has baseline dementia and is not reliable historian                 POSITIVE= underlined text  Negative = text not underlined  General:  fever, chills, sweats, generalized weakness, weight loss/gain,      loss of appetite   Eyes:    blurred vision, eye pain, loss of vision, double vision  ENT:    rhinorrhea, pharyngitis   Respiratory:   cough, sputum production, SOB, wheezing, ESCOBEDO, pleuritic pain   Cardiology:   chest pain, palpitations, orthopnea, PND, edema, syncope   Gastrointestinal:  abdominal pain , N/V, dysphagia, diarrhea, constipation, bleeding   Genitourinary:  frequency, urgency, dysuria, hematuria, incontinence   Muskuloskeletal :  arthralgia, myalgia   Hematology:  easy bruising, nose or gum bleeding, lymphadenopathy   Dermatological: rash, ulceration, pruritis   Endocrine:   hot flashes or polydipsia   Neurological:  headache, dizziness, confusion, focal weakness, paresthesia,     Speech difficulties, memory loss, gait disturbance  Psychological: Feelings of anxiety, depression, agitation    Objective:   VITALS:    Visit Vitals  BP 97/74   Pulse (!) 102   Temp (!) 101.1 °F (38.4 °C)   Resp 19   Ht 6' 5\" (1.956 m)   Wt 108.6 kg (239 lb 6.7 oz)   SpO2 99%   BMI 28.39 kg/m²     Temp (24hrs), Av.1 °F (38.4 °C), Min:101.1 °F (38.4 °C), Max:101.1 °F (38.4 °C)      PHYSICAL EXAM:   General:    Alert, cooperative, no distress, appears stated age. HEENT: Atraumatic, anicteric sclerae, pink conjunctivae     No oral ulcers, mucosa moist, throat clear                          + mild lips swelling   Neck:  Supple, symmetrical,  thyroid: non tender  Lungs:   Clear to auscultation bilaterally. No Wheezing or Rhonchi. No rales. Chest wall:  No tenderness  No Accessory muscle use.   Heart:   Regular  rhythm,  No  murmur   No edema  Abdomen: Soft, non-tender. Not distended. Bowel sounds normal  Extremities: No cyanosis. No clubbing  Skin:     Not pale. Not Jaundiced  No rashes   Psych:  Good insight. Not depressed. Not anxious or agitated. Neurologic: EOMs intact. No facial asymmetry. No aphasia or slurred speech. Symmetrical strength, Alert and oriented X 4.     _______________________________________________________________________  Care Plan discussed with:    Comments   Patient y    Family  y Bedside    RN y    Care Manager                    Consultant:  [facility]ade ED provider    ____________________________________________________________________  TOTAL TIME:  65  mins    Comments     Reviewed previous records   >50% of visit spent in counseling and coordination of care  Discussion with patient and/or family and questions answered       Critical Care Provided     Minutes non procedure based  ________________________________________________________________________  Signed: Brie Adrian MD      Procedures: see electronic medical records for all procedures/Xrays and details which were not copied into this note but were reviewed prior to creation of Plan.     LAB DATA REVIEWED:    Recent Results (from the past 24 hour(s))   EKG, 12 LEAD, INITIAL    Collection Time: 05/14/19  3:38 PM   Result Value Ref Range    Ventricular Rate 101 BPM    Atrial Rate 101 BPM    P-R Interval 166 ms    QRS Duration 88 ms    Q-T Interval 336 ms    QTC Calculation (Bezet) 435 ms    Calculated P Axis 35 degrees    Calculated R Axis 9 degrees    Calculated T Axis 13 degrees    Diagnosis       Sinus tachycardia  Possible Left atrial enlargement  When compared with ECG of 27-FEB-2019 12:53,  No significant change was found     POC CREATININE    Collection Time: 05/14/19  3:45 PM   Result Value Ref Range    Creatinine (POC) 2.1 (H) 0.6 - 1.3 mg/dL    GFRAA, POC 41 (L) >60 ml/min/1.73m2    GFRNA, POC 34 (L) >60 ml/min/1.73m2   LACTIC ACID    Collection Time: 05/14/19  3:48 PM   Result Value Ref Range    Lactic acid 0.8 0.4 - 2.0 MMOL/L   CBC WITH AUTOMATED DIFF    Collection Time: 05/14/19  3:55 PM   Result Value Ref Range    WBC 8.2 4.1 - 11.1 K/uL    RBC 3.95 (L) 4.10 - 5.70 M/uL    HGB 9.7 (L) 12.1 - 17.0 g/dL    HCT 30.5 (L) 36.6 - 50.3 %    MCV 77.2 (L) 80.0 - 99.0 FL    MCH 24.6 (L) 26.0 - 34.0 PG    MCHC 31.8 30.0 - 36.5 g/dL    RDW 17.1 (H) 11.5 - 14.5 %    PLATELET 924 212 - 225 K/uL    MPV 10.6 8.9 - 12.9 FL    NRBC 0.0 0  WBC    ABSOLUTE NRBC 0.00 0.00 - 0.01 K/uL    NEUTROPHILS 65 32 - 75 %    LYMPHOCYTES 23 12 - 49 %    MONOCYTES 12 5 - 13 %    EOSINOPHILS 0 0 - 7 %    BASOPHILS 0 0 - 1 %    IMMATURE GRANULOCYTES 0 0.0 - 0.5 %    ABS. NEUTROPHILS 5.3 1.8 - 8.0 K/UL    ABS. LYMPHOCYTES 1.8 0.8 - 3.5 K/UL    ABS. MONOCYTES 1.0 0.0 - 1.0 K/UL    ABS. EOSINOPHILS 0.0 0.0 - 0.4 K/UL    ABS. BASOPHILS 0.0 0.0 - 0.1 K/UL    ABS. IMM. GRANS. 0.0 0.00 - 0.04 K/UL    DF AUTOMATED     METABOLIC PANEL, COMPREHENSIVE    Collection Time: 05/14/19  3:55 PM   Result Value Ref Range    Sodium 138 136 - 145 mmol/L    Potassium 3.5 3.5 - 5.1 mmol/L    Chloride 102 97 - 108 mmol/L    CO2 29 21 - 32 mmol/L    Anion gap 7 5 - 15 mmol/L    Glucose 87 65 - 100 mg/dL    BUN 22 (H) 6 - 20 MG/DL    Creatinine 1.93 (H) 0.70 - 1.30 MG/DL    BUN/Creatinine ratio 11 (L) 12 - 20      GFR est AA 45 (L) >60 ml/min/1.73m2    GFR est non-AA 38 (L) >60 ml/min/1.73m2    Calcium 8.8 8.5 - 10.1 MG/DL    Bilirubin, total 1.5 (H) 0.2 - 1.0 MG/DL    ALT (SGPT) 19 12 - 78 U/L    AST (SGOT) 18 15 - 37 U/L    Alk.  phosphatase 63 45 - 117 U/L    Protein, total 8.8 (H) 6.4 - 8.2 g/dL    Albumin 3.7 3.5 - 5.0 g/dL    Globulin 5.1 (H) 2.0 - 4.0 g/dL    A-G Ratio 0.7 (L) 1.1 - 2.2     TROPONIN I    Collection Time: 05/14/19  3:55 PM   Result Value Ref Range    Troponin-I, Qt. <0.05 <0.05 ng/mL   CK W/ CKMB & INDEX    Collection Time: 05/14/19  3:55 PM   Result Value Ref Range     (H) 39 - 308 U/L    CK - MB 2. 0 <3.6 NG/ML    CK-MB Index 0.3 0.0 - 2.5     PROTHROMBIN TIME + INR    Collection Time: 05/14/19  3:55 PM   Result Value Ref Range    INR 1.2 (H) 0.9 - 1.1      Prothrombin time 12.4 (H) 9.0 - 11.1 sec   MAGNESIUM    Collection Time: 05/14/19  3:55 PM   Result Value Ref Range    Magnesium 1.8 1.6 - 2.4 mg/dL   LIPASE    Collection Time: 05/14/19  3:55 PM   Result Value Ref Range    Lipase 75 73 - 393 U/L   URINALYSIS W/ REFLEX CULTURE    Collection Time: 05/14/19  5:21 PM   Result Value Ref Range    Color DARK YELLOW      Appearance CLEAR CLEAR      Specific gravity 1.024 1.003 - 1.030      pH (UA) 7.0 5.0 - 8.0      Protein 30 (A) NEG mg/dL    Glucose NEGATIVE  NEG mg/dL    Ketone NEGATIVE  NEG mg/dL    Bilirubin NEGATIVE  NEG      Blood TRACE (A) NEG      Urobilinogen 4.0 (H) 0.2 - 1.0 EU/dL    Nitrites NEGATIVE  NEG      Leukocyte Esterase NEGATIVE  NEG      WBC 0-4 0 - 4 /hpf    RBC 0-5 0 - 5 /hpf    Epithelial cells FEW FEW /lpf    Bacteria NEGATIVE  NEG /hpf    UA:UC IF INDICATED CULTURE NOT INDICATED BY UA RESULT CNI      Hyaline cast 2-5 0 - 5 /lpf       _____________________________  Hospitalist: Teo Hendricks MD

## 2019-05-15 NOTE — ED NOTES
TRANSFER - OUT REPORT:    Verbal report given to Gen surg (name) on Molly Christine  being transferred to Gen surg(unit) for routine progression of care       Report consisted of patients Situation, Background, Assessment and   Recommendations(SBAR). Information from the following report(s) SBAR, Kardex, ED Summary, Procedure Summary, Intake/Output, Recent Results and Med Rec Status was reviewed with the receiving nurse. Lines:   Peripheral IV 05/14/19 Right Antecubital (Active)   Site Assessment Clean, dry, & intact 5/14/2019  3:44 PM   Phlebitis Assessment 0 5/14/2019  3:44 PM   Infiltration Assessment 0 5/14/2019  3:44 PM   Dressing Status Clean, dry, & intact 5/14/2019  3:44 PM   Dressing Type Transparent;Tape 5/14/2019  3:44 PM   Hub Color/Line Status Green;Patent; Flushed 5/14/2019  3:44 PM   Action Taken Blood drawn 5/14/2019  3:44 PM       Peripheral IV 05/14/19 (Active)   Site Assessment Clean, dry, & intact 5/14/2019  4:19 PM   Phlebitis Assessment 0 5/14/2019  4:19 PM   Infiltration Assessment 0 5/14/2019  4:19 PM   Dressing Status Clean, dry, & intact 5/14/2019  4:19 PM        Opportunity for questions and clarification was provided.       Patient transported with:   Intelen

## 2019-05-15 NOTE — PROGRESS NOTES
Duplicate famotidine (64QE PO + 20mg IV q 12h) orders exist.  Patient on pepcid for angioedema/swelling per this admission. Will Discontinue 40mg PO daily (patient's home dose prior to admission) and keep famotidine 20mg IV every 12h for lip swelling as duplicate medication per P& T protocol. Please monitor and change back to PO famotidine 40mg daily when patient clinically stable.     TITI Dyer

## 2019-05-15 NOTE — PROGRESS NOTES
Hospitalist Progress Note    NAME: Sussy Congress   :  1970   MRN:  [id number]       Assessment / Plan:  Angioedema  -unclear etiology, but pt states symptoms developed right after saline flush was given. There was questionable contrast allergy  -symptoms improved with benadryl, IV steroids and pepcid, will cont' today and possibly can change to PO tomorrow .   -monitor clinically     Sepsis ( fever/ tachycardia)   Chest pain   S/p TEVAR + L CCA-SCA bypass for complex Type B dissection 2019   -unknown exact source, r/o infected graft. Chest pain resolved  -UA neg for infection  -images to include CXR, CT chest/a/p reviewed. possible early PNA, however pt has no respiratory symptoms, not requiring O2 suppl   -cont' empiric vanco + cefepime given hypotension (BP 59/38 in the ER)  -follow BCx, check procalcitonin  -expect leukocytosis due to IV steroid use  -BP improved off fluids now  -management per primary team    Pre diabetes  -BS 87 on admission, hba1c 6.7 2019  -monitor for hypoglycemia and hyperglycemia with steroids use      HTN  Hypotension, resolved  -BP ~100/70. Will hold home BP meds: coreg/ norvasc/hydralazine, may need to adjust on discharge. He reports lightheadedness after taking all his antihypertensives at home     CKD stage III  -Cr at baseline ~ 1.7, slightly up today at 1.9  -cr improving  -avoid nephrotoxic drugs, adjust all meds to GFR.      Hyperlipidemia  -cont statin             Code Status: Full code   DVT Prophylaxis: per primary team       Subjective:     Chief Complaint / Reason for Physician Visit  Pt seen in the ER.  c/o back pain from bed, but denies chest pain or SOB. Lip swelling appears to be improving. Febrile on admission but no fever overnight. Bp overall improved. He remains stable on RA. Discussed with RN events overnight.      Review of Systems:  Symptom Y/N Comments  Symptom Y/N Comments   Fever/Chills n   Chest Pain n    Poor Appetite    Edema     Cough Abdominal Pain n    Sputum    Joint Pain     SOB/ESCOBEDO n   Pruritis/Rash     Nausea/vomit    Tolerating PT/OT     Diarrhea    Tolerating Diet y    Constipation    Other       Could NOT obtain due to:      Objective:     VITALS:   Last 24hrs VS reviewed since prior progress note.  Most recent are:  Patient Vitals for the past 24 hrs:   Temp Pulse Resp BP SpO2   05/15/19 1210 -- 86 18 106/71 97 %   05/15/19 1010 -- 87 24 112/77 99 %   05/15/19 0720 -- 76 18 95/71 99 %   05/15/19 0610 97.7 °F (36.5 °C) 75 19 99/77 94 %   05/15/19 0600 -- 74 17 94/70 97 %   05/15/19 0550 -- 74 19 98/64 95 %   05/14/19 2230 -- 93 27 98/70 96 %   05/14/19 2210 -- 95 28 97/69 97 %   05/14/19 2200 -- 95 29 96/69 98 %   05/14/19 2150 -- 97 17 101/72 97 %   05/14/19 2140 -- 96 20 93/63 98 %   05/14/19 2130 -- 96 17 113/78 98 %   05/14/19 2120 -- 99 19 101/68 97 %   05/14/19 2110 -- 95 18 98/76 97 %   05/14/19 2050 -- 97 19 97/72 99 %   05/14/19 2040 -- 99 17 100/72 98 %   05/14/19 2020 -- 96 22 94/68 99 %   05/14/19 2010 -- 98 24 101/74 99 %   05/14/19 2000 -- 96 24 101/72 99 %   05/14/19 1950 -- 97 26 99/75 98 %   05/14/19 1940 -- 100 24 109/79 99 %   05/14/19 1930 -- (!) 101 20 107/79 98 %   05/14/19 1920 -- (!) 102 20 99/73 99 %   05/14/19 1910 -- (!) 102 23 99/70 98 %   05/14/19 1900 -- (!) 102 15 100/72 98 %   05/14/19 1840 -- (!) 102 19 97/74 99 %   05/14/19 1800 -- (!) 101 26 105/73 95 %   05/14/19 1750 -- (!) 105 23 107/77 97 %   05/14/19 1740 -- (!) 104 25 105/69 97 %   05/14/19 1730 -- (!) 102 28 110/76 99 %   05/14/19 1720 -- (!) 103 26 106/67 98 %   05/14/19 1710 -- (!) 105 25 116/70 94 %   05/14/19 1700 -- (!) 106 21 114/75 100 %   05/14/19 1658 -- -- -- 112/77 --   05/14/19 1630 -- 98 28 111/76 96 %   05/14/19 1623 -- (!) 101 23 95/71 94 %   05/14/19 1622 -- 100 22 95/71 94 %   05/14/19 1620 -- 99 23 130/75 93 %   05/14/19 1618 -- 99 27 126/69 --   05/14/19 1610 -- (!) 103 25 94/49 --   05/14/19 1605 -- -- -- (!) 74/41 -- 05/14/19 1601 -- -- -- (!) 59/38 --   05/14/19 1534 (!) 101.1 °F (38.4 °C) (!) 107 16 (!) 146/117 98 %       Intake/Output Summary (Last 24 hours) at 5/15/2019 1225  Last data filed at 5/15/2019 1015  Gross per 24 hour   Intake 2550 ml   Output 400 ml   Net 2150 ml        PHYSICAL EXAM:  General: WD, WN. Alert, cooperative, no acute distress    EENT:  EOMI. Anicteric sclerae. MMM  Resp:  CTA bilaterally, no wheezing or rales. No accessory muscle use  CV:  Regular  rhythm,  No edema  GI:  Soft, Non distended, Non tender.  +Bowel sounds  Neurologic:  Alert and oriented X 3, normal speech  Psych:   Good insight. Not anxious nor agitated  Skin:  No rashes. No jaundice    Reviewed most current lab test results and cultures  YES  Reviewed most current radiology test results   YES  Review and summation of old records today    NO  Reviewed patient's current orders and MAR    YES  PMH/SH reviewed - no change compared to H&P  ________________________________________________________________________  Care Plan discussed with:    Comments   Patient x    Family      RN x    Care Manager     Consultant                        Multidiciplinary team rounds were held today with , nursing, pharmacist and clinical coordinator. Patient's plan of care was discussed; medications were reviewed and discharge planning was addressed. ________________________________________________________________________  Total NON critical care TIME:  35   Minutes    Total CRITICAL CARE TIME Spent:   Minutes non procedure based      Comments   >50% of visit spent in counseling and coordination of care     ________________________________________________________________________  Jael Ramirez MD     Procedures: see electronic medical records for all procedures/Xrays and details which were not copied into this note but were reviewed prior to creation of Plan. LABS:  I reviewed today's most current labs and imaging studies.   Pertinent labs include:  Recent Labs     05/15/19  0433 05/14/19  1555   WBC 7.9 8.2   HGB 8.7* 9.7*   HCT 28.4* 30.5*    207     Recent Labs     05/15/19  0433 05/14/19  1555    138   K 4.1 3.5    102   CO2 25 29   * 87   BUN 25* 22*   CREA 1.64* 1.93*   CA 8.1* 8.8   MG  --  1.8   ALB 3.0* 3.7   TBILI 0.9 1.5*   SGOT 18 18   ALT 17 19   INR  --  1.2*       Signed: Danielle Mckee MD

## 2019-05-16 VITALS
BODY MASS INDEX: 28.27 KG/M2 | DIASTOLIC BLOOD PRESSURE: 87 MMHG | HEIGHT: 77 IN | SYSTOLIC BLOOD PRESSURE: 124 MMHG | WEIGHT: 239.42 LBS | HEART RATE: 70 BPM | RESPIRATION RATE: 16 BRPM | TEMPERATURE: 98 F | OXYGEN SATURATION: 95 %

## 2019-05-16 LAB
ANION GAP SERPL CALC-SCNC: 7 MMOL/L (ref 5–15)
BUN SERPL-MCNC: 25 MG/DL (ref 6–20)
BUN/CREAT SERPL: 17 (ref 12–20)
CALCIUM SERPL-MCNC: 8.3 MG/DL (ref 8.5–10.1)
CHLORIDE SERPL-SCNC: 107 MMOL/L (ref 97–108)
CO2 SERPL-SCNC: 25 MMOL/L (ref 21–32)
CREAT SERPL-MCNC: 1.45 MG/DL (ref 0.7–1.3)
GLUCOSE BLD STRIP.AUTO-MCNC: 205 MG/DL (ref 65–100)
GLUCOSE BLD STRIP.AUTO-MCNC: 224 MG/DL (ref 65–100)
GLUCOSE SERPL-MCNC: 176 MG/DL (ref 65–100)
POTASSIUM SERPL-SCNC: 4 MMOL/L (ref 3.5–5.1)
SERVICE CMNT-IMP: ABNORMAL
SERVICE CMNT-IMP: ABNORMAL
SODIUM SERPL-SCNC: 139 MMOL/L (ref 136–145)

## 2019-05-16 PROCEDURE — 74011250636 HC RX REV CODE- 250/636: Performed by: HOSPITALIST

## 2019-05-16 PROCEDURE — 74011000258 HC RX REV CODE- 258: Performed by: HOSPITALIST

## 2019-05-16 PROCEDURE — 96376 TX/PRO/DX INJ SAME DRUG ADON: CPT

## 2019-05-16 PROCEDURE — 74011250637 HC RX REV CODE- 250/637: Performed by: SURGERY

## 2019-05-16 PROCEDURE — 80048 BASIC METABOLIC PNL TOTAL CA: CPT

## 2019-05-16 PROCEDURE — 74011250636 HC RX REV CODE- 250/636: Performed by: INTERNAL MEDICINE

## 2019-05-16 PROCEDURE — 74011250637 HC RX REV CODE- 250/637: Performed by: NURSE PRACTITIONER

## 2019-05-16 PROCEDURE — 74011636637 HC RX REV CODE- 636/637: Performed by: INTERNAL MEDICINE

## 2019-05-16 PROCEDURE — 74011250636 HC RX REV CODE- 250/636: Performed by: SURGERY

## 2019-05-16 PROCEDURE — 82962 GLUCOSE BLOOD TEST: CPT

## 2019-05-16 PROCEDURE — 36415 COLL VENOUS BLD VENIPUNCTURE: CPT

## 2019-05-16 PROCEDURE — 74011250637 HC RX REV CODE- 250/637: Performed by: INTERNAL MEDICINE

## 2019-05-16 PROCEDURE — 96366 THER/PROPH/DIAG IV INF ADDON: CPT

## 2019-05-16 PROCEDURE — 99218 HC RM OBSERVATION: CPT

## 2019-05-16 RX ORDER — AMLODIPINE BESYLATE 10 MG/1
10 TABLET ORAL DAILY
Qty: 30 TAB | Refills: 2 | Status: SHIPPED | OUTPATIENT
Start: 2019-05-16 | End: 2019-06-06 | Stop reason: SDUPTHER

## 2019-05-16 RX ORDER — DIPHENHYDRAMINE HCL 25 MG
25 CAPSULE ORAL
Status: DISCONTINUED | OUTPATIENT
Start: 2019-05-16 | End: 2019-05-16 | Stop reason: HOSPADM

## 2019-05-16 RX ORDER — AMOXICILLIN AND CLAVULANATE POTASSIUM 875; 125 MG/1; MG/1
1 TABLET, FILM COATED ORAL 2 TIMES DAILY
Qty: 20 TAB | Refills: 0 | Status: SHIPPED | OUTPATIENT
Start: 2019-05-16 | End: 2019-06-06 | Stop reason: ALTCHOICE

## 2019-05-16 RX ORDER — FAMOTIDINE 20 MG/1
20 TABLET, FILM COATED ORAL 2 TIMES DAILY
Status: DISCONTINUED | OUTPATIENT
Start: 2019-05-16 | End: 2019-05-16 | Stop reason: HOSPADM

## 2019-05-16 RX ORDER — PREDNISONE 10 MG/1
TABLET ORAL
Qty: 20 TAB | Refills: 0 | Status: SHIPPED | OUTPATIENT
Start: 2019-05-16 | End: 2019-06-06 | Stop reason: ALTCHOICE

## 2019-05-16 RX ORDER — DIPHENHYDRAMINE HYDROCHLORIDE 50 MG/ML
25 INJECTION, SOLUTION INTRAMUSCULAR; INTRAVENOUS
Status: DISCONTINUED | OUTPATIENT
Start: 2019-05-16 | End: 2019-05-16 | Stop reason: HOSPADM

## 2019-05-16 RX ORDER — HYDRALAZINE HYDROCHLORIDE 50 MG/1
50 TABLET, FILM COATED ORAL 3 TIMES DAILY
Qty: 90 TAB | Refills: 2 | Status: SHIPPED | OUTPATIENT
Start: 2019-05-16 | End: 2020-01-15 | Stop reason: SDUPTHER

## 2019-05-16 RX ORDER — CARVEDILOL 12.5 MG/1
25 TABLET ORAL 2 TIMES DAILY WITH MEALS
Status: DISCONTINUED | OUTPATIENT
Start: 2019-05-16 | End: 2019-05-16 | Stop reason: HOSPADM

## 2019-05-16 RX ORDER — VANCOMYCIN/0.9 % SOD CHLORIDE 1.5G/250ML
1500 PLASTIC BAG, INJECTION (ML) INTRAVENOUS EVERY 12 HOURS
Status: DISCONTINUED | OUTPATIENT
Start: 2019-05-16 | End: 2019-05-16 | Stop reason: HOSPADM

## 2019-05-16 RX ADMIN — METHYLPREDNISOLONE SODIUM SUCCINATE 40 MG: 40 INJECTION, POWDER, FOR SOLUTION INTRAMUSCULAR; INTRAVENOUS at 09:06

## 2019-05-16 RX ADMIN — DIPHENHYDRAMINE HYDROCHLORIDE 25 MG: 50 INJECTION, SOLUTION INTRAMUSCULAR; INTRAVENOUS at 06:11

## 2019-05-16 RX ADMIN — INSULIN LISPRO 3 UNITS: 100 INJECTION, SOLUTION INTRAVENOUS; SUBCUTANEOUS at 09:05

## 2019-05-16 RX ADMIN — ASPIRIN 81 MG: 81 TABLET, COATED ORAL at 09:05

## 2019-05-16 RX ADMIN — VANCOMYCIN HYDROCHLORIDE 1500 MG: 10 INJECTION, POWDER, LYOPHILIZED, FOR SOLUTION INTRAVENOUS at 03:54

## 2019-05-16 RX ADMIN — METHYLPREDNISOLONE SODIUM SUCCINATE 80 MG: 40 INJECTION, POWDER, FOR SOLUTION INTRAMUSCULAR; INTRAVENOUS at 05:53

## 2019-05-16 RX ADMIN — HUMAN INSULIN 8 UNITS: 100 INJECTION, SUSPENSION SUBCUTANEOUS at 09:05

## 2019-05-16 RX ADMIN — FAMOTIDINE 20 MG: 20 TABLET ORAL at 09:05

## 2019-05-16 RX ADMIN — CEFEPIME HYDROCHLORIDE 2 G: 2 INJECTION, POWDER, FOR SOLUTION INTRAVENOUS at 06:15

## 2019-05-16 RX ADMIN — CARVEDILOL 25 MG: 12.5 TABLET, FILM COATED ORAL at 09:05

## 2019-05-16 NOTE — PROGRESS NOTES
Hospitalist Progress Note    NAME: Laurie Perez   :  1970   MRN:  [id number]       Assessment / Plan:  Angioedema, resolved  -unclear etiology, but pt states symptoms developed right after saline flush was given. There was questionable contrast allergy  -will change to PO pepcid, tapered dose IV steroids, and benadryl changed to q6h prn .   -monitor clinically     Sepsis ( fever/ tachycardia)   Chest pain likely due to early PNA  S/p TEVAR + L CCA-SCA bypass for complex Type B dissection 2019   -unknown exact source, but likely due to early PNA. R/o infected graft. Chest pain resolved  -UA neg for infection  -images to include CXR, CT chest/a/p reviewed  -cont' empiric vanco + cefepime given hypotension (BP 59/38 in the ER)  -follow BCx, so far neg, procalcitonin 1.4  -expect leukocytosis due to IV steroid use  -BP improved off fluids now  -if Bcx remains neg and pt is being discharged today, recommend completing 5 days of levaquin for PNA. Pre diabetes  -BS 87 on admission, hba1c 6.7 2019  -hyperglycemic now due to steroid use  -schedule NPH with steroids  -cont' SSI     HTN  Hypotension, resolved  -BP ~100/70. BP improving. Will hold home BP meds: coreg/ norvasc/hydralazine, may need to adjust on discharge. He reports lightheadedness after taking all his antihypertensives at home  -would restart coreg alone on discharge. Have him f/u with PCP for further titration of meds.  -add prn nitrobid     CKD stage III  -Cr back to baseline  -avoid nephrotoxic drugs, adjust all meds to GFR.      Hyperlipidemia  -cont statin             Code Status: Full code   DVT Prophylaxis: per primary team       Subjective:     Chief Complaint / Reason for Physician Visit  Pt seen, uneventful night. Angioedema resolved. No fever, cough, sob, cp. Stable on RA. Wants to go home today. Discussed with RN events overnight.      Review of Systems:  Symptom Y/N Comments  Symptom Y/N Comments   Fever/Chills n Chest Pain n    Poor Appetite    Edema     Cough    Abdominal Pain n    Sputum    Joint Pain     SOB/ESCOBEDO n   Pruritis/Rash     Nausea/vomit    Tolerating PT/OT     Diarrhea    Tolerating Diet y    Constipation    Other       Could NOT obtain due to:      Objective:     VITALS:   Last 24hrs VS reviewed since prior progress note. Most recent are:  Patient Vitals for the past 24 hrs:   Temp Pulse Resp BP SpO2   05/16/19 0339 97.7 °F (36.5 °C) 78 16 131/88 95 %   05/15/19 2253 98 °F (36.7 °C) 80 18 141/85 97 %   05/15/19 1945 97.8 °F (36.6 °C) 88 18 124/90 94 %   05/15/19 1518 97.6 °F (36.4 °C) 82 18 114/85 98 %   05/15/19 1247 97.6 °F (36.4 °C) 81 18 126/83 98 %   05/15/19 1210 -- 86 18 106/71 97 %   05/15/19 1010 -- 87 24 112/77 99 %       Intake/Output Summary (Last 24 hours) at 5/16/2019 0735  Last data filed at 5/16/2019 0223  Gross per 24 hour   Intake 100 ml   Output 600 ml   Net -500 ml        PHYSICAL EXAM:  General: WD, WN. Alert, cooperative, no acute distress    EENT:  EOMI. Anicteric sclerae. MMM  Resp:  CTA bilaterally, no wheezing or rales. No accessory muscle use  CV:  Regular  rhythm,  No edema  GI:  Soft, Non distended, Non tender.  +Bowel sounds  Neurologic:  Alert and oriented X 3, normal speech  Psych:   Good insight. Not anxious nor agitated  Skin:  No rashes. No jaundice    Reviewed most current lab test results and cultures  YES  Reviewed most current radiology test results   YES  Review and summation of old records today    NO  Reviewed patient's current orders and MAR    YES  PMH/SH reviewed - no change compared to H&P  ________________________________________________________________________  Care Plan discussed with:    Comments   Patient x    Family      RN x    Care Manager     Consultant                        Multidiciplinary team rounds were held today with , nursing, pharmacist and clinical coordinator.   Patient's plan of care was discussed; medications were reviewed and discharge planning was addressed. ________________________________________________________________________  Total NON critical care TIME:  35   Minutes    Total CRITICAL CARE TIME Spent:   Minutes non procedure based      Comments   >50% of visit spent in counseling and coordination of care     ________________________________________________________________________  Deepthi Loving MD     Procedures: see electronic medical records for all procedures/Xrays and details which were not copied into this note but were reviewed prior to creation of Plan. LABS:  I reviewed today's most current labs and imaging studies.   Pertinent labs include:  Recent Labs     05/15/19  0433 05/14/19  1555   WBC 7.9 8.2   HGB 8.7* 9.7*   HCT 28.4* 30.5*    207     Recent Labs     05/16/19  0334 05/15/19  0433 05/14/19  1555    136 138   K 4.0 4.1 3.5    104 102   CO2 25 25 29   * 200* 87   BUN 25* 25* 22*   CREA 1.45* 1.64* 1.93*   CA 8.3* 8.1* 8.8   MG  --   --  1.8   ALB  --  3.0* 3.7   TBILI  --  0.9 1.5*   SGOT  --  18 18   ALT  --  17 19   INR  --   --  1.2*       Signed: Deepthi Loving MD

## 2019-05-16 NOTE — PROGRESS NOTES
Progress Note    Patient: Rodriguez Martin MRN: [id number]  SSN: [id number]    YOB: 1970  Age: 53 y.o. Sex: male      Admit Date: 5/14/2019    LOS: 2 days     Subjective:     Pt likes to have bed up, was educated on safety of having in low position  Pt was up in chair, to eat but says too low to sit. (pt is over 6' tall) said ok to sit on side of bed       Objective:     Vitals:    05/15/19 1945 05/15/19 2253 05/16/19 0339 05/16/19 0756   BP: 124/90 141/85 131/88 (!) 141/95   Pulse: 88 80 78 78   Resp: 18 18 16 16   Temp: 97.8 °F (36.6 °C) 98 °F (36.7 °C) 97.7 °F (36.5 °C) 97.8 °F (36.6 °C)   SpO2: 94% 97% 95% 94%   Weight:       Height:            Intake and Output:  Current Shift: 05/16 0701 - 05/16 1900  In: 180 [P.O.:180]  Out: 500 [Urine:500]  Last three shifts: 05/14 1901 - 05/16 0700  In: 2650 [I.V.:2650]  Out: 600 [Urine:600]    Physical Exam:   GENERAL: alert, cooperative, no distress, appears stated age    Lab/Data Review: All lab results for the last 24 hours reviewed.            Assessment:     Active Problems:    Chest pain (5/14/2019)        Plan:   D/C today     Signed By: Ravin Slaon RN     May 16, 2019

## 2019-05-16 NOTE — PROGRESS NOTES
Pharmacy Automatic Renal Dosing Protocol - Antimicrobials    Indication for Antimicrobials: sepsis, other - infected aoritc graft    Current Regimen of Each Antimicrobial:  Vancomycin 1750 mg IV LOAD + 1500 mg IV every 16 hours (start , day 3)  Cefepime 2 g IV every 8 hours (start , day 3)    Previous Antimicrobial Therapy:  Ceftriaxone 1 g IV ONCE (start )    Goal Level: VANCOMYCIN TROUGH GOAL RANGE    Vancomycin Trough: 15 - 20 mcg/mL    Date Dose & Interval Measured (mcg/mL) Extrapolated (mcg/mL)                       Date & time of next level:  4am    Significant Cultures:    blood paired - ng pending    Radiology / Imaging results: (X-ray, CT scan or MRI):     Paralysis, amputations, malnutrition: none noted    Labs:  Recent Labs     19  0334 05/15/19  0433 19  1555   CREA 1.45* 1.64* 1.93*   BUN 25* 25* 22*   WBC  --  7.9 8.2     Temp (24hrs), Av.8 °F (36.6 °C), Min:97.6 °F (36.4 °C), Max:98 °F (36.7 °C)    Creatinine Clearance (mL/min) or Dialysis: 80 ml/min    Impression/Plan:   · vancomycin 1500 mg IV every 12 hours, adjusted for improved CrCl. · continue cefepime 2 g IV every 8 hours   · Antimicrobial stop date TBD, possible discharge today?, cultures neg, so ordered trough for tomorrow AM  · BMP ordered     Pharmacy will follow daily and adjust medications as appropriate for renal function and/or serum levels.     Thank you,  Chloe Yanez, PHARMD

## 2019-05-16 NOTE — PROGRESS NOTES
Bedside shift change report given to Brittany Scott (oncoming nurse) by Felipa Renee (offgoing nurse). Report included the following information SBAR, Kardex, OR Summary, Procedure Summary, Intake/Output, MAR, Recent Results.

## 2019-05-16 NOTE — DISCHARGE INSTRUCTIONS
Resume one blood pressure medication per day: Today carvedilol was resumed. Tomorrow resume amlodipine. Saturday resume hydralazine. Mild exercise and light duties around the house are OK. Call the office at [phone] if there are any problems.

## 2019-05-16 NOTE — PROGRESS NOTES
Bedside shift report given to oncoming nurse [facility] including SBar, Kardex, EMar, I/O inforamtion as well as patients care throughout the night

## 2019-05-16 NOTE — PROGRESS NOTES
Reason for Admission:   Chest pain                   RRAT Score:   11                  Plan for utilizing home health:   No                       Current Advanced Directive/Advance Care Plan: not on file    Likelihood of Readmission:  Low                         Transition of Care Plan:    Patient is independent with ADL/IADL to include driving. Patient denies past HH/Rehab and DME use. Patient is being discharged home today without any needs or concerns. MD scheduled new PCP appointment for pt. Patient stated that he will be driving himself home. Follow-up appointments are on the [facility]    PCP-  Pharmacy-    Care Management Interventions  PCP Verified by CM:  Yes  Mode of Transport at Discharge: [address] (CM Consult): Discharge Planning  Discharge Durable Medical Equipment: No(no DME use)  Physical Therapy Consult: No  Occupational Therapy Consult: No  Speech Therapy Consult: No  Current Support Network: Relative's Home(Lives with mother and grandmother in a one story home with a ramp to enter the home)  Confirm Follow Up Transport: Self  Plan discussed with Pt/Family/Caregiver: Yes  Discharge Location  [address]:     *Home-no needs

## 2019-05-16 NOTE — DISCHARGE SUMMARY
Vascular Surgery Discharge Summary     Patient ID:  Rodriguez Martin  [id number]  42 y.o.  1970    Admitting Provider: Misbah Barnes MD  Discharging Provider: Simi Kovacs. Ruben Gallo MD     Admit Date: 5/14/2019    Discharge Date: 5/16/2019    Discharge Diagnoses:    Acute chest, back, and abd pain POA yes   -now resolved   Sepsis POA yes   Left lower lobe pneumonia POA yes   -blood cx NGTD  Hypotension with hx of HTN POA yes   -hypotension resolved.  -resuming home antihypertensive regimen slowly   Angioedema POA yes   -resolved  -he continues on a prednisone taper with his home medication pepcid  Left pleural effusion POA yes   Hx of LVH POA yes   CKD III POA yes   -creatinine improved over baseline    Steroid induced hyperglycemia POA yes   Hx of Type B aortic dissection POA yes   -s/p TEVAR and L CCA-SCA bypass 02/2019   -repeat CTA of the chest and abd stable  Anemia POA yes   -stable      Procedures: None       Hospital Course:   Mr. Candis Saleh is a 54 yo AAM with a pmhx significant for Type B aortic dissection, renal artery stenosis/infarction, LVH, CKD III, and anemia. He is known to the service for a TEVAR and L CCA-SCA bypass  on 02/15/2019. Since his procedure he has had recurrent chest pain. He had a nuclear stress test under the care of Dr. Jhonatan Heath in late February of 2019 that was unremarkable. He has a repeat CTA of the chest abd and pelvis on 02/24/2019 that was unremarkable with a stable stent graft.      On this occasion he presents to the hospital with acute onset of sharp epigastric pain with radiation to the back that was exacerbated by deep inhalation. On arrival he was febrile. He was tachycardic with a normal RR. He was initially hypertensive but shortly thereafter he became profoundly hypotensive. He denies congestion, sore throat, cough, or N/V. His WBC count on arrival was normal. He had a CTA of the chest/abd that was unchanged.   While in the emergency room he developed significant angioedema and diffuse intense itching after an IVP administration of saline. He was treated with IV steroids, benadryl, and his home medication famotidine. 48 hours later it had resolved. His CXR was positive for an early LLL PNA and he was treated with IV antibx. His fever resolved. His hypotension improved. On day of discharge he was slowly resuming his home antihypertensive regimen. He had no further episodes of pain while admitted.       Pertinent Results:    Recent Results (from the past 24 hour(s))   GLUCOSE, POC    Collection Time: 05/15/19  9:32 AM   Result Value Ref Range    Glucose (POC) 182 (H) 65 - 100 mg/dL    Performed by Naye Keen    GLUCOSE, POC    Collection Time: 05/15/19 12:56 PM   Result Value Ref Range    Glucose (POC) 283 (H) 65 - 100 mg/dL    Performed by Erik Collins (PCT)    GLUCOSE, POC    Collection Time: 05/15/19  4:55 PM   Result Value Ref Range    Glucose (POC) 213 (H) 65 - 100 mg/dL    Performed by Man Fink (PCT)    PROCALCITONIN    Collection Time: 05/15/19  5:55 PM   Result Value Ref Range    Procalcitonin 1.4 ng/mL   GLUCOSE, POC    Collection Time: 05/15/19  9:44 PM   Result Value Ref Range    Glucose (POC) 222 (H) 65 - 100 mg/dL    Performed by Man Fink ([facility])    METABOLIC PANEL, BASIC    Collection Time: 05/16/19  3:34 AM   Result Value Ref Range    Sodium 139 136 - 145 mmol/L    Potassium 4.0 3.5 - 5.1 mmol/L    Chloride 107 97 - 108 mmol/L    CO2 25 21 - 32 mmol/L    Anion gap 7 5 - 15 mmol/L    Glucose 176 (H) 65 - 100 mg/dL    BUN 25 (H) 6 - 20 MG/DL    Creatinine 1.45 (H) 0.70 - 1.30 MG/DL    BUN/Creatinine ratio 17 12 - 20      GFR est AA >60 >60 ml/min/1.73m2    GFR est non-AA 52 (L) >60 ml/min/1.73m2    Calcium 8.3 (L) 8.5 - 10.1 MG/DL   GLUCOSE, POC    Collection Time: 05/16/19  8:08 AM   Result Value Ref Range    Glucose (POC) 205 (H) 65 - 100 mg/dL    Performed by Rajwinder Dupont*        Vital signs:   Patient Vitals for the past 24 hrs:   BP Temp Pulse Resp SpO2   05/16/19 0756 (!) 141/95 97.8 °F (36.6 °C) 78 16 94 %   05/16/19 0339 131/88 97.7 °F (36.5 °C) 78 16 95 %   05/15/19 2253 141/85 98 °F (36.7 °C) 80 18 97 %   05/15/19 1945 124/90 97.8 °F (36.6 °C) 88 18 94 %   05/15/19 1518 114/85 97.6 °F (36.4 °C) 82 18 98 %   05/15/19 1247 126/83 97.6 °F (36.4 °C) 81 18 98 %   05/15/19 1210 106/71 -- 86 18 97 %   05/15/19 1010 112/77 -- 87 24 99 %       Patient Weight:   Last 3 Recorded Weights in this Encounter    05/14/19 1534   Weight: 108.6 kg (239 lb 6.7 oz)       Consults: Hospitalist    Patient Condition at Discharge: stable    Disposition: home    Patient Instructions:   Current Discharge Medication List      START taking these medications    Details   amoxicillin-clavulanate (AUGMENTIN) 875-125 mg per tablet Take 1 Tab by mouth two (2) times a day. Qty: 20 Tab, Refills: 0      predniSONE (DELTASONE) 10 mg tablet 4 tabs oral x 2 days, then 3 tabs oral x 2 days, then 2 tabs oral x 2 days, then 1 tab or x 2 days then stop  Qty: 20 Tab, Refills: 0         CONTINUE these medications which have CHANGED    Details   amLODIPine (NORVASC) 10 mg tablet Take 1 Tab by mouth daily. Resume on Friday 05/17/19  Qty: 30 Tab, Refills: 2      hydrALAZINE (APRESOLINE) 50 mg tablet Take 1 Tab by mouth three (3) times daily. Resume on Saturday 5/18/19  Qty: 90 Tab, Refills: 2         CONTINUE these medications which have NOT CHANGED    Details   acetaminophen (TYLENOL) 325 mg tablet Take 325-650 mg by mouth every four (4) hours as needed for Pain. cetirizine (ZYRTEC) 10 mg tablet Take 10 mg by mouth daily as needed for Allergies. aspirin delayed-release 81 mg tablet Take 1 Tab by mouth daily. Qty: 100 Tab, Refills: 0      carvedilol (COREG) 25 mg tablet Take 1 Tab by mouth two (2) times daily (with meals). Qty: 60 Tab, Refills: 2      famotidine (PEPCID) 40 mg tablet Take 1 Tab by mouth daily.   Qty: 30 Tab, Refills: 2      pravastatin (PRAVACHOL) 20 mg tablet Take 1 Tab by mouth nightly. Qty: 30 Tab, Refills: 0           Diet: Cardiac Diet    Activity:  Mild exercise and light duties around the house are OK. Call the office at [phone] if there are any problems.     Follow-up Information     Follow up With Specialties Details Why Contact Info    Vince Graham MD Vascular Surgery  2-3 weeks  [address] [place] [address]  [phone]          Signed:  Goran Covarrubias NP  5/16/2019  9:20 AM

## 2019-05-16 NOTE — CDMP QUERY
Pt admitted with CP, PNA and angioedema. Pt noted to have 3/4 SIRS criteria(101.1 RR28 ). . If possible, please document in the progress notes and d/c summary if you are evaluating and / or treating any of the following: 
 
? SIRS d/t non infectious source (angioedema) ? Sepsis, present on admission, suspected or probable causative organism (please specify) ? Sepsis, now resolved, suspected or probable causative organism (please specify) ? Sepsis, not present on admission, suspected or probable causative organism (please specify) ? No Sepsis, suspected or probable localized infection (please specify) ? Sepsis was ruled out (include corresponding diagnosis for patients clinical picture and treatment) ? Other, please specify ? Clinically unable to determine The medical record reflects the following: 
   Risk Factors: PNA, angioedema Clinical Indicators: c/o cp, SOB, fever, PN 5/16 documented \"Sepsis ( fever/ tachycardia) Chest pain likely due to early PNA, unknown exact source, but likely due to early PNA\" Treatment: iv vancomycin, iv ns x 2 liters, iv rocephin, lab monitoring, Tylenol, iv maxipime, iv solumedrol Thank You Sapna Santana, [facility] 
   [phone]

## 2019-05-19 LAB
BACTERIA SPEC CULT: NORMAL
SERVICE CMNT-IMP: NORMAL

## 2019-06-06 ENCOUNTER — OFFICE VISIT (OUTPATIENT)
Dept: INTERNAL MEDICINE CLINIC | Age: 49
End: 2019-06-06

## 2019-06-06 VITALS
HEART RATE: 92 BPM | OXYGEN SATURATION: 98 % | DIASTOLIC BLOOD PRESSURE: 89 MMHG | RESPIRATION RATE: 16 BRPM | WEIGHT: 239 LBS | TEMPERATURE: 98.5 F | HEIGHT: 77 IN | SYSTOLIC BLOOD PRESSURE: 129 MMHG | BODY MASS INDEX: 28.22 KG/M2

## 2019-06-06 DIAGNOSIS — Z00.00 ROUTINE ADULT HEALTH MAINTENANCE: Primary | ICD-10-CM

## 2019-06-06 DIAGNOSIS — E11.9 TYPE 2 DIABETES MELLITUS WITHOUT COMPLICATION, WITHOUT LONG-TERM CURRENT USE OF INSULIN (HCC): ICD-10-CM

## 2019-06-06 DIAGNOSIS — Z86.79 HISTORY OF AORTIC DISSECTION: ICD-10-CM

## 2019-06-06 DIAGNOSIS — I10 ESSENTIAL HYPERTENSION: ICD-10-CM

## 2019-06-06 DIAGNOSIS — E78.2 MIXED HYPERLIPIDEMIA: ICD-10-CM

## 2019-06-06 PROBLEM — D63.8 ANEMIA, CHRONIC DISEASE: Chronic | Status: ACTIVE | Noted: 2019-06-06

## 2019-06-06 RX ORDER — AMLODIPINE BESYLATE 10 MG/1
10 TABLET ORAL DAILY
Qty: 90 TAB | Refills: 1 | Status: SHIPPED | OUTPATIENT
Start: 2019-06-06 | End: 2019-12-10 | Stop reason: SDUPTHER

## 2019-06-06 NOTE — PATIENT INSTRUCTIONS
Learning About Type 2 Diabetes  What is type 2 diabetes? Insulin is a hormone that helps your body use sugar from your food as energy. Type 2 diabetes happens when your body can't use insulin the right way. Over time, the pancreas can't make enough insulin. If you don't have enough insulin, too much sugar stays in your blood. If you are overweight, get little or no exercise, or have type 2 diabetes in your family, you are more likely to have problems with the way insulin works in your body.  Americans, Hispanics, Native Americans,  Americans, and Pacific Islanders have a higher risk for type 2 diabetes. Type 2 diabetes can be prevented or delayed with a healthy lifestyle, which includes staying at a healthy weight, making smart food choices, and getting regular exercise. What can you expect with type 2 diabetes? Sarah Burrell keep hearing about how important it is to keep your blood sugar within a target range. That's because over time, high blood sugar can lead to serious problems. It can:  · Harm your eyes, nerves, and kidneys. · Damage your blood vessels, leading to heart disease and stroke. · Reduce blood flow and cause nerve damage to parts of your body, especially your feet. This can cause slow healing and pain when you walk. · Make your immune system weak and less able to fight infections. When people hear the word \"diabetes,\" they often think of problems like these. But daily care and treatment can help prevent or delay these problems. The goal is to keep your blood sugar in a target range. That's the best way to reduce your chance of having more problems from diabetes. What are the symptoms? Some people who have type 2 diabetes may not have any symptoms early on. Many people with the disease don't even know they have it at first. But with time, diabetes starts to cause symptoms. You experience most symptoms of type 2 diabetes when your blood sugar is either too high or too low.   The most common symptoms of high blood sugar include:  · Thirst.  · Frequent urination. · Weight loss. · Blurry vision. The symptoms of low blood sugar include:  · Sweating. · Shakiness. · Weakness. · Hunger. · Confusion. How can you prevent type 2 diabetes? The best way to prevent or delay type 2 diabetes is to adopt healthy habits, which include:  · Staying at a healthy weight. · Exercising regularly. · Eating healthy foods. How is type 2 diabetes treated? If you have type 2 diabetes, here are the most important things you can do. · Take your diabetes medicines. · Check your blood sugar as often as your doctor recommends. Also, get a hemoglobin A1c test at least every 6 months. · Try to eat a variety of foods and to spread carbohydrate throughout the day. Carbohydrate raises blood sugar higher and more quickly than any other nutrient does. Carbohydrate is found in sugar, breads and cereals, fruit, starchy vegetables such as potatoes and corn, and milk and yogurt. · Get at least 30 minutes of exercise on most days of the week. Walking is a good choice. You also may want to do other activities, such as running, swimming, cycling, or playing tennis or team sports. If your doctor says it's okay, do muscle-strengthening exercises at least 2 times a week. · See your doctor for checkups and tests on a regular schedule. · If you have high blood pressure or high cholesterol, take the medicines as prescribed by your doctor. · Do not smoke. Smoking can make health problems worse. This includes problems you might have with type 2 diabetes. If you need help quitting, talk to your doctor about stop-smoking programs and medicines. These can increase your chances of quitting for good. Follow-up care is a key part of your treatment and safety. Be sure to make and go to all appointments, and call your doctor if you are having problems.  It's also a good idea to know your test results and keep a list of the medicines you take. Where can you learn more? Go to http://el-daisy.info/. Enter S813 in the search box to learn more about \"Learning About Type 2 Diabetes. \"  Current as of: July 25, 2018  Content Version: 11.9  © 5445-7865 [employer]. Care instructions adapted under license by [employer] (which disclaims liability or warranty for this information). If you have questions about a medical condition or this instruction, always ask your healthcare professional. [address] any warranty or liability for your use of this information.

## 2019-06-06 NOTE — PROGRESS NOTES
Franca Meyer is a 53 y.o. male who presents for evaluation of new pt visit. No recent pcp. Was inpt [facility] may 14-16 with pna. Also in hospital in feb with aortic dissection, type b. Underwent TEVAR. Girlfriend with him today. Complains today of toes overlapping on both feet.       ROS:  Constitutional: negative for fevers, chills, anorexia and weight loss  Eyes:   negative for visual disturbance and irritation  ENT:   negative for tinnitus,sore throat,nasal congestion,ear pain,hoarseness  Respiratory:  negative for cough, hemoptysis, dyspnea,wheezing  CV:   negative for chest pain, palpitations, lower extremity edema  GI:   negative for nausea, vomiting, diarrhea, abdominal pain,melena  Genitourinary: negative for frequency, dysuria and hematuria  Musculoskel: negative for myalgias, arthralgias, back pain, muscle weakness, joint pain  Neurological:  negative for headaches, dizziness, focal weakness, numbness  Psychiatric:     Negative for depression or anxiety      Past Medical History:   Diagnosis Date    Foot fracture     Hypertension     Jaw fracture (HCC)     Ruptured ear drum     Thumb fracture        Past Surgical History:   Procedure Laterality Date    HX APPENDECTOMY      HX ARTERIAL BYPASS  02/15/2019    HX ORTHOPAEDIC      HX OTHER SURGICAL  02/15/2019    8 stints    HX OTHER SURGICAL  02/15/2019    Double bypass       Family History   Problem Relation Age of Onset    Diabetes Mother     Colon Cancer Father        Social History     Socioeconomic History    Marital status: SINGLE     Spouse name: Not on file    Number of children: Not on file    Years of education: Not on file    Highest education level: Not on file   Occupational History    Not on file   Social Needs    Financial resource strain: Not on file    Food insecurity:     Worry: Not on file     Inability: Not on file    Transportation needs:     Medical: Not on file     Non-medical: Not on file   Tobacco Use    Smoking status: Never Smoker    Smokeless tobacco: Never Used   Substance and Sexual Activity    Alcohol use: No    Drug use: No    Sexual activity: Yes     Partners: Female     Birth control/protection: Surgical   Lifestyle    Physical activity:     Days per week: Not on file     Minutes per session: Not on file    Stress: Not on file   Relationships    Social connections:     Talks on phone: Not on file     Gets together: Not on file     Attends Religion service: Not on file     Active member of club or organization: Not on file     Attends meetings of clubs or organizations: Not on file     Relationship status: Not on file    Intimate partner violence:     Fear of current or ex partner: Not on file     Emotionally abused: Not on file     Physically abused: Not on file     Forced sexual activity: Not on file   Other Topics Concern    Not on file   Social History Narrative    Not on file            Visit Vitals  /89 (BP 1 Location: Left arm, BP Patient Position: Sitting)   Pulse 92   Temp 98.5 °F (36.9 °C) (Oral)   Resp 16   Ht 6' 5\" (1.956 m)   Wt 239 lb (108.4 kg)   SpO2 98%   BMI 28.34 kg/m²       Physical Examination:   General - Well appearing male  HEENT - PERRL, TM no erythema/opacification, normal nasal turbinates, no oropharyngeal erythema or exudate, MMM  Neck - supple, no bruits, no thyroidomegaly, no lymphadenopathy  Pulm - clear to auscultation bilaterally  Cardio - RRR, normal S1 S2, no murmur  Abd - soft, nontender, no masses, no HSM  Extrem - no edema, +2 distal pulses  Neuro-  No focal deficits, CN intact     Assessment/Plan:    1. Routine adult health maintenance--check flp, tsh, psa  2. Pre/borderline diabetes--check a1c, last was 6.7. Consider glucophage  3.  ckd 2--check cmp  4. Hx type B aortic dissection--sp TEVAR. Could not tolerate coreg  5.  htn--controlled with norvasc, hydralazine  6.   Anemia--check cbc, iron, ferritin  7.  hyperlipids--check flp, had been on pravachol    Had colon at [facility], dr Charisse Leahy    rtc 4 months        Elvis Flatness III, DO

## 2019-06-06 NOTE — PROGRESS NOTES
Reviewed record in preparation for visit and have obtained necessary documentation. Identified pt with two pt identifiers(name and ). Chief Complaint   Patient presents with   [address] Maintenance Due   Topic Date Due    Pneumococcal 0-64 years (1 of 1 - PPSV23) 1977    FOOT EXAM Q1  1981    MICROALBUMIN Q1  1981    EYE EXAM RETINAL OR DILATED  1981    DTaP/Tdap/Td series (1 - Tdap) 1992    LIPID PANEL Q1  2019       Mr. Princess Huang has a reminder for a \"due or due soon\" health maintenance. I have asked that he discuss health maintenance topic(s) due with His  primary care provider. Coordination of Care Questionnaire:  :     1) Have you been to an emergency room, urgent care clinic since your last visit? yes   Hospitalized since your last visit? no             2) Have you seen or consulted any other health care providers outside of [address] [place] [address] since your last visit? no  (Include any pap smears or colon screenings in this section.)    3) Do you have an Advance Directive on file? no    4) Are you interested in receiving information on Advance Directives? yes    Patient is accompanied by self I have received verbal consent from Hong Borja to discuss any/all medical information while they are present in the room.

## 2019-06-07 LAB
ALBUMIN SERPL-MCNC: 4.5 G/DL (ref 3.5–5.5)
ALBUMIN/GLOB SERPL: 1.4 {RATIO} (ref 1.2–2.2)
ALP SERPL-CCNC: 73 IU/L (ref 39–117)
ALT SERPL-CCNC: 12 IU/L (ref 0–44)
AST SERPL-CCNC: 11 IU/L (ref 0–40)
BASOPHILS # BLD AUTO: 0 X10E3/UL (ref 0–0.2)
BASOPHILS NFR BLD AUTO: 0 %
BILIRUB SERPL-MCNC: 0.5 MG/DL (ref 0–1.2)
BUN SERPL-MCNC: 11 MG/DL (ref 6–24)
BUN/CREAT SERPL: 7 (ref 9–20)
CALCIUM SERPL-MCNC: 9.7 MG/DL (ref 8.7–10.2)
CHLORIDE SERPL-SCNC: 98 MMOL/L (ref 96–106)
CHOLEST SERPL-MCNC: 174 MG/DL (ref 100–199)
CO2 SERPL-SCNC: 27 MMOL/L (ref 20–29)
CREAT SERPL-MCNC: 1.69 MG/DL (ref 0.76–1.27)
EOSINOPHIL # BLD AUTO: 0.2 X10E3/UL (ref 0–0.4)
EOSINOPHIL NFR BLD AUTO: 4 %
ERYTHROCYTE [DISTWIDTH] IN BLOOD BY AUTOMATED COUNT: 18.6 % (ref 12.3–15.4)
EST. AVERAGE GLUCOSE BLD GHB EST-MCNC: 177 MG/DL
FERRITIN SERPL-MCNC: 363 NG/ML (ref 30–400)
GLOBULIN SER CALC-MCNC: 3.2 G/DL (ref 1.5–4.5)
GLUCOSE SERPL-MCNC: 145 MG/DL (ref 65–99)
HBA1C MFR BLD: 7.8 % (ref 4.8–5.6)
HCT VFR BLD AUTO: 33.3 % (ref 37.5–51)
HDLC SERPL-MCNC: 39 MG/DL
HGB BLD-MCNC: 10.7 G/DL (ref 13–17.7)
IMM GRANULOCYTES # BLD AUTO: 0 X10E3/UL (ref 0–0.1)
IMM GRANULOCYTES NFR BLD AUTO: 0 %
IRON SATN MFR SERPL: 12 % (ref 15–55)
IRON SERPL-MCNC: 28 UG/DL (ref 38–169)
LDLC SERPL CALC-MCNC: 119 MG/DL (ref 0–99)
LYMPHOCYTES # BLD AUTO: 1.3 X10E3/UL (ref 0.7–3.1)
LYMPHOCYTES NFR BLD AUTO: 37 %
MCH RBC QN AUTO: 24.4 PG (ref 26.6–33)
MCHC RBC AUTO-ENTMCNC: 32.1 G/DL (ref 31.5–35.7)
MCV RBC AUTO: 76 FL (ref 79–97)
MONOCYTES # BLD AUTO: 0.3 X10E3/UL (ref 0.1–0.9)
MONOCYTES NFR BLD AUTO: 7 %
NEUTROPHILS # BLD AUTO: 1.9 X10E3/UL (ref 1.4–7)
NEUTROPHILS NFR BLD AUTO: 52 %
PLATELET # BLD AUTO: 215 X10E3/UL (ref 150–450)
POTASSIUM SERPL-SCNC: 3.9 MMOL/L (ref 3.5–5.2)
PROT SERPL-MCNC: 7.7 G/DL (ref 6–8.5)
PSA SERPL-MCNC: 1.5 NG/ML (ref 0–4)
RBC # BLD AUTO: 4.38 X10E6/UL (ref 4.14–5.8)
SODIUM SERPL-SCNC: 141 MMOL/L (ref 134–144)
TIBC SERPL-MCNC: 226 UG/DL (ref 250–450)
TRIGL SERPL-MCNC: 81 MG/DL (ref 0–149)
TSH SERPL DL<=0.005 MIU/L-ACNC: 1.28 UIU/ML (ref 0.45–4.5)
UIBC SERPL-MCNC: 198 UG/DL (ref 111–343)
VLDLC SERPL CALC-MCNC: 16 MG/DL (ref 5–40)
WBC # BLD AUTO: 3.6 X10E3/UL (ref 3.4–10.8)

## 2019-06-07 RX ORDER — LANOLIN ALCOHOL/MO/W.PET/CERES
325 CREAM (GRAM) TOPICAL
Qty: 90 TAB | Refills: 3 | Status: SHIPPED | OUTPATIENT
Start: 2019-06-07 | End: 2019-10-07 | Stop reason: SDUPTHER

## 2019-06-07 RX ORDER — PRAVASTATIN SODIUM 20 MG/1
20 TABLET ORAL
Qty: 90 TAB | Refills: 3 | Status: SHIPPED | OUTPATIENT
Start: 2019-06-07 | End: 2019-12-06

## 2019-06-07 NOTE — PROGRESS NOTES
Definitely has diabetes, a1c is 7.8 for average sugar of 177. rx sent in to start invokana. Would benefit from education. Also has high cholesterol. rx sent in to resume pravachol. Anemia numbers are improving, but iron counts are low. rx sent in for iron pills.    We need to make sure getting his colon report from [facility].

## 2019-06-10 NOTE — PROGRESS NOTES
[phone] ([place]) - gave pt results note message from Dr. Steve Strauss. Pt will  the invokana, pravastatin and iron pills. Pt will call [facility] and request his colonoscopy report be faxed to Dr. Steve Strauss. Pt scheduled appt for diabetes education with this CDE on 6/13/19 at 10:30.

## 2019-06-13 ENCOUNTER — PATIENT OUTREACH (OUTPATIENT)
Dept: INTERNAL MEDICINE CLINIC | Age: 49
End: 2019-06-13

## 2019-06-13 ENCOUNTER — OFFICE VISIT (OUTPATIENT)
Dept: INTERNAL MEDICINE CLINIC | Age: 49
End: 2019-06-13

## 2019-06-13 DIAGNOSIS — E11.9 TYPE 2 DIABETES MELLITUS WITHOUT COMPLICATION, WITHOUT LONG-TERM CURRENT USE OF INSULIN (HCC): Primary | ICD-10-CM

## 2019-06-13 NOTE — PROGRESS NOTES
Was asked by Dr. Mireya Carmona to see patient for diabetes education. Last A1c was 7.8% on 6/6/19. Previous A1Cs were 6.7% on 2/28/19. Previous diabetes education - none. 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     Symptoms of high blood sugar- none    Motivation - wants to live    AADE 7 Self-Care Behaviors- since hospitalization, pt has not had a good appetite; since finding out he is diabetic, has cut out some foods    1) Healthy Eating/Food Recall-    - -noon; eggs, lopez, 1 cup grits, 2 cups oj or whole white or chocolate milk, can of gingerale  LN - skips  DN - 6-7PM- 3 fried chicken strips, 1/2 cup brown rice, green beans; or lenore steak, bonnie greens, 1/2 cup mashed potatoes, gingerale to help belch; tossed salad with tuna or crab meat, erin cheese dressing   SN - partial bag of potato chips anytime during the day, but not every day  JOHNNY - water, whole white or tabatha milk, oj, gingerale, sweet tea    2) Being Active- until released by the surgeon, pt is currently exercising in place as instructed by PT @ [facility]    3) Self Monitoring Blood Glucose (SMBG)- checks once in a while and stated it is always \"low\"; last fasting check was three days ago - 110; pt will check fasting every morning and call this certified diabetes educator with results. How to treat a low blood sugar -  n/a    4) Taking Medication- Invokana was prescribed 6/7/19 that pt has not started and does not want to start; he was told it can cause an urinary infection; explained to pt how [employer] works;  pt will check fasting blood sugar every morning and call this certified diabetes educator with results. 5) Problem Solving- pt is ready and willing to manage high blood sugars by making adjustments in his meal plan    6) Reducing Risk-   Tobacco - never smoked  HTN - takes amlodipine, hydralazine, pt has not been taking carvedilol as he thought it was discontinued; pt will restart per hospital dc instructions  HLD - takes pravastatin  Aspirin - takes 81 mg    Discussed possible complications of uncontrolled diabetes ie fatigue, dehydration, damage to vital organs. 7) Healthy Coping-   Support system - Pt's girlfriend is supportive in helping pt attain and maintain their diabetes health.  Pt will take advantage of the support and education provided today and of the support of his health care team.    Barriers identified - lack of knowledge of what is a carb    Health Maintenance Due:  Health Maintenance Due   Topic Date Due    Pneumococcal 0-64 years (1 of 1 - PPSV23) 08/04/1977    FOOT EXAM Q1  08/04/1981    MICROALBUMIN Q1  08/04/1981    EYE EXAM RETINAL OR DILATED  08/04/1981    DTaP/Tdap/Td series (1 - Tdap) 08/04/1992     Resources provided:    -Diabetes Made Simple Video    -Living With Type 2 Diabetes Create Your Plate section    -Label Reading Basics for Diabetes    -A roadmap of diabetes: Where to watch for problems    -Low and No-Carb Snack Ideas for Diabetics    -demonstration of Glucose Wands visual aid    DSMT- pt may be interested in a day class    Plan / Pt Goals -   -check blood sugar fasting every morning and write down result; call Yakelin Walker at [phone] in one week with results  -has not and does not was to start Olden [employer]ie; hold for now as last blood sugar fasting was 110 and finished prednisone  -exercise as instructed by Dr. Anahi Frost  -strive to start following the healthy plate meal plan being mindful of what is a carb (fruit, dairy, grains, starchy vegetables) and portion size; guideline is up to 45-60 grams of carb per meal (3 meals per day) or 3-4 servings per meal; remember that 1/2 cup of a sweet drink is 15 grams of carb  -snack guideline - 1 oz. protein serving and may add 1 carb serving   -follow up with Dr. Zenobia Montanez on 10/7/19 at 8:00  -call Shirley Osborne at [phone] with any questions    Future Appointments   Date Time Provider Travis Gacria   10/7/2019  8:00 AM Brian Niño      Last Appointment My Department:  6/6/2019    Chart was routed to Dr. Zenobia Montanez.     Mark Mcneal, RN, BSN, CDE, CCM  (Phone) [phone]

## 2019-06-13 NOTE — PROGRESS NOTES
Was asked by Dr. Roxane Germain to see patient for diabetes education. Last A1c was 7.8% on 6/6/19. Previous A1Cs were 6.7% on 2/28/19. Previous diabetes education - none. The following is noted:    Hospital Problem List :Acute thoracic aortic dissection [facility])   Care Timeline     02/15   Admitted from ED 0215      Transferred to [facility] 2 CRITICAL CARE 3 0318      THORACIC ANEURYSM REPAIR ENDOVASCULAR (TEVAR)  LEFT CAROTID TO SUBCLAVIAN BYPASS   02/22   Transferred out of [facility] CRITICAL CARE 3 5411   02/26   Discharged 57     Discharge:Home or Self Care     Presentation/Accompanied by - ambulatory    Social History - lives with his girlfriend in [place]; is self employed, but has not gone back to work yet    Diabetes History/Diabetes Family History - pt stated he just found out he has diabetes; positive family history in mom, maternal aunt whom he transports to HD on TRS. Symptoms of high blood sugar- none    Motivation - wants to live    AADE 7 Self-Care Behaviors- since hospitalization, pt has not had a good appetite; since finding out he is diabetic, has cut out some foods    1) Healthy Eating/Food Recall-    - -noon; eggs, lopez, 1 cup grits, 2 cups oj or whole white or chocolate milk, can of gingerale  LN - skips  DN - 6-7PM- 3 fried chicken strips, 1/2 cup brown rice, green beans; or lenore steak, bonnie greens, 1/2 cup mashed potatoes, gingerale to help belch; tossed salad with tuna or crab meat, erin cheese dressing   SN - partial bag of potato chips anytime during the day, but not every day  JOHNNY - water, whole white or tabatha milk, oj, gingerale, sweet tea    2) Being Active- until released by the surgeon, pt is currently exercising in place as instructed by PT @ [facility]    3) Self Monitoring Blood Glucose (SMBG)- checks once in a while and stated it is always \"low\"; last fasting check was three days ago - 110; pt will check fasting every morning and call this certified diabetes educator with results.     How to treat a low blood sugar -  n/a    4) Taking Medication- Invokana was prescribed 6/7/19 that pt has not started and does not want to start; he was told it can cause an urinary infection; explained to pt how Demetri Worley works;  pt will check fasting blood sugar every morning and call this certified diabetes educator with results. 5) Problem Solving- pt is ready and willing to manage high blood sugars by making adjustments in his meal plan    6) Reducing Risk-   Tobacco - never smoked  HTN - takes amlodipine, hydralazine, pt has not been taking carvedilol as he thought it was discontinued; pt will restart per hospital dc instructions  HLD - takes pravastatin  Aspirin - takes 81 mg    Discussed possible complications of uncontrolled diabetes ie fatigue, dehydration, damage to vital organs. 7) Healthy Coping-   Support system - Pt's girlfriend is supportive in helping pt attain and maintain their diabetes health.  Pt will take advantage of the support and education provided today and of the support of his health care team.    Barriers identified - lack of knowledge of what is a carb    Health Maintenance Due:  Health Maintenance Due   Topic Date Due    Pneumococcal 0-64 years (1 of 1 - PPSV23) 08/04/1977    FOOT EXAM Q1  08/04/1981    MICROALBUMIN Q1  08/04/1981    EYE EXAM RETINAL OR DILATED  08/04/1981    DTaP/Tdap/Td series (1 - Tdap) 08/04/1992     Resources provided:    -Diabetes Made Simple Video    -Living With Type 2 Diabetes Create Your Plate section    -Label Reading Basics for Diabetes    -A roadmap of diabetes: Where to watch for problems    -Low and No-Carb Snack Ideas for Diabetics    -demonstration of Glucose Wands visual aid    DSMT- pt may be interested in a day class    Plan / Pt Goals -   -check blood sugar fasting every morning and write down result; call Maggie Mchugh at [phone] in one week with results  -has not and does not was to start Invokana; hold for now as last blood sugar fasting was 110 and finished prednisone  -exercise as instructed by Dr. Dorita Alves  -strive to start following the healthy plate meal plan being mindful of what is a carb (fruit, dairy, grains, starchy vegetables) and portion size; guideline is up to 45-60 grams of carb per meal (3 meals per day) or 3-4 servings per meal; remember that 1/2 cup of a sweet drink is 15 grams of carb  -snack guideline - 1 oz. protein serving and may add 1 carb serving   -follow up with Dr. Laney Mathews on 10/7/19 at 8:00  -call Evelyne Reyes at [phone] with any questions    Future Appointments   Date Time Provider Travis Garcia   10/7/2019  8:00 AM Brian Niño      Last Appointment My Department:  6/6/2019    Chart was routed to Dr. Laney Mathews.     Andreas Ugarte, RN, BSN, CDE, CCM  (Phone) [phone]

## 2019-06-13 NOTE — PATIENT INSTRUCTIONS
Goals/plan:  -check blood sugar fasting every morning and write down result; call Maggie Mathewss at [phone] in one week with results  -you have not started Invokana so hold for now as your last blood sugar fasting was 110 and you finished prednisone  -exercise as instructed by Dr. Poncho Kc  -strive to start following the healthy plate meal plan being mindful of what is a carb (fruit, dairy, grains, starchy vegetables) and portion size; guideline is up to 45-60 grams of carb per meal (3 meals per day) or 3-4 servings per meal; remember that 1/2 cup of a sweet drink is 15 grams of carb  -snack guideline - 1 oz. protein serving and may add 1 carb serving   -follow up with Dr. Chad Ricci on 10/7/19 at 8:00  -call Maggie Mchugh at [phone] with any questions        Office Policies     Phone calls/patient messages:            Please allow up to 24 hours for someone in the office to contact you about your call or message. Be mindful your provider may be out of the office or your message may require further review. We encourage you to use [employer] for your messages as this is a faster, more efficient way to communicate with our office                         Medication Refills:            Prescription medications require 48-72 business hours to process. We encourage you to use [employer] for your refills. For controlled medications: Please allow 72 business hours to process. Certain medications may require you to  a written prescription at our office. NO narcotic/controlled medications will be prescribed after 4pm Monday through Friday or on weekends              Form/Paperwork Completion:            Please note a $25 fee may incur for all paperwork for completed by our providers. We ask that you allow 7-10 business days. Pre-payment is due prior to picking up/faxing the completed form. You may also download your forms to [employer] to have your doctor print off.

## 2019-06-17 ENCOUNTER — PATIENT OUTREACH (OUTPATIENT)
Dept: INTERNAL MEDICINE CLINIC | Age: 49
End: 2019-06-17

## 2019-06-17 NOTE — PROGRESS NOTES
Left message for pt to call back with blood sugars. 6/18/19-spoke to pt and advised him to start the Invokana every morning before breakfast. Pt verbalized understanding. He stated his blood sugar yesterday morning was 164 and all of his fasting blood sugars are <164. Pt stated he is at his mom's and was lying down. He is have dizziness and has not taken his meds yet today. Advised pt to call Dr. Glenna Larios and Dr. Bren Dockery about his meds. Gave pt both phone numbers. Also advised pt to check his blood pressure. He has not checked it today. He is supposed to be monitoring it. Pt will call Dr. Glenna Larios when he hangs up. Message   Received: Today   Message Contents   DO Nnamdi Milligan Morna Hope, RN   Caller: Unspecified (4 days ago, 12:25 PM)             His kidney function prevents him from taking glucophage.  I would like him to try invokana, for cardio benefits as well as helping his sugars.    radha

## 2019-06-21 ENCOUNTER — PATIENT OUTREACH (OUTPATIENT)
Dept: INTERNAL MEDICINE CLINIC | Age: 49
End: 2019-06-21

## 2019-06-21 NOTE — PROGRESS NOTES
Chart reviewed. Reached out to pt regarding diabetes self management and overall well being.      Left message asking pt to return call to [address] at [phone] regarding-did he call his cardiologist and/or vascular surgeon about his symptoms of weakness and dizziness and heart meds; did he start Invokana and what are his blood sugars

## 2019-06-25 ENCOUNTER — PATIENT OUTREACH (OUTPATIENT)
Dept: INTERNAL MEDICINE CLINIC | Age: 49
End: 2019-06-25

## 2019-06-25 NOTE — PROGRESS NOTES
Chart reviewed. Attempted to reach out to pt regarding diabetes self management and how he is feeling. Was unable to leave message as mailbox is full. Will follow.      Future Appointments   Date Time Provider Travis Garcia   10/7/2019  8:00 AM Brian Niño      Last Appointment My Department:  6/13/2019

## 2019-07-08 RX ORDER — CARVEDILOL 25 MG/1
25 TABLET ORAL 2 TIMES DAILY WITH MEALS
Qty: 60 TAB | Refills: 2 | Status: ON HOLD | OUTPATIENT
Start: 2019-07-08 | End: 2019-12-06 | Stop reason: SDUPTHER

## 2019-07-08 RX ORDER — ASPIRIN 81 MG/1
81 TABLET ORAL DAILY
Qty: 90 TAB | Refills: 3 | Status: SHIPPED | OUTPATIENT
Start: 2019-07-08 | End: 2019-12-06

## 2019-07-08 NOTE — TELEPHONE ENCOUNTER
----- Message from Sarah Lomax sent at 7/8/2019  1:19 PM EDT -----  Regarding: Dr. Bj Peters first and last name: ((patient))      Reason for call: medication for stint     Callback required yes/no and why: yes, medication refill     Best contact number(s):    [phone]    Details to clarify the request: Pt stated that he would like a call back to have a refill on \" Carvedilol 25 mg ' and aspirin sent to the [facility].  Callback: [phone]      Sarah Lomax

## 2019-08-28 ENCOUNTER — PATIENT OUTREACH (OUTPATIENT)
Dept: INTERNAL MEDICINE CLINIC | Age: 49
End: 2019-08-28

## 2019-08-28 NOTE — PROGRESS NOTES
Chart reviewed. Reached out to pt regarding follow up of diabetes. Left message asking pt to call Salty Azar back at [phone] with blood sugars since starting Invokana. Reminded pt of f/u with Dr. Tanvi Birch on 10/7/19 at 8:00. Lab Results   Component Value Date/Time    Hemoglobin A1c 7.8 (H) 06/06/2019 12:16 PM    Hemoglobin A1c 6.7 (H) 02/28/2019 03:33 AM     Key Antihyperglycemic Medications             canagliflozin (INVOKANA) 100 mg tablet Take 1 Tab by mouth Daily (before breakfast). Goals      Patient verbalizes understanding of self -management goals of living with Diabetes.       6/18/19-dkw  -fasting blood sugar yesterday 164; all <164 pt reported; he will continue to check fasting   -advised pt to start the [address] [place] [address] [place]; pt verbalized understanding   -pt is having dizziness; instructed him to call Dr. Roopa Carlin and or Dr. Nevarez who prescribed his heart meds   -will follow in one week    6/13/19-dkw  -check blood sugar fasting every morning and write down result; call Salty Azar at [phone] in one week with results  -has not and does not was to start [address] [place] [address]; hold for now as last blood sugar fasting was 110 and finished prednisone  -exercise as instructed by Dr. Nevarez  -strive to start following the healthy plate meal plan being mindful of what is a carb (fruit, dairy, grains, starchy vegetables) and portion size; guideline is up to 45-60 grams of carb per meal (3 meals per day) or 3-4 servings per meal; remember that 1/2 cup of a sweet drink is 15 grams of carb  -snack guideline - 1 oz. protein serving and may add 1 carb serving   -follow up with Dr. Tanvi Birch on 10/7/19 at 8:00  -call Salty Azar at [phone] with any questions  -will follow          Future Appointments   Date Time Provider Travis Garcia   10/7/2019  8:00 AM Brian Niño      Last Appointment My Department:  6/13/2019

## 2019-10-07 ENCOUNTER — OFFICE VISIT (OUTPATIENT)
Dept: INTERNAL MEDICINE CLINIC | Age: 49
End: 2019-10-07

## 2019-10-07 VITALS
RESPIRATION RATE: 16 BRPM | SYSTOLIC BLOOD PRESSURE: 128 MMHG | DIASTOLIC BLOOD PRESSURE: 84 MMHG | WEIGHT: 249.2 LBS | HEART RATE: 84 BPM | HEIGHT: 77 IN | OXYGEN SATURATION: 99 % | BODY MASS INDEX: 29.43 KG/M2 | TEMPERATURE: 98.3 F

## 2019-10-07 DIAGNOSIS — Z86.79 HISTORY OF AORTIC DISSECTION: ICD-10-CM

## 2019-10-07 DIAGNOSIS — E11.9 TYPE 2 DIABETES MELLITUS WITHOUT COMPLICATION, WITHOUT LONG-TERM CURRENT USE OF INSULIN (HCC): Primary | ICD-10-CM

## 2019-10-07 DIAGNOSIS — Z23 ENCOUNTER FOR IMMUNIZATION: ICD-10-CM

## 2019-10-07 DIAGNOSIS — G89.29 CHRONIC LEFT SHOULDER PAIN: ICD-10-CM

## 2019-10-07 DIAGNOSIS — E11.69 HYPERLIPIDEMIA ASSOCIATED WITH TYPE 2 DIABETES MELLITUS (HCC): ICD-10-CM

## 2019-10-07 DIAGNOSIS — E78.5 HYPERLIPIDEMIA ASSOCIATED WITH TYPE 2 DIABETES MELLITUS (HCC): ICD-10-CM

## 2019-10-07 DIAGNOSIS — M25.512 CHRONIC LEFT SHOULDER PAIN: ICD-10-CM

## 2019-10-07 DIAGNOSIS — N18.2 CKD (CHRONIC KIDNEY DISEASE) STAGE 2, GFR 60-89 ML/MIN: ICD-10-CM

## 2019-10-07 DIAGNOSIS — I10 ESSENTIAL HYPERTENSION: ICD-10-CM

## 2019-10-07 DIAGNOSIS — D50.9 IRON DEFICIENCY ANEMIA, UNSPECIFIED IRON DEFICIENCY ANEMIA TYPE: ICD-10-CM

## 2019-10-07 RX ORDER — LANOLIN ALCOHOL/MO/W.PET/CERES
325 CREAM (GRAM) TOPICAL
Qty: 90 TAB | Refills: 3 | Status: SHIPPED | OUTPATIENT
Start: 2019-10-07 | End: 2020-09-24

## 2019-10-07 NOTE — PATIENT INSTRUCTIONS

## 2019-10-07 NOTE — PROGRESS NOTES
Christie Buchanan is a 51 y.o. male who presents for evaluation of routine follow up. Last seen by me June 6, 2019 in npv. Was dx with dm then, a1c 7.8. He took invokana for one month, then stopped, as pharmacy told him that he did not have any refills. Complains of some left shoulder pain today, otherwise doing well.   Did not take his bp meds yet this am.      ROS:  Constitutional: negative for fevers, chills, anorexia and weight loss  Eyes:   negative for visual disturbance and irritation  ENT:   negative for tinnitus,sore throat,nasal congestion,ear pain,hoarseness  Respiratory:  negative for cough, hemoptysis, dyspnea,wheezing  CV:   negative for chest pain, palpitations, lower extremity edema  GI:   negative for nausea, vomiting, diarrhea, abdominal pain,melena  Genitourinary: negative for frequency, dysuria and hematuria  Musculoskel: negative for myalgias, arthralgias, back pain, muscle weakness, joint pain  Neurological:  negative for headaches, dizziness, focal weakness, numbness  Psychiatric:     Negative for depression or anxiety      Past Medical History:   Diagnosis Date    Foot fracture     Hypertension     Jaw fracture (HCC)     Ruptured ear drum     Thumb fracture        Past Surgical History:   Procedure Laterality Date    HX APPENDECTOMY      HX ARTERIAL BYPASS  02/15/2019    HX ORTHOPAEDIC      HX OTHER SURGICAL  02/15/2019    8 stints    HX OTHER SURGICAL  02/15/2019    Double bypass       Family History   Problem Relation Age of Onset    Diabetes Mother     Colon Cancer Father        Social History     Socioeconomic History    Marital status: SINGLE     Spouse name: Not on file    Number of children: Not on file    Years of education: Not on file    Highest education level: Not on file   Occupational History    Not on file   Social Needs    Financial resource strain: Not on file    Food insecurity:     Worry: Not on file     Inability: Not on file    Transportation needs: Medical: Not on file     Non-medical: Not on file   Tobacco Use    Smoking status: Never Smoker    Smokeless tobacco: Never Used   Substance and Sexual Activity    Alcohol use: No    Drug use: No    Sexual activity: Yes     Partners: Female     Birth control/protection: Surgical   Lifestyle    Physical activity:     Days per week: Not on file     Minutes per session: Not on file    Stress: Not on file   Relationships    Social connections:     Talks on phone: Not on file     Gets together: Not on file     Attends [employer] service: Not on file     Active member of club or organization: Not on file     Attends meetings of clubs or organizations: Not on file     Relationship status: Not on file    Intimate partner violence:     Fear of current or ex partner: Not on file     Emotionally abused: Not on file     Physically abused: Not on file     Forced sexual activity: Not on file   Other Topics Concern    Not on file   Social History Narrative    Not on file            Visit Vitals  /85 (BP 1 Location: Right arm, BP Patient Position: Sitting)   Pulse 84   Temp 98.3 °F (36.8 °C) (Oral)   Resp 16   Ht 6' 5\" (1.956 m)   Wt 249 lb 3.2 oz (113 kg)   SpO2 99%   BMI 29.55 kg/m²       Physical Examination:   General - Well appearing male  HEENT - PERRL, TM no erythema/opacification, normal nasal turbinates, no oropharyngeal erythema or exudate, MMM  Neck - supple, no bruits, no thyroidomegaly, no lymphadenopathy  Pulm - clear to auscultation bilaterally  Cardio - RRR, normal S1 S2, no murmur  Abd - soft, nontender, no masses, no HSM  Extrem - no edema, +2 distal pulses  Neuro-  No focal deficits, CN intact     Assessment/Plan:    1.  htn--did not take meds this am, does not follow bp at home either. On recheck, at goal.  Continue norvasc, coreg, hydralazine  2.  Dm, type 2--check a1c, urine micro.   Only took invokana for one month, suspect will need to resume  3.  hyperlipids--on pravachol  4.  ckd 2--check bmp  5. Iron def anemia--on po iron. Check h/h  6. Hx type B aortic dissection--on asa, coreg. Sp TEVAR with dr Caleb Callejas    tdap given today. Declines flu shot.     rtc 3 months        Mayra Case III, DO

## 2019-10-07 NOTE — PROGRESS NOTES
Reviewed record in preparation for visit and have obtained necessary documentation. Identified pt with two pt identifiers(name and ). Chief Complaint   Patient presents with    Hypertension       Health Maintenance Due   Topic Date Due    FOOT EXAM Q1  1981    MICROALBUMIN Q1  1981    EYE EXAM RETINAL OR DILATED  1981    DTaP/Tdap/Td series (1 - Tdap) 1992    Influenza Age 5 to Adult  2019       Mr. Latisha Suárez has a reminder for a \"due or due soon\" health maintenance. I have asked that he discuss health maintenance topic(s) due with His  primary care provider. Coordination of Care Questionnaire:  :     1) Have you been to an emergency room, urgent care clinic since your last visit? no   Hospitalized since your last visit? no             2) Have you seen or consulted any other health care providers outside of [address] [place] [address] since your last visit? no  (Include any pap smears or colon screenings in this section.)    3) Do you have an Advance Directive on file? no    4) Are you interested in receiving information on Advance Directives? NO    Patient is accompanied by self I have received verbal consent from Quincy Pulido to discuss any/all medical information while they are present in the room.

## 2019-10-08 LAB
ALBUMIN/CREAT UR: 92.8 MG/G CREAT (ref 0–30)
BUN SERPL-MCNC: 17 MG/DL (ref 6–24)
BUN/CREAT SERPL: 10 (ref 9–20)
CALCIUM SERPL-MCNC: 9.3 MG/DL (ref 8.7–10.2)
CHLORIDE SERPL-SCNC: 100 MMOL/L (ref 96–106)
CO2 SERPL-SCNC: 26 MMOL/L (ref 20–29)
CREAT SERPL-MCNC: 1.67 MG/DL (ref 0.76–1.27)
CREAT UR-MCNC: 82.4 MG/DL
EST. AVERAGE GLUCOSE BLD GHB EST-MCNC: 209 MG/DL
GLUCOSE SERPL-MCNC: 246 MG/DL (ref 65–99)
HBA1C MFR BLD: 8.9 % (ref 4.8–5.6)
HCT VFR BLD AUTO: 36.4 % (ref 37.5–51)
HGB BLD-MCNC: 11.3 G/DL (ref 13–17.7)
MICROALBUMIN UR-MCNC: 76.5 UG/ML
POTASSIUM SERPL-SCNC: 3.5 MMOL/L (ref 3.5–5.2)
SODIUM SERPL-SCNC: 143 MMOL/L (ref 134–144)

## 2019-10-08 NOTE — PROGRESS NOTES
Called, spoke to pt. Two identifiers confirmed. Pt notified of results/recommendations per Dr. Vidya Lugo. Pt verbalized understanding of information discussed w/ no further questions at this time. Coupon card for [facility] left up front for pt to .

## 2019-10-08 NOTE — PROGRESS NOTES
Called, spoke to pt. Two identifiers confirmed. Pt notified of results/recommendations per Dr. Sarahy Ríos. Pt verbalized understanding of information discussed w/ no further questions at this time.

## 2019-10-08 NOTE — PROGRESS NOTES
Definitely has diabetes, a1c up to 8.9 for average sugar of 209. rx sent in to start steglatro and [place] and Corey. Need to take both. Other labs stable.

## 2019-10-27 ENCOUNTER — HOSPITAL ENCOUNTER (OUTPATIENT)
Dept: CT IMAGING | Age: 49
Discharge: HOME | End: 2019-10-27
Attending: SURGERY
Payer: MEDICAID

## 2019-10-27 DIAGNOSIS — I71.02 DISSECTING AAA (ABDOMINAL AORTIC ANEURYSM) (HCC): ICD-10-CM

## 2019-10-27 DIAGNOSIS — I71.40 DISSECTING AAA (ABDOMINAL AORTIC ANEURYSM) (HCC): ICD-10-CM

## 2019-10-27 PROCEDURE — 74011636320 HC RX REV CODE- 636/320: Performed by: SURGERY

## 2019-10-27 PROCEDURE — 71275 CT ANGIOGRAPHY CHEST: CPT

## 2019-10-27 PROCEDURE — 74174 CTA ABD&PLVS W/CONTRAST: CPT

## 2019-10-27 RX ORDER — SODIUM CHLORIDE 0.9 % (FLUSH) 0.9 %
10 SYRINGE (ML) INJECTION
Status: COMPLETED | OUTPATIENT
Start: 2019-10-27 | End: 2019-10-27

## 2019-10-27 RX ADMIN — IOPAMIDOL 100 ML: 755 INJECTION, SOLUTION INTRAVENOUS at 10:18

## 2019-10-27 RX ADMIN — Medication 10 ML: at 10:20

## 2019-11-01 ENCOUNTER — PATIENT OUTREACH (OUTPATIENT)
Dept: INTERNAL MEDICINE CLINIC | Age: 49
End: 2019-11-01

## 2019-11-01 NOTE — PROGRESS NOTES
Chart reviewed. Reached out to pt regarding follow up of diabetes self management. Pt seen for dsme 6/13/19. Have not been able to get in touch with pt recently. Pt was seen by Dr. Vishal French on 10/7/19 and had stopped the [facility]. A1c was up to 8.9%. Januvia and Steglatro were prescribed at that time. Left message for pt to call Elissa Sharif back at [phone] regarding his blood sugars since starting the two new meds for diabetes. Key Antihyperglycemic Medications             SITagliptin (JANUVIA) 100 mg tablet Take 1 Tab by mouth daily. ertugliflozin (STEGLATRO) 5 mg tab Take 5 mg by mouth daily. canagliflozin (INVOKANA) 100 mg tablet Take 1 Tab by mouth Daily (before breakfast).         Lab Results   Component Value Date/Time    Hemoglobin A1c 8.9 (H) 10/07/2019 08:57 AM    Hemoglobin A1c 7.8 (H) 06/06/2019 12:16 PM    Hemoglobin A1c 6.7 (H) 02/28/2019 03:33 AM       Future Appointments   Date Time Provider Travis Garcia   1/7/2020  8:15 AM Brian Niño      Last Appointment My Department:  10/7/2019

## 2019-12-03 ENCOUNTER — APPOINTMENT (OUTPATIENT)
Dept: CT IMAGING | Age: 49
DRG: 045 | End: 2019-12-03
Attending: EMERGENCY MEDICINE
Payer: MEDICAID

## 2019-12-03 ENCOUNTER — HOSPITAL ENCOUNTER (INPATIENT)
Age: 49
LOS: 3 days | Discharge: HOME HEALTH | DRG: 045 | End: 2019-12-06
Attending: EMERGENCY MEDICINE | Admitting: INTERNAL MEDICINE
Payer: MEDICAID

## 2019-12-03 DIAGNOSIS — I63.312 THROMBOTIC STROKE INVOLVING LEFT MIDDLE CEREBRAL ARTERY (HCC): ICD-10-CM

## 2019-12-03 DIAGNOSIS — H34.211 HOLLENHORST PLAQUE, RIGHT EYE: ICD-10-CM

## 2019-12-03 DIAGNOSIS — H53.131 ACUTE LOSS OF VISION, RIGHT: Primary | ICD-10-CM

## 2019-12-03 DIAGNOSIS — E11.42 DIABETIC PERIPHERAL NEUROPATHY ASSOCIATED WITH TYPE 2 DIABETES MELLITUS (HCC): ICD-10-CM

## 2019-12-03 DIAGNOSIS — I63.9 ACUTE ISCHEMIC STROKE (HCC): ICD-10-CM

## 2019-12-03 DIAGNOSIS — H53.10 SUBJECTIVE VISUAL DISTURBANCE, RIGHT EYE: ICD-10-CM

## 2019-12-03 DIAGNOSIS — I65.23 BILATERAL CAROTID ARTERY STENOSIS: ICD-10-CM

## 2019-12-03 LAB
ALBUMIN SERPL-MCNC: 4.4 G/DL (ref 3.5–5)
ALBUMIN/GLOB SERPL: 1 {RATIO} (ref 1.1–2.2)
ALP SERPL-CCNC: 64 U/L (ref 45–117)
ALT SERPL-CCNC: 28 U/L (ref 12–78)
ANION GAP SERPL CALC-SCNC: 5 MMOL/L (ref 5–15)
AST SERPL-CCNC: 20 U/L (ref 15–37)
BASOPHILS # BLD: 0 K/UL (ref 0–0.1)
BASOPHILS NFR BLD: 1 % (ref 0–1)
BILIRUB SERPL-MCNC: 0.6 MG/DL (ref 0.2–1)
BUN SERPL-MCNC: 22 MG/DL (ref 6–20)
BUN/CREAT SERPL: 11 (ref 12–20)
CALCIUM SERPL-MCNC: 9.6 MG/DL (ref 8.5–10.1)
CHLORIDE SERPL-SCNC: 106 MMOL/L (ref 97–108)
CO2 SERPL-SCNC: 30 MMOL/L (ref 21–32)
COMMENT, HOLDF: NORMAL
CREAT SERPL-MCNC: 1.98 MG/DL (ref 0.7–1.3)
DIFFERENTIAL METHOD BLD: ABNORMAL
EOSINOPHIL # BLD: 0.1 K/UL (ref 0–0.4)
EOSINOPHIL NFR BLD: 2 % (ref 0–7)
ERYTHROCYTE [DISTWIDTH] IN BLOOD BY AUTOMATED COUNT: 16 % (ref 11.5–14.5)
ERYTHROCYTE [SEDIMENTATION RATE] IN BLOOD: 7 MM/HR (ref 0–15)
GLOBULIN SER CALC-MCNC: 4.3 G/DL (ref 2–4)
GLUCOSE BLD STRIP.AUTO-MCNC: 123 MG/DL (ref 65–100)
GLUCOSE SERPL-MCNC: 135 MG/DL (ref 65–100)
HCT VFR BLD AUTO: 42.2 % (ref 36.6–50.3)
HGB BLD-MCNC: 13.3 G/DL (ref 12.1–17)
IMM GRANULOCYTES # BLD AUTO: 0 K/UL (ref 0–0.04)
IMM GRANULOCYTES NFR BLD AUTO: 0 % (ref 0–0.5)
INR PPP: 1.1 (ref 0.9–1.1)
LYMPHOCYTES # BLD: 2.1 K/UL (ref 0.8–3.5)
LYMPHOCYTES NFR BLD: 34 % (ref 12–49)
MCH RBC QN AUTO: 25.1 PG (ref 26–34)
MCHC RBC AUTO-ENTMCNC: 31.5 G/DL (ref 30–36.5)
MCV RBC AUTO: 79.8 FL (ref 80–99)
MONOCYTES # BLD: 0.6 K/UL (ref 0–1)
MONOCYTES NFR BLD: 11 % (ref 5–13)
NEUTS SEG # BLD: 3.1 K/UL (ref 1.8–8)
NEUTS SEG NFR BLD: 52 % (ref 32–75)
NRBC # BLD: 0 K/UL (ref 0–0.01)
NRBC BLD-RTO: 0 PER 100 WBC
PLATELET # BLD AUTO: 223 K/UL (ref 150–400)
PMV BLD AUTO: 10.5 FL (ref 8.9–12.9)
POTASSIUM SERPL-SCNC: 3.2 MMOL/L (ref 3.5–5.1)
PROT SERPL-MCNC: 8.7 G/DL (ref 6.4–8.2)
PROTHROMBIN TIME: 10.9 SEC (ref 9–11.1)
RBC # BLD AUTO: 5.29 M/UL (ref 4.1–5.7)
SAMPLES BEING HELD,HOLD: NORMAL
SERVICE CMNT-IMP: ABNORMAL
SODIUM SERPL-SCNC: 141 MMOL/L (ref 136–145)
WBC # BLD AUTO: 6 K/UL (ref 4.1–11.1)

## 2019-12-03 PROCEDURE — 0042T CT PERF W CBF: CPT

## 2019-12-03 PROCEDURE — 99285 EMERGENCY DEPT VISIT HI MDM: CPT

## 2019-12-03 PROCEDURE — 36415 COLL VENOUS BLD VENIPUNCTURE: CPT

## 2019-12-03 PROCEDURE — 74011636320 HC RX REV CODE- 636/320: Performed by: EMERGENCY MEDICINE

## 2019-12-03 PROCEDURE — 80053 COMPREHEN METABOLIC PANEL: CPT

## 2019-12-03 PROCEDURE — 70496 CT ANGIOGRAPHY HEAD: CPT

## 2019-12-03 PROCEDURE — 82962 GLUCOSE BLOOD TEST: CPT

## 2019-12-03 PROCEDURE — 85652 RBC SED RATE AUTOMATED: CPT

## 2019-12-03 PROCEDURE — 85610 PROTHROMBIN TIME: CPT

## 2019-12-03 PROCEDURE — 74011250636 HC RX REV CODE- 250/636: Performed by: EMERGENCY MEDICINE

## 2019-12-03 PROCEDURE — 85025 COMPLETE CBC W/AUTO DIFF WBC: CPT

## 2019-12-03 PROCEDURE — 74011250637 HC RX REV CODE- 250/637: Performed by: EMERGENCY MEDICINE

## 2019-12-03 PROCEDURE — 70450 CT HEAD/BRAIN W/O DYE: CPT

## 2019-12-03 PROCEDURE — 93005 ELECTROCARDIOGRAM TRACING: CPT

## 2019-12-03 PROCEDURE — 65660000000 HC RM CCU STEPDOWN

## 2019-12-03 RX ORDER — SODIUM CHLORIDE 0.9 % (FLUSH) 0.9 %
10 SYRINGE (ML) INJECTION
Status: COMPLETED | OUTPATIENT
Start: 2019-12-03 | End: 2019-12-03

## 2019-12-03 RX ORDER — CLOPIDOGREL BISULFATE 75 MG/1
75 TABLET ORAL
Status: COMPLETED | OUTPATIENT
Start: 2019-12-03 | End: 2019-12-03

## 2019-12-03 RX ORDER — GUAIFENESIN 100 MG/5ML
81 LIQUID (ML) ORAL
Status: COMPLETED | OUTPATIENT
Start: 2019-12-03 | End: 2019-12-03

## 2019-12-03 RX ADMIN — CLOPIDOGREL BISULFATE 75 MG: 75 TABLET ORAL at 22:34

## 2019-12-03 RX ADMIN — SODIUM CHLORIDE 1000 ML: 900 INJECTION, SOLUTION INTRAVENOUS at 22:32

## 2019-12-03 RX ADMIN — Medication 10 ML: at 21:54

## 2019-12-03 RX ADMIN — ASPIRIN 81 MG 81 MG: 81 TABLET ORAL at 22:34

## 2019-12-03 RX ADMIN — IOPAMIDOL 110 ML: 755 INJECTION, SOLUTION INTRAVENOUS at 21:54

## 2019-12-04 ENCOUNTER — APPOINTMENT (OUTPATIENT)
Dept: MRI IMAGING | Age: 49
DRG: 045 | End: 2019-12-04
Attending: INTERNAL MEDICINE
Payer: MEDICAID

## 2019-12-04 ENCOUNTER — APPOINTMENT (OUTPATIENT)
Dept: MRI IMAGING | Age: 49
DRG: 045 | End: 2019-12-04
Attending: RADIOLOGY
Payer: MEDICAID

## 2019-12-04 ENCOUNTER — APPOINTMENT (OUTPATIENT)
Dept: NON INVASIVE DIAGNOSTICS | Age: 49
DRG: 045 | End: 2019-12-04
Attending: INTERNAL MEDICINE
Payer: MEDICAID

## 2019-12-04 ENCOUNTER — APPOINTMENT (OUTPATIENT)
Dept: VASCULAR SURGERY | Age: 49
DRG: 045 | End: 2019-12-04
Attending: PSYCHIATRY & NEUROLOGY
Payer: MEDICAID

## 2019-12-04 PROBLEM — I63.312 THROMBOTIC STROKE INVOLVING LEFT MIDDLE CEREBRAL ARTERY (HCC): Status: ACTIVE | Noted: 2019-12-04

## 2019-12-04 LAB
ANION GAP SERPL CALC-SCNC: 7 MMOL/L (ref 5–15)
ATRIAL RATE: 88 BPM
AV VELOCITY RATIO: 0.84
AV VTI RATIO: 0.9
BUN SERPL-MCNC: 22 MG/DL (ref 6–20)
BUN/CREAT SERPL: 11 (ref 12–20)
CALCIUM SERPL-MCNC: 8.7 MG/DL (ref 8.5–10.1)
CALCULATED P AXIS, ECG09: 46 DEGREES
CALCULATED R AXIS, ECG10: 32 DEGREES
CALCULATED T AXIS, ECG11: 10 DEGREES
CHLORIDE SERPL-SCNC: 108 MMOL/L (ref 97–108)
CO2 SERPL-SCNC: 27 MMOL/L (ref 21–32)
CREAT SERPL-MCNC: 1.95 MG/DL (ref 0.7–1.3)
DIAGNOSIS, 93000: NORMAL
ECHO AO ROOT DIAM: 3.67 CM
ECHO AV AREA PEAK VELOCITY: 3.6 CM2
ECHO AV AREA VTI: 3.7 CM2
ECHO AV MEAN GRADIENT: 2.4 MMHG
ECHO AV MEAN VELOCITY: 0.7 M/S
ECHO AV PEAK GRADIENT: 5.4 MMHG
ECHO AV PEAK VELOCITY: 116.65 CM/S
ECHO AV VTI: 21.45 CM
ECHO LA MAJOR AXIS: 4.75 CM
ECHO LA TO AORTIC ROOT RATIO: 1.29
ECHO LV E' LATERAL VELOCITY: 6.59 CM/S
ECHO LV E' SEPTAL VELOCITY: 8.41 CM/S
ECHO LV EDV A4C: 93.4 ML
ECHO LV EDV INDEX A4C: 38.2 ML/M2
ECHO LV EJECTION FRACTION A4C: 54 %
ECHO LV ESV A4C: 43.1 ML
ECHO LV ESV INDEX A4C: 17.6 ML/M2
ECHO LV INTERNAL DIMENSION DIASTOLIC: 4.01 CM (ref 4.2–5.9)
ECHO LV INTERNAL DIMENSION SYSTOLIC: 3.35 CM
ECHO LV IVSD: 1.84 CM (ref 0.6–1)
ECHO LV MASS 2D: 380.7 G (ref 88–224)
ECHO LV MASS INDEX 2D: 155.9 G/M2 (ref 49–115)
ECHO LV POSTERIOR WALL DIASTOLIC: 1.78 CM (ref 0.6–1)
ECHO LVOT CARDIAC OUTPUT: 5.6 L/MIN
ECHO LVOT DIAM: 2.33 CM
ECHO LVOT PEAK GRADIENT: 3.8 MMHG
ECHO LVOT PEAK VELOCITY: 97.75 CM/S
ECHO LVOT SV: 79.7 ML
ECHO LVOT VTI: 18.64 CM
ECHO MV A VELOCITY: 49.89 CM/S
ECHO MV AREA PHT: 5 CM2
ECHO MV AREA VTI: 5.3 CM2
ECHO MV E DECELERATION TIME (DT): 171.1 MS
ECHO MV E VELOCITY: 55.24 CM/S
ECHO MV E/A RATIO: 1.11
ECHO MV E/E' LATERAL: 8.38
ECHO MV E/E' RATIO (AVERAGED): 7.48
ECHO MV E/E' SEPTAL: 6.57
ECHO MV MAX VELOCITY: 60.95 CM/S
ECHO MV MEAN GRADIENT: 0.8 MMHG
ECHO MV MEAN INFLOW VELOCITY: 0.43 M/S
ECHO MV PEAK GRADIENT: 1.5 MMHG
ECHO MV PRESSURE HALF TIME (PHT): 44.4 MS
ECHO MV VTI: 15.1 CM
ECHO PV MAX VELOCITY: 143.99 CM/S
ECHO PV MEAN GRADIENT: 3.9 MMHG
ECHO PV PEAK GRADIENT: 8.3 MMHG
ECHO PV VTI: 18.01 CM
ECHO TV A WAVE: 51.45 CM/S
ECHO TV E WAVE: 58.61 CM/S
ECHO TV EROA: 0.9
ERYTHROCYTE [DISTWIDTH] IN BLOOD BY AUTOMATED COUNT: 16.2 % (ref 11.5–14.5)
GLUCOSE BLD STRIP.AUTO-MCNC: 130 MG/DL (ref 65–100)
GLUCOSE BLD STRIP.AUTO-MCNC: 141 MG/DL (ref 65–100)
GLUCOSE BLD STRIP.AUTO-MCNC: 143 MG/DL (ref 65–100)
GLUCOSE BLD STRIP.AUTO-MCNC: 150 MG/DL (ref 65–100)
GLUCOSE SERPL-MCNC: 145 MG/DL (ref 65–100)
HCT VFR BLD AUTO: 38.6 % (ref 36.6–50.3)
HGB BLD-MCNC: 11.9 G/DL (ref 12.1–17)
LEFT CCA DIST DIAS: 25.5 CM/S
LEFT CCA DIST SYS: 138.7 CM/S
LEFT CCA PROX DIAS: 26.8 CM/S
LEFT CCA PROX SYS: 276.4 CM/S
LEFT ECA DIAS: 6.54 CM/S
LEFT ECA SYS: 92.2 CM/S
LEFT ICA DIST DIAS: 22.6 CM/S
LEFT ICA DIST SYS: 69.6 CM/S
LEFT ICA MID DIAS: 22.5 CM/S
LEFT ICA MID SYS: 54.5 CM/S
LEFT ICA PROX DIAS: 13.5 CM/S
LEFT ICA PROX SYS: 71.4 CM/S
LEFT ICA/CCA SYS: 0.51
LEFT SUBCLAVIAN DIAS: 8.55 CM/S
LEFT SUBCLAVIAN SYS: 76.6 CM/S
LEFT VERTEBRAL SYS: 39.1 CM/S
LVFS 2D: 16.4 %
LVOT MG: 2.24 MMHG
LVOT MV: 0.72 CM/S
MCH RBC QN AUTO: 24.8 PG (ref 26–34)
MCHC RBC AUTO-ENTMCNC: 30.8 G/DL (ref 30–36.5)
MCV RBC AUTO: 80.4 FL (ref 80–99)
MV DEC SLOPE: 3.23
NRBC # BLD: 0 K/UL (ref 0–0.01)
NRBC BLD-RTO: 0 PER 100 WBC
P-R INTERVAL, ECG05: 184 MS
PLATELET # BLD AUTO: 219 K/UL (ref 150–400)
PMV BLD AUTO: 10.5 FL (ref 8.9–12.9)
POTASSIUM SERPL-SCNC: 3.7 MMOL/L (ref 3.5–5.1)
Q-T INTERVAL, ECG07: 382 MS
QRS DURATION, ECG06: 92 MS
QTC CALCULATION (BEZET), ECG08: 462 MS
RBC # BLD AUTO: 4.8 M/UL (ref 4.1–5.7)
RIGHT CCA DIST DIAS: 27.7 CM/S
RIGHT CCA DIST SYS: 136.8 CM/S
RIGHT CCA PROX DIAS: 14.3 CM/S
RIGHT CCA PROX SYS: 169.9 CM/S
RIGHT ECA DIAS: 8.28 CM/S
RIGHT ECA SYS: 122.9 CM/S
RIGHT ICA DIST DIAS: 17.3 CM/S
RIGHT ICA DIST SYS: 68.3 CM/S
RIGHT ICA MID DIAS: 19.3 CM/S
RIGHT ICA MID SYS: 103.8 CM/S
RIGHT ICA PROX DIAS: 15.5 CM/S
RIGHT ICA PROX SYS: 79.7 CM/S
RIGHT ICA/CCA SYS: 0.8
RIGHT SUBCLAVIAN DIAS: 0 CM/S
RIGHT SUBCLAVIAN SYS: 141.6 CM/S
RIGHT VERTEBRAL DIAS: 16.36 CM/S
RIGHT VERTEBRAL SYS: 55.7 CM/S
SERVICE CMNT-IMP: ABNORMAL
SODIUM SERPL-SCNC: 142 MMOL/L (ref 136–145)
VENTRICULAR RATE, ECG03: 88 BPM
WBC # BLD AUTO: 6.3 K/UL (ref 4.1–11.1)

## 2019-12-04 PROCEDURE — 94760 N-INVAS EAR/PLS OXIMETRY 1: CPT

## 2019-12-04 PROCEDURE — 65660000000 HC RM CCU STEPDOWN

## 2019-12-04 PROCEDURE — 85027 COMPLETE CBC AUTOMATED: CPT

## 2019-12-04 PROCEDURE — 97116 GAIT TRAINING THERAPY: CPT

## 2019-12-04 PROCEDURE — 97165 OT EVAL LOW COMPLEX 30 MIN: CPT

## 2019-12-04 PROCEDURE — 82962 GLUCOSE BLOOD TEST: CPT

## 2019-12-04 PROCEDURE — 36415 COLL VENOUS BLD VENIPUNCTURE: CPT

## 2019-12-04 PROCEDURE — 74011250637 HC RX REV CODE- 250/637: Performed by: PSYCHIATRY & NEUROLOGY

## 2019-12-04 PROCEDURE — 93306 TTE W/DOPPLER COMPLETE: CPT

## 2019-12-04 PROCEDURE — 70553 MRI BRAIN STEM W/O & W/DYE: CPT

## 2019-12-04 PROCEDURE — 93880 EXTRACRANIAL BILAT STUDY: CPT

## 2019-12-04 PROCEDURE — A9585 GADOBUTROL INJECTION: HCPCS | Performed by: INTERNAL MEDICINE

## 2019-12-04 PROCEDURE — 70548 MR ANGIOGRAPHY NECK W/DYE: CPT

## 2019-12-04 PROCEDURE — 80048 BASIC METABOLIC PNL TOTAL CA: CPT

## 2019-12-04 PROCEDURE — 74011250637 HC RX REV CODE- 250/637: Performed by: INTERNAL MEDICINE

## 2019-12-04 PROCEDURE — 74011250636 HC RX REV CODE- 250/636: Performed by: INTERNAL MEDICINE

## 2019-12-04 PROCEDURE — 97161 PT EVAL LOW COMPLEX 20 MIN: CPT

## 2019-12-04 RX ORDER — DEXTROSE 50 % IN WATER (D50W) INTRAVENOUS SYRINGE
25-50 AS NEEDED
Status: DISCONTINUED | OUTPATIENT
Start: 2019-12-04 | End: 2019-12-06 | Stop reason: HOSPADM

## 2019-12-04 RX ORDER — LORAZEPAM 2 MG/ML
1 INJECTION INTRAMUSCULAR ONCE
Status: DISCONTINUED | OUTPATIENT
Start: 2019-12-04 | End: 2019-12-04

## 2019-12-04 RX ORDER — ACETAMINOPHEN 650 MG/1
650 SUPPOSITORY RECTAL
Status: DISCONTINUED | OUTPATIENT
Start: 2019-12-04 | End: 2019-12-06 | Stop reason: HOSPADM

## 2019-12-04 RX ORDER — ACETAMINOPHEN 325 MG/1
650 TABLET ORAL
Status: DISCONTINUED | OUTPATIENT
Start: 2019-12-04 | End: 2019-12-06 | Stop reason: HOSPADM

## 2019-12-04 RX ORDER — ATORVASTATIN CALCIUM 40 MG/1
40 TABLET, FILM COATED ORAL
Status: DISCONTINUED | OUTPATIENT
Start: 2019-12-04 | End: 2019-12-06 | Stop reason: HOSPADM

## 2019-12-04 RX ORDER — SODIUM CHLORIDE 9 MG/ML
75 INJECTION, SOLUTION INTRAVENOUS CONTINUOUS
Status: DISCONTINUED | OUTPATIENT
Start: 2019-12-04 | End: 2019-12-05

## 2019-12-04 RX ORDER — PRAVASTATIN SODIUM 40 MG/1
40 TABLET ORAL
Status: DISCONTINUED | OUTPATIENT
Start: 2019-12-04 | End: 2019-12-04

## 2019-12-04 RX ORDER — FAMOTIDINE 20 MG/1
40 TABLET, FILM COATED ORAL DAILY
Status: DISCONTINUED | OUTPATIENT
Start: 2019-12-05 | End: 2019-12-06 | Stop reason: HOSPADM

## 2019-12-04 RX ORDER — CLOPIDOGREL BISULFATE 75 MG/1
75 TABLET ORAL DAILY
Status: DISCONTINUED | OUTPATIENT
Start: 2019-12-04 | End: 2019-12-06 | Stop reason: HOSPADM

## 2019-12-04 RX ORDER — LABETALOL HYDROCHLORIDE 5 MG/ML
5 INJECTION, SOLUTION INTRAVENOUS
Status: DISCONTINUED | OUTPATIENT
Start: 2019-12-04 | End: 2019-12-06 | Stop reason: HOSPADM

## 2019-12-04 RX ORDER — BISACODYL 5 MG
5 TABLET, DELAYED RELEASE (ENTERIC COATED) ORAL DAILY PRN
Status: DISCONTINUED | OUTPATIENT
Start: 2019-12-04 | End: 2019-12-06 | Stop reason: HOSPADM

## 2019-12-04 RX ORDER — GUAIFENESIN 100 MG/5ML
81 LIQUID (ML) ORAL DAILY
Status: DISCONTINUED | OUTPATIENT
Start: 2019-12-04 | End: 2019-12-06 | Stop reason: HOSPADM

## 2019-12-04 RX ORDER — INSULIN LISPRO 100 [IU]/ML
INJECTION, SOLUTION INTRAVENOUS; SUBCUTANEOUS
Status: DISCONTINUED | OUTPATIENT
Start: 2019-12-04 | End: 2019-12-06 | Stop reason: HOSPADM

## 2019-12-04 RX ORDER — MAGNESIUM SULFATE 100 %
4 CRYSTALS MISCELLANEOUS AS NEEDED
Status: DISCONTINUED | OUTPATIENT
Start: 2019-12-04 | End: 2019-12-06 | Stop reason: HOSPADM

## 2019-12-04 RX ORDER — LORAZEPAM 2 MG/ML
2 INJECTION INTRAMUSCULAR
Status: COMPLETED | OUTPATIENT
Start: 2019-12-04 | End: 2019-12-04

## 2019-12-04 RX ORDER — HEPARIN SODIUM 5000 [USP'U]/ML
5000 INJECTION, SOLUTION INTRAVENOUS; SUBCUTANEOUS EVERY 8 HOURS
Status: DISCONTINUED | OUTPATIENT
Start: 2019-12-04 | End: 2019-12-06 | Stop reason: HOSPADM

## 2019-12-04 RX ADMIN — HEPARIN SODIUM 5000 UNITS: 5000 INJECTION INTRAVENOUS; SUBCUTANEOUS at 17:33

## 2019-12-04 RX ADMIN — PRAVASTATIN SODIUM 40 MG: 40 TABLET ORAL at 02:27

## 2019-12-04 RX ADMIN — ASPIRIN 81 MG 81 MG: 81 TABLET ORAL at 10:25

## 2019-12-04 RX ADMIN — ATORVASTATIN CALCIUM 40 MG: 40 TABLET, FILM COATED ORAL at 21:34

## 2019-12-04 RX ADMIN — CLOPIDOGREL BISULFATE 75 MG: 75 TABLET ORAL at 10:25

## 2019-12-04 RX ADMIN — GADOBUTROL 10 ML: 604.72 INJECTION INTRAVENOUS at 13:38

## 2019-12-04 RX ADMIN — LORAZEPAM 2 MG: 2 INJECTION INTRAMUSCULAR; INTRAVENOUS at 12:51

## 2019-12-04 RX ADMIN — HEPARIN SODIUM 5000 UNITS: 5000 INJECTION INTRAVENOUS; SUBCUTANEOUS at 10:25

## 2019-12-04 RX ADMIN — HEPARIN SODIUM 5000 UNITS: 5000 INJECTION INTRAVENOUS; SUBCUTANEOUS at 02:27

## 2019-12-04 RX ADMIN — SODIUM CHLORIDE 75 ML/HR: 900 INJECTION, SOLUTION INTRAVENOUS at 06:00

## 2019-12-04 NOTE — ED NOTES
TRANSFER - OUT REPORT:    Verbal report given to [facility] (name) on Christie Buchanan  being transferred to neuro-tele (unit) for routine progression of care       Report consisted of patients Situation, Background, Assessment and   Recommendations(SBAR). Information from the following report(s) SBAR, ED Summary, MAR, Recent Results and Alarm Parameters  was reviewed with the receiving nurse. Lines:   Peripheral IV 12/03/19 Right Antecubital (Active)   Site Assessment Clean, dry, & intact 12/3/2019 11:37 PM   Phlebitis Assessment 0 12/3/2019 11:37 PM   Infiltration Assessment 0 12/3/2019 11:37 PM   Dressing Status Clean, dry, & intact 12/3/2019 11:37 PM   Dressing Type Transparent 12/3/2019 11:37 PM        Opportunity for questions and clarification was provided.

## 2019-12-04 NOTE — PROGRESS NOTES
Initial Nutrition Assessment:    INTERVENTIONS/RECOMMENDATIONS:   · Meals/Snacks: General/healthful diet: Continue current diet     ASSESSMENT:   Patient medically noted for acute loss of vision to right eye; admitted to rule out stroke. PMH HTN, DM, and CKD. Patient reports a good appetite at home. [place] he had recently gained some weight. Chart review shows no significant weight changes. Aware of menu/room service. About to start breakfast at time of visit. Encouraged intake of meals/snacks. Will monitor PO intake. Diet Order: Consistent carb  % Eaten:  No data found. Pertinent Medications: [x]Reviewed []Other: Plavix, Humalog, Pravastatin   Pertinent Labs: [x]Reviewed []Other: -[phone]  Food Allergies: [x]None []Yes:    Last BM: 12/3  []Active     []Hyperactive  []Hypoactive       [] Absent BS  Skin:    [x] Intact   [] Incision  [] Breakdown: [] Edema []Other:    Anthropometrics:   Height: 6' 5\" (195.6 cm) Weight: 111.6 kg (246 lb)   IBW (%IBW):   ( ) UBW (%UBW):   (  %)   Last Weight Metrics:  Weight Loss Metrics 12/4/2019 10/7/2019 6/6/2019 5/14/2019 2/27/2019 2/15/2019 8/7/2018   Today's Wt 246 lb 249 lb 3.2 oz 239 lb 239 lb 6.7 oz 229 lb 0.9 oz 241 lb 2.9 oz 244 lb   BMI 29.17 kg/m2 29.55 kg/m2 28.34 kg/m2 28.39 kg/m2 27.16 kg/m2 28.6 kg/m2 28.93 kg/m2       BMI: Body mass index is 29.17 kg/m². This BMI is indicative of:   []Underweight    []Normal    [x]Overweight    [] Obesity   [] Extreme Obesity (BMI>40)     Estimated Nutrition Needs (Based on):   2529 Kcals/day(BMR (2107) x 1. 2AF) , 90 g(0.8 g/kg bw) Protein  Carbohydrate:  At Least 130 g/day  Fluids: 2500 mL/day (1ml/kcal)    Pt expected to meet estimated nutrient needs: [x]Yes []No    NUTRITION DIAGNOSES:   Problem:  No nutritional diagnosis at this time      Etiology: related to       Signs/Symptoms: as evidenced by        NUTRITION INTERVENTIONS:  Meals/Snacks: General/healthful diet                  GOAL:   PO intake >75% of meals next 5-7 days    LEARNING NEEDS (Diet, Food/Nutrient-Drug Interaction):    [x] None Identified   [] Identified and Education Provided/Documented   [] Identified and Pt declined/was not appropriate     Cultural, Buddhist, OR Ethnic Dietary Needs:    [x] None Identified   [] Identified and Addressed     [x] Interdisciplinary Care Plan Reviewed/Documented    [x] Discharge Planning:  Heart healthy, CCD     MONITORING /EVALUATION:   Food/Nutrient Intake Outcomes:  Total energy intake  Physical Signs/Symptoms Outcomes: Weight/weight change, Glucose profile, Electrolyte and renal profile    NUTRITION RISK:    [] Patient At Nutritional Risk              [x] Patient Not at Nutritional Risk    PT SEEN FOR:    []  MD Consult: []Calorie Count      []Diabetic Diet Education        []Diet Education     []Electrolyte Management     []General Nutrition Management and Supplements     []Management of Tube Feeding     []TPN Recommendations    [x]  RN Referral:  [x]MST score >=2     []Enteral/Parenteral Nutrition PTA     []Pregnant: Gestational DM or Multigestation     []Pressure Ulcer/Wound Care needs        []  Low BMI  []  MATTY Wonga  [id number]  Pager [phone]    Weekend Pager [phone]

## 2019-12-04 NOTE — PROGRESS NOTES
Acute vision loss right eye rule out acute stroke Admit patient to telemetry neurology unit Stroke work-upstart patient on aspirin and Plavix Brain MRI neck pain DM Hold oral hypoglycemic medicationstart patient sliding scale with insulin Hypertension Hold oral antihypertensive medication Acute on chronic renal failure stage III 
Gentle hydration with IV fluid 51years old male from home with past medical history significant for hypertension, DM, history of aortic dissection with repair last month presented to the hospital for evaluation of acute painless right eye vision loss started about 9 PM while patient watching TV, patient denies any focal weakness numbness any headache, head CT was done show no acute abnormality CTA was done show no acute abnormality, patient was evaluated by telemetry neurology recommended patient  to be started on Plavix and aspirin admit patient for stroke work-up patient was recently admitted to the hospital for aortic dissection last month and he had a aortic dissection repair with a stent placement by Dr. Trenton Kennedy

## 2019-12-04 NOTE — PROGRESS NOTES
MRI PENDING    Mri screening sheet needs to be completed and signed    Please call [phone] when this is done

## 2019-12-04 NOTE — H&P
Hospitalist Admission Note    NAME: Kane Alcazar   :  1970   MRN:  [id number]     Date/Time:  12/3/2019 11:58 PM    Patient PCP: Rox Garcia, DO  ______________________________________________________________________  Given the patient's current clinical presentation, I have a high level of concern for decompensation if discharged from the emergency department. Complex decision making was performed, which includes reviewing the patient's available past medical records, laboratory results, and x-ray films. My assessment of this patient's clinical condition and my plan of care is as follows.     Assessment / Plan:    Acute vision loss right eye rule out acute stroke  -Admit patient to telemetry neurology unit  -Stroke work-up-start patient on aspirin and Plavix  -Brain MRI neck pain    DM  -Hold oral hypoglycemic medication-start patient sliding scale with insulin  Hypertension  -Hold oral antihypertensive medication    Acute on chronic renal failure stage III  -Gentle hydration with IV fluid      Code Status: Full   Surrogate Decision Maker:[email or web address]    DVT Prophylaxis: Heparin   GI Prophylaxis: not indicated          Subjective:   CHIEF COMPLAINT: vision loss Right eye     HISTORY OF PRESENT ILLNESS:       51years old male from home with past medical history significant for hypertension, DM, history of aortic dissection with repair last month presented to the hospital for evaluation of acute painless right eye vision loss started about 9 PM while patient watching TV, patient denies any focal weakness numbness any headache, head CT was done show no acute abnormality CTA was done show no acute abnormality, patient was evaluated by telemetry neurology recommended patient to be started on Plavix and aspirin admit patient for stroke work-up patient was recently admitted to the hospital for aortic dissection last month and he had a aortic dissection repair with a stent placement by Dr. Kimberly Calabrese   We were asked to admit for work up and evaluation of the above problems. Past Medical History:   Diagnosis Date    Foot fracture     Hypertension     Jaw fracture (HCC)     Ruptured ear drum     Thumb fracture         Past Surgical History:   Procedure Laterality Date    HX APPENDECTOMY      HX ARTERIAL BYPASS  02/15/2019    HX ORTHOPAEDIC      HX OTHER SURGICAL  02/15/2019    8 stints    HX OTHER SURGICAL  02/15/2019    Double bypass       Social History     Tobacco Use    Smoking status: Never Smoker    Smokeless tobacco: Never Used   Substance Use Topics    Alcohol use: No        Family History   Problem Relation Age of Onset    Diabetes Mother     Colon Cancer Father      No Known Allergies     Prior to Admission medications    Medication Sig Start Date End Date Taking? Authorizing Provider   SITagliptin (JANUVIA) 100 mg tablet Take 1 Tab by mouth daily. 10/8/19   Patience Niño III, DO   ertugliflozin (STEGLATRO) 5 mg tab Take 5 mg by mouth daily. 10/8/19   Jean-Claude Niño B III, DO   ferrous sulfate 325 mg (65 mg iron) tablet Take 1 Tab by mouth Daily (before breakfast). 10/7/19   Carmina Niño III, DO   carvedilol (COREG) 25 mg tablet Take 1 Tab by mouth two (2) times daily (with meals). 7/8/19   Piero Tellez B III, DO   aspirin delayed-release 81 mg tablet Take 1 Tab by mouth daily. 7/8/19   Patience Niño III, DO   canagliflozin (INVOKANA) 100 mg tablet Take 1 Tab by mouth Daily (before breakfast). 6/7/19   Jean-Claude Niño III, DO   pravastatin (PRAVACHOL) 20 mg tablet Take 1 Tab by mouth nightly. 6/7/19   Jean-Claude Niño B III, DO   amLODIPine (NORVASC) 10 mg tablet Take 1 Tab by mouth daily. Resume on Friday 05/17/19 6/6/19   Piero Tellez B III, DO   hydrALAZINE (APRESOLINE) 50 mg tablet Take 1 Tab by mouth three (3) times daily.  Resume on Saturday 5/18/19 5/16/19   Aaron Ramna, JESUS   acetaminophen (TYLENOL) 325 mg tablet Take 325-650 mg by mouth every four (4) hours as needed for Pain. Provider, Historical   cetirizine (ZYRTEC) 10 mg tablet Take 10 mg by mouth daily as needed for Allergies. Provider, Historical   famotidine (PEPCID) 40 mg tablet Take 1 Tab by mouth daily. 2/26/19   Jaren Kulkarni NP       REVIEW OF SYSTEMS:     I am not able to complete the review of systems because:    The patient is intubated and sedated    The patient has altered mental status due to his acute medical problems    The patient has baseline aphasia from prior stroke(s)    The patient has baseline dementia and is not reliable historian    The patient is in acute medical distress and unable to provide information           Total of 12 systems reviewed as follows:       POSITIVE= underlined text  Negative = text not underlined  General:  fever, chills, sweats, generalized weakness, weight loss/gain,      loss of appetite   Eyes:    blurred vision, eye pain, loss of vision, double vision  ENT:    rhinorrhea, pharyngitis   Respiratory:   cough, sputum production, SOB, ESCOBEDO, wheezing, pleuritic pain   Cardiology:   chest pain, palpitations, orthopnea, PND, edema, syncope   Gastrointestinal:  abdominal pain , N/V, diarrhea, dysphagia, constipation, bleeding   Genitourinary:  frequency, urgency, dysuria, hematuria, incontinence   Muskuloskeletal :  arthralgia, myalgia, back pain  Hematology:  easy bruising, nose or gum bleeding, lymphadenopathy   Dermatological: rash, ulceration, pruritis, color change / jaundice  Endocrine:   hot flashes or polydipsia   Neurological:  headache, dizziness, confusion, focal weakness, paresthesia,     Speech difficulties, memory loss, gait difficulty  Psychological: Feelings of anxiety, depression, agitation    Objective:   VITALS:    Visit Vitals  BP (!) 177/106   Pulse 96   Temp 98 °F (36.7 °C)   Resp 16   Ht 6' 5\" (1.956 m)   Wt 112 kg (246 lb 14.6 oz)   SpO2 93%   BMI 29.28 kg/m² PHYSICAL EXAM:    General:    Alert, cooperative, no distress, appears stated age. HEENT: Atraumatic, anicteric sclerae, pink conjunctivae     No oral ulcers, mucosa moist, throat clear, dentition fair , Right eye vision lost   Neck:  Supple, symmetrical,  thyroid: non tender  Lungs:   Clear to auscultation bilaterally. No Wheezing or Rhonchi. No rales. Chest wall:  No tenderness  No Accessory muscle use. Heart:   Regular  rhythm,  No  murmur   No edema  Abdomen:   Soft, non-tender. Not distended. Bowel sounds normal  Extremities: No cyanosis. No clubbing,      Skin turgor normal, Capillary refill normal, Radial dial pulse 2+  Skin:     Not pale. Not Jaundiced  No rashes   Psych:  Good insight. Not depressed. Not anxious or agitated. Neurologic: EOMs intact. No facial asymmetry. No aphasia or slurred speech. Symmetrical strength, Sensation grossly intact. Alert and oriented X 4.     _______________________________________________________________________  Care Plan discussed with:    Comments   Patient y    Family      RN y    Care Manager                    Consultant:      _______________________________________________________________________  Expected  Disposition:   Home with Family y   HH/PT/OT/RN    SNF/LTC    [place]    ________________________________________________________________________  TOTAL TIME:  61  Minutes    Critical Care Provided     Minutes non procedure based      Comments    y Reviewed previous records   >50% of visit spent in counseling and coordination of care y Discussion with patient and/or family and questions answered       ________________________________________________________________________  Signed: Va Rudolph MD    Procedures: see electronic medical records for all procedures/Xrays and details which were not copied into this note but were reviewed prior to creation of Plan.     LAB DATA REVIEWED:    Recent Results (from the past 24 hour(s))   GLUCOSE, POC Collection Time: 12/03/19  9:50 PM   Result Value Ref Range    Glucose (POC) 123 (H) 65 - 100 mg/dL    Performed by Trell Perry    CBC WITH AUTOMATED DIFF    Collection Time: 12/03/19  9:53 PM   Result Value Ref Range    WBC 6.0 4.1 - 11.1 K/uL    RBC 5.29 4. 10 - 5.70 M/uL    HGB 13.3 12.1 - 17.0 g/dL    HCT 42.2 36.6 - 50.3 %    MCV 79.8 (L) 80.0 - 99.0 FL    MCH 25.1 (L) 26.0 - 34.0 PG    MCHC 31.5 30.0 - 36.5 g/dL    RDW 16.0 (H) 11.5 - 14.5 %    PLATELET 764 368 - 031 K/uL    MPV 10.5 8.9 - 12.9 FL    NRBC 0.0 0  WBC    ABSOLUTE NRBC 0.00 0.00 - 0.01 K/uL    NEUTROPHILS 52 32 - 75 %    LYMPHOCYTES 34 12 - 49 %    MONOCYTES 11 5 - 13 %    EOSINOPHILS 2 0 - 7 %    BASOPHILS 1 0 - 1 %    IMMATURE GRANULOCYTES 0 0.0 - 0.5 %    ABS. NEUTROPHILS 3.1 1.8 - 8.0 K/UL    ABS. LYMPHOCYTES 2.1 0.8 - 3.5 K/UL    ABS. MONOCYTES 0.6 0.0 - 1.0 K/UL    ABS. EOSINOPHILS 0.1 0.0 - 0.4 K/UL    ABS. BASOPHILS 0.0 0.0 - 0.1 K/UL    ABS. IMM. GRANS. 0.0 0.00 - 0.04 K/UL    DF AUTOMATED     METABOLIC PANEL, COMPREHENSIVE    Collection Time: 12/03/19  9:53 PM   Result Value Ref Range    Sodium 141 136 - 145 mmol/L    Potassium 3.2 (L) 3.5 - 5.1 mmol/L    Chloride 106 97 - 108 mmol/L    CO2 30 21 - 32 mmol/L    Anion gap 5 5 - 15 mmol/L    Glucose 135 (H) 65 - 100 mg/dL    BUN 22 (H) 6 - 20 MG/DL    Creatinine 1.98 (H) 0.70 - 1.30 MG/DL    BUN/Creatinine ratio 11 (L) 12 - 20      GFR est AA 44 (L) >60 ml/min/1.73m2    GFR est non-AA 36 (L) >60 ml/min/1.73m2    Calcium 9.6 8.5 - 10.1 MG/DL    Bilirubin, total 0.6 0.2 - 1.0 MG/DL    ALT (SGPT) 28 12 - 78 U/L    AST (SGOT) 20 15 - 37 U/L    Alk.  phosphatase 64 45 - 117 U/L    Protein, total 8.7 (H) 6.4 - 8.2 g/dL    Albumin 4.4 3.5 - 5.0 g/dL    Globulin 4.3 (H) 2.0 - 4.0 g/dL    A-G Ratio 1.0 (L) 1.1 - 2.2     PROTHROMBIN TIME + INR    Collection Time: 12/03/19  9:53 PM   Result Value Ref Range    INR 1.1 0.9 - 1.1      Prothrombin time 10.9 9.0 - 11.1 sec   SED RATE (ESR) Collection Time: 12/03/19  9:53 PM   Result Value Ref Range    Sed rate, automated 7 0 - 15 mm/hr   SAMPLES BEING HELD    Collection Time: 12/03/19  9:53 PM   Result Value Ref Range    SAMPLES BEING HELD  1 DRK GRN, 1 RED, 1 PST     COMMENT        Add-on orders for these samples will be processed based on acceptable specimen integrity and analyte stability, which may vary by analyte.    EKG, 12 LEAD, INITIAL    Collection Time: 12/03/19 11:01 PM   Result Value Ref Range    Ventricular Rate 88 BPM    Atrial Rate 88 BPM    P-R Interval 184 ms    QRS Duration 92 ms    Q-T Interval 382 ms    QTC Calculation (Bezet) 462 ms    Calculated P Axis 46 degrees    Calculated R Axis 32 degrees    Calculated T Axis 10 degrees    Diagnosis       Normal sinus rhythm  Nonspecific T wave abnormality  Prolonged QT  When compared with ECG of 14-MAY-2019 15:38,  Nonspecific T wave abnormality now evident in Anterior leads

## 2019-12-04 NOTE — ROUTINE PROCESS
TRANSFER - IN REPORT:    Verbal report received from Mary Anne Xiong RN (name) on [facility]  being received from the Emergency Department (unit) for routine progression of care      Report consisted of patients Situation, Background, Assessment and   Recommendations(SBAR). Information from the following report(s) SBAR, Kardex, ED Summary, Procedure Summary, MAR and Recent Results was reviewed with the receiving nurse. Opportunity for questions and clarification was provided. Assessment to be completed upon patients arrival to unit and care assumed.

## 2019-12-04 NOTE — PROGRESS NOTES
Hospitalist Progress Note    NAME: Radha Smith   :  1970   MRN:  [id number]       Assessment / Plan:  Acute Embolic CVA POA  Right Vision Loss    Admitted with Acute right Vision loss  Likely 2/2 Central retinal artery occlusion from Emboli phenomenon. MRI - acute area of ischemia left temporal lobe. -aspirin, plavix, statin  PT/OT/ST    -ECHO negative  -H/O s/p TEVAR and L CCA-SCA bypass 2019   -Will get KRISTY to rule out Aortic source  Dr Ashley Saldana has seen pt this am    -appreciate Dr Mims`s help      DM  -Hold oral hypoglycemic medication-start patient sliding scale with insulin  Hypertension  -Hold oral antihypertensive medication    Acute on chronic renal failure stage III  -Gentle hydration with IV fluid  monitor       Code Status: Full   Surrogate Decision Maker:[email or web address]     DVT Prophylaxis: Heparin   GI Prophylaxis: not indicated           Subjective:     Chief Complaint / Reason for Physician Visit  \"vision improved. Still not able to see centrally on right eye\". Discussed with RN events overnight. Review of Systems:  Symptom Y/N Comments  Symptom Y/N Comments   Fever/Chills n   Chest Pain n    Poor Appetite n   Edema n    Cough n   Abdominal Pain n    Sputum n   Joint Pain n    SOB/ESCOBEDO    Pruritis/Rash n    Nausea/vomit    Tolerating PT/OT     Diarrhea    Tolerating Diet     Constipation    Other       Could NOT obtain due to:      Objective:     VITALS:   Last 24hrs VS reviewed since prior progress note.  Most recent are:  Patient Vitals for the past 24 hrs:   Temp Pulse Resp BP SpO2   19 1433 98 °F (36.7 °C) 78 16 (!) 123/96 99 %   19 1115 98.5 °F (36.9 °C) 75 16 126/75 99 %   19 0754 -- -- -- (!) 135/92 --   19 0744 98.5 °F (36.9 °C) 81 18 (!) 135/92 97 %   19 0530 97.8 °F (36.6 °C) 87 18 147/86 98 %   19 0144 98.3 °F (36.8 °C) 85 18 (!) 140/107 98 %   19 0045 -- 87 18 (!) 175/104 97 %   19 0030 -- 89 15 (!) 171/98 97 % 12/04/19 0015 -- 93 23 (!) 188/99 96 %   12/04/19 0001 -- 92 15 (!) 171/104 95 %   12/03/19 2345 -- 95 26 (!) 174/103 97 %   12/03/19 2330 -- 87 17 (!) 170/102 93 %   12/03/19 2315 -- 87 18 (!) 177/106 93 %   12/03/19 2300 -- -- -- (!) 192/108 96 %   12/03/19 2245 -- -- -- (!) 186/99 97 %   12/03/19 2230 -- -- -- (!) 186/111 96 %   12/03/19 2122 98 °F (36.7 °C) 96 16 (!) 170/108 98 %     No intake or output data in the 24 hours ending 12/04/19 1455     PHYSICAL EXAM:  General: WD, WN. Alert, cooperative, no acute distress    EENT:  EOMI. Anicteric sclerae. MMM  Resp:  CTA bilaterally, no wheezing or rales. No accessory muscle use  CV:  Regular  rhythm,  No edema  GI:  Soft, Non distended, Non tender.  +Bowel sounds  Neurologic:  Alert and oriented X 3, normal speech. Right eye, central vision loss, peripheral intact  Psych:   Good insight. Not anxious nor agitated  Skin:  No rashes. No jaundice    Reviewed most current lab test results and cultures  YES  Reviewed most current radiology test results   YES  Review and summation of old records today    NO  Reviewed patient's current orders and MAR    YES  PMH/SH reviewed - no change compared to H&P  ________________________________________________________________________  Care Plan discussed with:    Comments   Patient x    Family      RN x    Care Manager     Consultant  x                      Multidiciplinary team rounds were held today with , nursing, pharmacist and clinical coordinator. Patient's plan of care was discussed; medications were reviewed and discharge planning was addressed.      ________________________________________________________________________  Total NON critical care TIME:  28   Minutes    Total CRITICAL CARE TIME Spent:   Minutes non procedure based      Comments   >50% of visit spent in counseling and coordination of care     ________________________________________________________________________  Lupillo Belcher MD Procedures: see electronic medical records for all procedures/Xrays and details which were not copied into this note but were reviewed prior to creation of Plan. LABS:  I reviewed today's most current labs and imaging studies.   Pertinent labs include:  Recent Labs     12/04/19 0513 12/03/19 2153   WBC 6.3 6.0   HGB 11.9* 13.3   HCT 38.6 42.2    223     Recent Labs     12/04/19 0513 12/03/19  2153    141   K 3.7 3.2*    106   CO2 27 30   * 135*   BUN 22* 22*   CREA 1.95* 1.98*   CA 8.7 9.6   ALB  --  4.4   TBILI  --  0.6   SGOT  --  20   ALT  --  28   INR  --  1.1       Signed: Addie Ruiz MD

## 2019-12-04 NOTE — ED NOTES
Patient was at home with wife watching TV about 9 PM, all of a sudden started to lose vision in R eye. States came on pretty suddenly, denies pain or any significant event to initiate it. Has some peripheral vision to the right but otherwise can't see at all from that eye. Pupil notably less reactive to light. Hx aneurysm earlier this year, several stents in left side chest. Hypertension.

## 2019-12-04 NOTE — CONSULTS
Consult  REFERRED BY:  Perla Olson DO    CHIEF COMPLAINT: Sudden visual loss right eye      Subjective:     Tana Donis is a 51 y.o. right-handed -American male seen at the request of Dr. Jean Marie Bennett as a new patient to me for evaluation of visual loss in his right eye that occurred yesterday when he was just watching TV and noticed a sudden onset of blurred vision in his right eye. The patient had a normal head CT done and a normal CTA of the head neck on admission to the emergency room last night. Patient had normal carotid Doppler studies today, had an MRI scan that showed a very punctate minuscule left temporal infarct of unclear etiology. It is asymptomatic but a little unusual.  Patient just had an aortic dissection fixed with Dr. Wali Dupont with stent placement, and the question is could there be some atherosclerotic disease in the aorta, and a KRISTY is being planned for that tomorrow at his request.  Patient has a sed rate of 7. Patient has type 2 diabetes, but never had a history of stroke before. His dilated funduscopy seems to show a possible Hollenhorst plaque right at the takeoff of the central retinal artery on the right side. There did not appear to be significant disc edema or pallor yet, and the vessels may be a minimal attenuated but still seem present for the arterial branches. Patient has hand motion vision in his right eye centrally, and can count fingers in the periphery. Patient denies any headache or other focal weakness sensory loss or any other cause of his symptoms.     Past Medical History:   Diagnosis Date    Bilateral carotid artery stenosis     Diabetic peripheral neuropathy associated with type 2 diabetes mellitus ([facility] [address]     Foot fracture     H/O aortic dissection     Hollenhorst plaque, right eye     Hypertension     Jaw fracture (HCC)     Ruptured ear drum     Subjective visual disturbance, right eye     Thumb fracture       Past Surgical History: Procedure Laterality Date    HX APPENDECTOMY      HX ARTERIAL BYPASS  02/15/2019    HX ORTHOPAEDIC      HX OTHER SURGICAL  02/15/2019    8 stints    HX OTHER SURGICAL  02/15/2019    Double bypass     Family History   Problem Relation Age of Onset    Diabetes Mother     Stroke Mother     Colon Cancer Father     Cancer Brother     No Known Problems Child       Social History     Tobacco Use    Smoking status: Never Smoker    Smokeless tobacco: Never Used   Substance Use Topics    Alcohol use: No         Current Facility-Administered Medications:     acetaminophen (TYLENOL) tablet 650 mg, 650 mg, Oral, Q4H PRN **OR** acetaminophen (TYLENOL) solution 650 mg, 650 mg, Per NG tube, Q4H PRN **OR** acetaminophen (TYLENOL) suppository 650 mg, 650 mg, Rectal, Q4H PRN, Melany Anne MD    clopidogrel (PLAVIX) tablet 75 mg, 75 mg, Oral, DAILY, Melany Anne MD, 75 mg at 12/04/19 1025    bisacodyl (DULCOLAX) tablet 5 mg, 5 mg, Oral, DAILY PRN, Melany Anne MD    heparin (porcine) injection 5,000 Units, 5,000 Units, SubCUTAneous, Q8H, Melany Anne MD, 5,000 Units at 12/04/19 1733    aspirin chewable tablet 81 mg, 81 mg, Oral, DAILY, Melany Anne MD, 81 mg at 12/04/19 1025    labetalol (NORMODYNE;TRANDATE) injection 5 mg, 5 mg, IntraVENous, Q10MIN PRN, Melany Anne MD    0.9% sodium chloride infusion, 75 mL/hr, IntraVENous, CONTINUOUS, Melany Anne MD, Stopped at 12/04/19 1251    glucose chewable tablet 16 g, 4 Tab, Oral, PRN, Melany Anne MD    dextrose (D50W) injection syrg 12.5-25 g, 25-50 mL, IntraVENous, PRN, Melany Anne MD    glucagon [facility]) injection 1 mg, 1 mg, IntraMUSCular, PRN, Melany Anne MD    insulin lispro (HUMALOG) injection, , SubCUTAneous, AC&HS, Melany Anne MD, Stopped at 12/04/19 0730    [START ON 12/5/2019] famotidine (PEPCID) tablet 40 mg, 40 mg, Oral, DAILY, Melany Anne MD    atorvastatin (LIPITOR) tablet 40 mg, 40 mg, Oral, Gary Green MD        No Known Allergies   MRI Results (most recent):  Results from [place] encounter on 12/03/19   MRI BRAIN W WO CONT    Narrative EXAM:  MRI BRAIN W WO CONT    INDICATION:    Stroke    COMPARISON:  None. CONTRAST: 10 cc IV Dotarem. TECHNIQUE:    Multiplanar multisequence acquisition without and with contrast of the brain. FINDINGS:  Diffusion suspect a small area of acute ischemia in the medial aspect of the  left temporal lobe. No associated mass effect or enhancement, no hemorrhage. There is a minimal amount of nonspecific white matter changes her. Flow voids in major vessels at the base of the brain are present. Few areas of hemosiderin depositions. No enhancing lesion or masses . Impression IMPRESSION: Suspect small acute area of ischemia left temporal lobe. No masses or mass effect or hemorrhage. Minimal white matter disease. Results from [place] encounter on 12/03/19   MRI BRAIN W WO CONT    Narrative EXAM:  MRI BRAIN W WO CONT    INDICATION:    Stroke    COMPARISON:  None. CONTRAST: 10 cc IV Dotarem. TECHNIQUE:    Multiplanar multisequence acquisition without and with contrast of the brain. FINDINGS:  Diffusion suspect a small area of acute ischemia in the medial aspect of the  left temporal lobe. No associated mass effect or enhancement, no hemorrhage. There is a minimal amount of nonspecific white matter changes her. Flow voids in major vessels at the base of the brain are present. Few areas of hemosiderin depositions. No enhancing lesion or masses . Impression IMPRESSION: Suspect small acute area of ischemia left temporal lobe. No masses or mass effect or hemorrhage. Minimal white matter disease.      Review of Systems:  A comprehensive review of systems was negative except for: Eyes: positive for visual disturbance   Vitals:    12/04/19 0744 12/04/19 0754 12/04/19 1115 12/04/19 1433   BP: (!) 135/92 (!) 135/92 126/75 (!) 123/96   Pulse: 81  75 78   Resp: 18  16 16   Temp: 98.5 °F (36.9 °C)  98.5 °F (36.9 °C) 98 °F (36.7 °C)   SpO2: 97%  99% 99%   Weight:  246 lb (111.6 kg)     Height:  6' 5\" (1.956 m)       Objective:     I      NEUROLOGICAL EXAM:    Appearance: The patient is well developed, well nourished, provides a coherent history and is in no acute distress. Mental Status: Oriented to time, place and person, and the president, cognitive function is normal and speech is fluent and no aphasia or dysarthria. Mood and affect appropriate. Cranial Nerves:   Intact visual fields. Patient has hand motion vision in the central portion of his right eye, and count fingers vision in the periphery of the right eye, and normal fields on the left. Fundi are benign, except that the right eye seems to show a Hollenhorst plaque at the right central retinal artery takeoff, without significant disc edema or pallor yet, and the vessels still seem to be present but may be a little attenuated. Stan Rook NILE, EOM's full, no nystagmus, no ptosis. Facial sensation is normal. Corneal reflexes are not tested. Facial movement is symmetric. Hearing is normal bilaterally. Palate is midline with normal sternocleidomastoid and trapezius muscles are normal. Tongue is midline. Neck without meningismus or bruits  Temporal arteries are not tender or enlarged  TMJ areas are not tender on palpation   Motor:  5/5 strength in upper and lower proximal and distal muscles. Normal bulk and tone. No fasciculations. Rapid alternating movement is symmetric and intact bilaterally   Reflexes:   Deep tendon reflexes 2+/4 and symmetrical.  No babinski or clonus present   Sensory:   Normal to touch, pinprick and vibration and temperature. DSS is intact   Gait:  Normal gait for patient's age. Tremor:   No tremor noted.    Cerebellar:  No abnormal cerebellar signs present on Romberg and tandem testing and finger-nose-finger exam.   Neurovascular:  Normal heart sounds and regular rhythm, peripheral pulses intact, and no carotid bruits. Assessment:       ICD-10-CM ICD-9-CM    1. Acute loss of vision, right H53.131 368.11      Active Problems:    Acute ischemic stroke ([address] (12/3/2019)      H/O aortic dissection ()      Diabetic peripheral neuropathy associated with type 2 diabetes mellitus (HCC) ()      Hollenhorst plaque, right eye ()      Subjective visual disturbance, right eye ()      Bilateral carotid artery stenosis ()      Thrombotic stroke involving left middle cerebral artery ([address] (12/4/2019)        Plan:     Patient appears to most likely have a cholesterol plaque go to his eye, maybe even to the left temporal lobe  This may be from his aorta as the most likely cause, and KRISTY is being pending  He does not seem to have any primary carotid disease. We will continue antiplatelet therapy and await the KRISTY. Not much else to do. He will need an ophthalmologic evaluation once he is discharged. Very difficult case.     Signed By: Radha Padgett MD     December 4, 2019       CC: Raina More DO  FAX: [phone]

## 2019-12-04 NOTE — PROGRESS NOTES
Problem: Falls - Risk of  Goal: *Absence of Falls  Description  Document Matheus Araseli Fall Risk and appropriate interventions in the flowsheet.   Outcome: Progressing Towards Goal  Note: Fall Risk Interventions:            Medication Interventions: Bed/chair exit alarm, Patient to call before getting OOB                   Problem: Patient Education: Go to Patient Education Activity  Goal: Patient/Family Education  Outcome: Progressing Towards Goal

## 2019-12-04 NOTE — PROGRESS NOTES
Speech pathology note  Reviewed chart and note patient admitted with vision changes with concern for CVA. Note CT showed no evidence of acute abnormality and MRI pending. Note patient passed the STAND and a regular diet was ordered. NIHSS=1 for vision changes. Discussed case with RN who reported no SLP-related concerns. Formal SLP evaluation not clinically indicated at this time. Will sign off. Please re-consult if further needs arise. Thank you.     Barrett Brian., CCC-SLP

## 2019-12-04 NOTE — PROGRESS NOTES
PHYSICAL THERAPY EVALUATION WITH DISCHARGE  Patient: Tim Luciano (51 y.o. male)  Date: 12/4/2019  Primary Diagnosis: Acute ischemic stroke [facility]) [I63.9]       Precautions:          ASSESSMENT  Based on the objective data described below, the patient presents with central vision loss in R eye however good strength, intact balance, and baseline independent functional mobility. Strength and sensation equal and intact throughout. Gait steady and stable overall as pt independently ambulated 250ft without device. Pt scored 56/56 on Gilbert Balance test, indicating low falls risk. Pt has no further skilled therapy needs and is safe to be up ad jenny within room/hallways as well as discharge home w/ no further needs. Functional Outcome Measure: The patient scored Total: 56/56 on the Gilbert Balance Assessment which is indicative of low fall risk. Other factors to consider for discharge:      Further skilled acute physical therapy is not indicated at this time. PLAN :  Recommendation for discharge: (in order for the patient to meet his/her long term goals)  No skilled physical therapy/ follow up rehabilitation needs identified at this time. This discharge recommendation:  Has been made in collaboration with the attending provider and/or case management    IF patient discharges home will need the following DME: none         SUBJECTIVE:   Patient stated I'm not leaving until my vision comes back.     OBJECTIVE DATA SUMMARY:   HISTORY:    Past Medical History:   Diagnosis Date    Foot fracture     Hypertension     Jaw fracture ([address]     Ruptured ear drum     Thumb fracture      Past Surgical History:   Procedure Laterality Date    HX APPENDECTOMY      HX ARTERIAL BYPASS  02/15/2019    HX ORTHOPAEDIC      HX OTHER SURGICAL  02/15/2019    8 stints    HX OTHER SURGICAL  02/15/2019    Double bypass       Prior level of function: Independent w/ ambulation and ADLs. Denies history of falls.  Lives at home w/ girlfriend who is able to assist as needed. Still driving. Owns and operates a food truck. Personal factors and/or comorbidities impacting plan of care:     Home Situation  Home Environment: Private residence  # Steps to Enter: 5  Wheelchair Ramp: Yes  One/Two Story Residence: One story  Living Alone: No(lives with significant other)  Support Systems: Child(reji), Spouse/Significant Other/Partner  Patient Expects to be Discharged to[de-identified] Private residence  Current DME Used/Available at Home: None  Tub or Shower Type: Shower    EXAMINATION/PRESENTATION/DECISION MAKING:   Critical Behavior:  Neurologic State: Alert  Orientation Level: Oriented X4  Cognition: Appropriate decision making  Safety/Judgement: Awareness of environment  Hearing: Auditory  Auditory Impairment: None  Skin:  intact  Edema: none noted   Range Of Motion:  AROM: Within functional limits           PROM: Within functional limits           Strength:    Strength:  Within functional limits                    Tone & Sensation:   Tone: Normal              Sensation: Intact               Coordination:  Coordination: Within functional limits  Vision:   Corrective Lenses: (eye patch on)  Functional Mobility:  Bed Mobility:  Rolling: Independent  Supine to Sit: Independent     Scooting: Independent  Transfers:  Sit to Stand: Independent  Stand to Sit: Independent        Bed to Chair: Independent              Balance:   Sitting: Intact  Standing: Intact  Ambulation/Gait Training:  Distance (ft): 250 Feet (ft)  Assistive Device: Gait belt  Ambulation - Level of Assistance: Independent        Gait Abnormalities: Decreased step clearance        Base of Support: Narrowed     Speed/Emilia: Pace decreased (<100 feet/min)                     Functional Measure  Gilbert Balance Test:    Sitting to Standin  Standing Unsupported: 4  Sitting with Back Unsupported: 4  Standing to Sittin  Transfers: 4  Standing Unsupported with Eyes Closed: 4  Standing Unsupported with Feet Together: 4  Reach Forward with Outstretched Arm: 4   Object: 4  Turn to Look Over Shoulders: 4  Turn 360 Degrees: 4  Alternate Foot on Step/Stool: 4  Standing Unsupported One Foot in Front: 4  Stand on One Le  Total: 56/56         56=Maximum possible score;   0-20=High fall risk  21-40=Moderate fall risk   41-56=Low fall risk        Physical Therapy Evaluation Charge Determination   History Examination Presentation Decision-Making   MEDIUM  Complexity : 1-2 comorbidities / personal factors will impact the outcome/ POC  MEDIUM Complexity : 3 Standardized tests and measures addressing body structure, function, activity limitation and / or participation in recreation  MEDIUM Complexity : Evolving with changing characteristics  HIGH Complexity : FOTO score of 1- 25       Based on the above components, the patient evaluation is determined to be of the following complexity level: MEDIUM    Pain Rating:  Denied complaints of pain    Activity Tolerance:   WNL and Good  Please refer to the flowsheet for vital signs taken during this treatment. After treatment patient left in no apparent distress:   Sitting in chair and Call bell within reach    COMMUNICATION/EDUCATION:   The patients plan of care was discussed with: Occupational Therapist and Registered Nurse. Patient was educated regarding His deficit(s) of central vision loss as this relates to His diagnosis of r/o CVA. He demonstrated Good understanding as evidenced by verbalization. Patient and/or family was verbally educated on the BE FAST acronym for signs/symptoms of CVA and TIA. BE FAST was written on patient's communication board  for visual education and reinforcement. All questions answered with patient indicating good understanding. Fall prevention education was provided and the patient/caregiver indicated understanding., Patient/family have participated as able in goal setting and plan of care.  and Patient/family agree to work toward stated goals and plan of care.     Thank you for this referral.  Deborah Kamara, PT, DPT   Time Calculation: 15 mins

## 2019-12-04 NOTE — ED PROVIDER NOTES
EMERGENCY DEPARTMENT HISTORY AND PHYSICAL EXAM     ------------------------------------------------------------------------------------------------------------------------------------------------------------------------------------  Please note that this dictation was completed with [employer], the [employer] voice recognition software. Quite often unanticipated grammatical, syntax, homophones, and other interpretive errors are inadvertently transcribed by the computer software. Please disregard these errors. Please excuse any errors that have escaped final proofreading  ------------------------------------------------------------------------------------------------------------------------------------------------------------------------------------        Date: 12/3/2019  Patient Name: Golden White    History of Presenting Illness     Chief Complaint   Patient presents with    Loss of Vision     reports loss of vision to R eye around 2100 tonight; denies any pain at this time - reports that all he sees is black; denies hx of similar sx's       History Provided By:  Patient,  Family     HPI: Golden White is a 51 y.o. male, pmhx HTN, DM, AO dissection with repair, who presents ambulatory to the ED with c/o acute painless R eye vision loss that started at 9pm tonight while watching TV. Patient notes symptoms persist while emergency department. Cannot see light when tested although intra-ocular motion intact. Denies having similar symptoms in the past.  No other deficit or gait abnormality noted at time of evaluation. Patient noted to have significant vascular surgery with Dr. Martin Persons earlier this year due to aortic dissection and stent placement. Denies being on anticoagulant at this time. Takes blood pressure medication and reports taking his medication just after the onset of his symptoms.   Patient is notably hypertensive on arrival.  Patient specifically denies any associated fevers, chills, nausea, vomiting, diarrhea, abd pain, CP, SOB, urinary sxs, changes in BM, or headache. Last known well: 9pm  B  Anticoagulant? : no      Social Hx: denies tobacco  denies EtOH , denies Illicit Drugs    There are no other complaints, changes, or physical findings at this time.      No Known Allergies      PCP: Unique Newby III, DO    Current Facility-Administered Medications   Medication Dose Route Frequency Provider Last Rate Last Dose    acetaminophen (TYLENOL) tablet 650 mg  650 mg Oral Q4H PRN Luis F More MD        Or    acetaminophen (TYLENOL) solution 650 mg  650 mg Per NG tube Q4H PRN Luis F More MD        Or    acetaminophen (TYLENOL) suppository 650 mg  650 mg Rectal Q4H PRN Luis F More MD        clopidogrel (PLAVIX) tablet 75 mg  75 mg Oral DAILY Luis F More MD        pravastatin (PRAVACHOL) tablet 40 mg  40 mg Oral QHS Luis F More MD   40 mg at 19 4960    bisacodyl (DULCOLAX) tablet 5 mg  5 mg Oral DAILY PRN Luis F More MD        heparin (porcine) injection 5,000 Units  5,000 Units SubCUTAneous Martine Linda MD   5,000 Units at 19 0227    aspirin chewable tablet 81 mg  81 mg Oral DAILY Luis F More MD        labetalol (NORMODYNE;TRANDATE) injection 5 mg  5 mg IntraVENous Q10MIN PRN Luis F More MD        0.9% sodium chloride infusion  75 mL/hr IntraVENous CONTINUOUS Luis F More MD 75 mL/hr at 19 0600 75 mL/hr at 19 0600    glucose chewable tablet 16 g  4 Tab Oral PRN Luis F More MD        dextrose (D50W) injection syrg 12.5-25 g  25-50 mL IntraVENous PRN Luis F More MD        glucagon [facility]) injection 1 mg  1 mg IntraMUSCular PRN Luis F More MD        insulin lispro (HUMALOG) injection   SubCUTAneous AC&HS Luis F More MD           Past History     Past Medical History:  Past Medical History:   Diagnosis Date    Foot fracture     Hypertension     Jaw fracture (HCC)     Ruptured ear drum  Thumb fracture        Past Surgical History:  Past Surgical History:   Procedure Laterality Date    HX APPENDECTOMY      HX ARTERIAL BYPASS  02/15/2019    HX ORTHOPAEDIC      HX OTHER SURGICAL  02/15/2019    8 stints    HX OTHER SURGICAL  02/15/2019    Double bypass       Family History:  Family History   Problem Relation Age of Onset    Diabetes Mother     Colon Cancer Father        Social History:  Social History     Tobacco Use    Smoking status: Never Smoker    Smokeless tobacco: Never Used   Substance Use Topics    Alcohol use: No    Drug use: No       Allergies:  No Known Allergies      Review of Systems   Review of Systems   Constitutional: Negative for chills and fever. HENT: Negative. Eyes: Positive for visual disturbance (R eye vision loss, even to light). Negative for photophobia, pain and redness. Respiratory: Negative for cough, chest tightness and shortness of breath. Cardiovascular: Negative for chest pain and leg swelling. Gastrointestinal: Negative for abdominal pain, diarrhea, nausea and vomiting. Endocrine: Negative. Genitourinary: Negative for difficulty urinating and dysuria. Musculoskeletal: Negative for myalgias. Skin: Negative. Neurological: Negative. Psychiatric/Behavioral: Negative. All other systems reviewed and are negative. Physical Exam   Physical Exam  Vitals signs and nursing note reviewed. Constitutional:       General: He is not in acute distress. Appearance: He is well-developed. He is not diaphoretic. HENT:      Head: Normocephalic and atraumatic. Nose: Nose normal.      Mouth/Throat:      Pharynx: No oropharyngeal exudate. Eyes:      General: Lids are normal. Visual field deficit (loss of vision to light in R eye) present. Right eye: No discharge. Left eye: No discharge. Extraocular Movements: Extraocular movements intact. Right eye: Normal extraocular motion.       Left eye: Normal extraocular motion. Conjunctiva/sclera: Conjunctivae normal.      Right eye: Right conjunctiva is not injected. No chemosis or hemorrhage. Left eye: Left conjunctiva is not injected. No chemosis or hemorrhage. Neck:      Musculoskeletal: Normal range of motion and neck supple. Vascular: No JVD. Cardiovascular:      Rate and Rhythm: Normal rate and regular rhythm. Heart sounds: Normal heart sounds. No murmur. No friction rub. Pulmonary:      Effort: Pulmonary effort is normal. No respiratory distress. Breath sounds: Normal breath sounds. No stridor. No wheezing or rales. Abdominal:      General: Bowel sounds are normal. There is no distension. Palpations: Abdomen is soft. Tenderness: There is no tenderness. There is no rebound. Musculoskeletal: Normal range of motion. General: No tenderness. Skin:     General: Skin is warm and dry. Findings: No rash. Neurological:      Mental Status: He is alert and oriented to person, place, and time. Cranial Nerves: No cranial nerve deficit. Psychiatric:         Speech: Speech normal.         Behavior: Behavior normal.         Thought Content: Thought content normal.         Judgment: Judgment normal.           Diagnostic Study Results     Labs -     Recent Results (from the past 12 hour(s))   GLUCOSE, POC    Collection Time: 12/03/19  9:50 PM   Result Value Ref Range    Glucose (POC) 123 (H) 65 - 100 mg/dL    Performed by John Paul Kimble    CBC WITH AUTOMATED DIFF    Collection Time: 12/03/19  9:53 PM   Result Value Ref Range    WBC 6.0 4.1 - 11.1 K/uL    RBC 5.29 4. 10 - 5.70 M/uL    HGB 13.3 12.1 - 17.0 g/dL    HCT 42.2 36.6 - 50.3 %    MCV 79.8 (L) 80.0 - 99.0 FL    MCH 25.1 (L) 26.0 - 34.0 PG    MCHC 31.5 30.0 - 36.5 g/dL    RDW 16.0 (H) 11.5 - 14.5 %    PLATELET 466 648 - 879 K/uL    MPV 10.5 8.9 - 12.9 FL    NRBC 0.0 0  WBC    ABSOLUTE NRBC 0.00 0.00 - 0.01 K/uL    NEUTROPHILS 52 32 - 75 %    LYMPHOCYTES 34 12 - 49 %    MONOCYTES 11 5 - 13 %    EOSINOPHILS 2 0 - 7 %    BASOPHILS 1 0 - 1 %    IMMATURE GRANULOCYTES 0 0.0 - 0.5 %    ABS. NEUTROPHILS 3.1 1.8 - 8.0 K/UL    ABS. LYMPHOCYTES 2.1 0.8 - 3.5 K/UL    ABS. MONOCYTES 0.6 0.0 - 1.0 K/UL    ABS. EOSINOPHILS 0.1 0.0 - 0.4 K/UL    ABS. BASOPHILS 0.0 0.0 - 0.1 K/UL    ABS. IMM. GRANS. 0.0 0.00 - 0.04 K/UL    DF AUTOMATED     METABOLIC PANEL, COMPREHENSIVE    Collection Time: 12/03/19  9:53 PM   Result Value Ref Range    Sodium 141 136 - 145 mmol/L    Potassium 3.2 (L) 3.5 - 5.1 mmol/L    Chloride 106 97 - 108 mmol/L    CO2 30 21 - 32 mmol/L    Anion gap 5 5 - 15 mmol/L    Glucose 135 (H) 65 - 100 mg/dL    BUN 22 (H) 6 - 20 MG/DL    Creatinine 1.98 (H) 0.70 - 1.30 MG/DL    BUN/Creatinine ratio 11 (L) 12 - 20      GFR est AA 44 (L) >60 ml/min/1.73m2    GFR est non-AA 36 (L) >60 ml/min/1.73m2    Calcium 9.6 8.5 - 10.1 MG/DL    Bilirubin, total 0.6 0.2 - 1.0 MG/DL    ALT (SGPT) 28 12 - 78 U/L    AST (SGOT) 20 15 - 37 U/L    Alk. phosphatase 64 45 - 117 U/L    Protein, total 8.7 (H) 6.4 - 8.2 g/dL    Albumin 4.4 3.5 - 5.0 g/dL    Globulin 4.3 (H) 2.0 - 4.0 g/dL    A-G Ratio 1.0 (L) 1.1 - 2.2     PROTHROMBIN TIME + INR    Collection Time: 12/03/19  9:53 PM   Result Value Ref Range    INR 1.1 0.9 - 1.1      Prothrombin time 10.9 9.0 - 11.1 sec   SED RATE (ESR)    Collection Time: 12/03/19  9:53 PM   Result Value Ref Range    Sed rate, automated 7 0 - 15 mm/hr   SAMPLES BEING HELD    Collection Time: 12/03/19  9:53 PM   Result Value Ref Range    SAMPLES BEING HELD  1 DRK GRN, 1 RED, 1 PST     COMMENT        Add-on orders for these samples will be processed based on acceptable specimen integrity and analyte stability, which may vary by analyte.    EKG, 12 LEAD, INITIAL    Collection Time: 12/03/19 11:01 PM   Result Value Ref Range    Ventricular Rate 88 BPM    Atrial Rate 88 BPM    P-R Interval 184 ms    QRS Duration 92 ms    Q-T Interval 382 ms    QTC Calculation (Bezet) 462 ms Calculated P Axis 46 degrees    Calculated R Axis 32 degrees    Calculated T Axis 10 degrees    Diagnosis       Normal sinus rhythm  Nonspecific T wave abnormality  Prolonged QT  When compared with ECG of 14-MAY-2019 15:38,  Nonspecific T wave abnormality now evident in Anterior leads     CBC W/O DIFF    Collection Time: 12/04/19  5:13 AM   Result Value Ref Range    WBC 6.3 4.1 - 11.1 K/uL    RBC 4.80 4. 10 - 5.70 M/uL    HGB 11.9 (L) 12.1 - 17.0 g/dL    HCT 38.6 36.6 - 50.3 %    MCV 80.4 80.0 - 99.0 FL    MCH 24.8 (L) 26.0 - 34.0 PG    MCHC 30.8 30.0 - 36.5 g/dL    RDW 16.2 (H) 11.5 - 14.5 %    PLATELET 388 881 - 043 K/uL    MPV 10.5 8.9 - 12.9 FL    NRBC 0.0 0  WBC    ABSOLUTE NRBC 0.00 0.00 - 8.07 K/uL   METABOLIC PANEL, BASIC    Collection Time: 12/04/19  5:13 AM   Result Value Ref Range    Sodium 142 136 - 145 mmol/L    Potassium 3.7 3.5 - 5.1 mmol/L    Chloride 108 97 - 108 mmol/L    CO2 27 21 - 32 mmol/L    Anion gap 7 5 - 15 mmol/L    Glucose 145 (H) 65 - 100 mg/dL    BUN 22 (H) 6 - 20 MG/DL    Creatinine 1.95 (H) 0.70 - 1.30 MG/DL    BUN/Creatinine ratio 11 (L) 12 - 20      GFR est AA 45 (L) >60 ml/min/1.73m2    GFR est non-AA 37 (L) >60 ml/min/1.73m2    Calcium 8.7 8.5 - 10.1 MG/DL   GLUCOSE, POC    Collection Time: 12/04/19  6:57 AM   Result Value Ref Range    Glucose (POC) 143 (H) 65 - 100 mg/dL    Performed by Aristides Villegas (PCT)        Radiologic Studies -   CTA CODE NEURO HEAD AND NECK W CONT         CT PERF W CBF         CT CODE NEURO HEAD WO CONTRAST   Final Result   IMPRESSION: No evidence of acute abnormality. MRI BRAIN WO CONT    (Results Pending)     CT Results  (Last 48 hours)               12/03/19 2205  CTA CODE NEURO HEAD AND NECK W CONT Preliminary result    Narrative:  *PRELIMINARY REPORT*       No acute thrombosis or significant intracranial stenosis. Hypoplastic left vertebral artery.        Preliminary report was provided by Dr. Padmini Nguyễn, the on-call radiologist, at 10:41   PM.       Final report to follow. *END PRELIMINARY REPORT*                               12/03/19 2205  CT PERF W CBF Preliminary result    Narrative:  *PRELIMINARY REPORT*       No significant perfusion defect. Preliminary report was provided by Dr. Angela Ramos, the on-call radiologist, at 10:27   PM.       Final report to follow. *END PRELIMINARY REPORT*                               12/03/19 2146  CT CODE NEURO HEAD WO CONTRAST Final result    Impression:  IMPRESSION: No evidence of acute abnormality. Narrative:  EXAM: CT CODE NEURO HEAD WO CONTRAST       INDICATION: Code Stroke. Transient loss of vision in the right eye       COMPARISON: None. CONTRAST: None. TECHNIQUE: Unenhanced CT of the head was performed using 5 mm images. Brain and   bone windows were generated. CT dose reduction was achieved through use of a   standardized protocol tailored for this examination and automatic exposure   control for dose modulation. FINDINGS:   The ventricles and sulci are normal in size, shape and configuration and   midline. There is no significant white matter disease. There is no intracranial   hemorrhage, extra-axial collection, mass, mass effect or midline shift. The   basilar cisterns are open. No acute infarct is identified. The bone windows   demonstrate no abnormalities. The visualized portions of the paranasal sinuses   and mastoid air cells are clear. CXR Results  (Last 48 hours)    None            Medical Decision Making   I am the first provider for this patient. I reviewed the vital signs, available nursing notes, past medical history, past surgical history, family history and social history. Vital Signs-Reviewed the patient's vital signs.   Patient Vitals for the past 12 hrs:   Temp Pulse Resp BP SpO2   12/04/19 0530 97.8 °F (36.6 °C) 87 18 147/86 98 %   12/04/19 0144 98.3 °F (36.8 °C) 85 18 (!) 140/107 98 %   12/04/19 0045 -- 87 18 (!) 175/104 97 %   12/04/19 0030 -- 89 15 (!) 171/98 97 %   12/04/19 0015 -- 93 23 (!) 188/99 96 %   12/04/19 0001 -- 92 15 (!) 171/104 95 %   12/03/19 2345 -- 95 26 (!) 174/103 97 %   12/03/19 2330 -- 87 17 (!) 170/102 93 %   12/03/19 2315 -- 87 18 (!) 177/106 93 %   12/03/19 2300 -- -- -- (!) 192/108 96 %   12/03/19 2245 -- -- -- (!) 186/99 97 %   12/03/19 2230 -- -- -- (!) 186/111 96 %   12/03/19 2122 98 °F (36.7 °C) 96 16 (!) 170/108 98 %       Pulse Oximetry Analysis - 98% on RA    Cardiac Monitor:   Rate: 96 bpm  Rhythm: nsr    Records Reviewed: Nursing Notes, Old Medical Records, Previous electrocardiograms, Previous Radiology Studies and Previous Laboratory Studies    Provider Notes (Medical Decision Making):     DDX:  ICH, Occlusive stroke, electrolyte abnormalities, arrhythmia    Plan:  Code S head ct, labs, ekg, Teleneuro consult, CTA, sed rate    Impression:  Acute painless right eye vision loss, likely central retinal artery occlusion    ED Course:   Initial assessment performed. The patients presenting problems have been discussed, and they are in agreement with the care plan formulated and outlined with them. I have encouraged them to ask questions as they arise throughout their visit. I reviewed our electronic medical record system for any past medical records that were available that may contribute to the patients current condition, the nursing notes and and vital signs from today's visit    Nursing notes will be reviewed as they become available in realtime while the pt has been in the ED. Bhanu Coyne MD    I personally reviewed pt's imaging. Official read by radiology noted above.   Bhanu Coyne MD    Code S Timeline:  Arrival time to ED: 2106    Code S Called: 2132    Physician at Bedside: 2132     CT Order Time: 2132    ACT Page: 2133    ACT Call Back: 2141      CONSULT NOTE:   2141  Bhanu Coyne MD spoke with Dr. Amanda Roca,   Specialty: [facility] Dr. Amanda Roca due to acute R eye vision loss. Discussed pt's HPI and available diagnostic results thus far. Expressed concerns for needed evaluation. Consultant will evaluate pt via Teleneuro device and provide recommendations. Yosvany Arguello MD      PROGRESS NOTE:  9:56 PM  Pt evaluated by teleneuro who notes pt is NOT a candidate for TPA based on his evaluation. Recommends CTA for eval of occlusive disease, asa (81mg) and plavix (75mg) with permissive hypertension (220/120.)   Yosvany Arguello MD    CONSULT NOTE:   10:39 PM  Yosvany rAguello MD spoke with Dr. Stephanie Coles,   Specialty: Janie Coles due to surgeon for central retinal artery occlusion. Discussed pt's HPI and available diagnostic results thus far. Expressed concerns for needed admission. Consultant will evaluate for admission. Yosvany Arguello MD      ADMISSION NOTE:  Patient is being admitted to the hospital by Dr. Stephanie Coles. The results of their tests and reasons for their admission have been discussed with them and/or available family. They convey agreement and understanding for the need to be admitted and for their admission diagnosis. Yosvany Arguello MD        Critical Care Time:   CRITICAL CARE NOTE:  IMPENDING DETERIORATION -Airway, Respiratory, Cardiovascular, CNS and Metabolic  ASSOCIATED RISK FACTORS - Metabolic changes, Vascular Compromise and CNS Decompensation  MANAGEMENT- Bedside Assessment and Supervision of Care  INTERPRETATION -  CT Scan, ECG and Blood Pressure  INTERVENTIONS - hemodynamic mngmt, vascular control, Neurologic interventions  and Metobolic interventions  CASE REVIEW - Hospitalist, Medical Sub-Specialist, Nursing and Family  TREATMENT RESPONSE -Improved  PERFORMED BY - Self  NOTES   :  I have spent 90 minutes of critical care time involved in lab review, consultations with specialist, family decision- making, bedside attention and documentation excluding time spent on any separately billed procedures.  During this entire length of time I was immediately available to the patient . Leticia Hernandez MD           Diagnosis     Clinical Impression:   1. Acute loss of vision, right        PLAN:  Admit to Hospitalist    ADMISSION NOTE:  10:39 PM  Patient is being admitted to the hospital by Dr. Stephanie Coles. The results of their tests and reasons for their admission have been discussed with them and/or available family. They convey agreement and understanding for the need to be admitted and for their admission diagnosis. Leticia Hernandez MD              This note will not be viewable in [address]

## 2019-12-04 NOTE — ED NOTES
Case discussed with Yessica Freeman MD, per telehospitalist instructions we will allow patient's BP to run therapeutically high. Will continue to monitor.

## 2019-12-04 NOTE — PROGRESS NOTES
OCCUPATIONAL THERAPY EVALUATION/DISCHARGE  Patient: Linda Steward (59 y.o. male)  Date: 12/4/2019  Primary Diagnosis: Acute ischemic stroke [facility]) [I63.9]       Precautions:       ASSESSMENT  Based on the objective data described below, the patient presents with central vision loss in R eye, yet otherwise demonstrates intact AROM, strength and sensation. CT head revealed no acute processes. MRI pending. Pt completed LB dressing, standing grooming task and item retrieval with independence and no LOB. Educated and guided Pt through use of compensatory visual strategies including scanning his environment during mobility and ADLs for safety with and without the eye patch on. Pt reported improved balance and resolution of mild dizziness with eye patch on R eye. Overall he is completing his dynamic ADL routine safely w/o assist and is not in need of acute/post acute OT. Current Level of Function (ADLs/self-care): Independent to Mod I (d/t eye patch only)    Functional Outcome Measure: The patient scored 100/100 on the Barthel Index outcome measure which is indicative of no impairment with ADLs/related mobility. Other factors to consider for discharge: None     PLAN :  Recommend with staff: None    Recommendation for discharge: (in order for the patient to meet his/her long term goals)  No skilled occupational therapy/ follow up rehabilitation needs identified at this time. This discharge recommendation:  Has been made in collaboration with the attending provider and/or case management    IF patient discharges home will need the following DME: pt issued eye patch for R eye       SUBJECTIVE:   Patient stated I hope they figure this out. ..  I can't live like this\" (re: vision loss)    OBJECTIVE DATA SUMMARY:   HISTORY:   Past Medical History:   Diagnosis Date    Foot fracture     Hypertension     Jaw fracture ([address]     Ruptured ear drum     Thumb fracture      Past Surgical History:   Procedure Laterality Date  HX APPENDECTOMY      HX ARTERIAL BYPASS  02/15/2019    HX ORTHOPAEDIC      HX OTHER SURGICAL  02/15/2019    8 stints    HX OTHER SURGICAL  02/15/2019    Double bypass       Prior Level of Function/Environment/Context: Lives with significant other. 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 Drives. Denies Hx of falls. Expanded or extensive additional review of patient history: Aneurysm and aortic dissection earlier this year. Home Situation  Home Environment: Private residence  # Steps to Enter: 5  Wheelchair Ramp: Yes  One/Two Story Residence: One story  Living Alone: No(lives with significant other)  Support Systems: Child(reji), Spouse/Significant Other/Partner  Patient Expects to be Discharged to[de-identified] Private residence  Current DME Used/Available at Home: None  Tub or Shower Type: Shower    Hand dominance: Right    EXAMINATION OF PERFORMANCE DEFICITS:  Cognitive/Behavioral Status:  Neurologic State: Alert  Orientation Level: Oriented X4  Cognition: Appropriate decision making  Perception: Appears intact  Perseveration: No perseveration noted  Safety/Judgement: Awareness of environment    Skin: Intact    Edema: None    Hearing: Auditory  Auditory Impairment: None    Vision/Perceptual:    Tracking: Able to track left of midline(central vision loss)                 Diplopia: Yes(in R eye)    Acuity: Able to read employee name badge without difficulty; Able to read clock/calendar on wall without difficulty(with eye patch on)         Range of Motion:  AROM: Within functional limits  PROM: Within functional limits                      Strength:  Strength: Within functional limits                Coordination:  Coordination: Within functional limits  Fine Motor Skills-Upper: Left Intact; Right Intact    Gross Motor Skills-Upper: Left Intact; Right Intact    Tone & Sensation:  Tone: Normal  Sensation: Intact                      Balance:  Sitting: Intact  Standing: Intact    Functional Mobility and Transfers for ADLs:  Bed Mobility:  Rolling: Independent  Supine to Sit: Independent  Scooting: Independent    Transfers:  Sit to Stand: Independent  Stand to Sit: Independent  Bed to Chair: Independent  Bathroom Mobility: Modified independent  Toilet Transfer : Modified independent  Shower Transfer: Modified independent  Assistive Device : (eye patch)    ADL Assessment:                                            ADL Intervention and task modifications:       Grooming  Brushing Teeth: Independent                   Lower Body Dressing Assistance  Socks: Independent  Shoes with Cloth Laces: Independent  Leg Crossed Method Used: Yes  Position Performed: Seated in chair         Cognitive Retraining  Safety/Judgement: Awareness of environment    Functional Measure:  Barthel Index: The Barthel ADL Index: Guidelines  1. The index should be used as a record of what a patient does, not as a record of what a patient could do. 2. The main aim is to establish degree of independence from any help, physical or verbal, however minor and for whatever reason. 3. The need for supervision renders the patient not independent. 4. A patient's performance should be established using the best available evidence. Asking the patient, friends/relatives and nurses are the usual sources, but direct observation and common sense are also important. However direct testing is not needed. 5. Usually the patient's performance over the preceding 24-48 hours is important, but occasionally longer periods will be relevant. 6. Middle categories imply that the patient supplies over 50 per cent of the effort. 7. Use of aids to be independent is allowed. Lore Carranza., Barthel, D.W. (8309). Functional evaluation: the Barthel Index. [facility] (14)2. [facility] Roberto Carlosks DARNELL Haro, Carina Natarajan, [facility], [address] [place] [address] Ave (1999).  Measuring the change indisability after inpatient rehabilitation; comparison of the responsiveness of the Barthel Index and Functional [place] Measure. Journal of Neurology, Neurosurgery, and Psychiatry, 66(4), 902-606. COLE Ye, JERICHO Everett, & Kiley Lea M.A. (2004.) Assessment of post-stroke quality of life in cost-effectiveness studies: The usefulness of the Barthel Index and the EuroQoL-5D. Quality of Life Research, 15, 982-68       Occupational Therapy Evaluation Charge Determination   History Examination Decision-Making   LOW Complexity : Brief history review  LOW Complexity : 1-3 performance deficits relating to physical, cognitive , or psychosocial skils that result in activity limitations and / or participation restrictions  LOW Complexity : No comorbidities that affect functional and no verbal or physical assistance needed to complete eval tasks       Based on the above components, the patient evaluation is determined to be of the following complexity level: LOW   Pain Rating:  None    Activity Tolerance:   Good  Please refer to the flowsheet for vital signs taken during this treatment. After treatment patient left in no apparent distress:    Sitting in chair and Call bell within reach    COMMUNICATION/EDUCATION:   The patients plan of care was discussed with: Physical Therapist, Registered Nurse and Patient.     Thank you for this referral.  Delfino Mendoza OTR/L  Time Calculation: 17 mins

## 2019-12-05 ENCOUNTER — HOME HEALTH ADMISSION (OUTPATIENT)
Dept: HOME HEALTH SERVICES | Facility: HOME HEALTH | Age: 49
End: 2019-12-05

## 2019-12-05 ENCOUNTER — APPOINTMENT (OUTPATIENT)
Dept: NON INVASIVE DIAGNOSTICS | Age: 49
DRG: 045 | End: 2019-12-05
Attending: INTERNAL MEDICINE
Payer: MEDICAID

## 2019-12-05 LAB
ANION GAP SERPL CALC-SCNC: 5 MMOL/L (ref 5–15)
BUN SERPL-MCNC: 20 MG/DL (ref 6–20)
BUN/CREAT SERPL: 12 (ref 12–20)
CALCIUM SERPL-MCNC: 8.7 MG/DL (ref 8.5–10.1)
CHLORIDE SERPL-SCNC: 107 MMOL/L (ref 97–108)
CHOLEST SERPL-MCNC: 122 MG/DL
CO2 SERPL-SCNC: 27 MMOL/L (ref 21–32)
CREAT SERPL-MCNC: 1.61 MG/DL (ref 0.7–1.3)
ERYTHROCYTE [DISTWIDTH] IN BLOOD BY AUTOMATED COUNT: 16.1 % (ref 11.5–14.5)
EST. AVERAGE GLUCOSE BLD GHB EST-MCNC: 177 MG/DL
GLUCOSE BLD STRIP.AUTO-MCNC: 108 MG/DL (ref 65–100)
GLUCOSE BLD STRIP.AUTO-MCNC: 130 MG/DL (ref 65–100)
GLUCOSE BLD STRIP.AUTO-MCNC: 147 MG/DL (ref 65–100)
GLUCOSE BLD STRIP.AUTO-MCNC: 149 MG/DL (ref 65–100)
GLUCOSE SERPL-MCNC: 116 MG/DL (ref 65–100)
HBA1C MFR BLD: 7.8 % (ref 4–5.6)
HCT VFR BLD AUTO: 36.2 % (ref 36.6–50.3)
HDLC SERPL-MCNC: 34 MG/DL
HDLC SERPL: 3.6 {RATIO} (ref 0–5)
HGB BLD-MCNC: 11.1 G/DL (ref 12.1–17)
LDLC SERPL CALC-MCNC: 66.2 MG/DL (ref 0–100)
LIPID PROFILE,FLP: NORMAL
MCH RBC QN AUTO: 24.7 PG (ref 26–34)
MCHC RBC AUTO-ENTMCNC: 30.7 G/DL (ref 30–36.5)
MCV RBC AUTO: 80.4 FL (ref 80–99)
NRBC # BLD: 0 K/UL (ref 0–0.01)
NRBC BLD-RTO: 0 PER 100 WBC
PLATELET # BLD AUTO: 196 K/UL (ref 150–400)
PMV BLD AUTO: 10.7 FL (ref 8.9–12.9)
POTASSIUM SERPL-SCNC: 3.3 MMOL/L (ref 3.5–5.1)
RBC # BLD AUTO: 4.5 M/UL (ref 4.1–5.7)
SERVICE CMNT-IMP: ABNORMAL
SODIUM SERPL-SCNC: 139 MMOL/L (ref 136–145)
TRIGL SERPL-MCNC: 109 MG/DL (ref ?–150)
VLDLC SERPL CALC-MCNC: 21.8 MG/DL
WBC # BLD AUTO: 4.9 K/UL (ref 4.1–11.1)

## 2019-12-05 PROCEDURE — 74011250637 HC RX REV CODE- 250/637: Performed by: INTERNAL MEDICINE

## 2019-12-05 PROCEDURE — 65660000000 HC RM CCU STEPDOWN

## 2019-12-05 PROCEDURE — 85027 COMPLETE CBC AUTOMATED: CPT

## 2019-12-05 PROCEDURE — 74011250637 HC RX REV CODE- 250/637: Performed by: PSYCHIATRY & NEUROLOGY

## 2019-12-05 PROCEDURE — 80061 LIPID PANEL: CPT

## 2019-12-05 PROCEDURE — 74011000250 HC RX REV CODE- 250: Performed by: INTERNAL MEDICINE

## 2019-12-05 PROCEDURE — B24BZZ4 ULTRASONOGRAPHY OF HEART WITH AORTA, TRANSESOPHAGEAL: ICD-10-PCS | Performed by: INTERNAL MEDICINE

## 2019-12-05 PROCEDURE — 36415 COLL VENOUS BLD VENIPUNCTURE: CPT

## 2019-12-05 PROCEDURE — 99153 MOD SED SAME PHYS/QHP EA: CPT

## 2019-12-05 PROCEDURE — 82962 GLUCOSE BLOOD TEST: CPT

## 2019-12-05 PROCEDURE — 99152 MOD SED SAME PHYS/QHP 5/>YRS: CPT

## 2019-12-05 PROCEDURE — 80048 BASIC METABOLIC PNL TOTAL CA: CPT

## 2019-12-05 PROCEDURE — 83036 HEMOGLOBIN GLYCOSYLATED A1C: CPT

## 2019-12-05 PROCEDURE — 94760 N-INVAS EAR/PLS OXIMETRY 1: CPT

## 2019-12-05 PROCEDURE — 93312 ECHO TRANSESOPHAGEAL: CPT

## 2019-12-05 PROCEDURE — 74011250636 HC RX REV CODE- 250/636: Performed by: INTERNAL MEDICINE

## 2019-12-05 RX ORDER — POTASSIUM CHLORIDE 750 MG/1
20 TABLET, FILM COATED, EXTENDED RELEASE ORAL
Status: COMPLETED | OUTPATIENT
Start: 2019-12-05 | End: 2019-12-05

## 2019-12-05 RX ORDER — CARVEDILOL 12.5 MG/1
12.5 TABLET ORAL 2 TIMES DAILY WITH MEALS
Status: DISCONTINUED | OUTPATIENT
Start: 2019-12-05 | End: 2019-12-06 | Stop reason: HOSPADM

## 2019-12-05 RX ORDER — MIDAZOLAM HYDROCHLORIDE 1 MG/ML
.5-1 INJECTION, SOLUTION INTRAMUSCULAR; INTRAVENOUS
Status: DISCONTINUED | OUTPATIENT
Start: 2019-12-05 | End: 2019-12-05

## 2019-12-05 RX ORDER — AMLODIPINE BESYLATE 5 MG/1
5 TABLET ORAL DAILY
Status: DISCONTINUED | OUTPATIENT
Start: 2019-12-05 | End: 2019-12-06 | Stop reason: HOSPADM

## 2019-12-05 RX ORDER — FENTANYL CITRATE 50 UG/ML
25-50 INJECTION, SOLUTION INTRAMUSCULAR; INTRAVENOUS
Status: DISCONTINUED | OUTPATIENT
Start: 2019-12-05 | End: 2019-12-05

## 2019-12-05 RX ORDER — MIDAZOLAM HYDROCHLORIDE 1 MG/ML
.5-1 INJECTION, SOLUTION INTRAMUSCULAR; INTRAVENOUS
Status: DISCONTINUED | OUTPATIENT
Start: 2019-12-05 | End: 2019-12-05 | Stop reason: SDUPTHER

## 2019-12-05 RX ORDER — LIDOCAINE HYDROCHLORIDE 20 MG/ML
15 SOLUTION OROPHARYNGEAL ONCE
Status: COMPLETED | OUTPATIENT
Start: 2019-12-05 | End: 2019-12-05

## 2019-12-05 RX ADMIN — CARVEDILOL 12.5 MG: 12.5 TABLET, FILM COATED ORAL at 09:28

## 2019-12-05 RX ADMIN — ATORVASTATIN CALCIUM 40 MG: 40 TABLET, FILM COATED ORAL at 20:54

## 2019-12-05 RX ADMIN — MIDAZOLAM 1 MG: 1 INJECTION INTRAMUSCULAR; INTRAVENOUS at 12:22

## 2019-12-05 RX ADMIN — FENTANYL CITRATE 25 MCG: 50 INJECTION, SOLUTION INTRAMUSCULAR; INTRAVENOUS at 12:21

## 2019-12-05 RX ADMIN — HEPARIN SODIUM 5000 UNITS: 5000 INJECTION INTRAVENOUS; SUBCUTANEOUS at 09:27

## 2019-12-05 RX ADMIN — CARVEDILOL 12.5 MG: 12.5 TABLET, FILM COATED ORAL at 17:15

## 2019-12-05 RX ADMIN — LIDOCAINE HYDROCHLORIDE 15 ML: 20 SOLUTION ORAL; TOPICAL at 12:10

## 2019-12-05 RX ADMIN — FAMOTIDINE 40 MG: 20 TABLET ORAL at 09:00

## 2019-12-05 RX ADMIN — MIDAZOLAM 1 MG: 1 INJECTION INTRAMUSCULAR; INTRAVENOUS at 12:20

## 2019-12-05 RX ADMIN — MIDAZOLAM 1 MG: 1 INJECTION INTRAMUSCULAR; INTRAVENOUS at 12:12

## 2019-12-05 RX ADMIN — CLOPIDOGREL BISULFATE 75 MG: 75 TABLET ORAL at 09:00

## 2019-12-05 RX ADMIN — MIDAZOLAM 1 MG: 1 INJECTION INTRAMUSCULAR; INTRAVENOUS at 12:16

## 2019-12-05 RX ADMIN — ASPIRIN 81 MG 81 MG: 81 TABLET ORAL at 09:00

## 2019-12-05 RX ADMIN — HEPARIN SODIUM 5000 UNITS: 5000 INJECTION INTRAVENOUS; SUBCUTANEOUS at 17:15

## 2019-12-05 RX ADMIN — AMLODIPINE BESYLATE 5 MG: 5 TABLET ORAL at 09:28

## 2019-12-05 RX ADMIN — POTASSIUM CHLORIDE 20 MEQ: 750 TABLET, FILM COATED, EXTENDED RELEASE ORAL at 09:28

## 2019-12-05 RX ADMIN — FENTANYL CITRATE 25 MCG: 50 INJECTION, SOLUTION INTRAMUSCULAR; INTRAVENOUS at 12:16

## 2019-12-05 RX ADMIN — BENZOCAINE, BUTAMBEN, AND TETRACAINE HYDROCHLORIDE 1 SPRAY: .028; .004; .004 AEROSOL, SPRAY TOPICAL at 12:10

## 2019-12-05 NOTE — PROGRESS NOTES

## 2019-12-05 NOTE — PROGRESS NOTES
Patient arrived to Non-Invasive Cardiology Lab for Inpatient KRISTY Procedure. Staff introduced to patient. Patient identifiers verified with Name and Date of Birth. Procedure verified with patient. Consent forms reviewed and signed by patient or authorized representative and verified. Allergies verified. Patient informed of procedure and plan of care. Questions answered with review. Patient on cardiac monitor, non-invasive blood pressure, SPO2 monitor. On room air. Patient is A&Ox3. Patient reports no complaints. Patient on stretcher, in low position, with side rails up. Patient instructed to call for assistance as needed.

## 2019-12-05 NOTE — PROGRESS NOTES
TRANSFER - OUT REPORT:    Verbal report given to Kiara Casas RN (name) on Luz Maria Elena  being transferred to [address] [place] [address] (unit) for routine progression of care       Report consisted of patients Situation, Background, Assessment and   Recommendations(SBAR). Information from the following report(s) SBAR, Kardex, Procedure Summary, Intake/Output, MAR and Recent Results was reviewed with the receiving nurse. Lines:   Peripheral IV 12/03/19 Right Antecubital (Active)   Site Assessment Clean, dry, & intact 12/5/2019  8:48 AM   Phlebitis Assessment 0 12/5/2019  8:48 AM   Infiltration Assessment 0 12/5/2019  8:48 AM   Dressing Status Clean, dry, & intact 12/5/2019  8:48 AM   Dressing Type Transparent 12/5/2019  8:48 AM   Hub Color/Line Status Pink 12/5/2019  8:48 AM        Opportunity for questions and clarification was provided.       Patient transported with:  Transport

## 2019-12-05 NOTE — PROGRESS NOTES
Hospitalist Progress Note    NAME: Tana Donis   :  1970   MRN:  [id number]       Assessment / Plan:  Acute Embolic CVA POA  Acute painless Right Vision Loss    Admitted with Acute right Vision loss  Likely 2/2 Central retinal artery occlusion from Emboli phenomenon. MRI - acute area of ischemia left temporal lobe. 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, s/p emergent TEVAR, left subclavian embolization with left carotid to left subclavian bypass, left renal artery stent - 2019      -Will get KRISTY to rule out Aortic source, today      -appreciate Dr Mims`s help      DM  -Hold oral hypoglycemic medication-start patient sliding scale with insulin  Hypertension  -Hold oral antihypertensive medication    Acute on chronic renal failure stage III, imrpoving  -Gentle hydration with IV fluid  monitor       Code Status: Full   Surrogate Decision Maker:[email or web address]     DVT Prophylaxis: Heparin   GI Prophylaxis: not indicated      Further plan on KRISTY     Subjective:     Chief Complaint / Reason for Physician Visit    Denies any complain, except vision problem, his right eye vision improved, with slight blurriness on central vision    Review of Systems:  Symptom Y/N Comments  Symptom Y/N Comments   Fever/Chills n   Chest Pain n    Poor Appetite n   Edema n    Cough n   Abdominal Pain n    Sputum n   Joint Pain n    SOB/ESCOBEDO    Pruritis/Rash n    Nausea/vomit    Tolerating PT/OT     Diarrhea    Tolerating Diet     Constipation    Other       Could NOT obtain due to:      Objective:     VITALS:   Last 24hrs VS reviewed since prior progress note.  Most recent are:  Patient Vitals for the past 24 hrs:   Temp Pulse Resp BP SpO2   19 0729 98.2 °F (36.8 °C) 68 18 (!) 130/91 99 %   19 0305 98.4 °F (36.9 °C) 85 18 (!) 131/95 97 %   19 2300 98.4 °F (36.9 °C) 72 18 125/80 99 % 12/04/19 1902 97.8 °F (36.6 °C) 80 18 114/83 100 %   12/04/19 1433 98 °F (36.7 °C) 78 16 (!) 123/96 99 %   12/04/19 1115 98.5 °F (36.9 °C) 75 16 126/75 99 %       Intake/Output Summary (Last 24 hours) at 12/5/2019 1058  Last data filed at 12/5/2019 1022  Gross per 24 hour   Intake 240 ml   Output 850 ml   Net -610 ml        PHYSICAL EXAM:  General: WD, WN. Alert, cooperative, no acute distress    EENT:  EOMI. Anicteric sclerae. MMM  Resp:  CTA bilaterally, no wheezing or rales. No accessory muscle use  CV:  Regular  rhythm,  No edema  GI:  Soft, Non distended, Non tender.  +Bowel sounds  Neurologic:  Alert and oriented X 3, normal speech. Right eye improved vision, with minimal blurriness on central region  Psych:   Good insight. Not anxious nor agitated  Skin:  No rashes. No jaundice    Reviewed most current lab test results and cultures  YES  Reviewed most current radiology test results   YES  Review and summation of old records today    NO  Reviewed patient's current orders and MAR    YES  PMH/ reviewed - no change compared to H&P  ________________________________________________________________________  Care Plan discussed with:    Comments   Patient x    Family      RN x    Care Manager     Consultant  x                      Multidiciplinary team rounds were held today with , nursing, pharmacist and clinical coordinator. Patient's plan of care was discussed; medications were reviewed and discharge planning was addressed.      ________________________________________________________________________  Total NON critical care TIME:  30   Minutes    Total CRITICAL CARE TIME Spent:   Minutes non procedure based      Comments   >50% of visit spent in counseling and coordination of care     ________________________________________________________________________  Perri Macdonald MD     Procedures: see electronic medical records for all procedures/Xrays and details which were not copied into this note but were reviewed prior to creation of Plan. LABS:  I reviewed today's most current labs and imaging studies.   Pertinent labs include:  Recent Labs     12/05/19 0305 12/04/19 0513 12/03/19 2153   WBC 4.9 6.3 6.0   HGB 11.1* 11.9* 13.3   HCT 36.2* 38.6 42.2    219 223     Recent Labs     12/05/19 0305 12/04/19 0513 12/03/19 2153    142 141   K 3.3* 3.7 3.2*    108 106   CO2 27 27 30   * 145* 135*   BUN 20 22* 22*   CREA 1.61* 1.95* 1.98*   CA 8.7 8.7 9.6   ALB  --   --  4.4   TBILI  --   --  0.6   SGOT  --   --  20   ALT  --   --  28   INR  --   --  1.1       Signed: Rosenda Smith MD

## 2019-12-05 NOTE — PROGRESS NOTES
Consult  REFERRED BY:  Kim Munoz DO    CHIEF COMPLAINT: Sudden visual loss right eye      Subjective:     Kasie Parekh is a 51 y.o. right-handed -American male seen for evaluation of visual loss in his right eye that occurred Tuesday when he was just watching TV and noticed a sudden onset of blurred vision in his right eye. The patient had a normal head CT done and a normal CTA of the head neck on admission to the emergency room Tuesday night. Patient had normal carotid Doppler studies Wednesday, had an MRI scan that showed a very punctate minuscule left temporal infarct of unclear etiology. It is asymptomatic but a little unusual.  Patient just had an aortic dissection fixed with Dr. Marivel Mathews with stent placement, and the question is could there be some atherosclerotic disease in the aorta, and a KRISTY was done today that showed no evidence of further dissection, and not much atherosclerotic plaque in the aorta, and no cardiac abnormality on KRISTY or TTE. Patient has a sed rate of 7. Patient has type 2 diabetes, but never had a history of stroke before. His dilated funduscopy seems to show a possible Hollenhorst plaque right at the takeoff of the central retinal artery on the right side. There did not appear to be significant disc edema or pallor yet, and the vessels may be a minimal attenuated but still seem present for the arterial branches. Patient has hand motion vision in his right eye centrally, and can count fingers in the periphery. He says his vision is slowly improving in the periphery. Patient denies any headache or other focal weakness sensory loss or any other cause of his symptoms.     Past Medical History:   Diagnosis Date    Bilateral carotid artery stenosis     Diabetic peripheral neuropathy associated with type 2 diabetes mellitus ([address]     Foot fracture     H/O aortic dissection     Hollenhorst plaque, right eye     Hypertension     Jaw fracture ([address]     Ruptured ear drum     Subjective visual disturbance, right eye     Thumb fracture       Past Surgical History:   Procedure Laterality Date    HX APPENDECTOMY      HX ARTERIAL BYPASS  02/15/2019    HX ORTHOPAEDIC      HX OTHER SURGICAL  02/15/2019    8 stints    HX OTHER SURGICAL  02/15/2019    Double bypass     Family History   Problem Relation Age of Onset    Diabetes Mother     Stroke Mother     Colon Cancer Father     Cancer Brother     No Known Problems Child       Social History     Tobacco Use    Smoking status: Never Smoker    Smokeless tobacco: Never Used   Substance Use Topics    Alcohol use: No         Current Facility-Administered Medications:     carvedilol (COREG) tablet 12.5 mg, 12.5 mg, Oral, BID WITH MEALS, Aric Garrido MD, 12.5 mg at 12/05/19 1715    amLODIPine (NORVASC) tablet 5 mg, 5 mg, Oral, DAILY, Aric Garrido MD, 5 mg at 12/05/19 0928    acetaminophen (TYLENOL) tablet 650 mg, 650 mg, Oral, Q4H PRN **OR** acetaminophen (TYLENOL) solution 650 mg, 650 mg, Per NG tube, Q4H PRN **OR** acetaminophen (TYLENOL) suppository 650 mg, 650 mg, Rectal, Q4H PRN, Melany Anne MD    clopidogrel (PLAVIX) tablet 75 mg, 75 mg, Oral, DAILY, Melany Anne MD, 75 mg at 12/05/19 0900    bisacodyl (DULCOLAX) tablet 5 mg, 5 mg, Oral, DAILY PRN, Melany Anne MD    heparin (porcine) injection 5,000 Units, 5,000 Units, SubCUTAneous, Q8H, Melany Anne MD, 5,000 Units at 12/05/19 1715    aspirin chewable tablet 81 mg, 81 mg, Oral, DAILY, Melany Anne MD, 81 mg at 12/05/19 0900    labetalol (NORMODYNE;TRANDATE) injection 5 mg, 5 mg, IntraVENous, Q10MIN PRN, Melany Anne MD    glucose chewable tablet 16 g, 4 Tab, Oral, PRN, Melany Anne MD    dextrose (D50W) injection syrg 12.5-25 g, 25-50 mL, IntraVENous, PRN, Melany Anne MD    glucagon [facility]) injection 1 mg, 1 mg, IntraMUSCular, PRN, Melany Anne MD    insulin lispro (HUMALOG) injection, , SubCUTAneous, AC&HS, Cadence Ovalle MD, Stopped at 12/04/19 0730    famotidine (PEPCID) tablet 40 mg, 40 mg, Oral, DAILY, Cadence Ovalle MD, 40 mg at 12/05/19 0900    atorvastatin (LIPITOR) tablet 40 mg, 40 mg, Oral, QHS, Beronica Whiting MD, 40 mg at 12/04/19 2134        No Known Allergies   MRI Results (most recent):  Results from [place] encounter on 12/03/19   MRI BRAIN W WO CONT    Narrative EXAM:  MRI BRAIN W WO CONT    INDICATION:    Stroke    COMPARISON:  None. CONTRAST: 10 cc IV Dotarem. TECHNIQUE:    Multiplanar multisequence acquisition without and with contrast of the brain. FINDINGS:  Diffusion suspect a small area of acute ischemia in the medial aspect of the  left temporal lobe. No associated mass effect or enhancement, no hemorrhage. There is a minimal amount of nonspecific white matter changes her. Flow voids in major vessels at the base of the brain are present. Few areas of hemosiderin depositions. No enhancing lesion or masses . Impression IMPRESSION: Suspect small acute area of ischemia left temporal lobe. No masses or mass effect or hemorrhage. Minimal white matter disease. Results from [place] encounter on 12/03/19   MRI BRAIN W WO CONT    Narrative EXAM:  MRI BRAIN W WO CONT    INDICATION:    Stroke    COMPARISON:  None. CONTRAST: 10 cc IV Dotarem. TECHNIQUE:    Multiplanar multisequence acquisition without and with contrast of the brain. FINDINGS:  Diffusion suspect a small area of acute ischemia in the medial aspect of the  left temporal lobe. No associated mass effect or enhancement, no hemorrhage. There is a minimal amount of nonspecific white matter changes her. Flow voids in major vessels at the base of the brain are present. Few areas of hemosiderin depositions. No enhancing lesion or masses . Impression IMPRESSION: Suspect small acute area of ischemia left temporal lobe.   No masses or mass effect or hemorrhage. Minimal white matter disease. Review of Systems:  A comprehensive review of systems was negative except for: Eyes: positive for visual disturbance   Vitals:    12/05/19 1245 12/05/19 1250 12/05/19 1255 12/05/19 1539   BP: 133/88 133/88 140/83 145/88   Pulse: 78 77 79 76   Resp: 14 16 16 18   Temp:    97.9 °F (36.6 °C)   SpO2: 96% 93% 93% 100%   Weight:       Height:         Objective:     I      NEUROLOGICAL EXAM:    Appearance: The patient is well developed, well nourished, provides a coherent history and is in no acute distress. Mental Status: Oriented to time, place and person, and the president, cognitive function is normal and speech is fluent and no aphasia or dysarthria. Mood and affect appropriate. Cranial Nerves:   Intact visual fields. Patient has hand motion vision in the central portion of his right eye, and count fingers vision in the periphery of the right eye, and normal fields on the left. Fundi are benign, except that the right eye seems to show a Hollenhorst plaque at the right central retinal artery takeoff, without significant disc edema or pallor yet, and the vessels still seem to be present but may be a little attenuated. Davina DOWD, EOM's full, no nystagmus, no ptosis. Facial sensation is normal. Corneal reflexes are not tested. Facial movement is symmetric. Hearing is normal bilaterally. Palate is midline with normal sternocleidomastoid and trapezius muscles are normal. Tongue is midline. Neck without meningismus or bruits  Temporal arteries are not tender or enlarged  TMJ areas are not tender on palpation   Motor:  5/5 strength in upper and lower proximal and distal muscles. Normal bulk and tone. No fasciculations. Rapid alternating movement is symmetric and intact bilaterally   Reflexes:   Deep tendon reflexes 2+/4 and symmetrical.  No babinski or clonus present   Sensory:   Normal to touch, pinprick and vibration and temperature.   DSS is intact   Gait:  Normal gait for patient's age. Tremor:   No tremor noted. Cerebellar:  No abnormal cerebellar signs present on Romberg and tandem testing and finger-nose-finger exam.   Neurovascular:  Normal heart sounds and regular rhythm, peripheral pulses intact, and no carotid bruits. Assessment:       ICD-10-CM ICD-9-CM    1. Acute loss of vision, right H53.131 368.11    2. Acute ischemic stroke (HCC) I63.9 434.91    3. Bilateral carotid artery stenosis I65.23 433.10      433.30    4. Diabetic peripheral neuropathy associated with type 2 diabetes mellitus (HCC) E11.42 250.60      357.2    5. Hollenhorst plaque, right eye H34.211 362.33    6. Subjective visual disturbance, right eye H53.10 368.10    7. Thrombotic stroke involving left middle cerebral artery [facility]) I63.312 434.01      Active Problems:    Acute ischemic stroke ([facility] (12/3/2019)      H/O aortic dissection ()      Diabetic peripheral neuropathy associated with type 2 diabetes mellitus ([facility]) ()      Hollenhorst plaque, right eye ()      Subjective visual disturbance, right eye ()      Bilateral carotid artery stenosis ()      Thrombotic stroke involving left middle cerebral artery ([facility] (12/4/2019)        Plan:     Linda Steward is a 51 y.o. right-handed -American male seen for evaluation of visual loss in his right eye that occurred Tuesday when he was just watching TV and noticed a sudden onset of blurred vision in his right eye. The patient had a normal head CT done and a normal CTA of the head neck on admission to the emergency room Tuesday night. Patient had normal carotid Doppler studies Wednesday, had an MRI scan that showed a very punctate minuscule left temporal infarct of unclear etiology.   It is asymptomatic but a little unusual.  Patient just had an aortic dissection fixed with Dr. Joe Mera with stent placement, and the question is could there be some atherosclerotic disease in the aorta, and a KRITSY was done today that showed no evidence of further dissection, and not much atherosclerotic plaque in the aorta, and no cardiac abnormality on KRISTY or TTE. Patient has a sed rate of 7. Patient has type 2 diabetes, but never had a history of stroke before. His dilated funduscopy seems to show a possible Hollenhorst plaque right at the takeoff of the central retinal artery on the right side. There did not appear to be significant disc edema or pallor yet, and the vessels may be a minimal attenuated but still seem present for the arterial branches. Patient has hand motion vision in his right eye centrally, and can count fingers in the periphery. He says his vision is slowly improving in the periphery. Patient denies any headache or other focal weakness sensory loss or any other cause of his symptoms.   Patient will need ophthalmology evaluation at discharge, and we may call Medhat Lopez to see if we get him seen expeditiously after his discharge  Discussed this case with the patient and his wife in detail and discussed it with her cardiologist Dr. Vanita Alejo who did his KRISTY      Signed By: Olena Benedict MD     December 5, 2019       CC: Mary Anne Baker DO  FAX: [phone]

## 2019-12-05 NOTE — PROGRESS NOTES
Bedside and Verbal shift change report given to Mj Andrew (oncoming nurse) by Con Veloz (offgoing nurse). Report included the following information SBAR, Kardex, Recent Results, Med Rec Status and Cardiac Rhythm SR.     Zone Phone:   [phone]     Significant changes during shift:  KRISTY was completed- no significant data.  IV fluids were discontinued and patient was started back on a cardiac diet.      Patient Information     Quincy Pulido  51 y.o.  12/3/2019  9:24 PM by Sabrina Coleman MD. Quincy Pulido was admitted from Home     Problem List          Patient Active Problem List     Diagnosis Date Noted    Thrombotic stroke involving left middle cerebral artery ([facility] 12/04/2019    H/O aortic dissection      Diabetic peripheral neuropathy associated with type 2 diabetes mellitus (HCC)      Hollenhorst plaque, right eye      Subjective visual disturbance, right eye      Bilateral carotid artery stenosis      Acute ischemic stroke ([address] 12/03/2019    Anemia, chronic disease 06/06/2019    Type 2 diabetes mellitus without complication, without long-term current use of insulin ([address] 06/06/2019    Chest pain 05/14/2019    Abdominal pain 02/27/2019    Acute thoracic aortic dissection ([address] 02/15/2019    Essential hypertension 04/18/2018           Past Medical History:   Diagnosis Date    Bilateral carotid artery stenosis      Diabetic peripheral neuropathy associated with type 2 diabetes mellitus ([address]      Foot fracture      H/O aortic dissection      Hollenhorst plaque, right eye      Hypertension      Jaw fracture (HCC)      Ruptured ear drum      Subjective visual disturbance, right eye      Thumb fracture           Core Measures:     CVA: Yes Yes  CHF:No No  PNA:No No     Post Op Surgical (If Applicable):      Number times ambulated in hallway past shift:  0  Number of times OOB to chair past shift:   0  NG Tube: No  Incentive Spirometer: No  Drains: No   Volume  0  Dressing Present:  No  Flatus:  Not applicable     Activity Status:     OOB to Chair Yes  Ambulated this shift Yes   Bed Rest No     Supplemental O2: (If Applicable)     NC No  NRB No  Venti-mask No  On 0 Liters/min     LINES AND DRAINS:     Central Line? No   PICC LINE? No   Urinary Catheter? No   DVT prophylaxis:     DVT prophylaxis Med- Yes  DVT prophylaxis SCD or MYNOR- No      Wounds: (If Applicable)     Wounds- No     Location 0     Patient Safety:     Falls Score Total Score: 1  Safety Level_______  Bed Alarm On? No  Sitter?  No     Plan for upcoming shift: safety, neurochecks     Discharge Plan: Yes TBD     Active Consults:  IP CONSULT TO NEUROLOGY  IP CONSULT TO NEUROLOGY  IP CONSULT TO HOSPITALIST  IP CONSULT TO CARDIOLOGY

## 2019-12-05 NOTE — PROGRESS NOTES
Problem: Patient Education: Go to Patient Education Activity  Goal: Patient/Family Education  Outcome: Progressing Towards Goal     Problem: TIA/CVA Stroke: 0-24 hours  Goal: Off Pathway (Use only if patient is Off Pathway)  Outcome: Progressing Towards Goal  Goal: Activity/Safety  Outcome: Progressing Towards Goal  Goal: Consults, if ordered  Outcome: Progressing Towards Goal  Goal: Diagnostic Test/Procedures  Outcome: Progressing Towards Goal  Goal: Nutrition/Diet  Outcome: Progressing Towards Goal  Goal: Discharge Planning  Outcome: Progressing Towards Goal  Goal: Medications  Outcome: Progressing Towards Goal  Goal: Respiratory  Outcome: Progressing Towards Goal  Goal: Treatments/Interventions/Procedures  Outcome: Progressing Towards Goal  Goal: Minimize risk of bleeding post-thrombolytic infusion  Outcome: Progressing Towards Goal  Goal: Monitor for complications post-thrombolytic infusion  Outcome: Progressing Towards Goal  Goal: Psychosocial  Outcome: Progressing Towards Goal  Goal: *Hemodynamically stable  Outcome: Progressing Towards Goal  Goal: *Neurologically stable  Description  Absence of additional neurological deficits    Outcome: Progressing Towards Goal  Goal: *Verbalizes anxiety and depression are reduced or absent  Outcome: Progressing Towards Goal  Goal: *Absence of Signs of Aspiration on Current Diet  Outcome: Progressing Towards Goal  Goal: *Absence of deep venous thrombosis signs and symptoms(Stroke Metric)  Outcome: Progressing Towards Goal  Goal: *Ability to perform ADLs and demonstrates progressive mobility and function  Outcome: Progressing Towards Goal  Goal: *Stroke education started(Stroke Metric)  Outcome: Progressing Towards Goal  Goal: *Dysphagia screen performed(Stroke Metric)  Outcome: Progressing Towards Goal  Goal: *Rehab consulted(Stroke Metric)  Outcome: Progressing Towards Goal     Problem: TIA/CVA Stroke: Day 2 Until Discharge  Goal: Off Pathway (Use only if patient is Off Pathway)  Outcome: Progressing Towards Goal  Goal: Activity/Safety  Outcome: Progressing Towards Goal  Goal: Diagnostic Test/Procedures  Outcome: Progressing Towards Goal  Goal: Nutrition/Diet  Outcome: Progressing Towards Goal  Goal: Discharge Planning  Outcome: Progressing Towards Goal  Goal: Medications  Outcome: Progressing Towards Goal  Goal: Respiratory  Outcome: Progressing Towards Goal  Goal: Treatments/Interventions/Procedures  Outcome: Progressing Towards Goal  Goal: Psychosocial  Outcome: Progressing Towards Goal  Goal: *Verbalizes anxiety and depression are reduced or absent  Outcome: Progressing Towards Goal  Goal: *Absence of aspiration  Outcome: Progressing Towards Goal  Goal: *Absence of deep venous thrombosis signs and symptoms(Stroke Metric)  Outcome: Progressing Towards Goal  Goal: *Optimal pain control at patient's stated goal  Outcome: Progressing Towards Goal  Goal: *Tolerating diet  Outcome: Progressing Towards Goal  Goal: *Ability to perform ADLs and demonstrates progressive mobility and function  Outcome: Progressing Towards Goal  Goal: *Stroke education continued(Stroke Metric)  Outcome: Progressing Towards Goal     Problem: Ischemic Stroke: Discharge Outcomes  Goal: *Verbalizes anxiety and depression are reduced or absent  Outcome: Progressing Towards Goal  Goal: *Verbalize understanding of risk factor modification(Stroke Metric)  Outcome: Progressing Towards Goal  Goal: *Hemodynamically stable  Outcome: Progressing Towards Goal  Goal: *Absence of aspiration pneumonia  Outcome: Progressing Towards Goal  Goal: *Aware of needed dietary changes  Outcome: Progressing Towards Goal  Goal: *Verbalize understanding of prescribed medications including anti-coagulants, anti-lipid, and/or anti-platelets(Stroke Metric)  Outcome: Progressing Towards Goal  Goal: *Tolerating diet  Outcome: Progressing Towards Goal  Goal: *Aware of follow-up diagnostics related to anticoagulants  Outcome: Progressing Towards Goal  Goal: *Ability to perform ADLs and demonstrates progressive mobility and function  Outcome: Progressing Towards Goal  Goal: *Absence of DVT(Stroke Metric)  Outcome: Progressing Towards Goal  Goal: *Absence of aspiration  Outcome: Progressing Towards Goal  Goal: *Optimal pain control at patient's stated goal  Outcome: Progressing Towards Goal  Goal: *Home safety concerns addressed  Outcome: Progressing Towards Goal  Goal: *Describes available resources and support systems  Outcome: Progressing Towards Goal  Goal: *Verbalizes understanding of activation of EMS(911) for stroke symptoms(Stroke Metric)  Outcome: Progressing Towards Goal  Goal: *Understands and describes signs and symptoms to report to providers(Stroke Metric)  Outcome: Progressing Towards Goal  Goal: *Neurolgocially stable (absence of additional neurological deficits)  Outcome: Progressing Towards Goal  Goal: *Verbalizes importance of follow-up with primary care physician(Stroke Metric)  Outcome: Progressing Towards Goal  Goal: *Smoking cessation discussed,if applicable(Stroke Metric)  Outcome: Progressing Towards Goal  Goal: *Depression screening completed(Stroke Metric)  Outcome: Progressing Towards Goal     Problem: Falls - Risk of  Goal: *Absence of Falls  Description  Document Layton Fall Risk and appropriate interventions in the flowsheet. Outcome: Progressing Towards Goal  Note: Fall Risk Interventions:            Medication Interventions: Evaluate medications/consider consulting pharmacy                   Problem: Patient Education: Go to Patient Education Activity  Goal: Patient/Family Education  Outcome: Progressing Towards Goal     Problem: Diabetes Self-Management  Goal: *Disease process and treatment process  Description  Define diabetes and identify own type of diabetes; list 3 options for treating diabetes.   Outcome: Progressing Towards Goal  Goal: *Incorporating nutritional management into lifestyle  Description  Describe effect of type, amount and timing of food on blood glucose; list 3 methods for planning meals. Outcome: Progressing Towards Goal  Goal: *Incorporating physical activity into lifestyle  Description  State effect of exercise on blood glucose levels. Outcome: Progressing Towards Goal  Goal: *Developing strategies to promote health/change behavior  Description  Define the ABC's of diabetes; identify appropriate screenings, schedule and personal plan for screenings. Outcome: Progressing Towards Goal  Goal: *Using medications safely  Description  State effect of diabetes medications on diabetes; name diabetes medication taking, action and side effects. Outcome: Progressing Towards Goal  Goal: *Monitoring blood glucose, interpreting and using results  Description  Identify recommended blood glucose targets  and personal targets. Outcome: Progressing Towards Goal  Goal: *Prevention, detection, treatment of acute complications  Description  List symptoms of hyper- and hypoglycemia; describe how to treat low blood sugar and actions for lowering  high blood glucose level. Outcome: Progressing Towards Goal  Goal: *Prevention, detection and treatment of chronic complications  Description  Define the natural course of diabetes and describe the relationship of blood glucose levels to long term complications of diabetes.   Outcome: Progressing Towards Goal  Goal: *Developing strategies to address psychosocial issues  Description  Describe feelings about living with diabetes; identify support needed and support network  Outcome: Progressing Towards Goal  Goal: *Insulin pump training  Outcome: Progressing Towards Goal  Goal: *Sick day guidelines  Outcome: Progressing Towards Goal  Goal: *Patient Specific Goal (EDIT GOAL, INSERT TEXT)  Outcome: Progressing Towards Goal     Problem: Patient Education: Go to Patient Education Activity  Goal: Patient/Family Education  Outcome: Progressing Towards Goal

## 2019-12-05 NOTE — PROGRESS NOTES
* No surgery found *  * No surgery found *  Bedside and Verbal shift change report given to Neli Marcelo RN (oncoming nurse) by Sylvia Roldan RN (offgoing nurse). Report included the following information SBAR, Kardex, Recent Results, Med Rec Status and Cardiac Rhythm SR.     Zone Phone:   [phone]      Significant changes during shift:  None        Patient Information    Delos Hatchet  51 y.o.  12/3/2019  9:24 PM by Gloria Valentine MD. Delos Hatchet was admitted from Home    Problem List    Patient Active Problem List    Diagnosis Date Noted    Thrombotic stroke involving left middle cerebral artery ([address] 12/04/2019    H/O aortic dissection     Diabetic peripheral neuropathy associated with type 2 diabetes mellitus ([address]     Hollenhorst plaque, right eye     Subjective visual disturbance, right eye     Bilateral carotid artery stenosis     Acute ischemic stroke ([address] 12/03/2019    Anemia, chronic disease 06/06/2019    Type 2 diabetes mellitus without complication, without long-term current use of insulin ([address] 06/06/2019    Chest pain 05/14/2019    Abdominal pain 02/27/2019    Acute thoracic aortic dissection ([address] 02/15/2019    Essential hypertension 04/18/2018     Past Medical History:   Diagnosis Date    Bilateral carotid artery stenosis     Diabetic peripheral neuropathy associated with type 2 diabetes mellitus ([address]     Foot fracture     H/O aortic dissection     Hollenhorst plaque, right eye     Hypertension     Jaw fracture (HCC)     Ruptured ear drum     Subjective visual disturbance, right eye     Thumb fracture          Core Measures:    CVA: Yes Yes  CHF:No No  PNA:No No    Post Op Surgical (If Applicable):     Number times ambulated in hallway past shift:  0  Number of times OOB to chair past shift:   0  NG Tube: No  Incentive Spirometer: No  Drains: No   Volume  0  Dressing Present:  No  Flatus:  Not applicable    Activity Status:    OOB to Chair No  Ambulated this shift Yes   Bed Rest No    Supplemental O2: (If Applicable)    NC No  NRB No  Venti-mask No  On 0 Liters/min      LINES AND DRAINS:    Central Line? No   PICC LINE? No   Urinary Catheter? No   DVT prophylaxis:    DVT prophylaxis Med- Yes  DVT prophylaxis SCD or MYNOR- No     Wounds: (If Applicable)    Wounds- No    Location 0    Patient Safety:    Falls Score Total Score: 1  Safety Level_______  Bed Alarm On? No  Sitter?  No    Plan for upcoming shift: safety, neurochecks, IVF        Discharge Plan: Yes TBD    Active Consults:  IP CONSULT TO NEUROLOGY  IP CONSULT TO NEUROLOGY  IP CONSULT TO HOSPITALIST  IP CONSULT TO CARDIOLOGY

## 2019-12-05 NOTE — PROGRESS NOTES
Bedside and Verbal shift change report given to ramandeep TELLEZ (oncoming nurse) by Jany Scott RN (offgoing nurse).  Report included the following information SBAR, Kardex, Recent Results, Med Rec Status and Cardiac Rhythm SR.     Zone Phone:   [phone]        Significant changes during shift:  npo since midnight  Abisai today           Patient Information     Phil Gonzalez  51 y.o.  12/3/2019  9:24 PM by Lm Davis MD. Phil Gonzalez was admitted from Home     Problem List          Patient Active Problem List     Diagnosis Date Noted    Thrombotic stroke involving left middle cerebral artery ([address] 12/04/2019    H/O aortic dissection      Diabetic peripheral neuropathy associated with type 2 diabetes mellitus (HCC)      Hollenhorst plaque, right eye      Subjective visual disturbance, right eye      Bilateral carotid artery stenosis      Acute ischemic stroke ([address] 12/03/2019    Anemia, chronic disease 06/06/2019    Type 2 diabetes mellitus without complication, without long-term current use of insulin ([address] 06/06/2019    Chest pain 05/14/2019    Abdominal pain 02/27/2019    Acute thoracic aortic dissection ([address] 02/15/2019    Essential hypertension 04/18/2018           Past Medical History:   Diagnosis Date    Bilateral carotid artery stenosis      Diabetic peripheral neuropathy associated with type 2 diabetes mellitus (HCC)      Foot fracture      H/O aortic dissection      Hollenhorst plaque, right eye      Hypertension      Jaw fracture (HCC)      Ruptured ear drum      Subjective visual disturbance, right eye      Thumb fracture              Core Measures:     CVA: Yes Yes  CHF:No No  PNA:No No     Post Op Surgical (If Applicable):      Number times ambulated in hallway past shift:  0  Number of times OOB to chair past shift:   0  NG Tube: No  Incentive Spirometer: No  Drains: No   Volume  0  Dressing Present:  No  Flatus:  Not applicable     Activity Status:     OOB to Chair No  Ambulated this shift Yes Bed Rest No     Supplemental O2: (If Applicable)     NC No  NRB No  Venti-mask No  On 0 Liters/min        LINES AND DRAINS:     Central Line? No   PICC LINE? No   Urinary Catheter? No   DVT prophylaxis:     DVT prophylaxis Med- Yes  DVT prophylaxis SCD or MYNOR- No      Wounds: (If Applicable)     Wounds- No     Location 0     Patient Safety:     Falls Score Total Score: 1  Safety Level_______  Bed Alarm On? No  Sitter?  No     Plan for upcoming shift: safety, neurochecks, IVF           Discharge Plan: Yes TBD     Active Consults:  IP CONSULT TO NEUROLOGY  IP CONSULT TO NEUROLOGY  IP CONSULT TO HOSPITALIST  IP CONSULT TO CARDIOLOGY

## 2019-12-05 NOTE — PROGRESS NOTES
Reason for Admission:  Acute ischemic stroke                  RRAT Score: 19 - moderate risk                 Do you (patient/family) have any concerns for transition/discharge? Pt has access to adequate support network; CM has no immediate concerns for d/c                 Plan for utilizing home health: PT/OT consulted; pt has no post-acute therapy needs identified for d/c. Pt has qualifying dx for [facility] visit (diabetes). CM inquired if pt would like to utilize [facility] service; pt confirmed but stated he would need visit completed at girlfriend's [facility]: [phone]) [facility] [address] [address]    Current Advanced Directive/Advance Care Plan:  Pt is listed as a full code status with no advnaced care plan on file. Pt identified his mother (Kim Redo: [phone]) as his medical decision maker in the event he is unable to make his own medical decisions. Pt reported if his mother is unavailable he would like his girlfriend Lavelle Garcia) to be appointed as his medical decision maker. Transition of Care Plan:          * Home with [facility] visit & f/u appts    > [phone] confirmed they can provide [facility] visit per qualifying dx at d/c    CM met with pt and his girlfriend at bedside to make contact, introduce role, and complete initial assessment. Pt was alert and oriented x4 and successfully verified demographic information. Pt reported address on file is linked to his mother's residence, which is his mailing address; pt doesn't want address changed on file although he resides at girlfriend's residence detailed above. Pt is independent with ADL's at baseline, reported no DME in the home, and identified no barriers related to accessing/paying for medication. [facility] [address] Pt reported no prior hx of utilizing HH, SNF, or in-pt rehab services.      CM will continue to follow patient for discharge planning needs and arrange for services as deemed necessary. Care Management Interventions  PCP Verified by CM: Yes(pt reported last seeing PCP in August of 2019)  Mode of Transport at Discharge:  Other (see comment)(pt's girlfriend Reilly Martinez) will provide transportation at d/c)  Transition of Care Consult (CM Consult): Discharge Planning  Discharge Durable Medical Equipment: No  Physical Therapy Consult: Yes  Occupational Therapy Consult: Yes  Speech Therapy Consult: Yes  Current Support Network: Lives with Spouse, Other(pt lives with his girlfriend in one level home with 0 stairs leading to the entrance)  Confirm Follow Up Transport: Self  Plan discussed with Pt/Family/Caregiver: Yes  Discharge Location  Discharge Placement: Home(H2H visit & f/u appts)    [facility], [address] [place] [address], [facility]

## 2019-12-05 NOTE — CONSULTS
Vascular Surgery Consult Note  12/5/2019    Subjective:     Mr. Deanna Jackman is a 52 yo AAM with a pmhx significant for Type B aortic dissection, renal artery stenosis/infarction, LVH, CKD III, and anemia.  He is known to the service for a TEVAR and L CCA-SCA bypass  on 02/15/2019. Mercy Abrams had a repeat CTA of the chest abd and pelvis on 02/24/2019 that was unremarkable with a stable stent graft. In 05/2019 he had another CTA of the chest that was unchanged after presenting to the hospital w/ PNA. On this occasion he is admitted to the hospital w/ an acute left temporal CVA after presenting to the hospital with abrupt onset of right eye vision loss. His evaluation including CTA w/ perfusion and US of the carotid and subclavian arteries. These has has been relatively unremarkable for a source. KRISTY is pending for today. Past Medical History   Diabetes Mellitus   -peripheral neuropathy   ASHD  -multiple cardiac stents  -2V bypass   Type B aortic dissection  Renal artery stenosis/infarction  LVH  CKD III  Anemia     Past Procedural History  TEVAR and L CCA-SCA bypass on 02/15/2019   appendenctomy     Family History   Problem Relation Age of Onset    Diabetes Mother     Stroke Mother     Colon Cancer Father     Cancer Brother     No Known Problems Child       Social History     Tobacco Use    Smoking status: Never Smoker    Smokeless tobacco: Never Used   Substance Use Topics    Alcohol use: No       Prior to Admission medications    Medication Sig Start Date End Date Taking? Authorizing Provider   SITagliptin (JANUVIA) 100 mg tablet Take 1 Tab by mouth daily. 10/8/19  Yes Jean-Claude Niño III, DO   ferrous sulfate 325 mg (65 mg iron) tablet Take 1 Tab by mouth Daily (before breakfast). 10/7/19  Yes Jean-Claude Niño III, DO   aspirin delayed-release 81 mg tablet Take 1 Tab by mouth daily.  7/8/19  Yes Jean-Claude Niño III, DO   canagliflozin (INVOKANA) 100 mg tablet Take 1 Tab by mouth Daily (before breakfast). 6/7/19  Yes Jean-Claude Niño III, DO   pravastatin (PRAVACHOL) 20 mg tablet Take 1 Tab by mouth nightly. 6/7/19  Yes Jean-Claude Niño III, DO   amLODIPine (NORVASC) 10 mg tablet Take 1 Tab by mouth daily. Resume on Friday 05/17/19 6/6/19  Yes Jean-Claude Niño III, DO   hydrALAZINE (APRESOLINE) 50 mg tablet Take 1 Tab by mouth three (3) times daily. Resume on Saturday 5/18/19 5/16/19  Yes Ocraymundo Tse P, NP   acetaminophen (TYLENOL) 325 mg tablet Take 325-650 mg by mouth every four (4) hours as needed for Pain. Yes Provider, Historical   ertugliflozin (STEGLATRO) 5 mg tab Take 5 mg by mouth daily. 10/8/19   Sarah Niño III, DO   carvedilol (COREG) 25 mg tablet Take 1 Tab by mouth two (2) times daily (with meals). 7/8/19   Rain Linda III, DO   cetirizine (ZYRTEC) 10 mg tablet Take 10 mg by mouth daily as needed for Allergies. Provider, Historical   famotidine (PEPCID) 40 mg tablet Take 1 Tab by mouth daily. 2/26/19   Sanaz Yung NP     No Known Allergies     Review of Systems   Constitutional: Negative for chills, fatigue and unexpected weight change. HENT: Negative for congestion. Eyes: Positive for visual disturbance. Respiratory: Negative for cough, chest tightness and shortness of breath. Cardiovascular: Negative for chest pain and leg swelling. Gastrointestinal: Negative for nausea and vomiting. Endocrine: Negative for polydipsia and polyuria. Genitourinary: Negative. Musculoskeletal: Negative for gait problem and myalgias. Skin: Negative for color change and wound. Allergic/Immunologic: Negative. Neurological: Negative for facial asymmetry, weakness, light-headedness and numbness. Hematological: Negative. Psychiatric/Behavioral: Negative.         Objective:       Patient Vitals for the past 24 hrs:   BP Temp Pulse Resp SpO2   12/05/19 1255 140/83 -- 79 16 93 %   12/05/19 1250 133/88 -- 77 16 93 %   12/05/19 1245 133/88 -- 78 14 96 %   12/05/19 1240 (!) 145/95 -- 76 16 96 %   12/05/19 1235 132/83 -- 77 14 99 %   12/05/19 1230 147/82 -- 75 18 99 %   12/05/19 1227 (!) 170/94 -- 74 17 98 %   12/05/19 1225 (!) 204/101 -- 78 18 97 %   12/05/19 1220 159/85 -- 78 17 99 %   12/05/19 1215 (!) 156/93 -- 82 18 100 %   12/05/19 1210 (!) 167/106 -- 78 14 100 %   12/05/19 1205 (!) 182/112 -- 72 18 100 %   12/05/19 1146 (!) 143/92 98.4 °F (36.9 °C) 74 18 97 %   12/05/19 0729 (!) 130/91 98.2 °F (36.8 °C) 68 18 99 %   12/05/19 0305 (!) 131/95 98.4 °F (36.9 °C) 85 18 97 %   12/04/19 2300 125/80 98.4 °F (36.9 °C) 72 18 99 %   12/04/19 1902 114/83 97.8 °F (36.6 °C) 80 18 100 %   12/04/19 1433 (!) 123/96 98 °F (36.7 °C) 78 16 99 %     Physical Exam  Constitutional:       Appearance: Normal appearance. HENT:      Head: Normocephalic and atraumatic. Nose: Nose normal.      Mouth/Throat:      Mouth: Mucous membranes are moist.   Eyes:      Pupils: Pupils are equal, round, and reactive to light. Neck:      Musculoskeletal: Normal range of motion and neck supple. Cardiovascular:      Rate and Rhythm: Normal rate and regular rhythm. Pulmonary:      Effort: Pulmonary effort is normal.      Breath sounds: Normal breath sounds. Abdominal:      General: Abdomen is flat. Palpations: Abdomen is soft. Musculoskeletal: Normal range of motion. Skin:     General: Skin is warm. Neurological:      Mental Status: He is alert.       Comments: Right eye visual field deficit    Psychiatric:         Mood and Affect: Mood normal.         Behavior: Behavior normal.       Pertinent Test Results:   Recent Results (from the past 24 hour(s))   GLUCOSE, POC    Collection Time: 12/04/19  4:20 PM   Result Value Ref Range    Glucose (POC) 141 (H) 65 - 100 mg/dL    Performed by Joe Farooq (PCT)    GLUCOSE, POC    Collection Time: 12/04/19  9:06 PM   Result Value Ref Range    Glucose (POC) 130 (H) 65 - 100 mg/dL    Performed by Agus Ryan PANEL    Collection Time: 12/05/19  3:05 AM   Result Value Ref Range    LIPID PROFILE          Cholesterol, total 122 <200 MG/DL    Triglyceride 109 <150 MG/DL    HDL Cholesterol 34 MG/DL    LDL, calculated 66.2 0 - 100 MG/DL    VLDL, calculated 21.8 MG/DL    CHOL/HDL Ratio 3.6 0.0 - 5.0     HEMOGLOBIN A1C WITH EAG    Collection Time: 12/05/19  3:05 AM   Result Value Ref Range    Hemoglobin A1c 7.8 (H) 4.0 - 5.6 %    Est. average glucose 177 mg/dL   CBC W/O DIFF    Collection Time: 12/05/19  3:05 AM   Result Value Ref Range    WBC 4.9 4.1 - 11.1 K/uL    RBC 4.50 4. 10 - 5.70 M/uL    HGB 11.1 (L) 12.1 - 17.0 g/dL    HCT 36.2 (L) 36.6 - 50.3 %    MCV 80.4 80.0 - 99.0 FL    MCH 24.7 (L) 26.0 - 34.0 PG    MCHC 30.7 30.0 - 36.5 g/dL    RDW 16.1 (H) 11.5 - 14.5 %    PLATELET 273 908 - 111 K/uL    MPV 10.7 8.9 - 12.9 FL    NRBC 0.0 0  WBC    ABSOLUTE NRBC 0.00 0.00 - 7.61 K/uL   METABOLIC PANEL, BASIC    Collection Time: 12/05/19  3:05 AM   Result Value Ref Range    Sodium 139 136 - 145 mmol/L    Potassium 3.3 (L) 3.5 - 5.1 mmol/L    Chloride 107 97 - 108 mmol/L    CO2 27 21 - 32 mmol/L    Anion gap 5 5 - 15 mmol/L    Glucose 116 (H) 65 - 100 mg/dL    BUN 20 6 - 20 MG/DL    Creatinine 1.61 (H) 0.70 - 1.30 MG/DL    BUN/Creatinine ratio 12 12 - 20      GFR est AA 56 (L) >60 ml/min/1.73m2    GFR est non-AA 46 (L) >60 ml/min/1.73m2    Calcium 8.7 8.5 - 10.1 MG/DL   GLUCOSE, POC    Collection Time: 12/05/19  6:23 AM   Result Value Ref Range    Glucose (POC) 130 (H) 65 - 100 mg/dL    Performed by Aristides Villegas (PCT)    GLUCOSE, POC    Collection Time: 12/05/19 10:59 AM   Result Value Ref Range    Glucose (POC) 149 (H) 65 - 100 mg/dL    Performed by Joe Farooq (PCT)        Assessmen/Plan:     Consult problem:  Left temporal CVA w/ retinal artery occlusion  -CTA and US unremarkable for embolic source   -KRISTY shows structurally normal heart, no sig valve disease, few late bubbles, no intracardiac shunting, Visualized portions of ascending aorta without dissection or significant atheroma, descending aorta does show moderate atheroma. Dr. Marivel Mathews to see patient later today.       Active problems;  Diabetes Mellitus   -w/ peripheral neuropathy   ASHD  Type B aortic dissection  Renal artery stenosis/infarction  LVH   Hypertension   Acute on CKD III  Anemia   Management of comorbid conditions by primary team.    VTE Prophylaxis:  [facility]    Disposition:  Home    Signed By: Katy Blanchard NP     December 5, 2019

## 2019-12-05 NOTE — CONSULTS
Consult    NAME: Radha Garcia   :  1970   MRN:  [id number]     Date/Time:  2019 8:48 AM    Patient PCP: Sam Miles, DO  ________________________________________________________________________    Derinda Budds    - Acute CVA effecting vision     - Acute type B aortic dissection starting from left subclavian down to renals compromising celiac and renals, s/p emergent TEVAR, left subclavian embolization with left carotid to left subclavian bypass, left renal artery stent.  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    Normal EF  Tele ok  S/p TEVAR     1. Cont aspirin 81mg daily  2. Cont added plavix  3. Resume coreg 12.5mg bid, was on 25mg bid as outpt  4. Resume amlodipine 5mg, was on 10mg as outpt  5. Holding hydralazine   6. Stop fluids later today, repelete K  7. Cont statin     Discussed with Dr. Tenorio January, KRISTY, evaluate ascending aorta, as much of arch that can be visualized. KRISTY shows structurally normal heart, no sig valve disease, few late bubbles, no intracardiac shunting, Visualized portions of ascending aorta without dissection or significant atheroma, descending aorta does show moderate atheroma.            [x]?         High complexity decision making was performed          Subjective:   CHIEF COMPLAINT: Right eye vision loss    HISTORY OF PRESENT ILLNESS:       51years old male from home with past medical history significant for hypertension, DM, history of aortic dissection with repair last month presented to the hospital for evaluation of acute painless right eye vision loss started about 9 PM while patient watching TV, patient denies any focal weakness numbness any headache, head CT was done show no acute abnormality CTA was done show no acute abnormality, patient was evaluated by telemetry neurology recommended patient to be started on Plavix and aspirin admit patient for stroke work-up patient was recently admitted to the hospital for aortic dissection last month and he had a aortic dissection repair with a stent placement by Dr. Marivel Mathews     No chest pain, breathing ok, no edema, no palps, no syncope. We were asked to consult for work up and evaluation of the above problems. Past Medical History:   Diagnosis Date    Bilateral carotid artery stenosis     Diabetic peripheral neuropathy associated with type 2 diabetes mellitus ([facility] [address]     Foot fracture     H/O aortic dissection     Hollenhorst plaque, right eye     Hypertension     Jaw fracture (HCC)     Ruptured ear drum     Subjective visual disturbance, right eye     Thumb fracture       Past Surgical History:   Procedure Laterality Date    HX APPENDECTOMY      HX ARTERIAL BYPASS  02/15/2019    HX ORTHOPAEDIC      HX OTHER SURGICAL  02/15/2019    8 stints    HX OTHER SURGICAL  02/15/2019    Double bypass     No Known Allergies   Meds:  See below  Social History     Tobacco Use    Smoking status: Never Smoker    Smokeless tobacco: Never Used   Substance Use Topics    Alcohol use: No      Family History   Problem Relation Age of Onset    Diabetes Mother     Stroke Mother     Colon Cancer Father     Cancer Brother     No Known Problems Child        REVIEW OF SYSTEMS:     []         Unable to obtain  ROS due to ---   [x]         Total of 12 systems reviewed as follows:     Total of 12 systems reviewed as follows:       POSITIVE= Bold text  Negative = normal text  General:  fever, chills, sweats, generalized weakness, weight loss/gain,      loss of appetite   Eyes:    blurred vision, eye pain, loss of vision, double vision  ENT:    rhinorrhea, pharyngitis   Respiratory:   cough, sputum production, SOB, ESCOBEDO, wheezing, pleuritic pain   Cardiology:   chest pain, palpitations, orthopnea, PND, edema, syncope   Gastrointestinal:  abdominal pain , N/V, diarrhea, dysphagia, constipation, bleeding   Genitourinary:  frequency, urgency, dysuria, hematuria, incontinence   Muskuloskeletal :  arthralgia, myalgia, back pain  Hematology:  easy bruising, nose or gum bleeding, lymphadenopathy   Dermatological: rash, ulceration, pruritis, color change / jaundice  Endocrine:   hot flashes or polydipsia   Neurological:  headache, dizziness, confusion, focal weakness, paresthesia,     Speech difficulties, memory loss, gait difficulty  Psychological: Feelings of anxiety, depression, agitation    Objective:      Physical Exam:    Last 24hrs VS reviewed since prior progress note. Most recent are:    Visit Vitals  BP (!) 130/91   Pulse 68   Temp 98.2 °F (36.8 °C)   Resp 18   Ht 6' 5\" (1.956 m)   Wt 111.6 kg (246 lb)   SpO2 99%   BMI 29.17 kg/m²       Intake/Output Summary (Last 24 hours) at 12/5/2019 0848  Last data filed at 12/4/2019 1730  Gross per 24 hour   Intake 240 ml   Output --   Net 240 ml        General Appearance: Well developed, well nourished, alert & oriented x 3,    no acute distress. Ears/Nose/Mouth/Throat: Pupils equal and round, Hearing grossly normal.  Neck: Supple. JVP within normal limits. Carotids good upstrokes, with no bruit. Chest: Lungs clear to auscultation bilaterally. Cardiovascular: Regular rate and rhythm, S1S2 normal, no murmur, rubs, gallops. Abdomen: Soft, non-tender, bowel sounds are active. No organomegaly. Extremities: No edema bilaterally. Femoral pulses +2, Distal Pulses +1. Skin: Warm and dry. Neuro: CN III-XII grossly intact, Strength and sensation grossly intact.  Decreased vision right eye    Data:      Prior to Admission medications Medication Sig Start Date End Date Taking? Authorizing Provider   SITagliptin (JANUVIA) 100 mg tablet Take 1 Tab by mouth daily. 10/8/19  Yes Jean-Claude Niño III, DO   ferrous sulfate 325 mg (65 mg iron) tablet Take 1 Tab by mouth Daily (before breakfast). 10/7/19  Yes Jea-nClaude Niño III, DO   aspirin delayed-release 81 mg tablet Take 1 Tab by mouth daily. 7/8/19  Yes Jean-Claude Niño III, DO   canagliflozin (INVOKANA) 100 mg tablet Take 1 Tab by mouth Daily (before breakfast). 6/7/19  Yes Jean-Claude Niño III, DO   pravastatin (PRAVACHOL) 20 mg tablet Take 1 Tab by mouth nightly. 6/7/19  Yes Jean-Claude Niño III, DO   amLODIPine (NORVASC) 10 mg tablet Take 1 Tab by mouth daily. Resume on Friday 05/17/19 6/6/19  Yes Jean-Claude Niño III, DO   hydrALAZINE (APRESOLINE) 50 mg tablet Take 1 Tab by mouth three (3) times daily. Resume on Saturday 5/18/19 5/16/19  Yes Lorraine HANSEN NP   acetaminophen (TYLENOL) 325 mg tablet Take 325-650 mg by mouth every four (4) hours as needed for Pain. Yes Provider, Historical   ertugliflozin (STEGLATRO) 5 mg tab Take 5 mg by mouth daily. 10/8/19   Carlos Niño III, DO   carvedilol (COREG) 25 mg tablet Take 1 Tab by mouth two (2) times daily (with meals). 7/8/19   Jacqualin Zarco III, DO   cetirizine (ZYRTEC) 10 mg tablet Take 10 mg by mouth daily as needed for Allergies. Provider, Historical   famotidine (PEPCID) 40 mg tablet Take 1 Tab by mouth daily.  2/26/19   Chris Lima NP       Recent Results (from the past 24 hour(s))   DUPLEX CAROTID BILATERAL    Collection Time: 12/04/19  9:41 AM   Result Value Ref Range    Right subclavian sys 141.6 cm/s    RIGHT SUBCLAVIAN ARTERY D 0.00 cm/s    Right cca dist sys 136.8 cm/s    Right CCA dist baker 27.7 cm/s    Right CCA prox sys 169.9 cm/s    Right CCA prox baker 14.3 cm/s    Right ICA dist sys 68.3 cm/s    Right ICA dist baker 17.3 cm/s    Right ICA mid sys 103.8 cm/s    Right ICA mid baker 19.3 cm/s    Right ICA prox sys 79.7 cm/s    Right ICA prox baker 15.5 cm/s    Right eca sys 122.9 cm/s    RIGHT EXTERNAL CAROTID ARTERY D 8.28 cm/s    Right vertebral sys 55.7 cm/s    RIGHT VERTEBRAL ARTERY D 16.36 cm/s    Right ICA/CCA sys 0.8     Left subclavian sys 76.6 cm/s    LEFT SUBCLAVIAN ARTERY D 8.55 cm/s    Left CCA dist sys 138.7 cm/s    Left CCA dist baker 25.5 cm/s    Left CCA prox sys 276.4 cm/s    Left CCA prox baker 26.8 cm/s    Left ICA dist sys 69.6 cm/s    Left ICA dist baker 22.6 cm/s    Left ICA mid sys 54.5 cm/s    Left ICA mid baker 22.5 cm/s    Left ICA prox sys 71.4 cm/s    Left ICA prox baker 13.5 cm/s    Left ECA sys 92.2 cm/s    LEFT EXTERNAL CAROTID ARTERY D 6.54 cm/s    Left vertebral sys 39.1 cm/s    Left ICA/CCA sys 0.51    GLUCOSE, POC    Collection Time: 12/04/19 11:20 AM   Result Value Ref Range    Glucose (POC) 150 (H) 65 - 100 mg/dL    Performed by Teddy Blanco (PCT)    GLUCOSE, POC    Collection Time: 12/04/19  4:20 PM   Result Value Ref Range    Glucose (POC) 141 (H) 65 - 100 mg/dL    Performed by Tabatha Alvarado (PCT)    GLUCOSE, POC    Collection Time: 12/04/19  9:06 PM   Result Value Ref Range    Glucose (POC) 130 (H) 65 - 100 mg/dL    Performed by Omar Bridges    CBC W/O DIFF    Collection Time: 12/05/19  3:05 AM   Result Value Ref Range    WBC 4.9 4.1 - 11.1 K/uL    RBC 4.50 4. 10 - 5.70 M/uL    HGB 11.1 (L) 12.1 - 17.0 g/dL    HCT 36.2 (L) 36.6 - 50.3 %    MCV 80.4 80.0 - 99.0 FL    MCH 24.7 (L) 26.0 - 34.0 PG    MCHC 30.7 30.0 - 36.5 g/dL    RDW 16.1 (H) 11.5 - 14.5 %    PLATELET 808 745 - 817 K/uL    MPV 10.7 8.9 - 12.9 FL    NRBC 0.0 0  WBC    ABSOLUTE NRBC 0.00 0.00 - 6.21 K/uL   METABOLIC PANEL, BASIC    Collection Time: 12/05/19  3:05 AM   Result Value Ref Range    Sodium 139 136 - 145 mmol/L    Potassium 3.3 (L) 3.5 - 5.1 mmol/L    Chloride 107 97 - 108 mmol/L    CO2 27 21 - 32 mmol/L    Anion gap 5 5 - 15 mmol/L    Glucose 116 (H) 65 - 100 mg/dL BUN 20 6 - 20 MG/DL    Creatinine 1.61 (H) 0.70 - 1.30 MG/DL    BUN/Creatinine ratio 12 12 - 20      GFR est AA 56 (L) >60 ml/min/1.73m2    GFR est non-AA 46 (L) >60 ml/min/1.73m2    Calcium 8.7 8.5 - 10.1 MG/DL   GLUCOSE, POC    Collection Time: 12/05/19  6:23 AM   Result Value Ref Range    Glucose (POC) 130 (H) 65 - 100 mg/dL    Performed by Aristides Villegas (PCT)

## 2019-12-06 ENCOUNTER — TELEPHONE (OUTPATIENT)
Dept: INTERNAL MEDICINE CLINIC | Age: 49
End: 2019-12-06

## 2019-12-06 ENCOUNTER — TELEPHONE (OUTPATIENT)
Dept: NEUROLOGY | Age: 49
End: 2019-12-06

## 2019-12-06 VITALS
TEMPERATURE: 97.9 F | RESPIRATION RATE: 18 BRPM | HEART RATE: 72 BPM | DIASTOLIC BLOOD PRESSURE: 86 MMHG | SYSTOLIC BLOOD PRESSURE: 150 MMHG | WEIGHT: 246 LBS | HEIGHT: 77 IN | OXYGEN SATURATION: 97 % | BODY MASS INDEX: 29.05 KG/M2

## 2019-12-06 LAB
ANION GAP SERPL CALC-SCNC: 7 MMOL/L (ref 5–15)
BUN SERPL-MCNC: 23 MG/DL (ref 6–20)
BUN/CREAT SERPL: 13 (ref 12–20)
CALCIUM SERPL-MCNC: 8.2 MG/DL (ref 8.5–10.1)
CHLORIDE SERPL-SCNC: 105 MMOL/L (ref 97–108)
CO2 SERPL-SCNC: 27 MMOL/L (ref 21–32)
CREAT SERPL-MCNC: 1.76 MG/DL (ref 0.7–1.3)
ERYTHROCYTE [DISTWIDTH] IN BLOOD BY AUTOMATED COUNT: 15.9 % (ref 11.5–14.5)
GLUCOSE BLD STRIP.AUTO-MCNC: 127 MG/DL (ref 65–100)
GLUCOSE BLD STRIP.AUTO-MCNC: 149 MG/DL (ref 65–100)
GLUCOSE SERPL-MCNC: 138 MG/DL (ref 65–100)
HCT VFR BLD AUTO: 35 % (ref 36.6–50.3)
HGB BLD-MCNC: 10.9 G/DL (ref 12.1–17)
MCH RBC QN AUTO: 25.1 PG (ref 26–34)
MCHC RBC AUTO-ENTMCNC: 31.1 G/DL (ref 30–36.5)
MCV RBC AUTO: 80.5 FL (ref 80–99)
NRBC # BLD: 0 K/UL (ref 0–0.01)
NRBC BLD-RTO: 0 PER 100 WBC
PLATELET # BLD AUTO: 175 K/UL (ref 150–400)
PMV BLD AUTO: 10.3 FL (ref 8.9–12.9)
POTASSIUM SERPL-SCNC: 3.2 MMOL/L (ref 3.5–5.1)
RBC # BLD AUTO: 4.35 M/UL (ref 4.1–5.7)
SERVICE CMNT-IMP: ABNORMAL
SERVICE CMNT-IMP: ABNORMAL
SODIUM SERPL-SCNC: 139 MMOL/L (ref 136–145)
WBC # BLD AUTO: 5.4 K/UL (ref 4.1–11.1)

## 2019-12-06 PROCEDURE — 74011250637 HC RX REV CODE- 250/637: Performed by: INTERNAL MEDICINE

## 2019-12-06 PROCEDURE — 82962 GLUCOSE BLOOD TEST: CPT

## 2019-12-06 PROCEDURE — 94760 N-INVAS EAR/PLS OXIMETRY 1: CPT

## 2019-12-06 PROCEDURE — 80048 BASIC METABOLIC PNL TOTAL CA: CPT

## 2019-12-06 PROCEDURE — 74011250636 HC RX REV CODE- 250/636: Performed by: INTERNAL MEDICINE

## 2019-12-06 PROCEDURE — 36415 COLL VENOUS BLD VENIPUNCTURE: CPT

## 2019-12-06 PROCEDURE — 85027 COMPLETE CBC AUTOMATED: CPT

## 2019-12-06 RX ORDER — ATORVASTATIN CALCIUM 40 MG/1
40 TABLET, FILM COATED ORAL
Qty: 30 TAB | Refills: 1 | Status: SHIPPED | OUTPATIENT
Start: 2019-12-06 | End: 2020-02-06 | Stop reason: SDUPTHER

## 2019-12-06 RX ORDER — POTASSIUM CHLORIDE 750 MG/1
40 TABLET, FILM COATED, EXTENDED RELEASE ORAL ONCE
Status: COMPLETED | OUTPATIENT
Start: 2019-12-06 | End: 2019-12-06

## 2019-12-06 RX ORDER — CLOPIDOGREL BISULFATE 75 MG/1
75 TABLET ORAL DAILY
Qty: 30 TAB | Refills: 1 | Status: SHIPPED | OUTPATIENT
Start: 2019-12-07 | End: 2020-03-24 | Stop reason: SDUPTHER

## 2019-12-06 RX ORDER — CARVEDILOL 12.5 MG/1
12.5 TABLET ORAL 2 TIMES DAILY WITH MEALS
Qty: 60 TAB | Refills: 0 | Status: SHIPPED | OUTPATIENT
Start: 2019-12-06 | End: 2020-01-07 | Stop reason: SDUPTHER

## 2019-12-06 RX ADMIN — HEPARIN SODIUM 5000 UNITS: 5000 INJECTION INTRAVENOUS; SUBCUTANEOUS at 09:04

## 2019-12-06 RX ADMIN — POTASSIUM CHLORIDE 40 MEQ: 750 TABLET, FILM COATED, EXTENDED RELEASE ORAL at 09:05

## 2019-12-06 RX ADMIN — FAMOTIDINE 40 MG: 20 TABLET ORAL at 09:05

## 2019-12-06 RX ADMIN — ASPIRIN 81 MG 81 MG: 81 TABLET ORAL at 09:05

## 2019-12-06 RX ADMIN — CARVEDILOL 12.5 MG: 12.5 TABLET, FILM COATED ORAL at 09:05

## 2019-12-06 RX ADMIN — AMLODIPINE BESYLATE 5 MG: 5 TABLET ORAL at 09:05

## 2019-12-06 RX ADMIN — CLOPIDOGREL BISULFATE 75 MG: 75 TABLET ORAL at 09:05

## 2019-12-06 RX ADMIN — HEPARIN SODIUM 5000 UNITS: 5000 INJECTION INTRAVENOUS; SUBCUTANEOUS at 00:59

## 2019-12-06 NOTE — CDMP QUERY
Documentation of Acute Renal Rere Broderick is noted in the progress notes. Currently the patient does not meet RIFLE criteria (BSV approved) to support this diagnosis. If you are using another criteria to support this diagnosis, please document this in your progress note. Otherwise, please document in the progress notes the clinical indicators that support this diagnosis or state that the diagnosis has been ruled out. RIFLE  (BSV Approved) RISK:  Increased SCr x 1.5 or GFR decrease > 25% (within 7 days) INJURY:  Increased SCr x 2.0 or GFR decreased > 50% FAILURE:  Increased SCr x 3.0 or GFR decrease > 75% or SCr >4.0 mg/dL or acute increase >0.5 mg/dL LOSS:  Persistent acute renal failure = complete loss of kidney function > 4 weeks END STAGE:  End stage of kidney disease > 3 months AKIN 
 
STAGE  1:  Increase in SCr >/= 0.3 mg/dL or >/= 150% to 200% (1.5 to 2-fold) from baseline (within 48 hours) STAGE  2:  Increase in SCr to more than 200% to 300% (>2-3 fold) from baseline STAGE  3:  Increase in SCr to more than 300% (>3-fold) from baseline or SCr >/= 4.0 mg/dL with an acute increase of at least 0.5 mg/dL or initiation of renal replacement therapy KDIGO 
 
STAGE  1:  Increase in SCr by >/= 0.3 mg/dL within 48 hours or increase in SCr 1.5 to 1.9 times baseline which is known or presumed to have occurred within the prior 7 days STAGE  2:  Increase in SCr to 2.0 to 2.9 times baseline STAGE  3:  Increase in SCr to 3.0 times baseline or increase in SCr to >/= baseline or increase in SCr to >/= 4.0 mg/dL or initiation of renal replacement therapy Please clarify and document your clinical opinion in the progress notes and discharge summary including the definitive and/or presumptive diagnosis, (suspected or probable), related to the above clinical findings. Please include clinical findings supporting your diagnosis. Thank you, Wen Ren [facility]

## 2019-12-06 NOTE — PROGRESS NOTES
JOSH Plan:    * Home with [facility] visit & f/u appts     > [address] confirmed they can provide [facility] visit per qualifying dx at d/c  > CM secured f/u appt with [facility]; details in AVS  > Pt's girlfriend Sharon Sweet) will provide transportation at d/c    12:47 PM: CM met with pt at bedside to review final JOSH plans; pt verbalized understanding and had no additional questions for CM. Pt reported his girlfriend will be leaving work early to pick him up around 2:00 PM. No further CM needs identified at this time; CM will remain available for consult if additional needs arise before pt leaves facility. 11:09 AM: CM secured f/u appt with [facility] for 12/9/19 at 9:30 AM; details reflected in pt's AVS    Care Management Interventions  PCP Verified by CM: Yes  Mode of Transport at Discharge:  Other (see comment)(pt's girlfriend will provide transportation at d/c)  Transition of Care Consult (CM Consult): Discharge Planning  Discharge Durable Medical Equipment: No  Physical Therapy Consult: Yes  Occupational Therapy Consult: Yes  Speech Therapy Consult: Yes  Current Support Network: Lives with Spouse, Other(pt lives with his girlfriend in one level home with 0 stairs leading to the entrance)  Confirm Follow Up Transport: Family  Plan discussed with Pt/Family/Caregiver: Yes  Discharge Location  Discharge Placement: Home(H visit & f/u appts)      Rashaad Shetty, [address] [place], [facility]  [phone]

## 2019-12-06 NOTE — DISCHARGE INSTRUCTIONS
[employer] Activation    Thank you for requesting access to [employer]. Please follow the instructions below to securely access and download your online medical record. [employer] allows you to send messages to your doctor, view your test results, renew your prescriptions, schedule appointments, and more. How Do I Sign Up? 1. In your internet browser, go to [email or web address]  2. Click on the First Time User? Click Here link in the Sign In box. You will be redirect to the New Member Sign Up page. 3. Enter your [employer] Access Code exactly as it appears below. You will not need to use this code after youve completed the sign-up process. If you do not sign up before the expiration date, you must request a new code. [employer] Access Code: [email or web address]  Expires: 2020  9:23 PM (This is the date your [employer] access code will )    4. Enter the last four digits of your Social Security Number (xxxx) and Date of Birth (mm/dd/yyyy) as indicated and click Submit. You will be taken to the next sign-up page. 5. Create a [employer] ID. This will be your [employer] login ID and cannot be changed, so think of one that is secure and easy to remember. 6. Create a [employer] password. You can change your password at any time. 7. Enter your Password Reset Question and Answer. This can be used at a later time if you forget your password. 8. Enter your e-mail address. You will receive e-mail notification when new information is available in [address]. 9. Click Sign Up. You can now view and download portions of your medical record. 10. Click the Download Summary menu link to download a portable copy of your medical information. Additional Information    If you have questions, please visit the Frequently Asked Questions section of the [employer] website at [email or web address] Remember, [employer] is NOT to be used for urgent needs. For medical emergencies, dial 911.                             HOSPITALIST DISCHARGE INSTRUCTIONS    NAME: Asa Smith   :  1970   MRN:  [id number]     Date/Time:  2019 10:24 AM    ADMIT DATE: 12/3/2019   DISCHARGE DATE:      Acute Embolic CVA POA  Acute painless Right Vision Loss  DM  Hypertension  CKD III    · It is important that you take the medication exactly as they are prescribed. · Keep your medication in the bottles provided by the pharmacist and keep a list of the medication names, dosages, and times to be taken in your wallet. · Do not take other medications without consulting your doctor. What to do at Home    Recommended diet:  Cardiac Diet    Recommended activity: Activity as tolerated. No driving for next 6 months      If you have questions regarding the hospital related prescriptions or hospital related issues please call [facility] at [phone]. You can always direct your questions to your primary care doctor if you are unable to reach your hospital physician; your PCP works as an extension of your hospital doctor just like your hospital doctor is an extension of your PCP for your time at the hospital [facility], [facility])    If you experience any of the following symptoms then please call your primary care physician or return to the emergency room if you cannot get hold of your doctor:    Fever, chills, nausea, vomiting, or persistent diarrhea  Worsening weakness or new problems with your speech or balance  Dark stools or visible blood in your stools  New Leg swelling or shortness of breath as these could be signs of a clot    Additional Instructions:      Bring these papers with you to your follow up appointments. The papers will help your doctors be sure to continue the care plan from the hospital.              Information obtained by :  I understand that if any problems occur once I am at home I am to contact my physician. I understand and acknowledge receipt of the instructions indicated above. Physician's or R.N.'s Signature                                                                  Date/Time                                                                                                                                              Patient or Representative Signature

## 2019-12-06 NOTE — PROGRESS NOTES
Spiritual Care Partner Volunteer visited patient in Neuro on December 6, 2019.   Documented by:     TEOFILO Weaver, [facility], Staff [address]    [address] Paging Service  [phone])

## 2019-12-06 NOTE — TELEPHONE ENCOUNTER
Nuzhat Susan [employer]   Phone Number: [phone]             Caller's first and last name and relationship to patient (if not the patient): [address] [place] contact number: [phone]   Preferred date and time: 5-7 days after 12/06/19   Scheduled appointment date and time: No appointment available   Reason for appointment: Transition from [facility]   Details to clarify the request: [facility] pt discharged 12/06/19,  is calling for a transition of care appt     Copy/Paste  ENVERA

## 2019-12-06 NOTE — PROGRESS NOTES
Consult  REFERRED BY:  Melvi Munoz DO    CHIEF COMPLAINT: Sudden visual loss right eye      Subjective:     Shanthi Angeles is a 51 y.o. right-handed -American male seen for evaluation of visual loss in his right eye that occurred Tuesday when he was just watching TV and noticed a sudden onset of blurred vision in his right eye. The patient had a normal head CT done and a normal CTA of the head neck on admission to the emergency room Tuesday night. Patient had normal carotid Doppler studies Wednesday, had an MRI scan that showed a very punctate minuscule left temporal infarct of unclear etiology. It is asymptomatic but a little unusual.  Patient just had an aortic dissection fixed with Dr. Petr Noonan with stent placement, and the question is could there be some atherosclerotic disease in the aorta, and a KRISTY was done today that showed no evidence of further dissection, and not much atherosclerotic plaque in the aorta, and no cardiac abnormality on KRISTY or TTE. Patient has a sed rate of 7. Patient has type 2 diabetes, but never had a history of stroke before. His dilated funduscopy seems to show a possible Hollenhorst plaque right at the takeoff of the central retinal artery on the right side. There did not appear to be significant disc edema or pallor yet, and the vessels may be a minimal attenuated but still seem present for the arterial branches. Patient has hand motion vision in his right eye centrally, and can count fingers in the periphery. He says his vision is slowly improving in the periphery. Patient denies any headache or other focal weakness sensory loss or any other cause of his symptoms. Patient says his vision continues to improve, and he has an appointment to see [place] [facility] on Monday morning at 930 to see Dr. Archana Hitchcock to rule out any primary ocular pathology, but suspect this is most likely a small embolus to the eye.   He will continue aspirin therapy in the interim we will see in 2 to 4 weeks after his discharge to make sure he is doing well and check on the results of his eye exam.  He will call us immediately if any problem. Past Medical History:   Diagnosis Date    Bilateral carotid artery stenosis     Diabetic peripheral neuropathy associated with type 2 diabetes mellitus ([address]     Foot fracture     H/O aortic dissection     Hollenhorst plaque, right eye     Hypertension     Jaw fracture (HCC)     Ruptured ear drum     Subjective visual disturbance, right eye     Thumb fracture       Past Surgical History:   Procedure Laterality Date    HX APPENDECTOMY      HX ARTERIAL BYPASS  02/15/2019    HX ORTHOPAEDIC      HX OTHER SURGICAL  02/15/2019    8 stints    HX OTHER SURGICAL  02/15/2019    Double bypass     Family History   Problem Relation Age of Onset    Diabetes Mother     Stroke Mother     Colon Cancer Father     Cancer Brother     No Known Problems Child       Social History     Tobacco Use    Smoking status: Never Smoker    Smokeless tobacco: Never Used   Substance Use Topics    Alcohol use: No       No current facility-administered medications for this encounter. Current Outpatient Medications:     carvedilol (COREG) 12.5 mg tablet, Take 1 Tab by mouth two (2) times daily (with meals). , Disp: 60 Tab, Rfl: 0    atorvastatin (LIPITOR) 40 mg tablet, Take 1 Tab by mouth nightly., Disp: 30 Tab, Rfl: 1    [START ON 12/7/2019] clopidogrel (PLAVIX) 75 mg tab, Take 1 Tab by mouth daily. , Disp: 30 Tab, Rfl: 1    SITagliptin (JANUVIA) 100 mg tablet, Take 1 Tab by mouth daily. , Disp: 90 Tab, Rfl: 3    ferrous sulfate 325 mg (65 mg iron) tablet, Take 1 Tab by mouth Daily (before breakfast). , Disp: 90 Tab, Rfl: 3    canagliflozin (INVOKANA) 100 mg tablet, Take 1 Tab by mouth Daily (before breakfast). , Disp: 90 Tab, Rfl: 3    amLODIPine (NORVASC) 10 mg tablet, Take 1 Tab by mouth daily.  Resume on Friday 05/17/19, Disp: 90 Tab, Rfl: 1   hydrALAZINE (APRESOLINE) 50 mg tablet, Take 1 Tab by mouth three (3) times daily. Resume on Saturday 5/18/19, Disp: 90 Tab, Rfl: 2    acetaminophen (TYLENOL) 325 mg tablet, Take 325-650 mg by mouth every four (4) hours as needed for Pain., Disp: , Rfl:     ertugliflozin (STEGLATRO) 5 mg tab, Take 5 mg by mouth daily. , Disp: 90 Tab, Rfl: 3    cetirizine (ZYRTEC) 10 mg tablet, Take 10 mg by mouth daily as needed for Allergies. , Disp: , Rfl:     famotidine (PEPCID) 40 mg tablet, Take 1 Tab by mouth daily. , Disp: 30 Tab, Rfl: 2        No Known Allergies   MRI Results (most recent):  Results from Hospital Encounter encounter on 12/03/19   MRI BRAIN W WO CONT    Narrative EXAM:  MRI BRAIN W WO CONT    INDICATION:    Stroke    COMPARISON:  None. CONTRAST: 10 cc IV Dotarem. TECHNIQUE:    Multiplanar multisequence acquisition without and with contrast of the brain. FINDINGS:  Diffusion suspect a small area of acute ischemia in the medial aspect of the  left temporal lobe. No associated mass effect or enhancement, no hemorrhage. There is a minimal amount of nonspecific white matter changes her. Flow voids in major vessels at the base of the brain are present. Few areas of hemosiderin depositions. No enhancing lesion or masses . Impression IMPRESSION: Suspect small acute area of ischemia left temporal lobe. No masses or mass effect or hemorrhage. Minimal white matter disease. Results from [place] encounter on 12/03/19   MRI BRAIN W WO CONT    Narrative EXAM:  MRI BRAIN W WO CONT    INDICATION:    Stroke    COMPARISON:  None. CONTRAST: 10 cc IV Dotarem. TECHNIQUE:    Multiplanar multisequence acquisition without and with contrast of the brain. FINDINGS:  Diffusion suspect a small area of acute ischemia in the medial aspect of the  left temporal lobe. No associated mass effect or enhancement, no hemorrhage.   There is a minimal amount of nonspecific white matter changes her.  Flow voids in major vessels at the base of the brain are present. Few areas of hemosiderin depositions. No enhancing lesion or masses . Impression IMPRESSION: Suspect small acute area of ischemia left temporal lobe. No masses or mass effect or hemorrhage. Minimal white matter disease. Review of Systems:  A comprehensive review of systems was negative except for: Eyes: positive for visual disturbance   Vitals:    12/06/19 0107 12/06/19 0404 12/06/19 0746 12/06/19 1201   BP: 148/79 (!) 155/94 157/89 150/86   Pulse: 74 72 61 72   Resp: 18 18 18 18   Temp: 98.1 °F (36.7 °C) 98 °F (36.7 °C) 97.6 °F (36.4 °C) 97.9 °F (36.6 °C)   SpO2: 96% 98% 98% 97%   Weight:       Height:         Objective:     I      NEUROLOGICAL EXAM:    Appearance: The patient is well developed, well nourished, provides a coherent history and is in no acute distress. Mental Status: Oriented to time, place and person, and the president, cognitive function is normal and speech is fluent and no aphasia or dysarthria. Mood and affect appropriate. Cranial Nerves:   Intact visual fields. Patient has hand motion vision in the central portion of his right eye, and count fingers vision in the periphery of the right eye, and normal fields on the left. Fundi are benign, except that the right eye seems to show a Hollenhorst plaque at the right central retinal artery takeoff, without significant disc edema or pallor yet, and the vessels still seem to be present but may be a little attenuated. Christen Zuleta NILE, EOM's full, no nystagmus, no ptosis. Facial sensation is normal. Corneal reflexes are not tested. Facial movement is symmetric. Hearing is normal bilaterally. Palate is midline with normal sternocleidomastoid and trapezius muscles are normal. Tongue is midline.   Neck without meningismus or bruits  Temporal arteries are not tender or enlarged  TMJ areas are not tender on palpation   Motor:  5/5 strength in upper and lower proximal and distal muscles. Normal bulk and tone. No fasciculations. Rapid alternating movement is symmetric and intact bilaterally   Reflexes:   Deep tendon reflexes 2+/4 and symmetrical.  No babinski or clonus present   Sensory:   Normal to touch, pinprick and vibration and temperature. DSS is intact   Gait:  Normal gait for patient's age. Tremor:   No tremor noted. Cerebellar:  No abnormal cerebellar signs present on Romberg and tandem testing and finger-nose-finger exam.   Neurovascular:  Normal heart sounds and regular rhythm, peripheral pulses intact, and no carotid bruits. Assessment:       ICD-10-CM ICD-9-CM    1. Acute loss of vision, right H53.131 368.11    2. Acute ischemic stroke (HCC) I63.9 434.91    3. Bilateral carotid artery stenosis I65.23 433.10      433.30    4. Diabetic peripheral neuropathy associated with type 2 diabetes mellitus ([facility]) E11.42 250.60      357.2    5. Hollenhorst plaque, right eye H34.211 362.33    6. Subjective visual disturbance, right eye H53.10 368.10    7. Thrombotic stroke involving left middle cerebral artery [facility]) I63.312 434.01      Active Problems:    Acute ischemic stroke ([facility] (12/3/2019)      H/O aortic dissection ()      Diabetic peripheral neuropathy associated with type 2 diabetes mellitus ([facility]) ()      Hollenhorst plaque, right eye ()      Subjective visual disturbance, right eye ()      Bilateral carotid artery stenosis ()      Thrombotic stroke involving left middle cerebral artery ([facility] (12/4/2019)        Plan:     Andrei James is a 51 y.o. right-handed -American male seen for evaluation of visual loss in his right eye that occurred Tuesday when he was just watching TV and noticed a sudden onset of blurred vision in his right eye. The patient had a normal head CT done and a normal CTA of the head neck on admission to the emergency room Tuesday night.   Patient had normal carotid Doppler studies Wednesday, had an MRI scan that showed a very punctate minuscule left temporal infarct of unclear etiology. It is asymptomatic but a little unusual.  Patient just had an aortic dissection fixed with Dr. Mario Clark with stent placement, and the question is could there be some atherosclerotic disease in the aorta, and a KRISTY was done today that showed no evidence of further dissection, and not much atherosclerotic plaque in the aorta, and no cardiac abnormality on KRISTY or TTE. Patient has a sed rate of 7. Patient has type 2 diabetes, but never had a history of stroke before. His dilated funduscopy seems to show a possible Hollenhorst plaque right at the takeoff of the central retinal artery on the right side. There did not appear to be significant disc edema or pallor yet, and the vessels may be a minimal attenuated but still seem present for the arterial branches. Patient has hand motion vision in his right eye centrally, and can count fingers in the periphery. He says his vision is slowly improving in the periphery. Patient denies any headache or other focal weakness sensory loss or any other cause of his symptoms. Patient says his vision continues to improve, and he has an appointment to see [place] [facility] on Monday morning at 930 to see Dr. Merlin Krishnamurthy to rule out any primary ocular pathology, but suspect this is most likely a small embolus to the eye. He will continue aspirin therapy in the interim we will see in 2 to 4 weeks after his discharge to make sure he is doing well and check on the results of his eye exam.  He will call us immediately if any problem.         Signed By: Jane Santos MD     December 6, 2019       CC: Perla Suresh DO  FAX: [phone]

## 2019-12-06 NOTE — PROGRESS NOTES
Discharge instruction explained and given to pt. Education regarding new meds was done. Instructed patient not to drive for 6 months because of stroke.

## 2019-12-06 NOTE — PROGRESS NOTES
Bedside and Verbal shift change report given to italo RN (oncoming nurse) by coty RN (offgoing nurse). Report included the following information SBAR, Kardex, Recent Results, Med Rec Status and Cardiac Rhythm SR.     Zone Phone:   [phone]     Significant changes during shift:  KRISTY was completed- no significant data.  IV fluids were discontinued and patient was started back on a cardiac diet.      Patient Information     Andrei James  51 y.o.  12/3/2019  9:24 PM by Angela Whyte admitted from Home     Problem List             Patient Active Problem List     Diagnosis Date Noted    Thrombotic stroke involving left middle cerebral artery ([facility] 12/04/2019    H/O aortic dissection      Diabetic peripheral neuropathy associated with type 2 diabetes mellitus (HCC)      Hollenhorst plaque, right eye      Subjective visual disturbance, right eye      Bilateral carotid artery stenosis      Acute ischemic stroke ([address] 12/03/2019    Anemia, chronic disease 06/06/2019    Type 2 diabetes mellitus without complication, without long-term current use of insulin ([address] 06/06/2019    Chest pain 05/14/2019    Abdominal pain 02/27/2019    Acute thoracic aortic dissection ([address] 02/15/2019    Essential hypertension 04/18/2018              Past Medical History:   Diagnosis Date    Bilateral carotid artery stenosis      Diabetic peripheral neuropathy associated with type 2 diabetes mellitus (HCC)      Foot fracture      H/O aortic dissection      Hollenhorst plaque, right eye      Hypertension      Jaw fracture (HCC)      Ruptured ear drum      Subjective visual disturbance, right eye      Thumb fracture           Core Measures:     CVA: Yes Yes  CHF:No No  PNA:No No     Post Op Surgical (If Applicable):      Number times ambulated in hallway past shift:  0  Number of times OOB to chair past shift:   0  NG Tube: No  Incentive Spirometer: No  Drains: No   Volume  0  Dressing Present:  No  Flatus:  Not applicable     Activity Status:     OOB to Chair Yes  Ambulated this shift Yes   Bed Rest No     Supplemental F9: (NW Applicable)     NC No  NRB No  Venti-mask No  On 0 Liters/min     LINES AND DRAINS:     Central Line? No   PICC LINE? No   Urinary Catheter? No   DVT prophylaxis:     DVT prophylaxis Med- Yes  DVT prophylaxis SCD or MYNOR- No      Wounds: (If Applicable)     Wounds- No     Location 0     Patient Safety:     Falls Score Total Score: 1  Safety Level_______  Bed Alarm On? No  Sitter? No     Plan for upcoming shift: safety, neurochecks     Discharge Plan: Yes TBD     Active Consults:  IP CONSULT TO NEUROLOGY  IP CONSULT TO NEUROLOGY  IP CONSULT TO HOSPITALIST  IP CONSULT TO CARDIOLOGY

## 2019-12-06 NOTE — DISCHARGE SUMMARY
Hospitalist Discharge Summary     Patient ID:  Linda Steward  [id number]  35 y.o.  1970  12/3/2019    PCP on record: Terrence Pollard DO    Admit date: 12/3/2019  Discharge date and time: 12/6/2019    DISCHARGE DIAGNOSIS:    Acute Embolic CVA POA  Acute painless Right Vision Loss  DM  Hypertension  CKD III       CONSULTATIONS:  IP CONSULT TO NEUROLOGY  IP CONSULT TO NEUROLOGY  IP CONSULT TO HOSPITALIST  IP CONSULT TO CARDIOLOGY    Excerpted HPI from H&P of Polo Capps MD:  51years old male from home with past medical history significant for hypertension, DM, history of aortic dissection with repair last month presented to the hospital for evaluation of acute painless right eye vision loss started about 9 PM while patient watching TV, patient denies any focal weakness numbness any headache, head CT was done show no acute abnormality CTA was done show no acute abnormality, patient was evaluated by telemetry neurology recommended patient to be started on Plavix and aspirin admit patient for stroke work-up patient was recently admitted to the hospital for aortic dissection last month and he had a aortic dissection repair with a stent placement by Dr. Joe Mera   We were asked to admit for work up and evaluation of the above problems.        ______________________________________________________________________  DISCHARGE SUMMARY/HOSPITAL COURSE:  for full details see H&P, daily progress notes, labs, consult notes.  with a past medical history of diabetes, CKD stage III, history of type II aortic dissection repair presented to painless right eye vision loss, his symptoms improved next day but still on day of discharge he still had a blurriness on the central vision. He had a TTE and KRISTY, did not show any source. He did not have much atherosclerotic plaque in the aorta except in the descending aorta.   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 No need for Skilled therapy at home. Discussed with dr Teressa Montoya, will switch to Plavix and DC aspirin. Acute Embolic CVA POA  Acute painless Right Vision Loss     Admitted with Acute right Vision loss  Likely 2/2 Central retinal artery occlusion from Emboli phenomenon.     MRI - acute area of ischemia left temporal lobe. -aspirin, plavix, statin- DC aspirin  PT/OT/ST     -ECHO negative     H/O Acute type B aortic dissection starting from left subclavian down to renals compromising celiac and renals, s/p emergent TEVAR, left subclavian embolization with left carotid to left subclavian bypass, left renal artery stent - 2019        -KRISTY with descending aorta atheroma, other wise normal.        -appreciate Dr Mims`s help      DM  -Hold oral hypoglycemic medication-start patient sliding scale with insulin  Hypertension    chronic renal failure stage III  Suspected Sanjay initially, but ruled out. Creat at around baseline           _______________________________________________________________________  Patient seen and examined by me on discharge day. Pertinent Findings:  Gen:    Not in distress  Chest: Clear lungs  CVS:   Regular rhythm. No edema  Abd:  Soft, not distended, not tender  Neuro:  Alert, oriented x 3  _______________________________________________________________________  DISCHARGE MEDICATIONS:   Current Discharge Medication List      START taking these medications    Details   atorvastatin (LIPITOR) 40 mg tablet Take 1 Tab by mouth nightly. Qty: 30 Tab, Refills: 1      clopidogrel (PLAVIX) 75 mg tab Take 1 Tab by mouth daily. Qty: 30 Tab, Refills: 1         CONTINUE these medications which have CHANGED    Details   carvedilol (COREG) 12.5 mg tablet Take 1 Tab by mouth two (2) times daily (with meals). Qty: 60 Tab, Refills: 0         CONTINUE these medications which have NOT CHANGED    Details   SITagliptin (JANUVIA) 100 mg tablet Take 1 Tab by mouth daily.   Qty: 90 Tab, Refills: 3      ferrous sulfate 325 mg (65 mg iron) tablet Take 1 Tab by mouth Daily (before breakfast). Qty: 90 Tab, Refills: 3      canagliflozin (INVOKANA) 100 mg tablet Take 1 Tab by mouth Daily (before breakfast). Qty: 90 Tab, Refills: 3      amLODIPine (NORVASC) 10 mg tablet Take 1 Tab by mouth daily. Resume on Friday 05/17/19  Qty: 90 Tab, Refills: 1      hydrALAZINE (APRESOLINE) 50 mg tablet Take 1 Tab by mouth three (3) times daily. Resume on Saturday 5/18/19  Qty: 90 Tab, Refills: 2      acetaminophen (TYLENOL) 325 mg tablet Take 325-650 mg by mouth every four (4) hours as needed for Pain.      ertugliflozin (STEGLATRO) 5 mg tab Take 5 mg by mouth daily. Qty: 90 Tab, Refills: 3      cetirizine (ZYRTEC) 10 mg tablet Take 10 mg by mouth daily as needed for Allergies. famotidine (PEPCID) 40 mg tablet Take 1 Tab by mouth daily. Qty: 30 Tab, Refills: 2         STOP taking these medications       aspirin delayed-release 81 mg tablet Comments:   Reason for Stopping:         pravastatin (PRAVACHOL) 20 mg tablet Comments:   Reason for Stopping:                 Patient Follow Up Instructions: Activity: Activity as tolerated.  No driving for 6 months  Diet: Cardiac Diet    Follow-up Information     Follow up With Specialties Details Why Contact Info    Rox Garcia DO Internal Medicine In 2 weeks  [address]      Frank Duckworth MD Vascular Surgery In 2 weeks  [address] [facility]  [address]  In 5 days For Central retinal artery occlusion/Hollenhorst Plaque     Federico Mayo MD Neurology In 4 weeks  [address]  [facility]  [phone]          ________________________________________________________________    Risk of deterioration: Low    Condition at Discharge: Stable  __________________________________________________________________    Disposition  Home with family and home health services    ____________________________________________________________________    Code Status: Full Code  ___________________________________________________________________      Total time in minutes spent coordinating this discharge (includes going over instructions, follow-up, prescriptions, and preparing report for sign off to her PCP) :  38 minutes    Signed:  Argenis Blair MD

## 2019-12-10 ENCOUNTER — TELEPHONE (OUTPATIENT)
Dept: INTERNAL MEDICINE CLINIC | Age: 49
End: 2019-12-10

## 2019-12-10 ENCOUNTER — HOME CARE VISIT (OUTPATIENT)
Dept: SCHEDULING | Facility: HOME HEALTH | Age: 49
End: 2019-12-10

## 2019-12-10 PROCEDURE — G0299 HHS/HOSPICE OF RN EA 15 MIN: HCPCS

## 2019-12-10 NOTE — TELEPHONE ENCOUNTER
Vania sanchez calling with [facility] about patients hospital f/u visit. Patient is experiencing high blood pressure and chest pain. Attempted to reach nurses but no answer. Please call and advise.

## 2019-12-10 NOTE — TELEPHONE ENCOUNTER
Mounika would like pt to be seen today. Please call Mounika back as soon as possible she would like to speak with you right now.     #[phone]

## 2019-12-10 NOTE — TELEPHONE ENCOUNTER
Called and spoke with Mounika. Appointment scheduled for 12/11 @ 597 with Dr. Bowman Forward to notify pt to go to the ed if s/s worsen. Mounika verbalized understanding of information discussed w/ no further questions at this time.

## 2019-12-11 ENCOUNTER — TELEPHONE (OUTPATIENT)
Dept: INTERNAL MEDICINE CLINIC | Age: 49
End: 2019-12-11

## 2019-12-11 ENCOUNTER — OFFICE VISIT (OUTPATIENT)
Dept: INTERNAL MEDICINE CLINIC | Age: 49
End: 2019-12-11

## 2019-12-11 VITALS
RESPIRATION RATE: 16 BRPM | WEIGHT: 242 LBS | HEART RATE: 80 BPM | SYSTOLIC BLOOD PRESSURE: 178 MMHG | TEMPERATURE: 98 F | OXYGEN SATURATION: 97 % | BODY MASS INDEX: 28.57 KG/M2 | DIASTOLIC BLOOD PRESSURE: 105 MMHG | HEIGHT: 77 IN

## 2019-12-11 DIAGNOSIS — Z86.79 HISTORY OF AORTIC DISSECTION: ICD-10-CM

## 2019-12-11 DIAGNOSIS — N18.2 CKD (CHRONIC KIDNEY DISEASE) STAGE 2, GFR 60-89 ML/MIN: ICD-10-CM

## 2019-12-11 DIAGNOSIS — I10 ESSENTIAL HYPERTENSION: ICD-10-CM

## 2019-12-11 DIAGNOSIS — E78.5 HYPERLIPIDEMIA ASSOCIATED WITH TYPE 2 DIABETES MELLITUS (HCC): ICD-10-CM

## 2019-12-11 DIAGNOSIS — E11.69 HYPERLIPIDEMIA ASSOCIATED WITH TYPE 2 DIABETES MELLITUS (HCC): ICD-10-CM

## 2019-12-11 DIAGNOSIS — E11.9 TYPE 2 DIABETES MELLITUS WITHOUT COMPLICATION, WITHOUT LONG-TERM CURRENT USE OF INSULIN (HCC): ICD-10-CM

## 2019-12-11 DIAGNOSIS — F32.9 REACTIVE DEPRESSION: ICD-10-CM

## 2019-12-11 DIAGNOSIS — I63.9 ACUTE ISCHEMIC STROKE (HCC): Primary | ICD-10-CM

## 2019-12-11 RX ORDER — ESCITALOPRAM OXALATE 10 MG/1
10 TABLET ORAL DAILY
Qty: 90 TAB | Refills: 3 | Status: SHIPPED | OUTPATIENT
Start: 2019-12-11 | End: 2020-01-07

## 2019-12-11 RX ORDER — LOSARTAN POTASSIUM 25 MG/1
25 TABLET ORAL DAILY
Qty: 90 TAB | Refills: 3 | Status: SHIPPED | OUTPATIENT
Start: 2019-12-11 | End: 2020-01-07 | Stop reason: SDUPTHER

## 2019-12-11 RX ORDER — FAMOTIDINE 40 MG/1
40 TABLET, FILM COATED ORAL DAILY
Qty: 90 TAB | Refills: 3 | Status: SHIPPED | OUTPATIENT
Start: 2019-12-11 | End: 2020-01-07 | Stop reason: SDUPTHER

## 2019-12-11 RX ORDER — AMLODIPINE BESYLATE 10 MG/1
TABLET ORAL
Qty: 90 TAB | Refills: 3 | Status: SHIPPED | OUTPATIENT
Start: 2019-12-11 | End: 2020-12-30

## 2019-12-11 NOTE — PATIENT INSTRUCTIONS
Office Policies    Phone calls/patient messages:            Please allow up to 24 hours for someone in the office to contact you about your call or message. Be mindful your provider may be out of the office or your message may require further review. We encourage you to use [employer] for your messages as this is a faster, more efficient way to communicate with our office                         Medication Refills:            Prescription medications require 48-72 business hours to process. We encourage you to use [employer] for your refills. For controlled medications: Please allow 72 business hours to process. Certain medications may require you to  a written prescription at our office. NO narcotic/controlled medications will be prescribed after 4pm Monday through Friday or on weekends              Form/Paperwork Completion:            Please note a $25 fee may incur for all paperwork for completed by our providers. We ask that you allow 7-10 business days. Pre-payment is due prior to picking up/faxing the completed form. You may also download your forms to [employer] to have your doctor print off. Home Blood Pressure Test: About This Test  What is it? A home blood pressure test allows you to keep track of your blood pressure at home. Blood pressure is a measure of the force of blood against the walls of your arteries. Blood pressure readings include two numbers, such as 130/80 (say \"130 over 80\"). The first number is the systolic pressure. The second number is the diastolic pressure. Why is this test done? You may do this test at home to:  · Find out if you have high blood pressure. · Track your blood pressure if you have high blood pressure. · Track how well medicine is working to reduce high blood pressure. · Check how lifestyle changes, such as weight loss and exercise, are affecting blood pressure.   How can you prepare for the test?  · Do not use caffeine, tobacco, or medicines known to raise blood pressure (such as nasal decongestant sprays) for at least 30 minutes before taking your blood pressure. · Do not exercise for at least 30 minutes before taking your blood pressure. What happens before the test?  Take your blood pressure while you feel comfortable and relaxed. Sit quietly with both feet on the floor for at least 5 minutes before the test.  What happens during the test?  · Sit with your arm slightly bent and resting on a table so that your upper arm is at the same level as your heart. · Roll up your sleeve or take off your shirt to expose your upper arm. · Wrap the blood pressure cuff around your upper arm so that the lower edge of the cuff is about 1 inch above the bend of your elbow. Proceed with the following steps depending on if you are using an automatic or manual pressure monitor. Automatic blood pressure monitors  · Press the on/off button on the automatic monitor and wait until the ready-to-measure \"heart\" symbol appears next to zero in the display window. · Press the start button. The cuff will inflate and deflate by itself. · Your blood pressure numbers will appear on the screen. · Write your numbers in your log book, along with the date and time. Manual blood pressure monitors  · Place the earpieces of a stethoscope in your ears, and place the bell of the stethoscope over the artery, just below the cuff. · Close the valve on the rubber inflating bulb. · Squeeze the bulb rapidly with your opposite hand to inflate the cuff until the dial or column of mercury reads about 30 mm Hg higher than your usual systolic pressure. If you do not know your usual pressure, inflate the cuff to 210 mm Hg or until the pulse at your wrist disappears. · Open the pressure valve just slightly by twisting or pressing the valve on the bulb.   · As you watch the pressure slowly fall, note the level on the dial at which you first start to hear a pulsing or tapping sound through the stethoscope. This is your systolic blood pressure. · Continue letting the air out slowly. The sounds will become muffled and will finally disappear. Note the pressure when the sounds completely disappear. This is your diastolic blood pressure. Let out all the remaining air. · Write your numbers in your log book, along with the date and time. What else should you know about the test?  It is more accurate to take the average of several readings made throughout the day than to rely on a single reading. It's normal for blood pressure to go up and down throughout the day. Follow-up care is a key part of your treatment and safety. Be sure to make and go to all appointments, and call your doctor if you are having problems. It's also a good idea to keep a list of the medicines you take. Where can you learn more? Go to http://el-daisy.info/. Enter C427 in the search box to learn more about \"Home Blood Pressure Test: About This Test.\"  Current as of: April 9, 2019  Content Version: 12.2  © 1935-4407 [employer]. Care instructions adapted under license by [employer] (which disclaims liability or warranty for this information). If you have questions about a medical condition or this instruction, always ask your healthcare professional. [address] any warranty or liability for your use of this information. Would always take your blood pressure meds in the morning with water before you come to your appointments. We will contact you soon about additional meds to help control your sugars.

## 2019-12-11 NOTE — PROGRESS NOTES
Reviewed record in preparation for visit and have obtained necessary documentation. Identified pt with two pt identifiers(name and ). Chief Complaint   Patient presents with   [facility] Follow Up       Health Maintenance Due   Topic Date Due    Diabetic Foot Care  1981    Eye Exam  1981    Flu Vaccine  2019       Mr. Guy Martinez has a reminder for a \"due or due soon\" health maintenance. I have asked that he discuss this further with his primary care provider for follow-up on this health maintenance. Coordination of Care Questionnaire:  :     1) Have you been to an emergency room, urgent care clinic since your last visit? yes   Hospitalized since your last visit? yes             2) Have you seen or consulted any other health care providers outside of [address] [place] [address] since your last visit? yes  (Include any pap smears or colon screenings in this section.)    3) In the event something were to happen to you and you were unable to speak on your behalf, do you have an Advance Directive/ Living Will in place stating your wishes? NO    Do you have an Advance Directive on file? no    4) Are you interested in receiving information on Advance Directives? NO    Patient is accompanied by self I have received verbal consent from Kasie Parekh to discuss any/all medical information while they are present in the room.

## 2019-12-11 NOTE — PROGRESS NOTES
Shanthi Angeles is a 51 y.o. male who presents for evaluation of hospital follow up. Last seen by me oct 7, 2019. [facility] in right eye. His sight is improving, but still has some deficits. Saw ophtho recently, and they referred him to another specialist.  His wife and child are with him today. He is quite depressed about his health, and is now unable to drive for 6 months, and has been denied disability thus far. Was also unable to get invokana or steglatra for his sugars.       ROS:  Constitutional: negative for fevers, chills, anorexia and weight loss  Eyes:   negative for visual disturbance and irritation  ENT:   negative for tinnitus,sore throat,nasal congestion,ear pain,hoarseness  Respiratory:  negative for cough, hemoptysis, dyspnea,wheezing  CV:   negative for chest pain, palpitations, lower extremity edema  GI:   negative for nausea, vomiting, diarrhea, abdominal pain,melena  Genitourinary: negative for frequency, dysuria and hematuria  Musculoskel: negative for myalgias, arthralgias, back pain, muscle weakness, joint pain  Neurological:  negative for headaches, dizziness, focal weakness, numbness  Psychiatric:     Negative for depression or anxiety      Past Medical History:   Diagnosis Date    Bilateral carotid artery stenosis     Diabetic peripheral neuropathy associated with type 2 diabetes mellitus ([address]     Foot fracture     H/O aortic dissection     Hollenhorst plaque, right eye     Hypertension     Jaw fracture (HCC)     Ruptured ear drum     Subjective visual disturbance, right eye     Thumb fracture        Past Surgical History:   Procedure Laterality Date    HX APPENDECTOMY      HX ARTERIAL BYPASS  02/15/2019    HX ORTHOPAEDIC      HX OTHER SURGICAL  02/15/2019    8 stints    HX OTHER SURGICAL  02/15/2019    Double bypass       Family History   Problem Relation Age of Onset    Diabetes Mother     Stroke Mother  Colon Cancer Father     Cancer Brother     No Known Problems Child        Social History     Socioeconomic History    Marital status: SINGLE     Spouse name: Not on file    Number of children: Not on file    Years of education: Not on file    Highest education level: Not on file   Occupational History    Not on file   Social Needs    Financial resource strain: Not on file    Food insecurity:     Worry: Not on file     Inability: Not on file    Transportation needs:     Medical: Not on file     Non-medical: Not on file   Tobacco Use    Smoking status: Never Smoker    Smokeless tobacco: Never Used   Substance and Sexual Activity    Alcohol use: No    Drug use: No    Sexual activity: Yes     Partners: Female     Birth control/protection: Surgical   Lifestyle    Physical activity:     Days per week: Not on file     Minutes per session: Not on file    Stress: Not on file   Relationships    Social connections:     Talks on phone: Not on file     Gets together: Not on file     Attends Scientology service: Not on file     Active member of club or organization: Not on file     Attends meetings of clubs or organizations: Not on file     Relationship status: Not on file    Intimate partner violence:     Fear of current or ex partner: Not on file     Emotionally abused: Not on file     Physically abused: Not on file     Forced sexual activity: Not on file   Other Topics Concern    Not on file   Social History Narrative    Not on file            Visit Vitals  BP (!) 178/105 (BP 1 Location: Right arm, BP Patient Position: Sitting)   Pulse 80   Temp 98 °F (36.7 °C) (Oral)   Resp 16   Ht 6' 5\" (1.956 m)   Wt 242 lb (109.8 kg)   SpO2 97%   BMI 28.70 kg/m²       Physical Examination:   General - Well appearing male  HEENT - PERRL, TM no erythema/opacification, normal nasal turbinates, no oropharyngeal erythema or exudate, MMM  Neck - supple, no bruits, no thyroidomegaly, no lymphadenopathy  Pulm - clear to auscultation bilaterally  Cardio - RRR, normal S1 S2, no murmur  Abd - soft, nontender, no masses, no HSM  Extrem - no edema, +2 distal pulses  Neuro-  No focal deficits, CN intact     Assessment/Plan:    1. Acute cva with right eye vision loss--now on plavix. Has eye follow up next week with retinal specialist.  2.  htn--remains uncontrolled, and again he has not taken his meds. Told him to take them first thing in am with water. Continue norvasc, coreg, hydralazine. Add cozaar. Monitor at home  3. Uncontrolled dm, type 2--last a1c 7.8. On januvia. Tried to get him invokana or steglatro, but both were denied by insurance. Will have pharmacist see what other options we have. 4.  ckd 2--stable, but precludes glucophage  5. Hx aortic type B dissection--had sx with dr lyndon Callejas.   6.  Reactive depression--rx for lexapro    rtc 2 months        Kelly Case III, DO

## 2019-12-11 NOTE — TELEPHONE ENCOUNTER
Pharmacy Progress Note - Medication Access    Consulted by Dr. Mario Sicard regarding Mr. Andrei James 49 y.o.'s diabetes medications access. Patient has [facility] ([phone]). Uses [facility] on [address] [facility]. Confirmed with [facility] DR BRANDON KIDD. Patient was able to  Januvia this week for $0 for 30 days. Invokana and Steglatro require a prior authorization. Contacted [employer] insurance to submit prior authorization. Discussed patient cannot take metformin d/t renal function. Note, invokana is now approved for eGFR 45-60 ml/min. Prior authorization reference # [id number]. Will hear back within 24 hrs. Lab Results   Component Value Date/Time    Hemoglobin A1c 7.8 (H) 12/05/2019 03:05 AM    Hemoglobin A1c 8.9 (H) 10/07/2019 08:57 AM    Hemoglobin A1c 7.8 (H) 06/06/2019 12:16 PM     Lab Results   Component Value Date/Time    Sodium 139 12/06/2019 04:10 AM    Potassium 3.2 (L) 12/06/2019 04:10 AM    Chloride 105 12/06/2019 04:10 AM    CO2 27 12/06/2019 04:10 AM    Anion gap 7 12/06/2019 04:10 AM    Glucose 138 (H) 12/06/2019 04:10 AM    BUN 23 (H) 12/06/2019 04:10 AM    Creatinine 1.76 (H) 12/06/2019 04:10 AM    BUN/Creatinine ratio 13 12/06/2019 04:10 AM    GFR est AA 50 (L) 12/06/2019 04:10 AM    GFR est non-AA 42 (L) 12/06/2019 04:10 AM    Calcium 8.2 (L) 12/06/2019 04:10 AM    Bilirubin, total 0.6 12/03/2019 09:53 PM    AST (SGOT) 20 12/03/2019 09:53 PM    Alk. phosphatase 64 12/03/2019 09:53 PM    Protein, total 8.7 (H) 12/03/2019 09:53 PM    Albumin 4.4 12/03/2019 09:53 PM    Globulin 4.3 (H) 12/03/2019 09:53 PM    A-G Ratio 1.0 (L) 12/03/2019 09:53 PM    ALT (SGPT) 28 12/03/2019 09:53 PM     Lab Results   Component Value Date/Time    Microalb/Creat ratio (ug/mg creat.) 92.8 (H) 10/07/2019 08:57 AM     Estimated Creatinine Clearance: 70.7 mL/min (A) (by C-G formula based on SCr of 1.76 mg/dL (H)).       Thank you for the consult,  Johanna Álvarez, PharmD, CDE

## 2019-12-12 NOTE — TELEPHONE ENCOUNTER
Pharmacy Progress Note     Patient is now approved for Invokana. Will inform [address] about updates.      Thank you for the consult,  Johanna Hernandez, PharmD, CDE

## 2019-12-13 ENCOUNTER — HOME CARE VISIT (OUTPATIENT)
Dept: SCHEDULING | Facility: HOME HEALTH | Age: 49
End: 2019-12-13

## 2019-12-13 PROCEDURE — G0495 RN CARE TRAIN/EDU IN HH: HCPCS

## 2019-12-19 ENCOUNTER — PATIENT OUTREACH (OUTPATIENT)
Dept: INTERNAL MEDICINE CLINIC | Age: 49
End: 2019-12-19

## 2019-12-19 NOTE — PROGRESS NOTES
Chart reviewed. Reached out to pt regarding follow up of diabetes. 12/19/19-dkw  -pt's last a1c was 7.8% on 12/5/19 during inpt [id number] for acute ischemic stroke that affected vision in his right eye; pt has h/o aortic dissection  -pt picked up the [address] and is also taking Januvia; Sarah Medrano helped pt with prior auth for Invokana  -pt is not monitoring blood sugar due to has not strips; he will take his glucometer to pharmacy and make sure he gets the correct test strips and check fasting daily; pt will call if needs assistance with testing supplies; advised pt his fasting goal is <150 and that having controlled diabetes will help his whole body  -stopped Lexapro yesterday that Dr. Sarahy Ríos prescribed for reactive depression because feeling better; was really stressed out before due to decreased vision in right eye that is returning now; advised pt the Lexapro may be the reason he was feelign better and to restart if his symptoms of depression return  -pt has been resting quite a bit  -pt's appetite is returning, but is still not eating as much  -home health has come out twice  -pt is working on getting approved for disability  -pt will f/u with Dr. Sarahy Ríos on 1/7/20 at 8:15  -will follow up with pt at that appt per pt's request    Lab Results   Component Value Date/Time    Hemoglobin A1c 7.8 (H) 12/05/2019 03:05 AM    Hemoglobin A1c 8.9 (H) 10/07/2019 08:57 AM    Hemoglobin A1c 7.8 (H) 06/06/2019 12:16 PM     Key Antihyperglycemic Medications             canagliflozin (INVOKANA) 100 mg tablet Take  by mouth Daily (before breakfast). SITagliptin (JANUVIA) 100 mg tablet Take 1 Tab by mouth daily.           Future Appointments   Date Time Provider Travis Garcia   1/7/2020  8:15 AM Brian Niño      Last Appointment My Department:  12/11/2019

## 2020-01-07 ENCOUNTER — PATIENT OUTREACH (OUTPATIENT)
Dept: INTERNAL MEDICINE CLINIC | Age: 50
End: 2020-01-07

## 2020-01-07 ENCOUNTER — OFFICE VISIT (OUTPATIENT)
Dept: INTERNAL MEDICINE CLINIC | Age: 50
End: 2020-01-07

## 2020-01-07 VITALS
HEIGHT: 76 IN | OXYGEN SATURATION: 99 % | HEART RATE: 93 BPM | BODY MASS INDEX: 28.86 KG/M2 | WEIGHT: 237 LBS | RESPIRATION RATE: 18 BRPM | DIASTOLIC BLOOD PRESSURE: 94 MMHG | TEMPERATURE: 98.1 F | SYSTOLIC BLOOD PRESSURE: 155 MMHG

## 2020-01-07 DIAGNOSIS — E78.5 HYPERLIPIDEMIA ASSOCIATED WITH TYPE 2 DIABETES MELLITUS (HCC): ICD-10-CM

## 2020-01-07 DIAGNOSIS — E11.21 TYPE 2 DIABETES WITH NEPHROPATHY (HCC): ICD-10-CM

## 2020-01-07 DIAGNOSIS — E11.69 HYPERLIPIDEMIA ASSOCIATED WITH TYPE 2 DIABETES MELLITUS (HCC): ICD-10-CM

## 2020-01-07 DIAGNOSIS — I63.312 THROMBOTIC STROKE INVOLVING LEFT MIDDLE CEREBRAL ARTERY (HCC): ICD-10-CM

## 2020-01-07 DIAGNOSIS — I10 ESSENTIAL HYPERTENSION: Primary | ICD-10-CM

## 2020-01-07 DIAGNOSIS — Z86.79 HISTORY OF AORTIC DISSECTION: ICD-10-CM

## 2020-01-07 RX ORDER — FAMOTIDINE 40 MG/1
40 TABLET, FILM COATED ORAL DAILY
Qty: 90 TAB | Refills: 3 | Status: SHIPPED | OUTPATIENT
Start: 2020-01-07

## 2020-01-07 RX ORDER — LOSARTAN POTASSIUM 25 MG/1
25 TABLET ORAL DAILY
Qty: 90 TAB | Refills: 3 | Status: SHIPPED | OUTPATIENT
Start: 2020-01-07 | End: 2020-12-30

## 2020-01-07 RX ORDER — CARVEDILOL 12.5 MG/1
12.5 TABLET ORAL 2 TIMES DAILY WITH MEALS
Qty: 180 TAB | Refills: 3 | Status: SHIPPED | OUTPATIENT
Start: 2020-01-07 | End: 2021-01-04

## 2020-01-07 NOTE — PROGRESS NOTES
Identified pt with two pt identifiers(name and ). Reviewed record in preparation for visit and have obtained necessary documentation. Chief Complaint   Patient presents with    Cerebrovascular Accident    Diabetes        Visit Vitals  BP (!) 155/94 (BP 1 Location: Right arm, BP Patient Position: Sitting)   Pulse 93   Temp 98.1 °F (36.7 °C) (Oral)   Resp 18   Ht 6' 4\" (1.93 m)   Wt 237 lb (107.5 kg)   SpO2 99%   BMI 28.85 kg/m²       Health Maintenance Due   Topic    FOOT EXAM Q1     EYE EXAM RETINAL OR DILATED     Influenza Age 5 to Adult        Med Reconciliation: Completed    Coordination of Care Questionnaire:  :   1) Have you been to an emergency room, urgent care, or hospitalized since your last visit? If yes, where when, and reason for visit? No       2. Have seen or consulted any other health care provider since your last visit? If yes, where when, and reason for visit? No       3) Do you have an Advanced Directive/ Living Will in place? No  If yes, do we have a copy on file   If no, would you like information     Patient is accompanied by self I have received verbal consent from Kodak Kilpatrick to discuss any/all medical information while they are present in the room.

## 2020-01-07 NOTE — PROGRESS NOTES
Ray Nunn is a 51 y.o. male who presents for evaluation of routine follow up for htn. Last seen by me dec 11, 2019. bp was 178/105 then. Since then, he has lost 5 lbs, and is taking his bp meds each morning. Vision is slowly improving. Overall feels much better now. Stopped his lexapro.       ROS:  Constitutional: negative for fevers, chills, anorexia and weight loss  Eyes:   negative for visual disturbance and irritation  ENT:   negative for tinnitus,sore throat,nasal congestion,ear pain,hoarseness  Respiratory:  negative for cough, hemoptysis, dyspnea,wheezing  CV:   negative for chest pain, palpitations, lower extremity edema  GI:   negative for nausea, vomiting, diarrhea, abdominal pain,melena  Genitourinary: negative for frequency, dysuria and hematuria  Musculoskel: negative for myalgias, arthralgias, back pain, muscle weakness, joint pain  Neurological:  negative for headaches, dizziness, focal weakness, numbness  Psychiatric:     Negative for depression or anxiety      Past Medical History:   Diagnosis Date    Bilateral carotid artery stenosis     Diabetic peripheral neuropathy associated with type 2 diabetes mellitus ([facility] [address]     Foot fracture     H/O aortic dissection     Hollenhorst plaque, right eye     Hypertension     Jaw fracture (HCC)     Ruptured ear drum     Subjective visual disturbance, right eye     Thumb fracture        Past Surgical History:   Procedure Laterality Date    HX APPENDECTOMY      HX ARTERIAL BYPASS  02/15/2019    HX ORTHOPAEDIC      HX OTHER SURGICAL  02/15/2019    8 stints    HX OTHER SURGICAL  02/15/2019    Double bypass       Family History   Problem Relation Age of Onset    Diabetes Mother     Stroke Mother     Colon Cancer Father     Cancer Brother     No Known Problems Child        Social History     Socioeconomic History    Marital status: SINGLE     Spouse name: Not on file    Number of children: Not on file    Years of education: Not on file    Highest education level: Not on file   Occupational History    Not on file   Social Needs    Financial resource strain: Not on file    Food insecurity:     Worry: Not on file     Inability: Not on file    Transportation needs:     Medical: Not on file     Non-medical: Not on file   Tobacco Use    Smoking status: Never Smoker    Smokeless tobacco: Never Used   Substance and Sexual Activity    Alcohol use: No    Drug use: No    Sexual activity: Yes     Partners: Female     Birth control/protection: Surgical   Lifestyle    Physical activity:     Days per week: Not on file     Minutes per session: Not on file    Stress: Not on file   Relationships    Social connections:     Talks on phone: Not on file     Gets together: Not on file     Attends Taoist service: Not on file     Active member of club or organization: Not on file     Attends meetings of clubs or organizations: Not on file     Relationship status: Not on file    Intimate partner violence:     Fear of current or ex partner: Not on file     Emotionally abused: Not on file     Physically abused: Not on file     Forced sexual activity: Not on file   Other Topics Concern    Not on file   Social History Narrative    Not on file            Visit Vitals  BP (!) 155/94 (BP 1 Location: Right arm, BP Patient Position: Sitting)   Pulse 93   Temp 98.1 °F (36.7 °C) (Oral)   Resp 18   Ht 6' 4\" (1.93 m)   Wt 237 lb (107.5 kg)   SpO2 99%   BMI 28.85 kg/m²       Physical Examination:   General - Well appearing male  HEENT - PERRL, TM no erythema/opacification, normal nasal turbinates, no oropharyngeal erythema or exudate, MMM  Neck - supple, no bruits, no thyroidomegaly, no lymphadenopathy  Pulm - clear to auscultation bilaterally  Cardio - RRR, normal S1 S2, no murmur  Abd - soft, nontender, no masses, no HSM  Extrem - no edema, +2 distal pulses  Neuro-  No focal deficits, CN intact         Diabetic foot exam performed by Marlon Calloway III, DO     Measurement  Response Nurse Comment Physician Comment   Monofilament  R - normal sensation with micro filament  L - normal sensation with micro filament     Pulse DP R - present  L - present     Pulse TP R - present  L - present     Structural deformity R - None  L - None     Skin Integrity / Deformity R - None  L - None        Reviewed by:              Assessment/Plan:    1.  htn--improving, down 20 points since last visit. Will continue with same meds, norvasc, coreg, cozaar, hydralazine. Try to monitor at home. 2.  Dm, type 2--on [place] and invokana, last a1c 7.8  3.  hyperlipids--on lipitor, last LDL 66  4.  ckd 2--has been stable  5. Hx aortic dissection, type B--sp sx  6. Vision loss--due to cva, improving. On plavix  7.   Acute cva--on plavix    rtc 3 months        Baldo Schaefer III, DO

## 2020-01-07 NOTE — PATIENT INSTRUCTIONS
Try to follow blood pressure at home a few times each week. Write down results and bring to next visit.

## 2020-01-17 PROBLEM — H34.11 CENTRAL RETINAL ARTERY OCCLUSION OF RIGHT EYE: Status: ACTIVE | Noted: 2020-01-17

## 2020-01-28 ENCOUNTER — PATIENT OUTREACH (OUTPATIENT)
Dept: INTERNAL MEDICINE CLINIC | Age: 50
End: 2020-01-28

## 2020-01-28 NOTE — PROGRESS NOTES
Pt is interested in diabetes group classes and is scheduled for the first class on Thursday 2/13/20 from 10-12:00.

## 2020-02-14 ENCOUNTER — PATIENT OUTREACH (OUTPATIENT)
Dept: INTERNAL MEDICINE CLINIC | Age: 50
End: 2020-02-14

## 2020-02-14 NOTE — PROGRESS NOTES
Goals      Patient verbalizes understanding of self -management goals of living with Diabetes.       2/14/20-dkw  -pt was a NS for DSMT session #1 yesterday 2/13/20  -pt has f/u with Dr. Estefania Allen on 4/9/20; will follow then    1/28/20-dkw  -pt scheduled session #1 DSMT on Thursday February 13th from 10-12:00    1/7/20-dkw  -pt here today for f/u with Dr. Estefania Allen; his mom is in rehab facility in [place] s/p stroke        -pt's last a1c was 7.8% on 12/5/19 during inpt at [address] for acute ischemic stroke           that affected vision in his right eye; pt has h/o aortic dissection; vision in his right            eye is slowly coming back; his next appt with Dr. Lopez Arias is March 16th; home                 health only came out twice  -is checking blood sugars a few times a week fasting; four days ago was 130 something; discussed importance of checking fasting every morning to help with self management of his diabetes; goal is <140  -pt is taking Invokana daily and just got his Januvia refilled; he will take both in the morning  -pt stopped Lexapro due to anxiety decreased when his vision started coming back  -reviewed healthy eating again today; appetite is good; he is drinking a lot of juice and will switch to no calorie drinks like Crystal Light  -discussed exercise like taking a 15 minute walk daily and to get advisement from Dr. 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  -gave pt the following resources:  -Living With Type 2 Diabetes Create Your Plate section  -Label Reading Basics for Diabetes  -Low and No-Carb Snack Ideas for Diabetics  -List of apps designed to support weight loss and weight maintenance efforts  -Know your numbers handout       -pt will call Violet Moy at [phone] with any questions before next appt; phone number         given to pt       -he is interested is diabetes self management training (DSMT) classes       -will follow at next appt with  Adamaris Gillis on 4/9/20 12/19/19-dkw  -pt's last a1c was 7.8% on 12/5/19 during inpt at [facility] for acute ischemic stroke that affected vision in his right eye; pt has h/o aortic dissection  -pt picked up the [address] [place] [address] Revizer and is also taking Januvia;  Beth Evans helped pt with prior auth for Invokana  -pt is not monitoring blood sugar due to has not strips; he will take his glucometer to pharmacy and make sure he gets the correct test strips and check fasting daily; pt will call if needs assistance with testing supplies; advised pt his fasting goal is <150 and that having controlled diabetes will help his whole body  -stopped Lexapro yesterday that Dr. Adamaris Gillis prescribed for reactive depression because feeling better; was really stressed out before due to decreased vision in right eye that is returning now; advised pt the Lexapro may be the reason he was feelign better and to restart if his symptoms of depression return  -pt has been resting quite a bit  -pt's appetite is returning, but is still not eating as much  -home health has come out twice  -pt is working on getting approved for disability  -pt will f/u with Dr. Adamaris Gillis on 1/7/20 at 8:15  -will follow up with pt at that appt per pt's request    11/1/19-dkw  -left message for pt to call back  -will follow around next ov w/Dr. Adamaris Gillis 1/7/20 8/28/19-dkw  -left message to call back with blood sugars  -reminded pt of f/u with Dr. Adriana Campoverde on 10/7/19 at 8:00  -will follow    6/18/19-dkw  -fasting blood sugar yesterday 164; all <164 pt reported; he will continue to check fasting   -advised pt to start the [address] [place] [address] Revizer; pt verbalized understanding   -pt is having dizziness; instructed him to call Dr. Cristian Clifton and or Dr. Ashleigh Pimentel who prescribed his heart meds   -will follow in one week    6/13/19-dkw  -check blood sugar fasting every morning and write down result; call Dada Irwin at [phone] in one week with results  -has not and does not was to start [facility]; hold for now as last blood sugar fasting was 110 and finished prednisone  -exercise as instructed by Dr. Dorita Alves  -strive to start following the healthy plate meal plan being mindful of what is a carb (fruit, dairy, grains, starchy vegetables) and portion size; guideline is up to 45-60 grams of carb per meal (3 meals per day) or 3-4 servings per meal; remember that 1/2 cup of a sweet drink is 15 grams of carb  -snack guideline - 1 oz. protein serving and may add 1 carb serving   -follow up with Dr. Laney Mathews on 10/7/19 at 8:00  -call Evelyne Reyes at [phone] with any questions  -will follow

## 2020-02-20 ENCOUNTER — TELEPHONE (OUTPATIENT)
Dept: INTERNAL MEDICINE CLINIC | Age: 50
End: 2020-02-20

## 2020-02-20 NOTE — TELEPHONE ENCOUNTER
Spoke with Kirk Duong. Pt c/o cold symptoms. Pt has only tried taking zyrtec. Recommended Barb to try OTC mucinex and nasal decongestant. Continue flonase and zrytec. Notified Kirk Duong to call the office Monday if pt is not doing better. Kirk Duong verbalized understanding of information discussed w/ no further questions at this time.

## 2020-02-20 NOTE — TELEPHONE ENCOUNTER
#[phone] [employer] states pt has a cough with head congestion and stuffy nose. She is asking to have something called in for pt to help with symptoms. Please call [employer] to let her know what you can do.

## 2020-04-09 ENCOUNTER — VIRTUAL VISIT (OUTPATIENT)
Dept: INTERNAL MEDICINE CLINIC | Age: 50
End: 2020-04-09

## 2020-04-09 DIAGNOSIS — E11.69 HYPERLIPIDEMIA ASSOCIATED WITH TYPE 2 DIABETES MELLITUS (HCC): ICD-10-CM

## 2020-04-09 DIAGNOSIS — E78.5 HYPERLIPIDEMIA ASSOCIATED WITH TYPE 2 DIABETES MELLITUS (HCC): ICD-10-CM

## 2020-04-09 DIAGNOSIS — H34.11 CENTRAL RETINAL ARTERY OCCLUSION OF RIGHT EYE: ICD-10-CM

## 2020-04-09 DIAGNOSIS — Z86.79 H/O AORTIC DISSECTION: ICD-10-CM

## 2020-04-09 DIAGNOSIS — R07.89 OTHER CHEST PAIN: Primary | ICD-10-CM

## 2020-04-09 DIAGNOSIS — I63.312 THROMBOTIC STROKE INVOLVING LEFT MIDDLE CEREBRAL ARTERY (HCC): ICD-10-CM

## 2020-04-09 DIAGNOSIS — I10 ESSENTIAL HYPERTENSION: ICD-10-CM

## 2020-04-09 DIAGNOSIS — E11.42 DIABETIC PERIPHERAL NEUROPATHY ASSOCIATED WITH TYPE 2 DIABETES MELLITUS (HCC): ICD-10-CM

## 2020-04-09 NOTE — PATIENT INSTRUCTIONS
Office Policies Phone calls/patient messages: Please allow up to 24 hours for someone in the office to contact you about your call or message. Be mindful your provider may be out of the office or your message may require further review. We encourage you to use [employer] for your messages as this is a faster, more efficient way to communicate with our office Medication Refills: 
         
Prescription medications require 48-72 business hours to process. We encourage you to use [employer] for your refills. For controlled medications: Please allow 72 business hours to process. Certain medications may require you to  a written prescription at our office. NO narcotic/controlled medications will be prescribed after 4pm Monday through Friday or on weekends Form/Paperwork Completion: 
         
Please note a $25 fee may incur for all paperwork for completed by our providers. We ask that you allow 7-10 business days. Pre-payment is due prior to picking up/faxing the completed form. You may also download your forms to [employer] to have your doctor print off.

## 2020-04-09 NOTE — PROGRESS NOTES
Consent: Shanae Tejada, who was seen by synchronous (real-time) audio-video technology, and/or his healthcare decision maker, is aware that this patient-initiated, Telehealth encounter on 4/9/2020 is a billable service, with coverage as determined by his insurance carrier. He is aware that he may receive a bill and has provided verbal consent to proceed: Yes. Assessment & Plan:             I spent at least 15 minutes with this established patient, and >50% of the time was spent counseling and/or coordinating care regarding dm, htn, cva, vision loss, hyperlipids, chest pain, gerd  712  Subjective:   Shanae Tejada is a 51 y.o. male who was seen for Hypertension; Chest Pain (x2 weeks; consistent); and Diabetes    Last seen by me jan 7, 2020. Today's visit is a virtual visit due to coronavirus. He does not check his bp or sugars. Has been having almost daily chest pain, able to put his finger exactly on the place in mid chest, about fist below sternum, where he gets some sharp and stabbing chest pain almost daily. Always in same location. Does not radiate. No other symptoms. Is not reproducible. Is not related to any exertion. Is always relieved with either tylenol or pepcid ac. Denies any other reflux symptoms. Otherwise he is doing well. Prior to Admission medications    Medication Sig Start Date End Date Taking? Authorizing Provider   sennosides 8.6 mg cap senna 8.6 mg capsule   Take 2 capsules every day by oral route as needed. Yes Provider, Historical   canagliflozin (Invokana) 100 mg tablet Take 100 mg by mouth Daily (before breakfast). Yes Provider, Historical   clopidogreL (PLAVIX) 75 mg tab Take 1 Tab by mouth daily. 3/24/20  Yes Jean-Claude Niño III, DO   hydrALAZINE (APRESOLINE) 50 mg tablet Take 1 Tab by mouth three (3) times daily. 2/6/20  Yes Jean-Claude Niño III, DO   atorvastatin (LIPITOR) 40 mg tablet Take 1 Tab by mouth nightly.  2/6/20  Yes Ella Hanson, DO   carvedilol (COREG) 12.5 mg tablet Take 1 Tab by mouth two (2) times daily (with meals). 1/7/20  Yes Jean-Claude Niño III, DO   famotidine (PEPCID) 40 mg tablet Take 1 Tab by mouth daily. 1/7/20  Yes Jean-Claude Niño III, DO   losartan (COZAAR) 25 mg tablet Take 1 Tab by mouth daily. 1/7/20  Yes Jean-Claude Niño III, DO   amLODIPine (NORVASC) 10 mg tablet TAKE 1 TABLET BY MOUTH ONCE DAILY 12/11/19  Yes Jean-Claude Niño III, DO   SITagliptin (JANUVIA) 100 mg tablet Take 1 Tab by mouth daily. 12/11/19  Yes Jean-Claude Niño III, DO   ferrous sulfate 325 mg (65 mg iron) tablet Take 1 Tab by mouth Daily (before breakfast). 10/7/19  Yes Jean-Claude Niño III, DO   acetaminophen (TYLENOL) 325 mg tablet Take 325-650 mg by mouth every four (4) hours as needed for Pain. Yes Provider, Historical   cetirizine (ZYRTEC) 10 mg tablet Take 10 mg by mouth daily as needed for Allergies. Yes Provider, Historical     No Known Allergies        ROS      Objective: There were no vitals taken for this visit. General: alert, cooperative, no distress   Mental  status: normal mood, behavior, speech, dress, motor activity, and thought processes, able to follow commands   HENT: NCAT   Neck: no visualized mass   Resp: no respiratory distress   Neuro: no gross deficits   Skin: no discoloration or lesions of concern on visible areas   Psychiatric: normal affect, consistent with stated mood, no evidence of hallucinations     Additional exam findings:     1. Chest pain--does not sound cardiac, and remains on plavix. Don't think he needs to have it evaluated now. Continue with tylenol or pepcid. 2.  Dm, type 2--on invokana, januvia. Asked him to check sugars at least a few mornings each week. 3.  cva with vision loss--on plavix now. Vision is slowly improving  4.  htn--encouraged to monitor a few times each week. On hydralazine, norvasc, coreg, cozaar. 5.  hyperlipids--on lipitor  6.   Hx of aortic dissection--on plavix. Needs to work on bp control. rtc 3 months, check labs then. We discussed the expected course, resolution and complications of the diagnosis(es) in detail. Medication risks, benefits, costs, interactions, and alternatives were discussed as indicated. I advised him to contact the office if his condition worsens, changes or fails to improve as anticipated. He expressed understanding with the diagnosis(es) and plan. Candice Bowers is a 51 y.o. male being evaluated by a video visit encounter for concerns as above. A caregiver was present when appropriate. Due to this being a TeleHealth encounter (During RPAKD-04 public health emergency), evaluation of the following organ systems was limited: Vitals/Constitutional/EENT/Resp/CV/GI//MS/Neuro/Skin/Heme-Lymph-Imm. Pursuant to the emergency declaration under the [facility] [place]note 2[place], [facility] waiver authority and the [employer] and [employer] Act, this Virtual  Visit was conducted, with patient's (and/or legal guardian's) consent, to reduce the patient's risk of exposure to COVID-19 and provide necessary medical care. Services were provided through a video synchronous discussion virtually to substitute for in-person clinic visit. Patient and provider were located at their individual homes.         Bob Medrano III,

## 2020-04-14 ENCOUNTER — PATIENT OUTREACH (OUTPATIENT)
Dept: INTERNAL MEDICINE CLINIC | Age: 50
End: 2020-04-14

## 2020-05-26 ENCOUNTER — TELEPHONE (OUTPATIENT)
Dept: INTERNAL MEDICINE CLINIC | Age: 50
End: 2020-05-26

## 2020-05-26 NOTE — TELEPHONE ENCOUNTER
Received triage call from pt. Two pt identifiers confirmed. Reports glucose today was around 340's. C/o blurry vision bilateral x3 days--(Pt recently had stroke in December.)    Gotten minimally better with lower glucose. Glucose at 249 at last check. Reports drinking \"plenty of water\" to try and lower his glucose levels. Denies HA, one-sided weakness, no slurry speech. Takes Invokana 100mg and Januvia 100mg. Pt asking if he can do a VV. Pt informed Dr. Chiara Forrest will be notified. Pt verbalized understanding of information discussed w/ no further questions at this time.

## 2020-05-26 NOTE — TELEPHONE ENCOUNTER
Christopher Bearden; have pt push water and consult with PharmD TT for possible injectable for glucose control. Can schedule VV for 5/27/20. Called, spoke to pt. Two pt identifiers confirmed. Pt informed per Dr. Katharine Bearden to push fluids and monitor closely glucose. Pt offered/accepted Virtual appt for 5/27/20 at 1000. Informed pt that it will be billed under insurance and pt may expect a co-pay. Informed that pt must have access to a smartphone and/or a computer w/ a camera and a microphone. Informed pt that a PSR may contact pt roughly 15 minutes prior to [address] for check-in. Pt verbalized understanding of information discussed w/ no further questions at this time.

## 2020-05-27 ENCOUNTER — VIRTUAL VISIT (OUTPATIENT)
Dept: INTERNAL MEDICINE CLINIC | Age: 50
End: 2020-05-27

## 2020-05-27 DIAGNOSIS — H34.11 CENTRAL RETINAL ARTERY OCCLUSION OF RIGHT EYE: ICD-10-CM

## 2020-05-27 DIAGNOSIS — I63.312 THROMBOTIC STROKE INVOLVING LEFT MIDDLE CEREBRAL ARTERY (HCC): ICD-10-CM

## 2020-05-27 DIAGNOSIS — E11.65 UNCONTROLLED TYPE 2 DIABETES MELLITUS WITH HYPERGLYCEMIA (HCC): Primary | ICD-10-CM

## 2020-05-27 DIAGNOSIS — E78.5 HYPERLIPIDEMIA ASSOCIATED WITH TYPE 2 DIABETES MELLITUS (HCC): ICD-10-CM

## 2020-05-27 DIAGNOSIS — I10 ESSENTIAL HYPERTENSION: ICD-10-CM

## 2020-05-27 DIAGNOSIS — E11.69 HYPERLIPIDEMIA ASSOCIATED WITH TYPE 2 DIABETES MELLITUS (HCC): ICD-10-CM

## 2020-05-27 DIAGNOSIS — E11.42 DIABETIC PERIPHERAL NEUROPATHY ASSOCIATED WITH TYPE 2 DIABETES MELLITUS (HCC): ICD-10-CM

## 2020-05-27 NOTE — PROGRESS NOTES
Carri So is a 51 y.o. male who was seen by synchronous (real-time) audio-video technology on 5/27/2020. Consent: Carri So, who was seen by synchronous (real-time) audio-video technology, and/or his healthcare decision maker, is aware that this patient-initiated, Telehealth encounter on 5/27/2020 is a billable service, with coverage as determined by his insurance carrier. He is aware that he may receive a bill and has provided verbal consent to proceed: Yes. Assessment & Plan:   Diagnoses and all orders for this visit:    1. Uncontrolled type 2 diabetes mellitus with hyperglycemia ([facility] [address]    2. Diabetic peripheral neuropathy associated with type 2 diabetes mellitus ([facility] [address]    3. Thrombotic stroke involving left middle cerebral artery ([facility] [address]    4. Essential hypertension    5. Hyperlipidemia associated with type 2 diabetes mellitus ([facility] [address]    6. Central retinal artery occlusion of right eye    Other orders  -     semaglutide (RYBELSUS) 3 mg tablet; Take 1 Tab by mouth Daily (before breakfast). Demarco Altman. -     semaglutide (RYBELSUS) 7 mg tablet; Take 1 Tab by mouth Daily (before breakfast). Start once finished with 3 mg dose. I spent at least 40 minutes on this visit with this established patient. [phone]  Subjective:   Carri So is a 51 y.o. male who was seen for Blood sugar problem (BS has been up x 4 days.) and Eye Problem (eyes have been blurry x 4 days)    Last seen by me April 9, 2020. Stopped drinking water, and was drinking only chocolate milk a few days ago, and then he noticed that his sugars shot up above 300, and have not come down, despite stopping chocolate milk and drinking lots of water. With his elevated sugars, his vision is bit blurrier. Otherwise, he is doing well. This is a virtual visit due to covid 19. Prior to Admission medications    Medication Sig Start Date End Date Taking?  Authorizing Provider   glucose blood VI test strips (blood glucose test) strip Check fasting blood sugar daily using blood sugar test strip. Dx: e11.21 4/14/20  Yes Jean-Claude Niño III, DO   sennosides 8.6 mg cap senna 8.6 mg capsule   Take 2 capsules every day by oral route as needed. Yes Provider, Historical   canagliflozin (Invokana) 100 mg tablet Take 100 mg by mouth Daily (before breakfast). Yes Provider, Historical   clopidogreL (PLAVIX) 75 mg tab Take 1 Tab by mouth daily. 3/24/20  Yes Jean-Claude Niño III, DO   hydrALAZINE (APRESOLINE) 50 mg tablet Take 1 Tab by mouth three (3) times daily. 2/6/20  Yes Jean-Claude Niño III, DO   atorvastatin (LIPITOR) 40 mg tablet Take 1 Tab by mouth nightly. 2/6/20  Yes Jean-Claude Niño III, DO   carvedilol (COREG) 12.5 mg tablet Take 1 Tab by mouth two (2) times daily (with meals). 1/7/20  Yes Jean-Claude Niño III, DO   famotidine (PEPCID) 40 mg tablet Take 1 Tab by mouth daily. 1/7/20  Yes Jean-Claude Niño III, DO   losartan (COZAAR) 25 mg tablet Take 1 Tab by mouth daily. 1/7/20  Yes Jean-Claude Niño III, DO   amLODIPine (NORVASC) 10 mg tablet TAKE 1 TABLET BY MOUTH ONCE DAILY 12/11/19  Yes Jean-Claude Niño III, DO   SITagliptin (JANUVIA) 100 mg tablet Take 1 Tab by mouth daily. 12/11/19  Yes Jean-Claude Niño III, DO   ferrous sulfate 325 mg (65 mg iron) tablet Take 1 Tab by mouth Daily (before breakfast). 10/7/19  Yes Jean-Claude Niño III, DO   acetaminophen (TYLENOL) 325 mg tablet Take 325-650 mg by mouth every four (4) hours as needed for Pain. Yes Provider, Historical   cetirizine (ZYRTEC) 10 mg tablet Take 10 mg by mouth daily as needed for Allergies. Yes Provider, Historical     No Known Allergies        ROS      Objective: There were no vitals taken for this visit.    General: alert, cooperative, no distress   Mental  status: normal mood, behavior, speech, dress, motor activity, and thought processes, able to follow commands   HENT: NCAT   Neck: no visualized mass   Resp: no respiratory distress   Neuro: no gross deficits   Skin: no discoloration or lesions of concern on visible areas   Psychiatric: normal affect, consistent with stated mood, no evidence of hallucinations     Additional exam findings:     1. Uncontrolled dm, type 2--continue invokana. Add rybelsus. He does not want to start any injections. Stop januvia  2. Ckd 3--has been stable, but limits our meds to some extent  3.  htn--on hydralazine, coreg, norvasc, cozaar  4. Hx cva with vision loss--on plavix  5. Hx aortic dissection--sp TEVAR, on plavix. Did not tolerate coreg    rtc in July for regular visit. Check labs then. We discussed the expected course, resolution and complications of the diagnosis(es) in detail. Medication risks, benefits, costs, interactions, and alternatives were discussed as indicated. I advised him to contact the office if his condition worsens, changes or fails to improve as anticipated. He expressed understanding with the diagnosis(es) and plan. Donna Black is a 51 y.o. male who was evaluated by a video visit encounter for concerns as above. Patient identification was verified prior to start of the visit. A caregiver was present when appropriate. Due to this being a TeleHealth encounter (During SLAEG-97 public health emergency), evaluation of the following organ systems was limited: Vitals/Constitutional/EENT/Resp/CV/GI//MS/Neuro/Skin/Heme-Lymph-Imm. Pursuant to the emergency declaration under the [facility] waiver authority and the [employer] and [employer] Act, this Virtual  Visit was conducted, with patient's (and/or legal guardian's) consent, to reduce the patient's risk of exposure to COVID-19 and provide necessary medical care. Services were provided through a video synchronous discussion virtually to substitute for in-person clinic visit.    Patient and provider were located at their individual homes.       Wynema Galeazzi III, DO

## 2020-05-28 ENCOUNTER — TELEPHONE (OUTPATIENT)
Dept: INTERNAL MEDICINE CLINIC | Age: 50
End: 2020-05-28

## 2020-05-28 NOTE — TELEPHONE ENCOUNTER
Patient states a call needs to be made to Carola Velazquez in reference to medication prescribed yesterday, Semaglutide (RYBELSUS) 7 mg tablet & Semaglutide (RYBELSUS) 3 mg tablet. Patient states he went to  prescription & pharmacy advised they need to speak with the doctor. Please call Pharmacy & then patient to update.   Thank you

## 2020-06-22 ENCOUNTER — HOSPITAL ENCOUNTER (EMERGENCY)
Age: 50
Discharge: HOME | End: 2020-06-22
Attending: EMERGENCY MEDICINE
Payer: MEDICAID

## 2020-06-22 ENCOUNTER — APPOINTMENT (OUTPATIENT)
Dept: GENERAL RADIOLOGY | Age: 50
End: 2020-06-22
Attending: PHYSICIAN ASSISTANT
Payer: MEDICAID

## 2020-06-22 ENCOUNTER — TELEPHONE (OUTPATIENT)
Dept: INTERNAL MEDICINE CLINIC | Age: 50
End: 2020-06-22

## 2020-06-22 VITALS
HEIGHT: 77 IN | SYSTOLIC BLOOD PRESSURE: 162 MMHG | TEMPERATURE: 98.3 F | HEART RATE: 82 BPM | WEIGHT: 240.3 LBS | RESPIRATION RATE: 16 BRPM | DIASTOLIC BLOOD PRESSURE: 93 MMHG | BODY MASS INDEX: 28.37 KG/M2 | OXYGEN SATURATION: 97 %

## 2020-06-22 DIAGNOSIS — M25.552 ACUTE HIP PAIN, LEFT: Primary | ICD-10-CM

## 2020-06-22 PROCEDURE — 73502 X-RAY EXAM HIP UNI 2-3 VIEWS: CPT

## 2020-06-22 PROCEDURE — 99282 EMERGENCY DEPT VISIT SF MDM: CPT

## 2020-06-22 PROCEDURE — 74011250636 HC RX REV CODE- 250/636: Performed by: PHYSICIAN ASSISTANT

## 2020-06-22 PROCEDURE — 96374 THER/PROPH/DIAG INJ IV PUSH: CPT

## 2020-06-22 RX ORDER — METHYLPREDNISOLONE 4 MG/1
TABLET ORAL
Qty: 1 DOSE PACK | Refills: 0 | Status: SHIPPED | OUTPATIENT
Start: 2020-06-22 | End: 2020-07-08 | Stop reason: ALTCHOICE

## 2020-06-22 RX ORDER — OXYCODONE AND ACETAMINOPHEN 5; 325 MG/1; MG/1
1 TABLET ORAL
Qty: 9 TAB | Refills: 0 | Status: SHIPPED | OUTPATIENT
Start: 2020-06-22 | End: 2020-06-25

## 2020-06-22 RX ADMIN — METHYLPREDNISOLONE SODIUM SUCCINATE 125 MG: 125 INJECTION, POWDER, FOR SOLUTION INTRAMUSCULAR; INTRAVENOUS at 16:31

## 2020-06-22 NOTE — ED PROVIDER NOTES
EMERGENCY DEPARTMENT HISTORY AND PHYSICAL EXAM      Date: 6/22/2020  Patient Name: Mehdi Villegas    History of Presenting Illness     Chief Complaint   Patient presents with    Hip Pain     L hip pain x last night; reports hx of sciatic, reporting that sx's are similar       History Provided By: Patient    HPI: Mehdi Villegas, 51 y.o. male presents ambulatory to the ED with cc of a day or so of 10 out of 10 constant, aching left sided hip pain that is worse with ambulation and not associated with injury or fever. He tells me he has a history of hip pain and that the joint is \"bone-on-bone\". He tells me he typically gets cortisone shots in that area. He took some Tylenol earlier with without any significant improvement. There are no other complaints, changes, or physical findings at this time. PCP: Jacquelin Waite, DO    Current Outpatient Medications   Medication Sig Dispense Refill    methylPREDNISolone (Medrol, Clay,) 4 mg tablet Per Dose Pack instructions. Start tomorrow (6/23/2020) 1 Dose Pack 0    oxyCODONE-acetaminophen (Percocet) 5-325 mg per tablet Take 1 Tab by mouth every eight (8) hours as needed for Pain for up to 3 days. Max Daily Amount: 3 Tabs. Indications: pain 9 Tab 0    semaglutide (RYBELSUS) 3 mg tablet Take 1 Tab by mouth Daily (before breakfast). [place]. 30 Tab 0    semaglutide (RYBELSUS) 7 mg tablet Take 1 Tab by mouth Daily (before breakfast). Start once finished with 3 mg dose. 30 Tab 5    glucose blood VI test strips (blood glucose test) strip Check fasting blood sugar daily using blood sugar test strip. Dx: e11.21 100 Strip 3    sennosides 8.6 mg cap senna 8.6 mg capsule   Take 2 capsules every day by oral route as needed.  canagliflozin (Invokana) 100 mg tablet Take 100 mg by mouth Daily (before breakfast).  clopidogreL (PLAVIX) 75 mg tab Take 1 Tab by mouth daily.  90 Tab 3    hydrALAZINE (APRESOLINE) 50 mg tablet Take 1 Tab by mouth three (3) times daily. 270 Tab 3    atorvastatin (LIPITOR) 40 mg tablet Take 1 Tab by mouth nightly. 90 Tab 3    carvedilol (COREG) 12.5 mg tablet Take 1 Tab by mouth two (2) times daily (with meals). 180 Tab 3    famotidine (PEPCID) 40 mg tablet Take 1 Tab by mouth daily. 90 Tab 3    losartan (COZAAR) 25 mg tablet Take 1 Tab by mouth daily. 90 Tab 3    amLODIPine (NORVASC) 10 mg tablet TAKE 1 TABLET BY MOUTH ONCE DAILY 90 Tab 3    ferrous sulfate 325 mg (65 mg iron) tablet Take 1 Tab by mouth Daily (before breakfast). 90 Tab 3    acetaminophen (TYLENOL) 325 mg tablet Take 325-650 mg by mouth every four (4) hours as needed for Pain.  cetirizine (ZYRTEC) 10 mg tablet Take 10 mg by mouth daily as needed for Allergies. Past History     Past Medical History:  Past Medical History:   Diagnosis Date    Bilateral carotid artery stenosis     Diabetic peripheral neuropathy associated with type 2 diabetes mellitus ([facility] [address]     Foot fracture     H/O aortic dissection     Hollenhorst plaque, right eye     Hypertension     Jaw fracture (HCC)     Ruptured ear drum     Subjective visual disturbance, right eye     Thumb fracture        Past Surgical History:  Past Surgical History:   Procedure Laterality Date    HX APPENDECTOMY      HX ARTERIAL BYPASS  02/15/2019    HX ORTHOPAEDIC      HX OTHER SURGICAL  02/15/2019    8 stints    HX OTHER SURGICAL  02/15/2019    Double bypass       Family History:  Family History   Problem Relation Age of Onset    Diabetes Mother     Stroke Mother     Colon Cancer Father     Cancer Brother     No Known Problems Child        Social History:  Social History     Tobacco Use    Smoking status: Never Smoker    Smokeless tobacco: Never Used   Substance Use Topics    Alcohol use: No    Drug use: No       Allergies:  No Known Allergies  Review of Systems   Review of Systems   Constitutional: Negative for fatigue and fever. HENT: Negative for congestion, ear pain and rhinorrhea. Eyes: Negative for pain and redness. Respiratory: Negative for cough and wheezing. Cardiovascular: Negative for chest pain and palpitations. Gastrointestinal: Negative for abdominal pain, nausea and vomiting. Genitourinary: Negative for dysuria, frequency and urgency. Musculoskeletal: Negative for back pain, neck pain and neck stiffness. Left hip pain   Skin: Negative for rash and wound. Neurological: Negative for weakness, light-headedness, numbness and headaches. Physical Exam   Physical Exam  Vitals signs and nursing note reviewed. Constitutional:       General: He is not in acute distress. Appearance: He is well-developed. He is not toxic-appearing. HENT:      Head: Normocephalic and atraumatic. No right periorbital erythema or left periorbital erythema. Jaw: No trismus. Right Ear: External ear normal.      Left Ear: External ear normal.      Nose: Nose normal.      Mouth/Throat:      Pharynx: Uvula midline. Eyes:      General: No scleral icterus. Conjunctiva/sclera: Conjunctivae normal.      Pupils: Pupils are equal, round, and reactive to light. Neck:      Musculoskeletal: Full passive range of motion without pain and normal range of motion. Cardiovascular:      Rate and Rhythm: Normal rate and regular rhythm. Heart sounds: Normal heart sounds. Pulmonary:      Effort: Pulmonary effort is normal. No tachypnea, accessory muscle usage or respiratory distress. Breath sounds: Normal breath sounds. No decreased breath sounds or wheezing. Abdominal:      Palpations: Abdomen is soft. Abdomen is not rigid. Tenderness: There is no abdominal tenderness. There is no guarding. Musculoskeletal: Normal range of motion. Left hip: He exhibits tenderness. Legs:       Comments: LEFT HIP:   Able to flex hip and knee to 90 degrees  Good symmetry  No bruising, redness or swelling  Lateral tenderness of the hip   Skin:     Findings: No rash. Neurological:      Mental Status: He is alert and oriented to person, place, and time. He is not disoriented. GCS: GCS eye subscore is 4. GCS verbal subscore is 5. GCS motor subscore is 6. Cranial Nerves: No cranial nerve deficit. Sensory: No sensory deficit. Psychiatric:         Speech: Speech normal.       Diagnostic Study Results     Labs -   No results found for this or any previous visit (from the past 12 hour(s)). Radiologic Studies -   XR HIP RT W OR WO PELV 2-3 VWS   Final Result   IMPRESSION: Negative radiographic examination of the right hip. CT Results  (Last 48 hours)    None        CXR Results  (Last 48 hours)    None        Medical Decision Making   I am the first provider for this patient. I reviewed the vital signs, available nursing notes, past medical history, past surgical history, family history and social history. Vital Signs-Reviewed the patient's vital signs. Patient Vitals for the past 12 hrs:   Temp Pulse Resp BP SpO2   06/22/20 1306 98.3 °F (36.8 °C) 82 16 (!) 162/93 97 %       Pulse Oximetry Analysis - 97% on RA    Records Reviewed: Nursing Notes, Old Medical Records, Previous Radiology Studies, Previous Laboratory Studies and     Provider Notes (Medical Decision Making):   DDx: AVN, trochanteric bursitis, DJD, doubt fracture, sprain    ED Course:   Initial assessment performed. The patients presenting problems have been discussed, and they are in agreement with the care plan formulated and outlined with them. I have encouraged them to ask questions as they arise throughout their visit. Disposition:  Discharge    PLAN:  1. Current Discharge Medication List      START taking these medications    Details   methylPREDNISolone (Medrol, Clay,) 4 mg tablet Per Dose Pack instructions.   Start tomorrow (6/23/2020)  Qty: 1 Dose Pack, Refills: 0      oxyCODONE-acetaminophen (Percocet) 5-325 mg per tablet Take 1 Tab by mouth every eight (8) hours as needed for Pain for up to 3 days. Max Daily Amount: 3 Tabs. Indications: pain  Qty: 9 Tab, Refills: 0    Associated Diagnoses: Acute hip pain, left           2. Follow-up Information     Follow up With Specialties Details Why Contact Info    Carol Ramos MD Orthopedic Surgery Schedule an appointment as soon as possible for a visit ORTHO: as needed if symptoms persist [address]  [phone]          Return to ED if worse     Diagnosis     Clinical Impression:   1.  Acute hip pain, left

## 2020-06-22 NOTE — TELEPHONE ENCOUNTER
Samy HANSEN [employer]   Phone Number: [phone]             General Message/Vendor Calls     Caller's first and last name:Benjie Jovel       Reason for call:pt needs a recommendation for someone that can administer a cortisone shot in his left side. PT has Jak Delgado.        Callback required yes/no and why:yes       Best contact number(s):[phone]       Details to clarify the request:       [facility]

## 2020-06-22 NOTE — ED NOTES
Bedside shift change report given to [facility]  (oncoming nurse) by Tapan Macias RN (offgoing nurse). Report included the following information SBAR, ED Summary, MAR and Recent Results.

## 2020-06-23 ENCOUNTER — TELEPHONE (OUTPATIENT)
Dept: INTERNAL MEDICINE CLINIC | Age: 50
End: 2020-06-23

## 2020-06-23 NOTE — TELEPHONE ENCOUNTER
#[phone] pt states the specialist you referred him to for a Cortizone injection does not take his insurance, [employer]. Please call with a doctor who does take his insurance he ask.      Please call pt

## 2020-07-08 ENCOUNTER — VIRTUAL VISIT (OUTPATIENT)
Dept: INTERNAL MEDICINE CLINIC | Age: 50
End: 2020-07-08

## 2020-07-08 ENCOUNTER — HOSPITAL ENCOUNTER (OUTPATIENT)
Dept: GENERAL RADIOLOGY | Age: 50
Discharge: HOME | End: 2020-07-08
Payer: MEDICAID

## 2020-07-08 DIAGNOSIS — M25.552 LEFT HIP PAIN: ICD-10-CM

## 2020-07-08 DIAGNOSIS — E78.5 HYPERLIPIDEMIA ASSOCIATED WITH TYPE 2 DIABETES MELLITUS (HCC): ICD-10-CM

## 2020-07-08 DIAGNOSIS — M25.552 LEFT HIP PAIN: Primary | ICD-10-CM

## 2020-07-08 DIAGNOSIS — E11.69 HYPERLIPIDEMIA ASSOCIATED WITH TYPE 2 DIABETES MELLITUS (HCC): ICD-10-CM

## 2020-07-08 DIAGNOSIS — M54.42 CHRONIC LEFT-SIDED LOW BACK PAIN WITH LEFT-SIDED SCIATICA: Primary | ICD-10-CM

## 2020-07-08 DIAGNOSIS — Z86.79 H/O AORTIC DISSECTION: ICD-10-CM

## 2020-07-08 DIAGNOSIS — H34.11 CENTRAL RETINAL ARTERY OCCLUSION OF RIGHT EYE: ICD-10-CM

## 2020-07-08 DIAGNOSIS — I10 ESSENTIAL HYPERTENSION: ICD-10-CM

## 2020-07-08 DIAGNOSIS — E11.42 DIABETIC PERIPHERAL NEUROPATHY ASSOCIATED WITH TYPE 2 DIABETES MELLITUS (HCC): ICD-10-CM

## 2020-07-08 DIAGNOSIS — I63.312 THROMBOTIC STROKE INVOLVING LEFT MIDDLE CEREBRAL ARTERY (HCC): ICD-10-CM

## 2020-07-08 DIAGNOSIS — G89.29 CHRONIC LEFT-SIDED LOW BACK PAIN WITH LEFT-SIDED SCIATICA: Primary | ICD-10-CM

## 2020-07-08 DIAGNOSIS — D50.9 IRON DEFICIENCY ANEMIA, UNSPECIFIED IRON DEFICIENCY ANEMIA TYPE: ICD-10-CM

## 2020-07-08 PROCEDURE — 73502 X-RAY EXAM HIP UNI 2-3 VIEWS: CPT

## 2020-07-08 RX ORDER — LIDOCAINE 50 MG/G
PATCH TOPICAL
COMMUNITY
Start: 2020-06-24 | End: 2021-05-04 | Stop reason: ALTCHOICE

## 2020-07-08 RX ORDER — OLOPATADINE HYDROCHLORIDE 7 MG/ML
SOLUTION OPHTHALMIC
COMMUNITY
Start: 2020-06-09 | End: 2021-05-04 | Stop reason: ALTCHOICE

## 2020-07-08 RX ORDER — OXYCODONE AND ACETAMINOPHEN 5; 325 MG/1; MG/1
TABLET ORAL
COMMUNITY
Start: 2020-07-07 | End: 2021-05-04 | Stop reason: ALTCHOICE

## 2020-07-08 NOTE — PROGRESS NOTES
Asa Smith is a 51 y.o. male who was seen by synchronous (real-time) audio-video technology on 7/8/2020 for Diabetes; Hypertension; and Hip Pain (Sciatic nerve pain coming back)    Last seen by me may 24, 2020 when his sugars were very elevated, above 350. He attributed it to drinking lots of chocolate milk. rx sent in to start rybelsus, to add to invokana. He states that the rybelsus caused his vision to become much blurrier, so he stopped it, and his vision improved. His sugars have improved, today was 135 this am.    He had an ed visit on June 22 for left hip pain with sciatica. Xray was normal, was given medrol dose dorina and percocet. He has since seen pain management twice and had steroid injections into the hip with no relief. He has been using crutches to get around lately. Has never been to orthopedics. This is a virtual visit due to covid 19. Assessment & Plan:   Diagnoses and all orders for this visit:    1. Chronic left-sided low back pain with left-sided sciatica  -     REFERRAL TO ORTHOPEDICS    2. Diabetic peripheral neuropathy associated with type 2 diabetes mellitus (HCC)  -     METABOLIC PANEL, COMPREHENSIVE  -     HEMOGLOBIN A1C WITH EAG    3. Thrombotic stroke involving left middle cerebral artery ([address]    4. Essential hypertension  -     THYROID CASCADE PROFILE  -     PSA, DIAGNOSTIC (PROSTATE SPECIFIC AG)    5. Central retinal artery occlusion of right eye    6. Hyperlipidemia associated with type 2 diabetes mellitus ([address]  -     LIPID PANEL    7. H/O aortic dissection    8. Iron deficiency anemia, unspecified iron deficiency anemia type  -     CBC WITH AUTOMATED DIFF  -     FERRITIN  -     IRON PROFILE        I spent at least 25 minutes on this visit with this established patient. 712  Subjective:       Prior to Admission medications    Medication Sig Start Date End Date Taking?  Authorizing Provider   oxyCODONE-acetaminophen (PERCOCET) 5-325 mg per tablet  7/7/20  Yes Provider, Historical   Pazeo 0.7 % drop INSTILL 1 DROP INTO EACH EYE ONCE DAILY 6/9/20  Yes Provider, Historical   lidocaine (LIDODERM) 5 % APPLY ONE PATCH TOPICALLY TO CLEAN DRY SKIN ONCE DAILY. LEAVE ON FOR 12 HOURS THEN REMOVE. MUST WAIT AT LEAST 12 HOURS BEFORE APPLYING PATCH 6/24/20  Yes Provider, Historical   Invokana 100 mg tablet TAKE 1 TABLET BY MOUTH ONCE DAILY BEFORE BREAKFAST 7/3/20  Yes Amanda Given, DO   glucose blood VI test strips (blood glucose test) strip Check fasting blood sugar daily using blood sugar test strip. Dx: e11.21 4/14/20  Yes Jean-Claude Niño III, DO   sennosides 8.6 mg cap senna 8.6 mg capsule   Take 2 capsules every day by oral route as needed. Yes Provider, Historical   clopidogreL (PLAVIX) 75 mg tab Take 1 Tab by mouth daily. 3/24/20  Yes Jean-Claude Niño III, DO   hydrALAZINE (APRESOLINE) 50 mg tablet Take 1 Tab by mouth three (3) times daily. 2/6/20  Yes Jean-Claude Niño III, DO   atorvastatin (LIPITOR) 40 mg tablet Take 1 Tab by mouth nightly. 2/6/20  Yes Jean-Claude Niño III, DO   carvedilol (COREG) 12.5 mg tablet Take 1 Tab by mouth two (2) times daily (with meals). 1/7/20  Yes Jean-Claude Niño III, DO   famotidine (PEPCID) 40 mg tablet Take 1 Tab by mouth daily. 1/7/20  Yes Jean-Claude Niño III, DO   losartan (COZAAR) 25 mg tablet Take 1 Tab by mouth daily. 1/7/20  Yes Jean-Claude Niño III, DO   amLODIPine (NORVASC) 10 mg tablet TAKE 1 TABLET BY MOUTH ONCE DAILY 12/11/19  Yes Jean-Claude Niño III, DO   ferrous sulfate 325 mg (65 mg iron) tablet Take 1 Tab by mouth Daily (before breakfast). 10/7/19  Yes Jean-Claude Niño III, DO   acetaminophen (TYLENOL) 325 mg tablet Take 325-650 mg by mouth every four (4) hours as needed for Pain. Yes Provider, Historical   cetirizine (ZYRTEC) 10 mg tablet Take 10 mg by mouth daily as needed for Allergies.    Yes Provider, Historical   semaglutide (RYBELSUS) 3 mg tablet Take 1 Tab by mouth Daily (before breakfast). STOP JANUVIA. 5/29/20   Gary Niño III, DO   semaglutide (RYBELSUS) 7 mg tablet Take 1 Tab by mouth Daily (before breakfast). Start once finished with 3 mg dose. 5/29/20   Jean-Claude Niño III, DO         ROS    Objective:   No flowsheet data found. General: alert, cooperative, no distress   Mental  status: normal mood, behavior, speech, dress, motor activity, and thought processes, able to follow commands   HENT: NCAT   Neck: no visualized mass   Resp: no respiratory distress   Neuro: no gross deficits   Skin: no discoloration or lesions of concern on visible areas   Psychiatric: normal affect, consistent with stated mood, no evidence of hallucinations     Additional exam findings:     1. Left hip pain with sciatica--referral to dr Jose Luis brasher   2.  Dm, type 2--on invoka alone now. Check a1c, last was 7.8  3.  ckd 3--check cmp  4.  htn--continue norvasc, hydralazine, cozaar, coreg. Check cbc, cmp  5. Iron def anemia--on oral iron replacement, check iron panel  6. Hx aortic dissection--sp TEVAR, on plavix, coreg  7. Hx cva--on plavix  8. Hx right eye central artery occlusion--on plavix    rtc 6 months      We discussed the expected course, resolution and complications of the diagnosis(es) in detail. Medication risks, benefits, costs, interactions, and alternatives were discussed as indicated. I advised him to contact the office if his condition worsens, changes or fails to improve as anticipated. He expressed understanding with the diagnosis(es) and plan. Radha Smith, who was evaluated through a patient-initiated, synchronous (real-time) audio-video encounter, and/or his healthcare decision maker, is aware that it is a billable service, with coverage as determined by his insurance carrier. He provided verbal consent to proceed: Yes, and patient identification was verified.  It was conducted pursuant to the emergency declaration under the [address] and the [employer] Act, [facility] authority and the [employer] Act. A caregiver was present when appropriate. Ability to conduct physical exam was limited. I was in the office. The patient was at home.       Kelly Case III, DO

## 2020-07-09 ENCOUNTER — VIRTUAL VISIT (OUTPATIENT)
Dept: ORTHOPEDIC SURGERY | Age: 50
End: 2020-07-09

## 2020-07-09 DIAGNOSIS — M70.62 TROCHANTERIC BURSITIS, LEFT HIP: Primary | ICD-10-CM

## 2020-07-09 NOTE — PROGRESS NOTES
7/9/2020    Chief Complaint: Left hip pain    HPI: This is a 51 y.o. patient who complains of left hip pain which is activity dependent. The patient has had activity dependent pain for several months. The pain is located in the lateral aspect of the hip, it radiates somewhat distally and laterally, it is severe in intensity. The patient complains of difficulty sleeping on the left side, limitation in the normal activities of daily living. The patient has tried activity modification, some over the counter pain medications, no physical therapy, injections have failed to help him. No other surgery or pertinent history to this hip. Past Medical History:   Diagnosis Date    Bilateral carotid artery stenosis     Diabetic peripheral neuropathy associated with type 2 diabetes mellitus ([facility] [address]     Foot fracture     H/O aortic dissection     Hollenhorst plaque, right eye     Hypertension     Jaw fracture (HCC)     Ruptured ear drum     Subjective visual disturbance, right eye     Thumb fracture        Past Surgical History:   Procedure Laterality Date    HX APPENDECTOMY      HX ARTERIAL BYPASS  02/15/2019    HX ORTHOPAEDIC      HX OTHER SURGICAL  02/15/2019    8 stints    HX OTHER SURGICAL  02/15/2019    Double bypass       Current Outpatient Medications on File Prior to Visit   Medication Sig Dispense Refill    oxyCODONE-acetaminophen (PERCOCET) 5-325 mg per tablet       Pazeo 0.7 % drop INSTILL 1 DROP INTO EACH EYE ONCE DAILY      lidocaine (LIDODERM) 5 % APPLY ONE PATCH TOPICALLY TO CLEAN DRY SKIN ONCE DAILY. LEAVE ON FOR 12 HOURS THEN REMOVE. MUST WAIT AT LEAST 12 HOURS BEFORE APPLYING PATCH      Invokana 100 mg tablet TAKE 1 TABLET BY MOUTH ONCE DAILY BEFORE BREAKFAST 30 Tab 11    glucose blood VI test strips (blood glucose test) strip Check fasting blood sugar daily using blood sugar test strip.  Dx: e11.21 100 Strip 3    sennosides 8.6 mg cap senna 8.6 mg capsule   Take 2 capsules every day by oral route as needed.  clopidogreL (PLAVIX) 75 mg tab Take 1 Tab by mouth daily. 90 Tab 3    hydrALAZINE (APRESOLINE) 50 mg tablet Take 1 Tab by mouth three (3) times daily. 270 Tab 3    atorvastatin (LIPITOR) 40 mg tablet Take 1 Tab by mouth nightly. 90 Tab 3    carvedilol (COREG) 12.5 mg tablet Take 1 Tab by mouth two (2) times daily (with meals). 180 Tab 3    famotidine (PEPCID) 40 mg tablet Take 1 Tab by mouth daily. 90 Tab 3    losartan (COZAAR) 25 mg tablet Take 1 Tab by mouth daily. 90 Tab 3    amLODIPine (NORVASC) 10 mg tablet TAKE 1 TABLET BY MOUTH ONCE DAILY 90 Tab 3    ferrous sulfate 325 mg (65 mg iron) tablet Take 1 Tab by mouth Daily (before breakfast). 90 Tab 3    acetaminophen (TYLENOL) 325 mg tablet Take 325-650 mg by mouth every four (4) hours as needed for Pain.  cetirizine (ZYRTEC) 10 mg tablet Take 10 mg by mouth daily as needed for Allergies. No current facility-administered medications on file prior to visit.         No Known Allergies    Family History   Problem Relation Age of Onset    Diabetes Mother     Stroke Mother     Colon Cancer Father     Cancer Brother     No Known Problems Child        Social History     Socioeconomic History    Marital status: SINGLE     Spouse name: Not on file    Number of children: Not on file    Years of education: Not on file    Highest education level: Not on file   Tobacco Use    Smoking status: Never Smoker    Smokeless tobacco: Never Used   Substance and Sexual Activity    Alcohol use: No    Drug use: No    Sexual activity: Yes     Partners: Female     Birth control/protection: Surgical         Review of Systems:       General: Denies headache, lethargy, fever, weight loss  Ears/Nose/Throat: Denies ear discharge, drainage, nosebleeds, hoarse voice, dental problems  Cardiovascular: Denies chest pain, shortness of breath  Lungs: Denies chest pain, breathing problems, wheezing, pneumonia  Stomach: Denies stomach pain, heartburn, constipation, irritable bowel  Skin: Denies rash, sores, open wounds  Musculoskeletal: Admits to lateral hip pain, this pain is severe and causes difficulty walking. This pain is severe, made better by rest.   There is decreased mobility, and severe difficulty doing normal activities of daily living secondary to the pain. Genitourinary: Denies dysuria, hematuria, polyuria  Gastrointestinal: Denies constipation, obstipation, diarrhea  Neurological: Denies changes in sight, smell, hearing, taste, seizures. Denies loss of consciousness. Psychiatric: Denies depression, sleep pattern changes, anxiety, change in personality  Endocrine: Denies mood swings, heat or cold intolerance  Hematologic/Lymphatic: Denies anemia, purpura, petechia  Allergic/Immunologic: Denies swelling of throat, pain or swelling at lymph nodes      Physical Examination:    There were no vitals taken for this visit. General: AOX3, no apparent distress  Psychiatric: mood and affect appropriate      Diagnostics:    Pertinent Xrays:  Pelvis and hip xrays indicate no fractures, dislocations, femoroacetabular joint is well mainatined. There are no calcifcations at the tip of the greater trochanter at the insertion of the gluteus tendons      Assessment: Peritrochanteric pain syndrome, left hip    Plan: This patient and I did discuss at length the anatomy, which I drew out in office. We discussed the potential causes of the issue, as well as the treatment options, which are largely nonoperative. We discussed that a mixture of anti-inflammatory analgesics, cortisone injections, as well as consistent physical therapy for 4-6 weeks is the standard of care of this issue. Evidence indicates that over 90% of patients can begin to resolve their issues in that time. The patient will proceed with PT, TENS unit. The patient will then follow up in 4-6 weeks for a clinical check.      Procedures: none    Telemedicine modality: video  Location of patient: home  Location of physician: office  Time spent in consultation:30 minutes  The patient has been made aware of the billing practices of telemedicine. Mr. Fabian Condon has a reminder for a \"due or due soon\" health maintenance. I have asked that he contact his primary care provider for follow-up on this health maintenance.

## 2020-08-18 ENCOUNTER — HOSPITAL ENCOUNTER (OUTPATIENT)
Dept: PHYSICAL THERAPY | Age: 50
Discharge: HOME | End: 2020-08-18
Payer: MEDICAID

## 2020-08-18 DIAGNOSIS — M70.62 TROCHANTERIC BURSITIS, LEFT HIP: ICD-10-CM

## 2020-08-18 PROCEDURE — 97162 PT EVAL MOD COMPLEX 30 MIN: CPT | Performed by: PHYSICAL THERAPIST

## 2020-08-18 NOTE — PROGRESS NOTES
PT INITIAL EVALUATION NOTE 2-15    Patient Name: Pastor Waller  Date:2020  : 1970  [x]  Patient  Verified  Payor: Waldo Finney / Plan: [facility] COMMUNITY PLAN [facility] / Product Type: Managed Care Medicaid /    In time: 2:40pm  Out time: 3:30pm  Total Treatment Time (min):  50  Visit #: 1     Treatment Area: Trochanteric bursitis, left hip [M70.62]    SUBJECTIVE  Pain Level (0-10 scale): 5 (3-7/10)  Any medication changes, allergies to medications, adverse drug reactions, diagnosis change, or new procedure performed?: [] No    [x] Yes (see summary sheet for update)  Subjective: The patient reports he had left hip pain starting in May 2020. He's been doing a TENS unit every night. If he walks for an hour it will irritate. It may hurt sitting with . He had a stroke 2019 as he lost his vision on the right eye due a blod clot in the back of the eye. He had a heart attack last 2019 and had stents put in. He had an injection in the hip in May 2020 as they told him it was sciatica and it didn't help.       OBJECTIVE    Posture:  Scapular protraction bilaterally  Other Observations:  SLS: R= 16s; L= 3s  Gait and Functional Mobility:  Decreased left foot clearance  Palpation: tender to palpate left hip trochanter        Lumbar AROM:          R  L    Flexion    90%      Extension   limited      Side Bending   WFL  WFL    Rotation   [facility]  WF        LOWER QUARTER   MUSCLE STRENGTH  KEY       R  L  0 - No Contraction  L1, L2 Psoas  5  5  1 - Trace   L3 Quads  5  5  2 - Poor   L4 Tib Ant  5  2+  3 - Fair    L5 EHL  5  5  4 - Good   S1 Peroneals  5  5  5 - Normal   S2 Hams  5  5    Flexibility: hamstrings tight  Mobility Assessment: hypomobile left hip      MMT:               HIP Abd: 3-/5 on left; 3+/5 on right  Neurological: Reflexes / Sensations: nt  Special Tests:    H.S. SLR: + for tight hamstrings   Long Sit: neg    7 min Therapeutic Exercise:  [x] See flow sheet :   Rationale: increase ROM, increase strength and improve coordination to improve the patients ability to perform daily activities.            With   [x] TE   [] TA   [] neuro   [] other: Patient Education: [x] Review HEP    [x] Progressed/Changed HEP based on:   [x] positioning   [x] body mechanics   [] transfers   [] heat/ice application    [] other:        Other Objective/Functional Measures: FOTO= 48    Pain Level (0-10 scale) post treatment: 4      ASSESSMENT:      [x]  See Plan of [facility], PT 8/18/2020

## 2020-08-18 NOTE — PROGRESS NOTES
[address] [address] (MOB IV), [address]  Lasha Crump  Phone: [phone] Fax: [phone]    Plan of Care/Statement of Necessity for Physical Therapy Services  2-15    Patient name: Halina Ortega  : 1970  Provider#: [id number]  Referral source: Shaye Cope DO      Medical/Treatment Diagnosis: Trochanteric bursitis, left hip [M70.62]     Prior Hospitalization: see medical history     Comorbidities: depression, DM II, HTN, prior surgery, hx of stroke  Prior Level of Function: 20 min of exercise seldom or never  Medications: Verified on Patient Summary List    Start of Care: 20      Onset Date: May 2020       The [address] [place] [address] and following information is based on the information from the initial evaluation. Assessment/ key information: The patient presents with a chief complaint of left hip pain that started around May 2020 that is consistent with left trochanteric bursitis. He does have a history of a stroke in 2020 which did affect his left lower extremity (slight dropped foot on the left), along with decreased glute and core strength, which alters his gait and exacerbates his symptoms. The patient will benefit from guided therapeutic interventions such as therex for strengthening and neuromuscular re-eduction, manual techniques for joint mobility and soft tissue extensibility, and modalities for pain management in order to improve functional mobility with daily activities. Evaluation Complexity History MEDIUM  Complexity : 1-2 comorbidities / personal factors will impact the outcome/ POC ; Examination MEDIUM Complexity : 3 Standardized tests and measures addressing body structure, function, activity limitation and / or participation in recreation  ;Presentation MEDIUM Complexity : Evolving with changing characteristics  ; Clinical Decision Making MEDIUM Complexity : FOTO score of 26-74  Overall Complexity Rating: MEDIUM    Problem List: pain affecting function, decrease ROM, decrease strength, edema affecting function, impaired gait/ balance, decrease ADL/ functional abilitiies, decrease activity tolerance, decrease flexibility/ joint mobility and decrease transfer abilities   Treatment Plan may include any combination of the following: Therapeutic exercise, Therapeutic activities, Neuromuscular re-education, Physical agent/modality, Gait/balance training, Manual therapy, Patient education, Self Care training, Functional mobility training, Home safety training and Stair training  Patient / Family readiness to learn indicated by: asking questions, trying to perform skills and interest  Persons(s) to be included in education: patient (P)  Barriers to Learning/Limitations: None  Patient Goal (s): manage or erase pain  Patient Self Reported Health Status: fair  Rehabilitation Potential: good    Short Term Goals: To be accomplished in 2 treatments:                         1.) The patient will be independent with their HEP consistently for at least one week. Long Term Goals: To be accomplished in 16 treatments:                         1.) The patient will have at most 4/10 pain with daily activities. 2.) The patient will be able to ambulate for at least 40min with at most 4/10 pain. 3.) The patient will improve their FOTO score from 48 to at least 50 to show improvements in functional mobility. 4.) The patient will improve his single leg stance on his left leg from 3s to at least 5s to show improvement in stability. Frequency / Duration: Patient to be seen 2 times per week for 16 treatments.       Patient/ Caregiver education and instruction: self care, activity modification and exercises    [x]  Plan of care has been reviewed with FERNANDEZ Wang, PT 8/18/2020     ________________________________________________________________________    I certify that the above Therapy Services are being furnished while the patient is under my care. I agree with the treatment plan and certify that this therapy is necessary.     [facility] Signature:____________________  Date:____________Time: _________

## 2020-08-20 ENCOUNTER — VIRTUAL VISIT (OUTPATIENT)
Dept: ORTHOPEDIC SURGERY | Age: 50
End: 2020-08-20
Payer: MEDICAID

## 2020-08-20 DIAGNOSIS — M70.62 TROCHANTERIC BURSITIS, LEFT HIP: Primary | ICD-10-CM

## 2020-08-20 PROCEDURE — 99212 OFFICE O/P EST SF 10 MIN: CPT | Performed by: ORTHOPAEDIC SURGERY

## 2020-08-20 RX ORDER — DICLOFENAC SODIUM 10 MG/G
4 GEL TOPICAL 4 TIMES DAILY
Qty: 1 EACH | Refills: 1 | Status: SHIPPED | OUTPATIENT
Start: 2020-08-20

## 2020-08-20 NOTE — PROGRESS NOTES
8/20/2020      CC: left hip pain    HPI:      This is a 53y.o. year old male who has peritrochanteric pain syndrome. He was prescribed a TENS and PT. He has used the tens with good success, one session of therapy so far. PMH:  Past Medical History:   Diagnosis Date    Bilateral carotid artery stenosis     Diabetic peripheral neuropathy associated with type 2 diabetes mellitus ([address]     Foot fracture     H/O aortic dissection     Hollenhorst plaque, right eye     Hypertension     Jaw fracture (HCC)     Ruptured ear drum     Subjective visual disturbance, right eye     Thumb fracture        PSxHx:  Past Surgical History:   Procedure Laterality Date    HX APPENDECTOMY      HX ARTERIAL BYPASS  02/15/2019    HX ORTHOPAEDIC      HX OTHER SURGICAL  02/15/2019    8 stints    HX OTHER SURGICAL  02/15/2019    Double bypass       Meds:    Current Outpatient Medications:     oxyCODONE-acetaminophen (PERCOCET) 5-325 mg per tablet, , Disp: , Rfl:     Pazeo 0.7 % drop, INSTILL 1 DROP INTO EACH EYE ONCE DAILY, Disp: , Rfl:     lidocaine (LIDODERM) 5 %, APPLY ONE PATCH TOPICALLY TO CLEAN DRY SKIN ONCE DAILY. LEAVE ON FOR 12 HOURS THEN REMOVE. MUST WAIT AT LEAST 12 HOURS BEFORE APPLYING PATCH, Disp: , Rfl:     Invokana 100 mg tablet, TAKE 1 TABLET BY MOUTH ONCE DAILY BEFORE BREAKFAST, Disp: 30 Tab, Rfl: 11    glucose blood VI test strips (blood glucose test) strip, Check fasting blood sugar daily using blood sugar test strip. Dx: e11.21, Disp: 100 Strip, Rfl: 3    sennosides 8.6 mg cap, senna 8.6 mg capsule  Take 2 capsules every day by oral route as needed. , Disp: , Rfl:     clopidogreL (PLAVIX) 75 mg tab, Take 1 Tab by mouth daily. , Disp: 90 Tab, Rfl: 3    hydrALAZINE (APRESOLINE) 50 mg tablet, Take 1 Tab by mouth three (3) times daily. , Disp: 270 Tab, Rfl: 3    atorvastatin (LIPITOR) 40 mg tablet, Take 1 Tab by mouth nightly., Disp: 90 Tab, Rfl: 3    carvedilol (COREG) 12.5 mg tablet, Take 1 Tab by mouth two (2) times daily (with meals). , Disp: 180 Tab, Rfl: 3    famotidine (PEPCID) 40 mg tablet, Take 1 Tab by mouth daily. , Disp: 90 Tab, Rfl: 3    losartan (COZAAR) 25 mg tablet, Take 1 Tab by mouth daily. , Disp: 90 Tab, Rfl: 3    amLODIPine (NORVASC) 10 mg tablet, TAKE 1 TABLET BY MOUTH ONCE DAILY, Disp: 90 Tab, Rfl: 3    ferrous sulfate 325 mg (65 mg iron) tablet, Take 1 Tab by mouth Daily (before breakfast). , Disp: 90 Tab, Rfl: 3    acetaminophen (TYLENOL) 325 mg tablet, Take 325-650 mg by mouth every four (4) hours as needed for Pain., Disp: , Rfl:     cetirizine (ZYRTEC) 10 mg tablet, Take 10 mg by mouth daily as needed for Allergies. , Disp: , Rfl:     All:  No Known Allergies    Social Hx:  Social History     Socioeconomic History    Marital status: SINGLE     Spouse name: Not on file    Number of children: Not on file    Years of education: Not on file    Highest education level: Not on file   Tobacco Use    Smoking status: Never Smoker    Smokeless tobacco: Never Used   Substance and Sexual Activity    Alcohol use: No    Drug use: No    Sexual activity: Yes     Partners: Female     Birth control/protection: Surgical       Family Hx:  Family History   Problem Relation Age of Onset    Diabetes Mother     Stroke Mother     Colon Cancer Father     Cancer Brother     No Known Problems Child          Review of Systems:       General: Denies headache, lethargy, fever, weight loss  Ears/Nose/Throat: Denies ear discharge, drainage, nosebleeds, hoarse voice, dental problems  Cardiovascular: Denies chest pain, shortness of breath  Lungs: Denies chest pain, breathing problems, wheezing, pneumonia  Stomach: Denies stomach pain, heartburn, constipation, irritable bowel  Skin: Denies rash, sores, open wounds  Musculoskeletal: left hip pain  Genitourinary: Denies dysuria, hematuria, polyuria  Gastrointestinal: Denies constipation, obstipation, diarrhea  Neurological: Denies changes in sight, smell, hearing, taste, seizures. Denies loss of consciousness. Psychiatric: Denies depression, sleep pattern changes, anxiety, change in personality  Endocrine: Denies mood swings, heat or cold intolerance  Hematologic/Lymphatic: Denies anemia, purpura, petechia  Allergic/Immunologic: Denies swelling of throat, pain or swelling at lymph nodes      Physical Examination:    There were no vitals taken for this visit. General: AOX3, no apparent distress  Psychiatric: mood and affect appropriate        Diagnostics:    Pertinent Diagnostics: none today    Assessment: left hip peritrochanteric pain syndrome  Plan:    So far, this patient has not had significant therapy, but is doing fairly well in general.  His pain is better than it was, and he likes the TENS unit. Plan will be to continue PT, I will add diclofenac gel to his medications, and follow up in 1 month with no xrays. I was in the office while conducting this encounter. Consent:  He and/or his healthcare decision maker is aware that this patient-initiated Telehealth encounter is a billable service, with coverage as determined by his insurance carrier. He is aware that he may receive a bill and has provided verbal consent to proceed: Yes    This virtual visit was conducted via [employer]. Pursuant to the emergency declaration under the [facility]5 waiver authority and the [employer] Act, this Virtual  Visit was conducted to reduce the patient's risk of exposure to COVID-19 and provide continuity of care for an established patient. Services were provided through a video synchronous discussion virtually to substitute for in-person clinic visit. Due to this being a TeleHealth evaluation, many elements of the physical examination are unable to be assessed. Total Time: minutes: 11-20 minutes. Mr. Kale Abebe has a reminder for a \"due or due soon\" health maintenance. I have asked that he contact his primary care provider for follow-up on this health maintenance.

## 2020-08-26 ENCOUNTER — PATIENT OUTREACH (OUTPATIENT)
Dept: CASE MANAGEMENT | Age: 50
End: 2020-08-26

## 2020-08-26 NOTE — PROGRESS NOTES
Patient has graduated from the Complex Case Management  program on 8/26/20 for diabetes. Patient/family has the ability to self-manage at this time Care management goals have been completed. No further Ambulatory Care Manager follow up scheduled. Goals Addressed                 This Visit's Progress     COMPLETED: Patient verbalizes understanding of self -management goals of living with Diabetes. 8/26/20-dkw  -dsme initiated face to face on 6/13/19  -a1c was 7.8% on 12/5/19  -last VV w/Dr Tiarra Azevedo was 7/8/20 and blood sugar was reported as 135  -next appt w/Dr Tiarra Azevedo is 1/8/21  -labs ordered and not done yet  -will resolve CCM episode at this time and can reopen as needed    4/14/20-dkw  -last A1c ws 7.8% on 12/5/19  -pt seen by Dr. Tiarra Azevedo 4/9/20 for a virtual visit TeleHealth encounter (During \[facility] [place] [facility]\""VK-49 public health emergency  -labs will be ordered at next appt in July  -pt stated fasting blood sugar was 167 last week; he has run out of test strips, which were sent to his  pharmacy today  -pt stated he is taking his   Key Antihyperglycemic Medications               canagliflozin (Invokana) 100 mg tablet Take 100 mg by mouth Daily (before breakfast). SITagliptin (JANUVIA) 100 mg tablet Take 1 Tab by mouth daily. -pt was getting more vision back s/p stroke, but it has been more blurry lately  -he is eating less carbs, no sweets or sweet drinks  -advised pt to practice social distancing   -he has his own bp machine at home  -will follow    Key Antihyperglycemic Medications               canagliflozin (Invokana) 100 mg tablet Take 100 mg by mouth Daily (before breakfast). SITagliptin (JANUVIA) 100 mg tablet Take 1 Tab by mouth daily.               2/14/20-dkw  -pt was a NS for DSMT session #1 yesterday 2/13/20  -pt has f/u with Dr. Tiarra Azevedo on 4/9/20; will follow then    1/28/20-dkw  -pt scheduled session #1 DSMT on Thursday February 13th from 10-12:00    1/7/20-dkw  -pt here today for f/u with Dr. Tonia Swartz; his mom is in rehab facility in [place] s/p stroke        -pt's last a1c was 7.8% on 12/5/19 during inpt at [address] for acute ischemic stroke           that affected vision in his right eye; pt has h/o aortic dissection; vision in his right            eye is slowly coming back; his next appt with Dr. Jose Antonio Marrero is March 16th; home                 health only came out twice  -is checking blood sugars a few times a week fasting; four days ago was 130 something; discussed importance of checking fasting every morning to help with self management of his diabetes; goal is <140  -pt is taking Invokana daily and just got his Januvia refilled; he will take both in the morning  -pt stopped Lexapro due to anxiety decreased when his vision started coming back  -reviewed healthy eating again today; appetite is good; he is drinking a lot of juice and will switch to no calorie drinks like Crystal Light  -discussed exercise like taking a 15 minute walk daily and to get advisement from Dr. 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 Tonia Swartz on 4/9/20 12/19/19-dkw  -pt's last a1c was 7.8% on 12/5/19 during inpt at [address] for acute ischemic stroke that affected vision in his right eye; pt has h/o aortic dissection  -pt picked up the [address] and is also taking Januvia;  Vahe Garg helped pt with prior auth for Invokana  -pt is not monitoring blood sugar due to has not strips; he will take his glucometer to pharmacy and make sure he gets the correct test strips and check fasting daily; pt will call if needs assistance with testing supplies; advised pt his fasting goal is <150 and that having controlled diabetes will help his whole body  -stopped Lexapro yesterday that Dr. Herb Shah prescribed for reactive depression because feeling better; was really stressed out before due to decreased vision in right eye that is returning now; advised pt the Lexapro may be the reason he was feelign better and to restart if his symptoms of depression return  -pt has been resting quite a bit  -pt's appetite is returning, but is still not eating as much  -home health has come out twice  -pt is working on getting approved for disability  -pt will f/u with Dr. Herb Shah on 1/7/20 at 8:15  -will follow up with pt at that appt per pt's request    11/1/19-dkw  -left message for pt to call back  -will follow around next ov w/Dr. Herb Shah 1/7/20 8/28/19-dkw  -left message to call back with blood sugars  -reminded pt of f/u with Dr. Danielito Blood on 10/7/19 at 8:00  -will follow    6/18/19-dkw  -fasting blood sugar yesterday 164; all <164 pt reported; he will continue to check fasting   -advised pt to start the [address] [place] [address]; pt verbalized understanding   -pt is having dizziness; instructed him to call Dr. Christoph Villarreal and or Dr. Rojean Barthel who prescribed his heart meds   -will follow in one week    6/13/19-dkw  -check blood sugar fasting every morning and write down result; call Aileen Easley at [phone] in one week with results  -has not and does not was to start [address] [place] [address]; hold for now as last blood sugar fasting was 110 and finished prednisone  -exercise as instructed by Dr. Rojean Barthel  -strive to start following the healthy plate meal plan being mindful of what is a carb (fruit, dairy, grains, starchy vegetables) and portion size; guideline is up to 45-60 grams of carb per meal (3 meals per day) or 3-4 servings per meal; remember that 1/2 cup of a sweet drink is 15 grams of carb  -snack guideline - 1 oz. protein serving and may add 1 carb serving   -follow up with Dr. Sincere Christiansen on 10/7/19 at 8:00  -call Dora Andersen at [phone] with any questions  -will follow            Patient has Ambulatory Care Manager's contact information for any further questions, concerns, or needs.   Patients upcoming visits:    Future Appointments   Date Time Provider Travis Garcia   8/27/2020  2:45 PM Kris Marie PT MRM OPP[facility]   1/8/2021  8:15 AM Frank Rocha Ards III, DO [facility] BS AMB

## 2020-09-03 ENCOUNTER — HOSPITAL ENCOUNTER (OUTPATIENT)
Dept: PHYSICAL THERAPY | Age: 50
Discharge: HOME | End: 2020-09-03
Payer: MEDICAID

## 2020-09-03 PROCEDURE — 97110 THERAPEUTIC EXERCISES: CPT | Performed by: PHYSICAL THERAPIST

## 2020-09-03 NOTE — PROGRESS NOTES
PT DAILY TREATMENT NOTE 2-15    Patient Name: Janell Paz  AXHU:[id number]  : 1970  [x]  Patient  Verified  Payor: Caleb Oneal / Plan: [facility] COMMUNITY PLAN [employer] / Product Type: Managed Care Medicaid /    In time: 3:18pm  Out time: 4:15pm  Total Treatment Time (min): 62  Visit #: 2    Treatment Area: Left hip pain [M25.552]    SUBJECTIVE  Pain Level (0-10 scale): 0  Any medication changes, allergies to medications, adverse drug reactions, diagnosis change, or new procedure performed?: [x] No    [] Yes (see summary sheet for update)  Subjective functional status/changes:   [] No changes reported  The patient reports that he's had a lot to do with with his son, pets fighting and furniture being destroyed, but he's been trying his exercises, but the band broke last week so he's only been able to use half of it. The hip is mainly just uncomfortable to lay on at night. OBJECTIVE    57 min Therapeutic Exercise:  [x] See flow sheet :   Rationale: increase ROM, increase strength and improve coordination to improve the patients ability to perform daily activities. With   [x] TE   [] TA   [] neuro   [] other: Patient Education: [x] Review HEP    [x] Progressed/Changed HEP based on:   [x] positioning   [x] body mechanics   [] transfers   [] heat/ice application    [] other:      Other Objective/Functional Measures: none noted     Pain Level (0-10 scale) post treatment: 0    ASSESSMENT/Changes in Function:   The patient is progressing with strengthening and mobility as tolerated, but needed cues for his HEP.   Patient will continue to benefit from skilled PT services to modify and progress therapeutic interventions, address functional mobility deficits, address ROM deficits, address strength deficits, analyze and address soft tissue restrictions, analyze and cue movement patterns, analyze and modify body mechanics/ergonomics, assess and modify postural abnormalities, address imbalance/dizziness and instruct in home and community integration to attain remaining goals. [x]  See Plan of Care  []  See progress note/recertification  []  See Discharge Summary         Progress towards goals / Updated goals: The patient is progressing towards goals.      PLAN  [x]  Upgrade activities as tolerated     [x]  Continue plan of care  [x]  Update interventions per flow sheet       []  Discharge due to:_  []  Other:_      Dayton Curling, PT 9/3/2020

## 2020-09-10 ENCOUNTER — HOSPITAL ENCOUNTER (OUTPATIENT)
Dept: PHYSICAL THERAPY | Age: 50
Discharge: HOME | End: 2020-09-10
Payer: MEDICAID

## 2020-09-10 PROCEDURE — 97110 THERAPEUTIC EXERCISES: CPT | Performed by: PHYSICAL THERAPIST

## 2020-09-10 NOTE — PROGRESS NOTES
PT DAILY TREATMENT NOTE 2-15    Patient Name: Milo Altman  Date:9/10/2020  : 1970  [x]  Patient  Verified  Payor: Abilio Rajan / Plan: [facility] COMMUNITY PLAN [employer] / Product Type: Managed Care Medicaid /    In time: 1:00pm  Out time: 1:54pm  Total Treatment Time (min): 54  Visit #: 3    Treatment Area: Left hip pain [M25.552]    SUBJECTIVE  Pain Level (0-10 scale): 0  Any medication changes, allergies to medications, adverse drug reactions, diagnosis change, or new procedure performed?: [x] No    [] Yes (see summary sheet for update)  Subjective functional status/changes:   [] No changes reported  The patient reports that it's been up to a 5/10 if he lays on the hip, but it doesn't bother him much with daily activities. OBJECTIVE    54 min Therapeutic Exercise:  [x] See flow sheet :   Rationale: increase ROM, increase strength and improve coordination to improve the patients ability to perform daily activities. With   [x] TE   [] TA   [] neuro   [] other: Patient Education: [x] Review HEP    [x] Progressed/Changed HEP based on:   [x] positioning   [x] body mechanics   [] transfers   [] heat/ice application    [] other:      Other Objective/Functional Measures: none noted     Pain Level (0-10 scale) post treatment: 0    ASSESSMENT/Changes in Function:   The patient progressed with strengthening and ROM as tolerated, but continues to have glute strength deficits causing bursitis symptoms. Patient will continue to benefit from skilled PT services to modify and progress therapeutic interventions, address functional mobility deficits, address ROM deficits, address strength deficits, analyze and address soft tissue restrictions, analyze and cue movement patterns, analyze and modify body mechanics/ergonomics, assess and modify postural abnormalities, address imbalance/dizziness and instruct in home and community integration to attain remaining goals.      [x]  See Plan of Care  []  See progress note/recertification  []  See Discharge Summary         Progress towards goals / Updated goals: The patient is progressing towards goals.      PLAN  [x]  Upgrade activities as tolerated     [x]  Continue plan of care  [x]  Update interventions per flow sheet       []  Discharge due to:_  []  Other:_      Hever Hernandez, PT 9/10/2020

## 2020-09-14 ENCOUNTER — HOSPITAL ENCOUNTER (OUTPATIENT)
Dept: PHYSICAL THERAPY | Age: 50
Discharge: HOME | End: 2020-09-14
Payer: MEDICAID

## 2020-09-14 PROCEDURE — 97110 THERAPEUTIC EXERCISES: CPT | Performed by: PHYSICAL THERAPIST

## 2020-09-14 NOTE — PROGRESS NOTES
PT DAILY TREATMENT NOTE 2-15    Patient Name: Samara Mendoza  Date:2020  : 1970  [x]  Patient  Verified  Payor: [facility] MEDICAID / Plan: [facility] [employer] / Product Type: Managed Care Medicaid /    In time: 1:58pm  Out time: 2:52pm  Total Treatment Time (min): 54  Visit #: 4    Treatment Area: Left hip pain [M25.552]    SUBJECTIVE  Pain Level (0-10 scale): 0  Any medication changes, allergies to medications, adverse drug reactions, diagnosis change, or new procedure performed?: [x] No    [] Yes (see summary sheet for update)  Subjective functional status/changes:   [] No changes reported  The patient reports that he feels 75% better, he's not limping like he used to, it didn't wake him up much over the weekend, but he wasn't able to do many of his exercises due to his aunt being in the hospital.     OBJECTIVE    54 min Therapeutic Exercise:  [x] See flow sheet :   Rationale: increase ROM, increase strength and improve coordination to improve the patients ability to perform daily activities. With   [x] TE   [] TA   [] neuro   [] other: Patient Education: [x] Review HEP    [x] Progressed/Changed HEP based on:   [x] positioning   [x] body mechanics   [] transfers   [] heat/ice application    [] other:      Other Objective/Functional Measures: FOTO= 72 (48 at eval)     Pain Level (0-10 scale) post treatment: 0    ASSESSMENT/Changes in Function:   The patient has progressed very well and MET most goals and will be discharged to his advanced HEP. .     []  See Plan of Care  []  See progress note/recertification  [x]  See Discharge Summary         Progress towards goals / Updated goals: The patient is progressing towards or has MET goals. PLAN  []  Upgrade activities as tolerated     []  Continue plan of care  []  Update interventions per flow sheet       [x]  Discharge due to: Pt. Has MET goals.    []  Other:Luis Steele, PT 2020

## 2020-09-14 NOTE — ANCILLARY DISCHARGE INSTRUCTIONS
[facility]  [address] (MO[place]), [address] [facility] [place] Echeverria  Phone: [phone] Fax: [phone]    Medicaid Discharge Summary  2-15    Patient name: Tana Donis  : 1970  Provider#: [id number]  Referral source: Jael Barahona DO      Medical/Treatment Diagnosis: Left hip pain [M25.552]     Prior Hospitalization: see medical history     Comorbidities: See Plan of Care  Prior Level of Function:See Plan of Care  Medications: Verified on Patient Summary List    Start of Care: 20      Onset Date: May 2020   Visits from Start of Care: 4     Missed Visits: 2  Reporting Period : 20 to 20      ASSESSMENT/SUMMARY OF CARE: The patient has progressed very well with his left hip bursitis pain, self reporting feeling 75% better. He is not limping when he walks anymore, and can tolerate lying on the left side better. He does also not have much discomfort with daily activities. He has a dynamic and progressive HEP that he will continue to utilize to maintain improvements made thus far. Short Term Goals: To be accomplished in 2 treatments:                         9.) The patient will be independent with their HEP consistently for at least one week. - MET  Long Term Goals: To be accomplished in 16 treatments:                         6.) The patient will have at most 4/10 pain with daily activities. - MET                         6.) The patient will be able to ambulate for at least 40min with at most 4/10 pain. - MET                         6.) The patient will improve their FOTO score from 48 to at least 50 to show improvements in functional mobility.- MET (72 today)                         4.) The patient will improve his single leg stance on his left leg from 3s to at least 5s to show improvement in stability. - MET (27s today)        RECOMMENDATIONS:  [x]Discontinue therapy: [x]Patient has reached or is progressing toward set goals      []Patient is non-compliant or has abdicated      []Due to lack of appreciable progress towards set goals      []Other    Sophia Helms, PT 9/14/2020       ______________________________________________________________________  NOTE TO PHYSICIAN:  Please complete the following and fax to: [facility]: Fax: [phone]. Retain this original for your records. If you are unable to process this request in 24 hours, please contact our office.      [address] [place] [address] Signature:____________________  Date:____________Time:_________

## 2020-10-14 RX ORDER — LANOLIN ALCOHOL/MO/W.PET/CERES
CREAM (GRAM) TOPICAL
Qty: 90 TAB | Refills: 3 | Status: CANCELLED | OUTPATIENT
Start: 2020-10-14

## 2020-10-14 NOTE — TELEPHONE ENCOUNTER
Re-faxed confirmation of script approval to [facility] DR BRANDON KIDD.      Sig: TAKE 1 TABLET BY MOUTH ONCE DAILY BEFORE BREAKFAST     Sent to pharmacy as: ferrous sulfate 325 mg (65 mg iron) tablet     E-Prescribing Status: Receipt confirmed by pharmacy (9/24/2020  7:38 AM EDT)

## 2020-12-07 ENCOUNTER — TELEPHONE (OUTPATIENT)
Dept: INTERNAL MEDICINE CLINIC | Age: 50
End: 2020-12-07

## 2020-12-07 RX ORDER — ATORVASTATIN CALCIUM 40 MG/1
40 TABLET, FILM COATED ORAL
Qty: 90 TAB | Refills: 3 | Status: SHIPPED | OUTPATIENT
Start: 2020-12-07 | End: 2022-01-06

## 2020-12-07 NOTE — TELEPHONE ENCOUNTER
----- Message from Angel Garcia sent at 12/7/2020 10:13 AM EST -----  Regarding: Medication Refill  Medication Refill    Caller (if not patient): Milad Izquierdo      Relationship of caller (if not patient): life partner       Best contact number(s): [phone]         Name of medication and dosage if known: \"Atorvastatin, unsure of dosage\"      Is patient out of this medication (yes/no): Yes       Pharmacy name: [facility] listed in chart? (yes/no): Yes   Pharmacy phone number: unable to provide      Details to clarify the request: Milad Izquierdo, life partner, calling to advise patient has been out of medication for roughly 7 days and needing filled. She can be contacted at [phone] if needing to advise refill due to patient phone acting up.        Message from [facility]

## 2020-12-07 NOTE — TELEPHONE ENCOUNTER
----- Message from Maddie Briceno sent at 12/7/2020 10:13 AM EST -----  Regarding: Medication Refill  Medication Refill    Caller (if not patient): Estevan Sinclair      Relationship of caller (if not patient): life partner       Best contact number(s): [phone]         Name of medication and dosage if known: \"Atorvastatin, unsure of dosage\"      Is patient out of this medication (yes/no): Yes       Pharmacy name: [facility] listed in chart? (yes/no): Yes   Pharmacy phone number: unable to provide      Details to clarify the request: Estevan Sinclair, life partner, calling to advise patient has been out of medication for roughly 7 days and needing filled. She can be contacted at [phone] if needing to advise refill due to patient phone acting up.        Message from [facility]

## 2021-01-08 ENCOUNTER — OFFICE VISIT (OUTPATIENT)
Dept: INTERNAL MEDICINE CLINIC | Age: 51
End: 2021-01-08
Payer: MEDICAID

## 2021-01-08 VITALS
OXYGEN SATURATION: 98 % | BODY MASS INDEX: 28.83 KG/M2 | WEIGHT: 244.2 LBS | HEART RATE: 75 BPM | HEIGHT: 77 IN | SYSTOLIC BLOOD PRESSURE: 148 MMHG | DIASTOLIC BLOOD PRESSURE: 84 MMHG | RESPIRATION RATE: 16 BRPM

## 2021-01-08 DIAGNOSIS — E11.21 TYPE 2 DIABETES WITH NEPHROPATHY (HCC): ICD-10-CM

## 2021-01-08 DIAGNOSIS — H34.11 CENTRAL RETINAL ARTERY OCCLUSION OF RIGHT EYE: ICD-10-CM

## 2021-01-08 DIAGNOSIS — E11.69 HYPERLIPIDEMIA ASSOCIATED WITH TYPE 2 DIABETES MELLITUS (HCC): ICD-10-CM

## 2021-01-08 DIAGNOSIS — I10 ESSENTIAL HYPERTENSION: Primary | ICD-10-CM

## 2021-01-08 DIAGNOSIS — E11.42 DIABETIC PERIPHERAL NEUROPATHY ASSOCIATED WITH TYPE 2 DIABETES MELLITUS (HCC): ICD-10-CM

## 2021-01-08 DIAGNOSIS — E78.5 HYPERLIPIDEMIA ASSOCIATED WITH TYPE 2 DIABETES MELLITUS (HCC): ICD-10-CM

## 2021-01-08 DIAGNOSIS — Z86.79 HISTORY OF AORTIC DISSECTION: ICD-10-CM

## 2021-01-08 DIAGNOSIS — N18.2 CKD (CHRONIC KIDNEY DISEASE) STAGE 2, GFR 60-89 ML/MIN: ICD-10-CM

## 2021-01-08 DIAGNOSIS — I63.312 THROMBOTIC STROKE INVOLVING LEFT MIDDLE CEREBRAL ARTERY (HCC): ICD-10-CM

## 2021-01-08 PROCEDURE — 99214 OFFICE O/P EST MOD 30 MIN: CPT | Performed by: INTERNAL MEDICINE

## 2021-01-08 NOTE — PATIENT INSTRUCTIONS
Home Blood Pressure Test: About This Test  What is it? A home blood pressure test allows you to keep track of your blood pressure at home. Blood pressure is a measure of the force of blood against the walls of your arteries. Blood pressure readings include two numbers, such as 130/80 (say \"130 over 80\"). The first number is the systolic pressure. The second number is the diastolic pressure. Why is this test done? You may do this test at home to:  · Find out if you have high blood pressure. · Track your blood pressure if you have high blood pressure. · Track how well medicine is working to reduce high blood pressure. · Check how lifestyle changes, such as weight loss and exercise, are affecting blood pressure. How do you prepare for the test?  For at least 30 minutes before you take your blood pressure, don't exercise or use caffeine, tobacco, or medicines that raise blood pressure. Take your blood pressure while you feel comfortable and relaxed. Sit quietly with both feet on the floor for at least 5 minutes before the test.  How is the test done? · Sit with your arm slightly bent and resting on a table so that your upper arm is at the same level as your heart. · Roll up your sleeve or take off your shirt to expose your upper arm. · Wrap the blood pressure cuff around your upper arm so that the lower edge of the cuff is about 1 inch above the bend of your elbow. Proceed with the following steps depending on if you are using an automatic or manual pressure monitor. Automatic blood pressure monitors  · Press the on/off button on the automatic monitor and wait until the ready-to-measure \"heart\" symbol appears next to zero in the display window. · Press the start button. The cuff will inflate and deflate by itself. · Your blood pressure numbers will appear on the screen. · Write your numbers in your log book, along with the date and time.   Manual blood pressure monitors  · Place the earpieces of a stethoscope in your ears, and place the bell of the stethoscope over the artery, just below the cuff. · Close the valve on the rubber inflating bulb. · Squeeze the bulb rapidly with your opposite hand to inflate the cuff until the dial or column of mercury reads about 30 mm Hg higher than your usual systolic pressure. If you do not know your usual pressure, inflate the cuff to 210 mm Hg or until the pulse at your wrist disappears. · Open the pressure valve just slightly by twisting or pressing the valve on the bulb. · As you watch the pressure slowly fall, note the level on the dial at which you first start to hear a pulsing or tapping sound through the stethoscope. This is your systolic blood pressure. · Continue letting the air out slowly. The sounds will become muffled and will finally disappear. Note the pressure when the sounds completely disappear. This is your diastolic blood pressure. Let out all the remaining air. · Write your numbers in your log book, along with the date and time. Follow-up care is a key part of your treatment and safety. Be sure to make and go to all appointments, and call your doctor if you are having problems. It's also a good idea to keep a list of the medicines you take. Where can you learn more? Go to http://www.[employer].com.com/  Enter C427 in the search box to learn more about \"Home Blood Pressure Test: About This Test.\"  Current as of: December 16, 2019               Content Version: 12.6  © 4696-9581 [employer]. Care instructions adapted under license by [employer] (which disclaims liability or warranty for this information). If you have questions about a medical condition or this instruction, always ask your healthcare professional. [address] any warranty or liability for your use of this information. Try to follow bp at home few times each week. Send me results in 4-6 weeks.

## 2021-01-08 NOTE — PROGRESS NOTES
Leobardo Newman is a 53 y.o. male who presents for evaluation of routine follow up. Last seen by me July 8, 2020 with left sided sciatica. Has been seeing ortho, dr Humberto Castro. Doing better after PT and TENS unit. Just took bp meds in hallway before coming in. On recheck down to 148/84. Has not been checking at home. Overall feels well though. Just got approved for disability, got 1st check last week.       ROS:  Constitutional: negative for fevers, chills, anorexia and weight loss  Eyes:   negative for visual disturbance and irritation  ENT:   negative for tinnitus,sore throat,nasal congestion,ear pain,hoarseness  Respiratory:  negative for cough, hemoptysis, dyspnea,wheezing  CV:   negative for chest pain, palpitations, lower extremity edema  GI:   negative for nausea, vomiting, diarrhea, abdominal pain,melena  Genitourinary: negative for frequency, dysuria and hematuria  Musculoskel: negative for myalgias, arthralgias, back pain, muscle weakness, joint pain  Neurological:  negative for headaches, dizziness, focal weakness, numbness  Psychiatric:     Negative for depression or anxiety      Past Medical History:   Diagnosis Date    Bilateral carotid artery stenosis     Diabetic peripheral neuropathy associated with type 2 diabetes mellitus ([facility] [address]     Foot fracture     H/O aortic dissection     Hollenhorst plaque, right eye     Hypertension     Jaw fracture (HCC)     Ruptured ear drum     Subjective visual disturbance, right eye     Thumb fracture        Past Surgical History:   Procedure Laterality Date    HX APPENDECTOMY      HX ARTERIAL BYPASS  02/15/2019    HX ORTHOPAEDIC      HX OTHER SURGICAL  02/15/2019    8 stints    HX OTHER SURGICAL  02/15/2019    Double bypass       Family History   Problem Relation Age of Onset    Diabetes Mother     Stroke Mother     Colon Cancer Father     Cancer Brother     No Known Problems Child        Social History     Socioeconomic History    Marital status: SINGLE     Spouse name: Not on file    Number of children: Not on file    Years of education: Not on file    Highest education level: Not on file   Occupational History    Not on file   Social Needs    Financial resource strain: Not on file    Food insecurity     Worry: Not on file     Inability: Not on file    Transportation needs     Medical: Not on file     Non-medical: Not on file   Tobacco Use    Smoking status: Never Smoker    Smokeless tobacco: Never Used   Substance and Sexual Activity    Alcohol use: No    Drug use: No    Sexual activity: Yes     Partners: Female     Birth control/protection: Surgical   Lifestyle    Physical activity     Days per week: Not on file     Minutes per session: Not on file    Stress: Not on file   Relationships    Social connections     Talks on phone: Not on file     Gets together: Not on file     Attends [employer] service: Not on file     Active member of club or organization: Not on file     Attends meetings of clubs or organizations: Not on file     Relationship status: Not on file    Intimate partner violence     Fear of current or ex partner: Not on file     Emotionally abused: Not on file     Physically abused: Not on file     Forced sexual activity: Not on file   Other Topics Concern    Not on file   Social History Narrative    Not on file            Visit Vitals  BP (!) 181/97 (BP 1 Location: Right arm, BP Patient Position: Sitting)   Pulse 75   Resp 16   Ht 6' 5\" (1.956 m)   Wt 244 lb 3.2 oz (110.8 kg)   SpO2 98%   BMI 28.96 kg/m²       Physical Examination:   General - Well appearing male  HEENT - PERRL, TM no erythema/opacification, normal nasal turbinates, no oropharyngeal erythema or exudate, MMM  Neck - supple, no bruits, no thyroidomegaly, no lymphadenopathy  Pulm - clear to auscultation bilaterally  Cardio - RRR, normal S1 S2, no murmur  Abd - soft, nontender, no masses, no HSM  Extrem - no edema, +2 distal pulses  Neuro-  No focal deficits, CN intact     Assessment/Plan:    1.  htn--on manual recheck is 148/84.  Continue cozaar, hydralazine, coreg, norvasc.  Monitor at home.  Would increase cozaar as next step to 50.  2.  ckd 3--check cmp  3.  Dm, type 2--last a1c 7.8.  Ran out of invokana earlier this week.  Refills given.  Check a1c, urine micro  4.  Hx aortic dissection---sp TEVAR.  On plavix  5.  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  Vision still blurry  6.  Iron def anemia--check cbc.  On iron       rtc 6 months.        Jean-Claude Niño III, DO

## 2021-01-10 LAB
ALBUMIN SERPL-MCNC: 4.8 G/DL (ref 4–5)
ALBUMIN/CREAT UR: 18 MG/G CREAT (ref 0–29)
ALBUMIN/GLOB SERPL: 1.8 {RATIO} (ref 1.2–2.2)
ALP SERPL-CCNC: 66 IU/L (ref 39–117)
ALT SERPL-CCNC: 20 IU/L (ref 0–44)
AST SERPL-CCNC: 17 IU/L (ref 0–40)
BASOPHILS # BLD AUTO: 0 X10E3/UL (ref 0–0.2)
BASOPHILS NFR BLD AUTO: 0 %
BILIRUB SERPL-MCNC: 0.7 MG/DL (ref 0–1.2)
BUN SERPL-MCNC: 19 MG/DL (ref 6–24)
BUN/CREAT SERPL: 13 (ref 9–20)
CALCIUM SERPL-MCNC: 9.5 MG/DL (ref 8.7–10.2)
CHLORIDE SERPL-SCNC: 102 MMOL/L (ref 96–106)
CHOLEST SERPL-MCNC: 101 MG/DL (ref 100–199)
CO2 SERPL-SCNC: 25 MMOL/L (ref 20–29)
CREAT SERPL-MCNC: 1.42 MG/DL (ref 0.76–1.27)
CREAT UR-MCNC: 185.9 MG/DL
EOSINOPHIL # BLD AUTO: 0.1 X10E3/UL (ref 0–0.4)
EOSINOPHIL NFR BLD AUTO: 2 %
ERYTHROCYTE [DISTWIDTH] IN BLOOD BY AUTOMATED COUNT: 15 % (ref 11.6–15.4)
EST. AVERAGE GLUCOSE BLD GHB EST-MCNC: 157 MG/DL
GLOBULIN SER CALC-MCNC: 2.6 G/DL (ref 1.5–4.5)
GLUCOSE SERPL-MCNC: 160 MG/DL (ref 65–99)
HBA1C MFR BLD: 7.1 % (ref 4.8–5.6)
HCT VFR BLD AUTO: 44.4 % (ref 37.5–51)
HDLC SERPL-MCNC: 35 MG/DL
HGB BLD-MCNC: 14.4 G/DL (ref 13–17.7)
IMM GRANULOCYTES # BLD AUTO: 0 X10E3/UL (ref 0–0.1)
IMM GRANULOCYTES NFR BLD AUTO: 0 %
LDLC SERPL CALC-MCNC: 50 MG/DL (ref 0–99)
LYMPHOCYTES # BLD AUTO: 1.6 X10E3/UL (ref 0.7–3.1)
LYMPHOCYTES NFR BLD AUTO: 30 %
MCH RBC QN AUTO: 26.2 PG (ref 26.6–33)
MCHC RBC AUTO-ENTMCNC: 32.4 G/DL (ref 31.5–35.7)
MCV RBC AUTO: 81 FL (ref 79–97)
MICROALBUMIN UR-MCNC: 33.5 UG/ML
MONOCYTES # BLD AUTO: 0.4 X10E3/UL (ref 0.1–0.9)
MONOCYTES NFR BLD AUTO: 9 %
NEUTROPHILS # BLD AUTO: 3 X10E3/UL (ref 1.4–7)
NEUTROPHILS NFR BLD AUTO: 59 %
PLATELET # BLD AUTO: 202 X10E3/UL (ref 150–450)
POTASSIUM SERPL-SCNC: 3.7 MMOL/L (ref 3.5–5.2)
PROT SERPL-MCNC: 7.4 G/DL (ref 6–8.5)
PSA SERPL-MCNC: 1 NG/ML (ref 0–4)
RBC # BLD AUTO: 5.5 X10E6/UL (ref 4.14–5.8)
SODIUM SERPL-SCNC: 141 MMOL/L (ref 134–144)
TRIGL SERPL-MCNC: 80 MG/DL (ref 0–149)
TSH SERPL DL<=0.005 MIU/L-ACNC: 1.07 UIU/ML (ref 0.45–4.5)
VLDLC SERPL CALC-MCNC: 16 MG/DL (ref 5–40)
WBC # BLD AUTO: 5.2 X10E3/UL (ref 3.4–10.8)

## 2021-01-10 NOTE — PROGRESS NOTES
Labs look much better. Kidney function improved, no longer anemic, dm improved, a1c down to 7.1. Other labs look good also. Continue with same meds.

## 2021-03-15 ENCOUNTER — TELEPHONE (OUTPATIENT)
Dept: INTERNAL MEDICINE CLINIC | Age: 51
End: 2021-03-15

## 2021-03-15 NOTE — TELEPHONE ENCOUNTER
Callers: Patient and Mary Helms (life partner) called together. Pt states that he gives permission for Mary Helms to handle medical affairs and receive all medical information. He added her to HIPAA for physical therapy but didn't realize she was not on one for this office. He states he will add her and for now request to use her number and give her permission for all, brandon regarding this referral.    Call back Phone per patient Mary Helms): [phone]    Pt states he needs a referral to see a dentist bc his mouth is hurtling very bad. They state they need a referral bc they are concerned about his blood thinners and need a dentist that takes [employer]. They request a referral from the doctor. They also request to know what to do about blood thinners. They want to know when to stop blood thinners prior to seeing a dentist.  Please call to advise.

## 2021-03-15 NOTE — TELEPHONE ENCOUNTER
Called out and spoke to pt. Two pt identifiers confirmed. I got verbal consent that I could talk to Phan Villanueva by the pt. I told her to call his insurance to see what dentist will take that insurance and to let us know what is going to be done and when so that I can inform Dr. Helen Carrington about the blood thinners. Pt verbalized understanding of information discussed w/ no further questions at this time.

## 2021-03-18 ENCOUNTER — TELEPHONE (OUTPATIENT)
Dept: INTERNAL MEDICINE CLINIC | Age: 51
End: 2021-03-18

## 2021-03-18 NOTE — TELEPHONE ENCOUNTER
Patient's caregiver, Nikolay Diane, states she needs a call back today in reference to what to do about patient's medications for upcoming Dental Procedure/Extraction on this Tuesday, 3/23/21.  Thank you

## 2021-03-18 NOTE — TELEPHONE ENCOUNTER
Patient's caregiver, Tavares Pitt, states she needs a call back to discuss patient's upcoming Dental Extraction on 3/23/21 & needs to know what medications patient needs to stop & for how many day prior to procedure as Sierra Hobbs reports patient is on Blood Thinner. Please call top advise.  Thank you

## 2021-03-18 NOTE — TELEPHONE ENCOUNTER
Called out and spoke to pt. Two pt identifiers confirmed. Informed Ms. Yaya Antunez that he needs to stop Plavix and can start the day after his procedure. Pt verbalized understanding of information discussed w/ no further questions at this time.

## 2021-05-04 ENCOUNTER — OFFICE VISIT (OUTPATIENT)
Dept: INTERNAL MEDICINE CLINIC | Age: 51
End: 2021-05-04
Payer: MEDICAID

## 2021-05-04 VITALS
WEIGHT: 246 LBS | RESPIRATION RATE: 18 BRPM | OXYGEN SATURATION: 99 % | SYSTOLIC BLOOD PRESSURE: 133 MMHG | TEMPERATURE: 98.4 F | DIASTOLIC BLOOD PRESSURE: 69 MMHG | BODY MASS INDEX: 29.05 KG/M2 | HEIGHT: 77 IN | HEART RATE: 85 BPM

## 2021-05-04 DIAGNOSIS — H34.11 CENTRAL RETINAL ARTERY OCCLUSION OF RIGHT EYE: ICD-10-CM

## 2021-05-04 DIAGNOSIS — M25.461 PAIN AND SWELLING OF RIGHT KNEE: Primary | ICD-10-CM

## 2021-05-04 DIAGNOSIS — M25.561 PAIN AND SWELLING OF RIGHT KNEE: Primary | ICD-10-CM

## 2021-05-04 DIAGNOSIS — E78.5 HYPERLIPIDEMIA ASSOCIATED WITH TYPE 2 DIABETES MELLITUS (HCC): ICD-10-CM

## 2021-05-04 DIAGNOSIS — E11.42 DIABETIC PERIPHERAL NEUROPATHY ASSOCIATED WITH TYPE 2 DIABETES MELLITUS (HCC): ICD-10-CM

## 2021-05-04 DIAGNOSIS — E11.69 HYPERLIPIDEMIA ASSOCIATED WITH TYPE 2 DIABETES MELLITUS (HCC): ICD-10-CM

## 2021-05-04 DIAGNOSIS — E11.21 TYPE 2 DIABETES WITH NEPHROPATHY (HCC): ICD-10-CM

## 2021-05-04 DIAGNOSIS — I10 ESSENTIAL HYPERTENSION: ICD-10-CM

## 2021-05-04 DIAGNOSIS — Z86.79 HISTORY OF AORTIC DISSECTION: ICD-10-CM

## 2021-05-04 PROCEDURE — 99214 OFFICE O/P EST MOD 30 MIN: CPT | Performed by: INTERNAL MEDICINE

## 2021-05-04 RX ORDER — OLOPATADINE HYDROCHLORIDE 1 MG/ML
SOLUTION OPHTHALMIC
COMMUNITY
Start: 2021-02-02

## 2021-05-04 RX ORDER — PREDNISONE 20 MG/1
TABLET ORAL
Qty: 18 TAB | Refills: 0 | Status: SHIPPED | OUTPATIENT
Start: 2021-05-04 | End: 2021-05-13

## 2021-05-04 RX ORDER — SULFAMETHOXAZOLE AND TRIMETHOPRIM 800; 160 MG/1; MG/1
1 TABLET ORAL 2 TIMES DAILY
Qty: 14 TAB | Refills: 0 | Status: SHIPPED | OUTPATIENT
Start: 2021-05-04 | End: 2021-05-13

## 2021-05-04 NOTE — PROGRESS NOTES
Reviewed record in preparation for visit and have obtained necessary documentation. Identified pt with two pt identifiers(name and ). Chief Complaint   Patient presents with    Knee Swelling       Health Maintenance Due   Topic Date Due    Hepatitis C Test  Never done    COVID-19 Vaccine (1) Never done    Diabetic Foot Care  2021       Mr. Shiva Paniagua has a reminder for a \"due or due soon\" health maintenance. I have asked that he discuss this further with his primary care provider for follow-up on this health maintenance. Coordination of Care Questionnaire:  :     1) Have you been to an emergency room, urgent care clinic since your last visit? no   Hospitalized since your last visit? no             2) Have you seen or consulted any other health care providers outside of [address] [place] [address] since your last visit? no  (Include any pap smears or colon screenings in this section.)    3) In the event something were to happen to you and you were unable to speak on your behalf, do you have an Advance Directive/ Living Will in place stating your wishes? NO    Do you have an Advance Directive on file? no    4) Are you interested in receiving information on Advance Directives? NO    Patient is accompanied by self I have received verbal consent from Sharmila Pavon to discuss any/all medical information while they are present in the room.

## 2021-05-04 NOTE — PROGRESS NOTES
Connie Morgan is a 53 y.o. male who presents for evaluation of right knee pain, swelling, and sensation of 'giving away'. Last seen by me jan 8, 2021. bp much improved. Knee started to bother him about 3 weeks ago. Has been icing it regularly. Noticed some swelling, but that has improved some. Denies trauma to knee. Had steroid injection to knee years ago. Also has small boil on/in right nares, been bothering him for a few weeks now. Has tried to pop it or drain it with needle with no success.       ROS:  Constitutional: negative for fevers, chills, anorexia and weight loss  Eyes:   negative for visual disturbance and irritation  ENT:   negative for tinnitus,sore throat,nasal congestion,ear pain,hoarseness  Respiratory:  negative for cough, hemoptysis, dyspnea,wheezing  CV:   negative for chest pain, palpitations, lower extremity edema  GI:   negative for nausea, vomiting, diarrhea, abdominal pain,melena  Genitourinary: negative for frequency, dysuria and hematuria  Musculoskel: negative for myalgias, arthralgias, back pain, muscle weakness, joint pain  Neurological:  negative for headaches, dizziness, focal weakness, numbness  Psychiatric:     Negative for depression or anxiety      Past Medical History:   Diagnosis Date    Bilateral carotid artery stenosis     Diabetic peripheral neuropathy associated with type 2 diabetes mellitus ([address]     Foot fracture     H/O aortic dissection     Hollenhorst plaque, right eye     Hypertension     Jaw fracture (HCC)     Ruptured ear drum     Subjective visual disturbance, right eye     Thumb fracture        Past Surgical History:   Procedure Laterality Date    HX APPENDECTOMY      HX ARTERIAL BYPASS  02/15/2019    HX ORTHOPAEDIC      HX OTHER SURGICAL  02/15/2019    8 stints    HX OTHER SURGICAL  02/15/2019    Double bypass       Family History   Problem Relation Age of Onset    Diabetes Mother     Stroke Mother     Colon Cancer Father     Cancer Brother     No Known Problems Child        Social History     Socioeconomic History    Marital status: SINGLE     Spouse name: Not on file    Number of children: Not on file    Years of education: Not on file    Highest education level: Not on file   Occupational History    Not on file   Social Needs    Financial resource strain: Not on file    Food insecurity     Worry: Not on file     Inability: Not on file    Transportation needs     Medical: Not on file     Non-medical: Not on file   Tobacco Use    Smoking status: Never Smoker    Smokeless tobacco: Never Used   Substance and Sexual Activity    Alcohol use: No    Drug use: No    Sexual activity: Yes     Partners: Female     Birth control/protection: Surgical   Lifestyle    Physical activity     Days per week: Not on file     Minutes per session: Not on file    Stress: Not on file   Relationships    Social connections     Talks on phone: Not on file     Gets together: Not on file     Attends R[employer] service: Not on file     Active member of club or organization: Not on file     Attends meetings of clubs or organizations: Not on file     Relationship status: Not on file    Intimate partner violence     Fear of current or ex partner: Not on file     Emotionally abused: Not on file     Physically abused: Not on file     Forced sexual activity: Not on file   Other Topics Concern    Not on file   Social History Narrative    Not on file            Visit Vitals  /69 (BP 1 Location: Right arm, BP Patient Position: Sitting)   Pulse 85   Temp 98.4 °F (36.9 °C) (Axillary)   Resp 18   Ht 6' 5\" (1.956 m)   Wt 246 lb (111.6 kg)   SpO2 99%   BMI 29.17 kg/m²       Physical Examination:   General - Well appearing male  HEENT - PERRL, TM no erythema/opacification, normal nasal turbinates, no oropharyngeal erythema or exudate, MMM  Neck - supple, no bruits, no thyroidomegaly, no lymphadenopathy  Pulm - clear to auscultation bilaterally  Cardio - RRR, normal S1 S2, no murmur  Abd - soft, nontender, no masses, no HSM  Extrem - no edema, +2 distal pulses. ++mild swelling to right knee. No pain. Neuro-  No focal deficits, CN intact     Assessment/Plan:    1. Right knee swelling, pain--rx for prednisone. Referral to dr Kyung Almonte  2.  htn--making progress. Continue norvasc, coreg, cozaar, hydralazine  3.  Dm, type 2--on invokana  4. Hx aortic dissection--on plavix, sp TEVAR  5. Hx CRAO right eye--vision much improved, workup was negative. On plavix  6.  ckd 3--has been stable  7. Right nares lesion, ? Small cyst or boil--use warm compress. rx sent in for bactrim.     Rtc for regular visit        Jamie Jaramillo III, DO

## 2021-05-04 NOTE — PATIENT INSTRUCTIONS
Knee Pain or Injury: Care Instructions  Your Care Instructions     Injuries are a common cause of knee problems. Sudden (acute) injuries may be caused by a direct blow to the knee. They can also be caused by abnormal twisting, bending, or falling on the knee. Pain, bruising, or swelling may be severe, and may start within minutes of the injury. Overuse is another cause of knee pain. Other causes are climbing stairs, kneeling, and other activities that use the knee. Everyday wear and tear, especially as you get older, also can cause knee pain. Rest, along with home treatment, often relieves pain and allows your knee to heal. If you have a serious knee injury, you may need tests and treatment. Follow-up care is a key part of your treatment and safety. Be sure to make and go to all appointments, and call your doctor if you are having problems. It's also a good idea to know your test results and keep a list of the medicines you take. How can you care for yourself at home? · Be safe with medicines. Read and follow all instructions on the label. ? If the doctor gave you a prescription medicine for pain, take it as prescribed. ? If you are not taking a prescription pain medicine, ask your doctor if you can take an over-the-counter medicine. · Rest and protect your knee. Take a break from any activity that may cause pain. · Put ice or a cold pack on your knee for 10 to 20 minutes at a time. Put a thin cloth between the ice and your skin. · Prop up a sore knee on a pillow when you ice it or anytime you sit or lie down for the next 3 days. Try to keep it above the level of your heart. This will help reduce swelling. · If your knee is not swollen, you can put moist heat, a heating pad, or a warm cloth on your knee. · If your doctor recommends an elastic bandage, sleeve, or other type of support for your knee, wear it as directed.   · Follow your doctor's instructions about how much weight you can put on your leg. Use a cane, crutches, or a walker as instructed. · Follow your doctor's instructions about activity during your healing process. If you can do mild exercise, slowly increase your activity. · Reach and stay at a healthy weight. Extra weight can strain the joints, especially the knees and hips, and make the pain worse. Losing even a few pounds may help. When should you call for help? Call 911 anytime you think you may need emergency care. For example, call if:    · You have symptoms of a blood clot in your lung (called a pulmonary embolism). These may include:  ? Sudden chest pain. ? Trouble breathing. ? Coughing up blood. Call your doctor now or seek immediate medical care if:    · You have severe or increasing pain.     · Your leg or foot turns cold or changes color.     · You cannot stand or put weight on your knee.     · Your knee looks twisted or bent out of shape.     · You cannot move your knee.     · You have signs of infection, such as:  ? Increased pain, swelling, warmth, or redness. ? Red streaks leading from the knee. ? Pus draining from a place on your knee. ? A fever.     · You have signs of a blood clot in your leg (called a deep vein thrombosis), such as:  ? Pain in your calf, back of the knee, thigh, or groin. ? Redness and swelling in your leg or groin. Watch closely for changes in your health, and be sure to contact your doctor if:    · You have tingling, weakness, or numbness in your knee.     · You have any new symptoms, such as swelling.     · You have bruises from a knee injury that last longer than 2 weeks.     · You do not get better as expected. Where can you learn more? Go to http://www.gray.com/  Enter K195 in the search box to learn more about \"Knee Pain or Injury: Care Instructions. \"  Current as of: February 26, 2020               Content Version: 12.8  © 5805-2211 [employer].    Care instructions adapted under license by [employer] (which disclaims liability or warranty for this information). If you have questions about a medical condition or this instruction, always ask your healthcare professional. [address] any warranty or liability for your use of this information.

## 2021-05-11 ENCOUNTER — TELEPHONE (OUTPATIENT)
Dept: INTERNAL MEDICINE CLINIC | Age: 51
End: 2021-05-11

## 2021-05-11 NOTE — TELEPHONE ENCOUNTER
Pt called the office   Stating that he has been having chest pains for the past two days   Pt states that the pain is at the center of his chest   Pt states that pain feels like something is twisting in his chest   Pt advised to go to an emergency room or an urgent care   Pt was compliant   Pt understood and had no further questions

## 2021-05-12 ENCOUNTER — APPOINTMENT (OUTPATIENT)
Dept: GENERAL RADIOLOGY | Age: 51
End: 2021-05-12
Attending: EMERGENCY MEDICINE
Payer: MEDICAID

## 2021-05-12 ENCOUNTER — TELEPHONE (OUTPATIENT)
Dept: INTERNAL MEDICINE CLINIC | Age: 51
End: 2021-05-12

## 2021-05-12 ENCOUNTER — HOSPITAL ENCOUNTER (OUTPATIENT)
Age: 51
Setting detail: OBSERVATION
Discharge: HOME | End: 2021-05-13
Attending: EMERGENCY MEDICINE | Admitting: INTERNAL MEDICINE
Payer: MEDICAID

## 2021-05-12 DIAGNOSIS — R07.9 CHEST PAIN, UNSPECIFIED TYPE: ICD-10-CM

## 2021-05-12 PROBLEM — I20.0 UNSTABLE ANGINA (HCC): Status: ACTIVE | Noted: 2021-05-12

## 2021-05-12 LAB
ALBUMIN SERPL-MCNC: 3.5 G/DL (ref 3.5–5)
ALBUMIN/GLOB SERPL: 1.1 {RATIO} (ref 1.1–2.2)
ALP SERPL-CCNC: 98 U/L (ref 45–117)
ALT SERPL-CCNC: 23 U/L (ref 12–78)
ANION GAP SERPL CALC-SCNC: 4 MMOL/L (ref 5–15)
APPEARANCE UR: CLEAR
AST SERPL-CCNC: 8 U/L (ref 15–37)
BACTERIA URNS QL MICRO: NEGATIVE /HPF
BASOPHILS # BLD: 0 K/UL (ref 0–0.1)
BASOPHILS NFR BLD: 0 % (ref 0–1)
BILIRUB SERPL-MCNC: 0.7 MG/DL (ref 0.2–1)
BILIRUB UR QL: NEGATIVE
BUN SERPL-MCNC: 34 MG/DL (ref 6–20)
BUN/CREAT SERPL: 20 (ref 12–20)
CALCIUM SERPL-MCNC: 8.7 MG/DL (ref 8.5–10.1)
CHLORIDE SERPL-SCNC: 106 MMOL/L (ref 97–108)
CO2 SERPL-SCNC: 28 MMOL/L (ref 21–32)
COLOR UR: ABNORMAL
CREAT SERPL-MCNC: 1.71 MG/DL (ref 0.7–1.3)
DIFFERENTIAL METHOD BLD: ABNORMAL
EOSINOPHIL # BLD: 0.1 K/UL (ref 0–0.4)
EOSINOPHIL NFR BLD: 1 % (ref 0–7)
EPITH CASTS URNS QL MICRO: ABNORMAL /LPF
ERYTHROCYTE [DISTWIDTH] IN BLOOD BY AUTOMATED COUNT: 14.9 % (ref 11.5–14.5)
GLOBULIN SER CALC-MCNC: 3.2 G/DL (ref 2–4)
GLUCOSE BLD STRIP.AUTO-MCNC: 223 MG/DL (ref 65–117)
GLUCOSE BLD STRIP.AUTO-MCNC: 223 MG/DL (ref 65–117)
GLUCOSE SERPL-MCNC: 204 MG/DL (ref 65–100)
GLUCOSE UR STRIP.AUTO-MCNC: >1000 MG/DL
HCT VFR BLD AUTO: 44.1 % (ref 36.6–50.3)
HGB BLD-MCNC: 14.1 G/DL (ref 12.1–17)
HGB UR QL STRIP: NEGATIVE
HYALINE CASTS URNS QL MICRO: ABNORMAL /LPF (ref 0–5)
IMM GRANULOCYTES # BLD AUTO: 0.1 K/UL (ref 0–0.04)
IMM GRANULOCYTES NFR BLD AUTO: 1 % (ref 0–0.5)
KETONES UR QL STRIP.AUTO: NEGATIVE MG/DL
LEUKOCYTE ESTERASE UR QL STRIP.AUTO: NEGATIVE
LYMPHOCYTES # BLD: 2.2 K/UL (ref 0.8–3.5)
LYMPHOCYTES NFR BLD: 30 % (ref 12–49)
MCH RBC QN AUTO: 26.7 PG (ref 26–34)
MCHC RBC AUTO-ENTMCNC: 32 G/DL (ref 30–36.5)
MCV RBC AUTO: 83.4 FL (ref 80–99)
MONOCYTES # BLD: 0.6 K/UL (ref 0–1)
MONOCYTES NFR BLD: 8 % (ref 5–13)
NEUTS SEG # BLD: 4.5 K/UL (ref 1.8–8)
NEUTS SEG NFR BLD: 60 % (ref 32–75)
NITRITE UR QL STRIP.AUTO: NEGATIVE
NRBC # BLD: 0 K/UL (ref 0–0.01)
NRBC BLD-RTO: 0 PER 100 WBC
PH UR STRIP: 5.5 [PH] (ref 5–8)
PLATELET # BLD AUTO: 187 K/UL (ref 150–400)
PMV BLD AUTO: 10.5 FL (ref 8.9–12.9)
POTASSIUM SERPL-SCNC: 3.6 MMOL/L (ref 3.5–5.1)
PROT SERPL-MCNC: 6.7 G/DL (ref 6.4–8.2)
PROT UR STRIP-MCNC: NEGATIVE MG/DL
RBC # BLD AUTO: 5.29 M/UL (ref 4.1–5.7)
RBC #/AREA URNS HPF: ABNORMAL /HPF (ref 0–5)
SERVICE CMNT-IMP: ABNORMAL
SERVICE CMNT-IMP: ABNORMAL
SODIUM SERPL-SCNC: 138 MMOL/L (ref 136–145)
SP GR UR REFRACTOMETRY: 1.01 (ref 1–1.03)
TROPONIN I SERPL-MCNC: 0.07 NG/ML
TROPONIN I SERPL-MCNC: 0.07 NG/ML
UA: UC IF INDICATED,UAUC: ABNORMAL
UROBILINOGEN UR QL STRIP.AUTO: 0.2 EU/DL (ref 0.2–1)
WBC # BLD AUTO: 7.4 K/UL (ref 4.1–11.1)
WBC URNS QL MICRO: ABNORMAL /HPF (ref 0–4)

## 2021-05-12 PROCEDURE — 71045 X-RAY EXAM CHEST 1 VIEW: CPT

## 2021-05-12 PROCEDURE — 99285 EMERGENCY DEPT VISIT HI MDM: CPT

## 2021-05-12 PROCEDURE — 74011250636 HC RX REV CODE- 250/636: Performed by: INTERNAL MEDICINE

## 2021-05-12 PROCEDURE — 93005 ELECTROCARDIOGRAM TRACING: CPT

## 2021-05-12 PROCEDURE — 74011250637 HC RX REV CODE- 250/637: Performed by: INTERNAL MEDICINE

## 2021-05-12 PROCEDURE — 81001 URINALYSIS AUTO W/SCOPE: CPT

## 2021-05-12 PROCEDURE — 74011636637 HC RX REV CODE- 636/637: Performed by: INTERNAL MEDICINE

## 2021-05-12 PROCEDURE — 65660000000 HC RM CCU STEPDOWN

## 2021-05-12 PROCEDURE — 99218 HC RM OBSERVATION: CPT

## 2021-05-12 PROCEDURE — 82962 GLUCOSE BLOOD TEST: CPT

## 2021-05-12 PROCEDURE — 84484 ASSAY OF TROPONIN QUANT: CPT

## 2021-05-12 PROCEDURE — 80053 COMPREHEN METABOLIC PANEL: CPT

## 2021-05-12 PROCEDURE — 85025 COMPLETE CBC W/AUTO DIFF WBC: CPT

## 2021-05-12 PROCEDURE — 36415 COLL VENOUS BLD VENIPUNCTURE: CPT

## 2021-05-12 RX ORDER — ACETAMINOPHEN 325 MG/1
650 TABLET ORAL
Status: DISCONTINUED | OUTPATIENT
Start: 2021-05-12 | End: 2021-05-13 | Stop reason: HOSPADM

## 2021-05-12 RX ORDER — ENOXAPARIN SODIUM 100 MG/ML
40 INJECTION SUBCUTANEOUS DAILY
Status: DISCONTINUED | OUTPATIENT
Start: 2021-05-13 | End: 2021-05-13 | Stop reason: HOSPADM

## 2021-05-12 RX ORDER — SODIUM CHLORIDE 9 MG/ML
75 INJECTION, SOLUTION INTRAVENOUS CONTINUOUS
Status: DISCONTINUED | OUTPATIENT
Start: 2021-05-12 | End: 2021-05-13

## 2021-05-12 RX ORDER — SODIUM CHLORIDE 0.9 % (FLUSH) 0.9 %
5-40 SYRINGE (ML) INJECTION EVERY 8 HOURS
Status: DISCONTINUED | OUTPATIENT
Start: 2021-05-12 | End: 2021-05-13 | Stop reason: HOSPADM

## 2021-05-12 RX ORDER — CARVEDILOL 12.5 MG/1
12.5 TABLET ORAL 2 TIMES DAILY WITH MEALS
Status: DISCONTINUED | OUTPATIENT
Start: 2021-05-12 | End: 2021-05-13 | Stop reason: HOSPADM

## 2021-05-12 RX ORDER — FAMOTIDINE 20 MG/1
40 TABLET, FILM COATED ORAL DAILY
Status: DISCONTINUED | OUTPATIENT
Start: 2021-05-13 | End: 2021-05-13 | Stop reason: HOSPADM

## 2021-05-12 RX ORDER — CLOPIDOGREL BISULFATE 75 MG/1
75 TABLET ORAL DAILY
Status: DISCONTINUED | OUTPATIENT
Start: 2021-05-13 | End: 2021-05-13 | Stop reason: HOSPADM

## 2021-05-12 RX ORDER — ONDANSETRON 2 MG/ML
4 INJECTION INTRAMUSCULAR; INTRAVENOUS
Status: DISCONTINUED | OUTPATIENT
Start: 2021-05-12 | End: 2021-05-13 | Stop reason: HOSPADM

## 2021-05-12 RX ORDER — INSULIN LISPRO 100 [IU]/ML
INJECTION, SOLUTION INTRAVENOUS; SUBCUTANEOUS
Status: DISCONTINUED | OUTPATIENT
Start: 2021-05-12 | End: 2021-05-13 | Stop reason: HOSPADM

## 2021-05-12 RX ORDER — DEXTROSE 50 % IN WATER (D50W) INTRAVENOUS SYRINGE
12.5-25 AS NEEDED
Status: DISCONTINUED | OUTPATIENT
Start: 2021-05-12 | End: 2021-05-13 | Stop reason: HOSPADM

## 2021-05-12 RX ORDER — AMLODIPINE BESYLATE 5 MG/1
10 TABLET ORAL DAILY
Status: DISCONTINUED | OUTPATIENT
Start: 2021-05-13 | End: 2021-05-13 | Stop reason: HOSPADM

## 2021-05-12 RX ORDER — LOSARTAN POTASSIUM 25 MG/1
25 TABLET ORAL DAILY
Status: DISCONTINUED | OUTPATIENT
Start: 2021-05-13 | End: 2021-05-13 | Stop reason: HOSPADM

## 2021-05-12 RX ORDER — MAGNESIUM SULFATE 100 %
4 CRYSTALS MISCELLANEOUS AS NEEDED
Status: DISCONTINUED | OUTPATIENT
Start: 2021-05-12 | End: 2021-05-13 | Stop reason: HOSPADM

## 2021-05-12 RX ORDER — ACETAMINOPHEN 650 MG/1
650 SUPPOSITORY RECTAL
Status: DISCONTINUED | OUTPATIENT
Start: 2021-05-12 | End: 2021-05-13 | Stop reason: HOSPADM

## 2021-05-12 RX ORDER — PROMETHAZINE HYDROCHLORIDE 25 MG/1
12.5 TABLET ORAL
Status: DISCONTINUED | OUTPATIENT
Start: 2021-05-12 | End: 2021-05-13 | Stop reason: HOSPADM

## 2021-05-12 RX ORDER — SODIUM CHLORIDE 0.9 % (FLUSH) 0.9 %
5-40 SYRINGE (ML) INJECTION AS NEEDED
Status: DISCONTINUED | OUTPATIENT
Start: 2021-05-12 | End: 2021-05-13 | Stop reason: HOSPADM

## 2021-05-12 RX ORDER — HYDRALAZINE HYDROCHLORIDE 50 MG/1
50 TABLET, FILM COATED ORAL 3 TIMES DAILY
Status: DISCONTINUED | OUTPATIENT
Start: 2021-05-12 | End: 2021-05-13 | Stop reason: HOSPADM

## 2021-05-12 RX ORDER — ATORVASTATIN CALCIUM 40 MG/1
40 TABLET, FILM COATED ORAL
Status: DISCONTINUED | OUTPATIENT
Start: 2021-05-12 | End: 2021-05-13 | Stop reason: HOSPADM

## 2021-05-12 RX ORDER — POLYETHYLENE GLYCOL 3350 17 G/17G
17 POWDER, FOR SOLUTION ORAL DAILY PRN
Status: DISCONTINUED | OUTPATIENT
Start: 2021-05-12 | End: 2021-05-13 | Stop reason: HOSPADM

## 2021-05-12 RX ADMIN — Medication 10 ML: at 21:40

## 2021-05-12 RX ADMIN — HYDRALAZINE HYDROCHLORIDE 50 MG: 50 TABLET, FILM COATED ORAL at 21:39

## 2021-05-12 RX ADMIN — ATORVASTATIN CALCIUM 40 MG: 40 TABLET, FILM COATED ORAL at 21:39

## 2021-05-12 RX ADMIN — SODIUM CHLORIDE 75 ML/HR: 9 INJECTION, SOLUTION INTRAVENOUS at 18:22

## 2021-05-12 RX ADMIN — CARVEDILOL 12.5 MG: 12.5 TABLET, FILM COATED ORAL at 18:33

## 2021-05-12 RX ADMIN — INSULIN LISPRO 2 UNITS: 100 INJECTION, SOLUTION INTRAVENOUS; SUBCUTANEOUS at 21:39

## 2021-05-12 NOTE — ED NOTES
Patient is being transferred to [address], Room # 2217. Report given to GEOFF Womack on Azalea Castro for routine progression of care. Report consisted of the following information SBAR, ED Summary, MAR and Recent Results. Outstanding consults needed: No     Next labs due: Yes, 0000 and 0400    The following personal items will be sent with the patient during transfer to the floor: All valuables:    Cardiac monitoring ordered: Yes    The following CURRENT information was reported to the receiving RN:    Code status: Full Code at time of transfer    Last set of vital signs:  Vital Signs  Level of Consciousness: Alert (0) (05/12/21 1800)  Temp: 98 °F (36.7 °C) (05/12/21 1357)  Temp Source: Oral (05/12/21 1357)  Pulse (Heart Rate): 68 (05/12/21 1800)  Heart Rate Source: Monitor (05/12/21 1357)  Resp Rate: 16 (05/12/21 1800)  BP: (!) 144/83 (05/12/21 1800)  MAP (Monitor): 96 (05/12/21 1800)  MAP (Calculated): 103 (05/12/21 1800)  BP 1 Location: Right upper arm (05/12/21 1357)  BP 1 Method: Automatic (05/12/21 1357)  BP Patient Position: Sitting (05/12/21 1357)  MEWS Score: 1 (05/12/21 1357)         Oxygen Therapy  O2 Sat (%): 98 % (05/12/21 1800)  Pulse via Oximetry: 63 beats per minute (05/12/21 1800)  O2 Device: None (Room air) (05/12/21 1800)      Last pain assessment:  Pain 1  Pain Scale 1: Numeric (0 - 10)  Pain Intensity 1: 0  Patient Stated Pain Goal: 0  Pain Onset 1: couple of days  Pain Location 1: Chest  Pain Orientation 1: Left, Mid  Pain Description 1: Intermittent, Pressure      Wounds: No     Urinary catheter: voiding  Is there a enamorado order: No     LDAs:       Peripheral IV 05/12/21 Left Antecubital (Active)         Opportunity for questions and clarification was provided.     Pamela Hylton RN

## 2021-05-12 NOTE — TELEPHONE ENCOUNTER
----- Message from Doyle Waldron sent at 5/12/2021 12:17 PM EDT -----  Regarding: Dr. Tyler Villafana: [phone]  Level 1/Escalated Issue      Caller's first and last name and relationship (if not the patient):pt      Best contact number(s): [phone]      What are the symptoms: Elev , 343- Blurry Vision      Transfer successful - yes/no (include outcome):n/a Pt states he will go to the ER but wants a message sent      Transfer declined - yes/no (include reason):n/a pt states he will go to the ER but wants a message sent      Was caller advised to seek appropriate level of care - yes/no:yes      Details to clarify the request:Pt states he went to [facility] yesterday with a BS of 479 and they advised him to go to the ER yesterday but pt declined and went home. Pt states he BS this am is 343 with blurry vision and has decided to go to the ER now.         Message from [facility]

## 2021-05-12 NOTE — PROGRESS NOTES
[facility] verbal report from Benjy Mendes, [facility], in ER. Awaiting arrival of pt to room at this time. 1900 Pt arrived to room with transport. Pt ambulated to bed. VSS. Bedside shift change report given to Cindy Gurrola RN (oncoming nurse) by Juvenal Au RN (offgoing nurse). Report included the following information SBAR, Kardex, Intake/Output, MAR, Recent Results and Cardiac Rhythm NSR.

## 2021-05-12 NOTE — H&P
Hospitalist Admission Note    NAME: Eliana Lima   :  1970   MRN:  [id number]     Date/Time:  2021 6:31 PM    Patient PCP: Yolis Calderon, DO  ________________________________________________________________________    My assessment of this patient's clinical condition and my plan of care is as follows. Assessment / Plan:  Troponin elevation rule out non-STEMI  -Description of pain is mixed with some typical cardiac pain and also intermittent sharp type of pain  -EKG shows nonspecific ST-T wave changes and troponin 0 0.07  -Check 2 more sets of troponins  -Resume his home Plavix, Coreg, Lipitor  -Recent A1c was 7.1, LDL was 51.  -Cardiology consulted. Keep n.p.o. from midnight. -Given mixed type of pain we will also check a D-dimer level as well    History of CKD stage III  -Baseline creatinine is anywhere between 1.4-1.7 currently creatinine is 1.7  -We will start gentle hydration due to anticipated cath in a.m. Diabetes mellitus type 2  -Hold home Invokana. 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 male who presents with past medical history of coronary disease, hypertension, dyslipidemia, diabetes mellitus is coming the hospital chief complaint of chest pain.   Patient reports pain in the substernal area of the chest since the last 3 days which is pressure-like and also sometimes intermittently sharp, nonradiating, without any aggravating or relieving factors. Does not report any severe shortness of breath, palpitations, syncopal episodes. Does not report any fever or chills. Does not report any recent travel, trauma rash over the chest.  Does not report any abdominal pain, nausea or vomiting. He reports being compliant with his medications. On arrival to ED, vital signs are within normal limits but on lab work noted to have a normal CBC. BMP shows a creatinine of 1.71. LFTs are normal.  Troponin 0 0.07. EKG shows normal sinus rhythm nonspecific ST-T wave changes. We were asked to admit for work up and evaluation of the above problems. Past Medical History:   Diagnosis Date    Bilateral carotid artery stenosis     Diabetic peripheral neuropathy associated with type 2 diabetes mellitus (HCC)     Foot fracture     H/O aortic dissection     Hollenhorst plaque, right eye     Hypertension     Jaw fracture (HCC)     Ruptured ear drum     Subjective visual disturbance, right eye     Thumb fracture         Past Surgical History:   Procedure Laterality Date    HX APPENDECTOMY      HX ARTERIAL BYPASS  02/15/2019    HX ORTHOPAEDIC      HX OTHER SURGICAL  02/15/2019    8 stints    HX OTHER SURGICAL  02/15/2019    Double bypass       Social History     Tobacco Use    Smoking status: Never Smoker    Smokeless tobacco: Never Used   Substance Use Topics    Alcohol use: No        Family History   Problem Relation Age of Onset    Diabetes Mother     Stroke Mother     Colon Cancer Father     Cancer Brother     No Known Problems Child      No Known Allergies     Prior to Admission medications    Medication Sig Start Date End Date Taking? Authorizing Provider   Juanita Twice Daily Relief 0.1 % ophthalmic solution  2/2/21   Provider, Historical   predniSONE (DELTASONE) 20 mg tablet 60 mg daily x 6 days, then stop.  5/4/21   Adam HULL III, DO   trimethoprim-sulfamethoxazole (BACTRIM DS, SEPTRA DS) 160-800 mg per tablet Take 1 Tab by mouth two (2) times a day for 7 days. 5/4/21 5/11/21  Thersa Escort B III, DO   hydrALAZINE (APRESOLINE) 50 mg tablet Take 1 Tab by mouth three (3) times daily. 3/5/21   Thersa Escort B III, DO   clopidogreL (PLAVIX) 75 mg tab Take 1 Tab by mouth daily. 3/5/21   Thersa Escort B III, DO   losartan (COZAAR) 25 mg tablet Take 1 tablet by mouth once daily 1/25/21   Thersa Escort B III, DO   amLODIPine (NORVASC) 10 mg tablet Take 1 tablet by mouth once daily 1/25/21   Boo Butts B III, DO   canagliflozin (Invokana) 100 mg tablet TAKE 1 TABLET BY MOUTH ONCE DAILY BEFORE BREAKFAST 1/8/21   Jean-Claude Niño III, DO   carvediloL (COREG) 12.5 mg tablet TAKE 1 TABLET BY MOUTH TWICE DAILY WITH MEALS 1/4/21   Jean-Claude Niño III, DO   atorvastatin (LIPITOR) 40 mg tablet Take 1 Tab by mouth nightly. 12/7/20   Boo Piresort B III, DO   ferrous sulfate 325 mg (65 mg iron) tablet TAKE 1 TABLET BY MOUTH ONCE DAILY BEFORE BREAKFAST 9/24/20   Jean-Claude Niño III, DO   diclofenac (VOLTAREN) 1 % gel Apply 4 g to affected area four (4) times daily. 8/20/20   Chance Contreras, DO   glucose blood VI test strips (blood glucose test) strip Check fasting blood sugar daily using blood sugar test strip. Dx: e11.21 4/14/20   Boo Piresort B III, DO   sennosides 8.6 mg cap senna 8.6 mg capsule   Take 2 capsules every day by oral route as needed. Provider, Historical   famotidine (PEPCID) 40 mg tablet Take 1 Tab by mouth daily. 1/7/20   Boo Piresort B III, DO   acetaminophen (TYLENOL) 325 mg tablet Take 325-650 mg by mouth every four (4) hours as needed for Pain. Provider, Historical   cetirizine (ZYRTEC) 10 mg tablet Take 10 mg by mouth daily as needed for Allergies. Provider, Historical       REVIEW OF SYSTEMS:     I am not able to complete the review of systems because:    The patient is intubated and sedated    The patient has altered mental status due to his acute medical problems    The patient has baseline aphasia from prior stroke(s)    The patient has baseline dementia and is not reliable historian    The patient is in acute medical distress and unable to provide information           Total of 12 systems reviewed as follows:       POSITIVE= underlined text  Negative = text not underlined  General:  fever, chills, sweats, generalized weakness, weight loss/gain,      loss of appetite   Eyes:    blurred vision, eye pain, loss of vision, double vision  ENT:    rhinorrhea, pharyngitis   Respiratory:   cough, sputum production, SOB, ESCOBEDO, wheezing, pleuritic pain   Cardiology:   chest pain, palpitations, orthopnea, PND, edema, syncope   Gastrointestinal:  abdominal pain , N/V, diarrhea, dysphagia, constipation, bleeding   Genitourinary:  frequency, urgency, dysuria, hematuria, incontinence   Muskuloskeletal :  arthralgia, myalgia, back pain  Hematology:  easy bruising, nose or gum bleeding, lymphadenopathy   Dermatological: rash, ulceration, pruritis, color change / jaundice  Endocrine:   hot flashes or polydipsia   Neurological:  headache, dizziness, confusion, focal weakness, paresthesia,     Speech difficulties, memory loss, gait difficulty  Psychological: Feelings of anxiety, depression, agitation    Objective:   VITALS:    Visit Vitals  BP (!) 144/83   Pulse 68   Temp 98 °F (36.7 °C)   Resp 16   Ht 6' 5\" (1.956 m)   Wt 110.5 kg (243 lb 9.7 oz)   SpO2 98%   BMI 28.89 kg/m²       PHYSICAL EXAM:    General:    Alert, cooperative, no distress, appears stated age. HEENT: Atraumatic, anicteric sclerae, pink conjunctivae     No oral ulcers, mucosa moist, throat clear, dentition fair  Neck:  Supple, symmetrical,  thyroid: non tender  Lungs:   Clear to auscultation bilaterally. No Wheezing or Rhonchi. No rales. Chest wall:  No tenderness  No Accessory muscle use.   Heart:   Regular  rhythm,  No  murmur   No edema  Abdomen: Soft, non-tender. Not distended. Bowel sounds normal  Extremities: No cyanosis. No clubbing,      Skin turgor normal, Capillary refill normal, Radial dial pulse 2+  Skin:     Not pale. Not Jaundiced  No rashes   Psych:  Not anxious or agitated. Neurologic: EOMs intact. No facial asymmetry. No aphasia or slurred speech. Symmetrical strength, Sensation grossly intact. Alert and oriented X 4.     _______________________________________________________________________  Care Plan discussed with:    Comments   Patient y    Family      RN y    Care Manager                    Consultant:      _______________________________________________________________________  Expected  Disposition:   Home with Family y   HH/PT/OT/RN    SNF/LTC    [place]    ________________________________________________________________________  TOTAL TIME:  61  Minutes    Critical Care Provided     Minutes non procedure based      Comments    y Reviewed previous records   >50% of visit spent in counseling and coordination of care y Discussion with patient and/or family and questions answered       ________________________________________________________________________  Signed: Paulo Zamora MD    Procedures: see electronic medical records for all procedures/Xrays and details which were not copied into this note but were reviewed prior to creation of Plan.     LAB DATA REVIEWED:    Recent Results (from the past 24 hour(s))   EKG, 12 LEAD, INITIAL    Collection Time: 05/12/21  1:48 PM   Result Value Ref Range    Ventricular Rate 76 BPM    Atrial Rate 76 BPM    P-R Interval 170 ms    QRS Duration 96 ms    Q-T Interval 380 ms    QTC Calculation (Bezet) 427 ms    Calculated P Axis 44 degrees    Calculated R Axis 11 degrees    Calculated T Axis 7 degrees    Diagnosis       Normal sinus rhythm  Nonspecific T wave abnormality  When compared with ECG of 03-DEC-2019 23:01,  No significant change was found     GLUCOSE, POC    Collection Time: 05/12/21  1:56 PM   Result Value Ref Range    Glucose (POC) 223 (H) 65 - 117 mg/dL    Performed by Jerome Bonilla    CBC WITH AUTOMATED DIFF    Collection Time: 05/12/21  2:26 PM   Result Value Ref Range    WBC 7.4 4.1 - 11.1 K/uL    RBC 5.29 4. 10 - 5.70 M/uL    HGB 14.1 12.1 - 17.0 g/dL    HCT 44.1 36.6 - 50.3 %    MCV 83.4 80.0 - 99.0 FL    MCH 26.7 26.0 - 34.0 PG    MCHC 32.0 30.0 - 36.5 g/dL    RDW 14.9 (H) 11.5 - 14.5 %    PLATELET 916 958 - 154 K/uL    MPV 10.5 8.9 - 12.9 FL    NRBC 0.0 0  WBC    ABSOLUTE NRBC 0.00 0.00 - 0.01 K/uL    NEUTROPHILS 60 32 - 75 %    LYMPHOCYTES 30 12 - 49 %    MONOCYTES 8 5 - 13 %    EOSINOPHILS 1 0 - 7 %    BASOPHILS 0 0 - 1 %    IMMATURE GRANULOCYTES 1 (H) 0.0 - 0.5 %    ABS. NEUTROPHILS 4.5 1.8 - 8.0 K/UL    ABS. LYMPHOCYTES 2.2 0.8 - 3.5 K/UL    ABS. MONOCYTES 0.6 0.0 - 1.0 K/UL    ABS. EOSINOPHILS 0.1 0.0 - 0.4 K/UL    ABS. BASOPHILS 0.0 0.0 - 0.1 K/UL    ABS. IMM. GRANS. 0.1 (H) 0.00 - 0.04 K/UL    DF AUTOMATED     METABOLIC PANEL, COMPREHENSIVE    Collection Time: 05/12/21  2:26 PM   Result Value Ref Range    Sodium 138 136 - 145 mmol/L    Potassium 3.6 3.5 - 5.1 mmol/L    Chloride 106 97 - 108 mmol/L    CO2 28 21 - 32 mmol/L    Anion gap 4 (L) 5 - 15 mmol/L    Glucose 204 (H) 65 - 100 mg/dL    BUN 34 (H) 6 - 20 MG/DL    Creatinine 1.71 (H) 0.70 - 1.30 MG/DL    BUN/Creatinine ratio 20 12 - 20      GFR est AA 52 (L) >60 ml/min/1.73m2    GFR est non-AA 43 (L) >60 ml/min/1.73m2    Calcium 8.7 8.5 - 10.1 MG/DL    Bilirubin, total 0.7 0.2 - 1.0 MG/DL    ALT (SGPT) 23 12 - 78 U/L    AST (SGOT) 8 (L) 15 - 37 U/L    Alk.  phosphatase 98 45 - 117 U/L    Protein, total 6.7 6.4 - 8.2 g/dL    Albumin 3.5 3.5 - 5.0 g/dL    Globulin 3.2 2.0 - 4.0 g/dL    A-G Ratio 1.1 1.1 - 2.2     TROPONIN I    Collection Time: 05/12/21  2:26 PM   Result Value Ref Range    Troponin-I, Qt. 0.07 (H) <0.05 ng/mL   URINALYSIS W/ REFLEX CULTURE    Collection Time: 05/12/21  4:31 PM    Specimen: Urine   Result Value Ref Range    Color YELLOW/STRAW      Appearance CLEAR CLEAR      Specific gravity 1.015 1.003 - 1.030      pH (UA) 5.5 5.0 - 8.0      Protein Negative NEG mg/dL    Glucose >1,000 (A) NEG mg/dL    Ketone Negative NEG mg/dL    Bilirubin Negative NEG      Blood Negative NEG      Urobilinogen 0.2 0.2 - 1.0 EU/dL    Nitrites Negative NEG      Leukocyte Esterase Negative NEG      UA:UC IF INDICATED CULTURE NOT INDICATED BY UA RESULT CNI      WBC 0-4 0 - 4 /hpf    RBC 0-5 0 - 5 /hpf    Epithelial cells FEW FEW /lpf    Bacteria Negative NEG /hpf    Hyaline cast 0-2 0 - 5 /lpf   TROPONIN I    Collection Time: 05/12/21  4:53 PM   Result Value Ref Range    Troponin-I, Qt. 0.07 (H) <0.05 ng/mL

## 2021-05-13 ENCOUNTER — APPOINTMENT (OUTPATIENT)
Dept: NON INVASIVE DIAGNOSTICS | Age: 51
End: 2021-05-13
Attending: INTERNAL MEDICINE
Payer: MEDICAID

## 2021-05-13 ENCOUNTER — APPOINTMENT (OUTPATIENT)
Dept: NUCLEAR MEDICINE | Age: 51
End: 2021-05-13
Attending: INTERNAL MEDICINE
Payer: MEDICAID

## 2021-05-13 ENCOUNTER — APPOINTMENT (OUTPATIENT)
Dept: CT IMAGING | Age: 51
End: 2021-05-13
Attending: INTERNAL MEDICINE
Payer: MEDICAID

## 2021-05-13 VITALS
RESPIRATION RATE: 16 BRPM | WEIGHT: 243.61 LBS | SYSTOLIC BLOOD PRESSURE: 108 MMHG | BODY MASS INDEX: 28.76 KG/M2 | HEIGHT: 77 IN | HEART RATE: 70 BPM | DIASTOLIC BLOOD PRESSURE: 80 MMHG | OXYGEN SATURATION: 98 % | TEMPERATURE: 97.8 F

## 2021-05-13 LAB
ANION GAP SERPL CALC-SCNC: 3 MMOL/L (ref 5–15)
ATRIAL RATE: 76 BPM
BUN SERPL-MCNC: 29 MG/DL (ref 6–20)
BUN/CREAT SERPL: 20 (ref 12–20)
CALCIUM SERPL-MCNC: 8.2 MG/DL (ref 8.5–10.1)
CALCULATED P AXIS, ECG09: 44 DEGREES
CALCULATED R AXIS, ECG10: 11 DEGREES
CALCULATED T AXIS, ECG11: 7 DEGREES
CHLORIDE SERPL-SCNC: 106 MMOL/L (ref 97–108)
CO2 SERPL-SCNC: 28 MMOL/L (ref 21–32)
CREAT SERPL-MCNC: 1.46 MG/DL (ref 0.7–1.3)
D DIMER PPP FEU-MCNC: 0.97 MG/L FEU (ref 0–0.65)
DIAGNOSIS, 93000: NORMAL
ERYTHROCYTE [DISTWIDTH] IN BLOOD BY AUTOMATED COUNT: 15.2 % (ref 11.5–14.5)
GLUCOSE BLD STRIP.AUTO-MCNC: 133 MG/DL (ref 65–117)
GLUCOSE BLD STRIP.AUTO-MCNC: 176 MG/DL (ref 65–117)
GLUCOSE BLD STRIP.AUTO-MCNC: 250 MG/DL (ref 65–117)
GLUCOSE SERPL-MCNC: 194 MG/DL (ref 65–100)
HCT VFR BLD AUTO: 40.7 % (ref 36.6–50.3)
HGB BLD-MCNC: 13.2 G/DL (ref 12.1–17)
MCH RBC QN AUTO: 26.7 PG (ref 26–34)
MCHC RBC AUTO-ENTMCNC: 32.4 G/DL (ref 30–36.5)
MCV RBC AUTO: 82.4 FL (ref 80–99)
NRBC # BLD: 0 K/UL (ref 0–0.01)
NRBC BLD-RTO: 0 PER 100 WBC
P-R INTERVAL, ECG05: 170 MS
PLATELET # BLD AUTO: 155 K/UL (ref 150–400)
PMV BLD AUTO: 10.8 FL (ref 8.9–12.9)
POTASSIUM SERPL-SCNC: 3.7 MMOL/L (ref 3.5–5.1)
Q-T INTERVAL, ECG07: 380 MS
QRS DURATION, ECG06: 96 MS
QTC CALCULATION (BEZET), ECG08: 427 MS
RBC # BLD AUTO: 4.94 M/UL (ref 4.1–5.7)
SERVICE CMNT-IMP: ABNORMAL
SODIUM SERPL-SCNC: 137 MMOL/L (ref 136–145)
TROPONIN I SERPL-MCNC: 0.07 NG/ML
TROPONIN I SERPL-MCNC: 0.07 NG/ML
VENTRICULAR RATE, ECG03: 76 BPM
WBC # BLD AUTO: 7 K/UL (ref 4.1–11.1)

## 2021-05-13 PROCEDURE — 82962 GLUCOSE BLOOD TEST: CPT

## 2021-05-13 PROCEDURE — 85027 COMPLETE CBC AUTOMATED: CPT

## 2021-05-13 PROCEDURE — 74011250636 HC RX REV CODE- 250/636: Performed by: INTERNAL MEDICINE

## 2021-05-13 PROCEDURE — 74011250637 HC RX REV CODE- 250/637: Performed by: INTERNAL MEDICINE

## 2021-05-13 PROCEDURE — 36415 COLL VENOUS BLD VENIPUNCTURE: CPT

## 2021-05-13 PROCEDURE — 99218 HC RM OBSERVATION: CPT

## 2021-05-13 PROCEDURE — 71275 CT ANGIOGRAPHY CHEST: CPT

## 2021-05-13 PROCEDURE — 84484 ASSAY OF TROPONIN QUANT: CPT

## 2021-05-13 PROCEDURE — 93017 CV STRESS TEST TRACING ONLY: CPT

## 2021-05-13 PROCEDURE — 78452 HT MUSCLE IMAGE SPECT MULT: CPT

## 2021-05-13 PROCEDURE — 96372 THER/PROPH/DIAG INJ SC/IM: CPT

## 2021-05-13 PROCEDURE — 80048 BASIC METABOLIC PNL TOTAL CA: CPT

## 2021-05-13 PROCEDURE — 85379 FIBRIN DEGRADATION QUANT: CPT

## 2021-05-13 PROCEDURE — 74011000636 HC RX REV CODE- 636: Performed by: INTERNAL MEDICINE

## 2021-05-13 PROCEDURE — 74011636637 HC RX REV CODE- 636/637: Performed by: INTERNAL MEDICINE

## 2021-05-13 RX ORDER — NITROGLYCERIN 0.4 MG/1
0.4 TABLET SUBLINGUAL
Qty: 15 TAB | Refills: 0 | Status: SHIPPED | OUTPATIENT
Start: 2021-05-13

## 2021-05-13 RX ORDER — PANTOPRAZOLE SODIUM 40 MG/1
40 TABLET, DELAYED RELEASE ORAL DAILY
Qty: 15 TAB | Refills: 0 | Status: SHIPPED | OUTPATIENT
Start: 2021-05-13 | End: 2021-05-28

## 2021-05-13 RX ADMIN — CLOPIDOGREL BISULFATE 75 MG: 75 TABLET ORAL at 10:19

## 2021-05-13 RX ADMIN — SODIUM CHLORIDE 75 ML/HR: 9 INJECTION, SOLUTION INTRAVENOUS at 06:47

## 2021-05-13 RX ADMIN — ENOXAPARIN SODIUM 40 MG: 40 INJECTION SUBCUTANEOUS at 10:19

## 2021-05-13 RX ADMIN — Medication 10 ML: at 06:22

## 2021-05-13 RX ADMIN — LOSARTAN POTASSIUM 25 MG: 25 TABLET, FILM COATED ORAL at 10:19

## 2021-05-13 RX ADMIN — INSULIN LISPRO 5 UNITS: 100 INJECTION, SOLUTION INTRAVENOUS; SUBCUTANEOUS at 17:25

## 2021-05-13 RX ADMIN — IOPAMIDOL 180 ML: 755 INJECTION, SOLUTION INTRAVENOUS at 09:48

## 2021-05-13 RX ADMIN — INSULIN LISPRO 2 UNITS: 100 INJECTION, SOLUTION INTRAVENOUS; SUBCUTANEOUS at 10:19

## 2021-05-13 RX ADMIN — FAMOTIDINE 40 MG: 20 TABLET ORAL at 10:19

## 2021-05-13 RX ADMIN — HYDRALAZINE HYDROCHLORIDE 50 MG: 50 TABLET, FILM COATED ORAL at 17:12

## 2021-05-13 RX ADMIN — HYDRALAZINE HYDROCHLORIDE 50 MG: 50 TABLET, FILM COATED ORAL at 10:19

## 2021-05-13 RX ADMIN — CARVEDILOL 12.5 MG: 12.5 TABLET, FILM COATED ORAL at 17:12

## 2021-05-13 RX ADMIN — AMLODIPINE BESYLATE 10 MG: 5 TABLET ORAL at 10:19

## 2021-05-13 NOTE — CONSULTS
Consult    NAME: Linda Steward   :  1970   MRN:  [id number]     Date/Time:  2021 6:58 AM    Patient PCP: Terrence Pollard, DO  ________________________________________________________________________     Assessment:     Atypical chest pain, chronic equivicol troponin    - Acute type B aortic dissection starting from left subclavian down to renals compromising celiac and renals, s/p emergent TEVAR, left subclavian embolization with left carotid to left subclavian bypass, left renal artery stent.  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 Discussed cardiac cath and patient declines, prefers to start with stress test, exercise nuclear stress this AM.    - Cont plavix  - Cont metoprolol  - Cont cozaar  - Cont amlodipine  - Cont hydralazine  - cont statin    Home later today if stress test shows no ischemia. Follow up with me in two weeks. Addendum:  Ex 8 min 23 sec, no chest pain, no ischemia, normal EF. [x]        High complexity decision making was performed        Subjective:   CHIEF COMPLAINT:  Chest pain    HISTORY OF PRESENT ILLNESS:       Linda Steward is a 53 y.o. male who presents with past medical history of coronary disease, hypertension, dyslipidemia, diabetes mellitus is coming the hospital chief complaint of chest pain. Patient reports pain in the substernal area of the chest since the last 3 days which is pressure-like and also sometimes intermittently sharp, nonradiating, without any aggravating or relieving factors.   Does not report any severe shortness of breath, palpitations, syncopal episodes. Does not report any fever or chills. Does not report any recent travel, trauma rash over the chest.  Does not report any abdominal pain, nausea or vomiting. He reports being compliant with his medications.     On arrival to ED, vital signs are within normal limits but on lab work noted to have a normal CBC. BMP shows a creatinine of 1.71. LFTs are normal.  Troponin 0 0.07. EKG shows normal sinus rhythm nonspecific ST-T wave changes. Atypical chest pain, not related to exertion. No pain this aM. No dyspnea, no edema, no palpitations. We were asked to consult for work up and evaluation of the above problems. Past Medical History:   Diagnosis Date    Bilateral carotid artery stenosis     Diabetic peripheral neuropathy associated with type 2 diabetes mellitus ([facility] [address]     Foot fracture     H/O aortic dissection     Hollenhorst plaque, right eye     Hypertension     Jaw fracture (HCC)     Ruptured ear drum     Subjective visual disturbance, right eye     Thumb fracture       Past Surgical History:   Procedure Laterality Date    HX APPENDECTOMY      HX ARTERIAL BYPASS  02/15/2019    HX ORTHOPAEDIC      HX OTHER SURGICAL  02/15/2019    8 stints    HX OTHER SURGICAL  02/15/2019    Double bypass     No Known Allergies   Meds:  See below  Social History     Tobacco Use    Smoking status: Never Smoker    Smokeless tobacco: Never Used   Substance Use Topics    Alcohol use: No      Family History   Problem Relation Age of Onset    Diabetes Mother     Stroke Mother     Colon Cancer Father     Cancer Brother     No Known Problems Child        REVIEW OF SYSTEMS:     []         Unable to obtain  ROS due to ---   [x]         Total of 12 systems reviewed as follows:     Total of 12 systems reviewed as follows:       POSITIVE= Bold text  Negative = normal text  General:  fever, chills, sweats, generalized weakness, weight loss/gain,      loss of appetite   Eyes: blurred vision, eye pain, loss of vision, double vision  ENT:    rhinorrhea, pharyngitis   Respiratory:   cough, sputum production, SOB, [place]O, wheezing, pleuritic pain   Cardiology:   chest pain, palpitations, orthopnea, PND, edema, syncope   Gastrointestinal:  abdominal pain , N/V, diarrhea, dysphagia, constipation, bleeding   Genitourinary:  frequency, urgency, dysuria, hematuria, incontinence   Muskuloskeletal :  arthralgia, myalgia, back pain  Hematology:  easy bruising, nose or gum bleeding, lymphadenopathy   Dermatological: rash, ulceration, pruritis, color change / jaundice  Endocrine:   hot flashes or polydipsia   Neurological:  headache, dizziness, confusion, focal weakness, paresthesia,     Speech difficulties, memory loss, gait difficulty  Psychological: Feelings of anxiety, depression, agitation    Objective:      Physical Exam:    Last 24hrs VS reviewed since prior progress note. Most recent are:    Visit Vitals  /85   Pulse 64   Temp 97.6 °F (36.4 °C)   Resp 15   Ht 6' 5\" (1.956 m)   Wt 110.5 kg (243 lb 9.7 oz)   SpO2 97%   BMI 28.89 kg/m²     No intake or output data in the 24 hours ending 05/13/21 0658     General Appearance: Well developed, well nourished, alert & oriented x 3,    no acute distress. Ears/Nose/Mouth/Throat: Pupils equal and round, Hearing grossly normal.  Neck: Supple. JVP within normal limits. Carotids good upstrokes, with no bruit. Chest: Lungs clear to auscultation bilaterally. Cardiovascular: Regular rate and rhythm, S1S2 normal, no murmur, rubs, gallops. Abdomen: Soft, non-tender, bowel sounds are active. No organomegaly. Extremities: No edema bilaterally. Femoral pulses +2, Distal Pulses +1. Skin: Warm and dry. Neuro: CN II-XII grossly intact, Strength and sensation grossly intact. Data:      Prior to Admission medications    Medication Sig Start Date End Date Taking?  Authorizing Provider   hydrALAZINE (APRESOLINE) 50 mg tablet Take 1 Tab by mouth three (3) times daily. 3/5/21  Yes Jean-Claude Niño III, DO   clopidogreL (PLAVIX) 75 mg tab Take 1 Tab by mouth daily. 3/5/21  Yes Shivani Niño III, DO   losartan (COZAAR) 25 mg tablet Take 1 tablet by mouth once daily 1/25/21  Yes Jean-Claude Niño III, DO   amLODIPine (NORVASC) 10 mg tablet Take 1 tablet by mouth once daily 1/25/21  Yes Jean-Claude Niño III, DO   canagliflozin (Invokana) 100 mg tablet TAKE 1 TABLET BY MOUTH ONCE DAILY BEFORE BREAKFAST 1/8/21  Yes Jean-Claude Niño III, DO   carvediloL (COREG) 12.5 mg tablet TAKE 1 TABLET BY MOUTH TWICE DAILY WITH MEALS 1/4/21  Yes Jean-Claude Niño III, DO   atorvastatin (LIPITOR) 40 mg tablet Take 1 Tab by mouth nightly. 12/7/20  Yes Jean-Claude Niño III,    ferrous sulfate 325 mg (65 mg iron) tablet TAKE 1 TABLET BY MOUTH ONCE DAILY BEFORE BREAKFAST 9/24/20  Yes Jean-Claude Niño III, DO   glucose blood VI test strips (blood glucose test) strip Check fasting blood sugar daily using blood sugar test strip. Dx: e11.21 4/14/20  Yes Jean-Claude Niño III, DO   sennosides 8.6 mg cap senna 8.6 mg capsule   Take 2 capsules every day by oral route as needed. Yes Provider, Historical   famotidine (PEPCID) 40 mg tablet Take 1 Tab by mouth daily. 1/7/20  Yes Jean-Claude Niño III,    acetaminophen (TYLENOL) 325 mg tablet Take 325-650 mg by mouth every four (4) hours as needed for Pain. Yes Provider, Historical   cetirizine (ZYRTEC) 10 mg tablet Take 10 mg by mouth daily as needed for Allergies. Yes Provider, Historical   Pataday Twice Daily Relief 0.1 % ophthalmic solution  2/2/21   Provider, Historical   predniSONE (DELTASONE) 20 mg tablet 60 mg daily x 6 days, then stop. 5/4/21   Jean-Claude Niño III, DO   diclofenac (VOLTAREN) 1 % gel Apply 4 g to affected area four (4) times daily.  8/20/20   Nas Manriquez,        Recent Results (from the past 24 hour(s))   EKG, 12 LEAD, INITIAL    Collection Time: 05/12/21  1:48 PM   Result Value Ref Range    Ventricular Rate 76 BPM    Atrial Rate 76 BPM    P-R Interval 170 ms    QRS Duration 96 ms    Q-T Interval 380 ms    QTC Calculation (Bezet) 427 ms    Calculated P Axis 44 degrees    Calculated R Axis 11 degrees    Calculated T Axis 7 degrees    Diagnosis       Normal sinus rhythm  Nonspecific T wave abnormality  When compared with ECG of 03-DEC-2019 23:01,  No significant change was found     GLUCOSE, POC    Collection Time: 05/12/21  1:56 PM   Result Value Ref Range    Glucose (POC) 223 (H) 65 - 117 mg/dL    Performed by Jerome Bonilla    CBC WITH AUTOMATED DIFF    Collection Time: 05/12/21  2:26 PM   Result Value Ref Range    WBC 7.4 4.1 - 11.1 K/uL    RBC 5.29 4. 10 - 5.70 M/uL    HGB 14.1 12.1 - 17.0 g/dL    HCT 44.1 36.6 - 50.3 %    MCV 83.4 80.0 - 99.0 FL    MCH 26.7 26.0 - 34.0 PG    MCHC 32.0 30.0 - 36.5 g/dL    RDW 14.9 (H) 11.5 - 14.5 %    PLATELET 404 784 - 990 K/uL    MPV 10.5 8.9 - 12.9 FL    NRBC 0.0 0  WBC    ABSOLUTE NRBC 0.00 0.00 - 0.01 K/uL    NEUTROPHILS 60 32 - 75 %    LYMPHOCYTES 30 12 - 49 %    MONOCYTES 8 5 - 13 %    EOSINOPHILS 1 0 - 7 %    BASOPHILS 0 0 - 1 %    IMMATURE GRANULOCYTES 1 (H) 0.0 - 0.5 %    ABS. NEUTROPHILS 4.5 1.8 - 8.0 K/UL    ABS. LYMPHOCYTES 2.2 0.8 - 3.5 K/UL    ABS. MONOCYTES 0.6 0.0 - 1.0 K/UL    ABS. EOSINOPHILS 0.1 0.0 - 0.4 K/UL    ABS. BASOPHILS 0.0 0.0 - 0.1 K/UL    ABS. IMM.  GRANS. 0.1 (H) 0.00 - 0.04 K/UL    DF AUTOMATED     METABOLIC PANEL, COMPREHENSIVE    Collection Time: 05/12/21  2:26 PM   Result Value Ref Range    Sodium 138 136 - 145 mmol/L    Potassium 3.6 3.5 - 5.1 mmol/L    Chloride 106 97 - 108 mmol/L    CO2 28 21 - 32 mmol/L    Anion gap 4 (L) 5 - 15 mmol/L    Glucose 204 (H) 65 - 100 mg/dL    BUN 34 (H) 6 - 20 MG/DL    Creatinine 1.71 (H) 0.70 - 1.30 MG/DL    BUN/Creatinine ratio 20 12 - 20      GFR est AA 52 (L) >60 ml/min/1.73m2    GFR est non-AA 43 (L) >60 ml/min/1.73m2    Calcium 8.7 8.5 - 10.1 MG/DL Bilirubin, total 0.7 0.2 - 1.0 MG/DL    ALT (SGPT) 23 12 - 78 U/L    AST (SGOT) 8 (L) 15 - 37 U/L    Alk.  phosphatase 98 45 - 117 U/L    Protein, total 6.7 6.4 - 8.2 g/dL    Albumin 3.5 3.5 - 5.0 g/dL    Globulin 3.2 2.0 - 4.0 g/dL    A-G Ratio 1.1 1.1 - 2.2     TROPONIN I    Collection Time: 05/12/21  2:26 PM   Result Value Ref Range    Troponin-I, Qt. 0.07 (H) <0.05 ng/mL   URINALYSIS W/ REFLEX CULTURE    Collection Time: 05/12/21  4:31 PM    Specimen: Urine   Result Value Ref Range    Color YELLOW/STRAW      Appearance CLEAR CLEAR      Specific gravity 1.015 1.003 - 1.030      pH (UA) 5.5 5.0 - 8.0      Protein Negative NEG mg/dL    Glucose >1,000 (A) NEG mg/dL    Ketone Negative NEG mg/dL    Bilirubin Negative NEG      Blood Negative NEG      Urobilinogen 0.2 0.2 - 1.0 EU/dL    Nitrites Negative NEG      Leukocyte Esterase Negative NEG      UA:UC IF INDICATED CULTURE NOT INDICATED BY UA RESULT CNI      WBC 0-4 0 - 4 /hpf    RBC 0-5 0 - 5 /hpf    Epithelial cells FEW FEW /lpf    Bacteria Negative NEG /hpf    Hyaline cast 0-2 0 - 5 /lpf   TROPONIN I    Collection Time: 05/12/21  4:53 PM   Result Value Ref Range    Troponin-I, Qt. 0.07 (H) <0.05 ng/mL   GLUCOSE, POC    Collection Time: 05/12/21  9:24 PM   Result Value Ref Range    Glucose (POC) 223 (H) 65 - 117 mg/dL    Performed by Mari Christiansen RN    TROPONIN I    Collection Time: 05/13/21 12:15 AM   Result Value Ref Range    Troponin-I, Qt. 0.07 (H) <0.05 ng/mL   CBC W/O DIFF    Collection Time: 05/13/21  6:23 AM   Result Value Ref Range    WBC 7.0 4.1 - 11.1 K/uL    RBC 4.94 4.10 - 5.70 M/uL    HGB 13.2 12.1 - 17.0 g/dL    HCT 40.7 36.6 - 50.3 %    MCV 82.4 80.0 - 99.0 FL    MCH 26.7 26.0 - 34.0 PG    MCHC 32.4 30.0 - 36.5 g/dL    RDW 15.2 (H) 11.5 - 14.5 %    PLATELET 518 031 - 850 K/uL    MPV 10.8 8.9 - 12.9 FL    NRBC 0.0 0  WBC    ABSOLUTE NRBC 0.00 0.00 - 0.01 K/uL   D DIMER    Collection Time: 05/13/21  6:23 AM   Result Value Ref Range    D-dimer 0.97 (H) 0.00 - 0.65 mg/L FEU

## 2021-05-13 NOTE — PROGRESS NOTES
Problem: Falls - Risk of  Goal: *Absence of Falls  Description: Document Alexia Jones Fall Risk and appropriate interventions in the flowsheet.   Outcome: Progressing Towards Goal  Note: Fall Risk Interventions:

## 2021-05-13 NOTE — PROGRESS NOTES
[facility]: EXERCISE Cardiac Stress study completed. Please check with ordering Physician regarding pt diet. Results to follow.

## 2021-05-13 NOTE — ED PROVIDER NOTES
EMERGENCY DEPARTMENT HISTORY AND PHYSICAL EXAM      Date: 2021  Patient Name: Lm Mc    History of Presenting Illness     Chief Complaint   Patient presents with    Chest Pain     3 days - has cardiac stents. Went to [facility] yesterday and the EKG and CXR were fine    High Blood Sugar     Also his BS was high this morning and he is not a diabetic. It was in the 400's. He took one of his mom's metformin tablets and it is 223 in triage. History Provided By: Patient    HPI: Lm Mc, 53 y.o. male presents to the ED with cc of chest pain. Patient symptoms started 3 days ago. He has had substernal chest pain, which feels like a pressure at times. Sometimes the pain is sharp. It does not radiate. It is usually of moderate severity. It is associated with diaphoresis, but no nausea or shortness of breath. Patient denies leg pain, leg edema, cough, fever or chills. He denies trauma, abdominal pain or change in bowel habits. He has a history of cardiac stents. He also states that his glucose was elevated this morning. It was in the 400s, so he took one of his mom's Metformin tablets. There are no other complaints, changes, or physical findings at this time. PCP: Florentin Jansen, DO    No current facility-administered medications on file prior to encounter. Current Outpatient Medications on File Prior to Encounter   Medication Sig Dispense Refill    Pataday Twice Daily Relief 0.1 % ophthalmic solution       predniSONE (DELTASONE) 20 mg tablet 60 mg daily x 6 days, then stop. 18 Tab 0    [] trimethoprim-sulfamethoxazole (BACTRIM DS, SEPTRA DS) 160-800 mg per tablet Take 1 Tab by mouth two (2) times a day for 7 days. 14 Tab 0    hydrALAZINE (APRESOLINE) 50 mg tablet Take 1 Tab by mouth three (3) times daily. 270 Tab 3    clopidogreL (PLAVIX) 75 mg tab Take 1 Tab by mouth daily.  90 Tab 3    losartan (COZAAR) 25 mg tablet Take 1 tablet by mouth once daily 90 Tab 3    amLODIPine (NORVASC) 10 mg tablet Take 1 tablet by mouth once daily 90 Tab 3    canagliflozin (Invokana) 100 mg tablet TAKE 1 TABLET BY MOUTH ONCE DAILY BEFORE BREAKFAST 30 Tab 11    carvediloL (COREG) 12.5 mg tablet TAKE 1 TABLET BY MOUTH TWICE DAILY WITH MEALS 180 Tab 3    atorvastatin (LIPITOR) 40 mg tablet Take 1 Tab by mouth nightly. 90 Tab 3    ferrous sulfate 325 mg (65 mg iron) tablet TAKE 1 TABLET BY MOUTH ONCE DAILY BEFORE BREAKFAST 90 Tab 3    diclofenac (VOLTAREN) 1 % gel Apply 4 g to affected area four (4) times daily. 1 Each 1    glucose blood VI test strips (blood glucose test) strip Check fasting blood sugar daily using blood sugar test strip. Dx: e11.21 100 Strip 3    sennosides 8.6 mg cap senna 8.6 mg capsule   Take 2 capsules every day by oral route as needed.  famotidine (PEPCID) 40 mg tablet Take 1 Tab by mouth daily. 90 Tab 3    acetaminophen (TYLENOL) 325 mg tablet Take 325-650 mg by mouth every four (4) hours as needed for Pain.  cetirizine (ZYRTEC) 10 mg tablet Take 10 mg by mouth daily as needed for Allergies.          Past History     Past Medical History:  Past Medical History:   Diagnosis Date    Bilateral carotid artery stenosis     Diabetic peripheral neuropathy associated with type 2 diabetes mellitus ([address]     Foot fracture     H/O aortic dissection     Hollenhorst plaque, right eye     Hypertension     Jaw fracture (HCC)     Ruptured ear drum     Subjective visual disturbance, right eye     Thumb fracture        Past Surgical History:  Past Surgical History:   Procedure Laterality Date    HX APPENDECTOMY      HX ARTERIAL BYPASS  02/15/2019    HX ORTHOPAEDIC      HX OTHER SURGICAL  02/15/2019    8 stints    HX OTHER SURGICAL  02/15/2019    Double bypass       Family History:  Family History   Problem Relation Age of Onset    Diabetes Mother     Stroke Mother     Colon Cancer Father     Cancer Brother     No Known Problems Child Social History:  Social History     Tobacco Use    Smoking status: Never Smoker    Smokeless tobacco: Never Used   Substance Use Topics    Alcohol use: No    Drug use: No       Allergies:  No Known Allergies      Review of Systems   Review of Systems   Constitutional: Positive for diaphoresis. Negative for fever. HENT: Negative for congestion. Eyes: Negative. Respiratory: Negative for shortness of breath. Cardiovascular: Positive for chest pain. Gastrointestinal: Negative for abdominal pain. Endocrine: Negative for heat intolerance. Genitourinary: Negative. Musculoskeletal: Negative for back pain. Skin: Negative for rash. Allergic/Immunologic: Negative for immunocompromised state. Neurological: Negative for dizziness. Hematological: Does not bruise/bleed easily. Psychiatric/Behavioral: Negative. All other systems reviewed and are negative. Physical Exam   Physical Exam  Vitals signs and nursing note reviewed. Constitutional:       General: He is not in acute distress. Appearance: He is well-developed. HENT:      Head: Normocephalic and atraumatic. Neck:      Musculoskeletal: Normal range of motion. Cardiovascular:      Rate and Rhythm: Normal rate and regular rhythm. Heart sounds: Normal heart sounds. Pulmonary:      Effort: Pulmonary effort is normal.      Breath sounds: Normal breath sounds. Abdominal:      General: Bowel sounds are normal.      Palpations: Abdomen is soft. Skin:     General: Skin is warm and dry. Neurological:      General: No focal deficit present. Mental Status: He is alert and oriented to person, place, and time.       Coordination: Coordination normal.   Psychiatric:         Mood and Affect: Mood normal.         Behavior: Behavior normal.         Diagnostic Study Results     Labs -     Recent Results (from the past 12 hour(s))   EKG, 12 LEAD, INITIAL    Collection Time: 05/12/21  1:48 PM   Result Value Ref Range Ventricular Rate 76 BPM    Atrial Rate 76 BPM    P-R Interval 170 ms    QRS Duration 96 ms    Q-T Interval 380 ms    QTC Calculation (Bezet) 427 ms    Calculated P Axis 44 degrees    Calculated R Axis 11 degrees    Calculated T Axis 7 degrees    Diagnosis       Normal sinus rhythm  Nonspecific T wave abnormality  When compared with ECG of 03-DEC-2019 23:01,  No significant change was found     GLUCOSE, POC    Collection Time: 05/12/21  1:56 PM   Result Value Ref Range    Glucose (POC) 223 (H) 65 - 117 mg/dL    Performed by Jerome Bonilla    CBC WITH AUTOMATED DIFF    Collection Time: 05/12/21  2:26 PM   Result Value Ref Range    WBC 7.4 4.1 - 11.1 K/uL    RBC 5.29 4. 10 - 5.70 M/uL    HGB 14.1 12.1 - 17.0 g/dL    HCT 44.1 36.6 - 50.3 %    MCV 83.4 80.0 - 99.0 FL    MCH 26.7 26.0 - 34.0 PG    MCHC 32.0 30.0 - 36.5 g/dL    RDW 14.9 (H) 11.5 - 14.5 %    PLATELET 233 849 - 827 K/uL    MPV 10.5 8.9 - 12.9 FL    NRBC 0.0 0  WBC    ABSOLUTE NRBC 0.00 0.00 - 0.01 K/uL    NEUTROPHILS 60 32 - 75 %    LYMPHOCYTES 30 12 - 49 %    MONOCYTES 8 5 - 13 %    EOSINOPHILS 1 0 - 7 %    BASOPHILS 0 0 - 1 %    IMMATURE GRANULOCYTES 1 (H) 0.0 - 0.5 %    ABS. NEUTROPHILS 4.5 1.8 - 8.0 K/UL    ABS. LYMPHOCYTES 2.2 0.8 - 3.5 K/UL    ABS. MONOCYTES 0.6 0.0 - 1.0 K/UL    ABS. EOSINOPHILS 0.1 0.0 - 0.4 K/UL    ABS. BASOPHILS 0.0 0.0 - 0.1 K/UL    ABS. IMM.  GRANS. 0.1 (H) 0.00 - 0.04 K/UL    DF AUTOMATED     METABOLIC PANEL, COMPREHENSIVE    Collection Time: 05/12/21  2:26 PM   Result Value Ref Range    Sodium 138 136 - 145 mmol/L    Potassium 3.6 3.5 - 5.1 mmol/L    Chloride 106 97 - 108 mmol/L    CO2 28 21 - 32 mmol/L    Anion gap 4 (L) 5 - 15 mmol/L    Glucose 204 (H) 65 - 100 mg/dL    BUN 34 (H) 6 - 20 MG/DL    Creatinine 1.71 (H) 0.70 - 1.30 MG/DL    BUN/Creatinine ratio 20 12 - 20      GFR est AA 52 (L) >60 ml/min/1.73m2    GFR est non-AA 43 (L) >60 ml/min/1.73m2    Calcium 8.7 8.5 - 10.1 MG/DL    Bilirubin, total 0.7 0.2 - 1.0 MG/DL ALT (SGPT) 23 12 - 78 U/L    AST (SGOT) 8 (L) 15 - 37 U/L    Alk. phosphatase 98 45 - 117 U/L    Protein, total 6.7 6.4 - 8.2 g/dL    Albumin 3.5 3.5 - 5.0 g/dL    Globulin 3.2 2.0 - 4.0 g/dL    A-G Ratio 1.1 1.1 - 2.2     TROPONIN I    Collection Time: 05/12/21  2:26 PM   Result Value Ref Range    Troponin-I, Qt. 0.07 (H) <0.05 ng/mL   URINALYSIS W/ REFLEX CULTURE    Collection Time: 05/12/21  4:31 PM    Specimen: Urine   Result Value Ref Range    Color YELLOW/STRAW      Appearance CLEAR CLEAR      Specific gravity 1.015 1.003 - 1.030      pH (UA) 5.5 5.0 - 8.0      Protein Negative NEG mg/dL    Glucose >1,000 (A) NEG mg/dL    Ketone Negative NEG mg/dL    Bilirubin Negative NEG      Blood Negative NEG      Urobilinogen 0.2 0.2 - 1.0 EU/dL    Nitrites Negative NEG      Leukocyte Esterase Negative NEG      UA:UC IF INDICATED CULTURE NOT INDICATED BY UA RESULT CNI      WBC 0-4 0 - 4 /hpf    RBC 0-5 0 - 5 /hpf    Epithelial cells FEW FEW /lpf    Bacteria Negative NEG /hpf    Hyaline cast 0-2 0 - 5 /lpf   TROPONIN I    Collection Time: 05/12/21  4:53 PM   Result Value Ref Range    Troponin-I, Qt. 0.07 (H) <0.05 ng/mL       Radiologic Studies -   XR CHEST PORT   Final Result      No acute process. CT Results  (Last 48 hours)    None        CXR Results  (Last 48 hours)               05/12/21 1659  XR CHEST PORT Final result    Impression:      No acute process. Narrative:  EXAM:  XR CHEST PORT       INDICATION:  pain       COMPARISON:  5/14/2019       FINDINGS:       A portable AP radiograph of the chest was obtained at 16:48 hours. The patient   is on a cardiac monitor. The lungs are clear. The cardiac silhouette is normal   in size. There is a thoracic aortic stent. There is no vascular congestion or   pleural fluid. The bones and soft tissues are unremarkable. There has been no   change since the prior study.                  Medical Decision Making   I am the first provider for this patient. I reviewed the vital signs, available nursing notes, past medical history, past surgical history, family history and social history. Vital Signs-Reviewed the patient's vital signs. Patient Vitals for the past 12 hrs:   Temp Pulse Resp BP SpO2   05/12/21 1902 99.1 °F (37.3 °C) 81 20 (!) 141/92 97 %   05/12/21 1800 -- 68 16 (!) 144/83 98 %   05/12/21 1730 -- 74 14 (!) 144/85 98 %   05/12/21 1700 -- 74 20 (!) 140/75 97 %   05/12/21 1556 -- -- -- 129/62 --   05/12/21 1357 98 °F (36.7 °C) 76 18 137/83 100 %       EKG interpretation: (Preliminary)  Rhythm: normal sinus rhythm; and regular . Rate (approx.): 76; Axis: normal; [place] interval: normal; QRS interval: normal ; ST/T wave: non-specific changes; Other findings: .    Records Reviewed: Nursing Notes, Old Medical Records, Previous Radiology Studies and Previous Laboratory Studies    Provider Notes (Medical Decision Making):   ACS, atypical chest pain, reflux, gastritis    ED Course:   Initial assessment performed. The patients presenting problems have been discussed, and they are in agreement with the care plan formulated and outlined with them. I have encouraged them to ask questions as they arise throughout their visit. Consult note:    I discussed the case with Dr. Jus Ross, cardiology. He recommends admission. Patient should be n.p.o. after midnight. Consult note: The patient is being admitted by [facility], hospitalist.               Critical Care Time:   0    Disposition:  admit    DISCHARGE PLAN:  1. Current Discharge Medication List        2. Follow-up Information    None       3. Return to ED if worse     Diagnosis     Clinical Impression:   1. Chest pain, unspecified type        Attestations:    Sean Cespedes MD    Please note that this dictation was completed with [employer], the [employer] voice recognition software.   Quite often unanticipated grammatical, syntax, homophones, and other interpretive errors are inadvertently transcribed by the computer software. Please disregard these errors. Please excuse any errors that have escaped final proofreading. Thank you.

## 2021-05-13 NOTE — PROGRESS NOTES
0700: End of Shift Note    Bedside shift change report given to GEOFF Cole (oncoming nurse) by Kiana Rebolledo (offgoing nurse). Report included the following information SBAR, Kardex, Intake/Output, MAR, Recent Results and Cardiac Rhythm NSR    Shift worked:  8703-8513     Shift summary and any significant changes:     pt slept     Concerns for physician to address:  stress test [facility] phone for oncoming shift:          Activity:  Activity Level: Up ad jenny  Number times ambulated in hallways past shift: 0  Number of times OOB to chair past shift: 0    Cardiac:   Cardiac Monitoring: Yes      Cardiac Rhythm: Sinus Rhythm    Access:   Current line(s): PIV     Genitourinary:   Urinary status: voiding    Respiratory:   O2 Device: None (Room air)  Chronic home O2 use?: NO  Incentive spirometer at bedside: NO     GI:     Current diet:  DIET NPO  Passing flatus: YES  Tolerating current diet: YES       Pain Management:   Patient states pain is manageable on current regimen: YES    Skin:  Holden Score: 22  Interventions: increase time out of bed    Patient Safety:  Fall Score:  Total Score: 0  Interventions: pt to call before getting OOB       Length of Stay:  Expected LOS: - - -  Actual LOS: 1 [facility]

## 2021-05-13 NOTE — PROGRESS NOTES
Problem: Falls - Risk of  Goal: *Absence of Falls  Description: Document Emily Sargent Fall Risk and appropriate interventions in the flowsheet.   5/13/2021 1836 by Swetha Bennett  Reactivated  5/13/2021 1835 by Swetha Bennett  Outcome: Resolved/Not Met  5/13/2021 1302 by Swetha Bennett  Outcome: Progressing Towards Goal  Note: Fall Risk Interventions:            Medication Interventions: Evaluate medications/consider consulting pharmacy, Patient to call before getting OOB, Teach patient to arise slowly                   Problem: Patient Education: Go to Patient Education Activity  Goal: Patient/Family Education  5/13/2021 1836 by Swetha Bennett  Reactivated  5/13/2021 [address] by Swetha Bennett  Outcome: Resolved/Not Met     Problem: Patient Education: Go to Patient Education Activity  Goal: Patient/Family Education  5/13/2021 1836 by Swetha Bennett  Reactivated  5/13/2021 1835 by Swetha Bennett  Outcome: Resolved/Not Met     Problem: Unstable angina/NSTEMI: Day of Admission/Day 1  Goal: Off Pathway (Use only if patient is Off Pathway)  5/13/2021 1836 by Swetha Bennett  Reactivated  5/13/2021 1835 by Swetha Bennett  Outcome: Resolved/Not Met  Goal: Activity/Safety  Outcome: Resolved/Not Met  Goal: Consults, if ordered  Outcome: Resolved/Not Met  Goal: Diagnostic Test/Procedures  Outcome: Resolved/Not Met  Goal: Nutrition/Diet  Outcome: Resolved/Not Met  Goal: Discharge Planning  Outcome: Resolved/Not Met  Goal: Medications  Outcome: Resolved/Not Met  Goal: Respiratory  Outcome: Resolved/Not Met  Goal: Treatments/Interventions/Procedures  Outcome: Resolved/Not Met  Goal: Psychosocial  Outcome: Resolved/Not Met  Goal: *Hemodynamically stable  Outcome: Resolved/Not Met  Goal: *Optimal pain control at patient's stated goal  Outcome: Resolved/Not Met  Goal: *Lungs clear or at baseline  Outcome: Resolved/Not Met

## 2021-05-13 NOTE — DISCHARGE SUMMARY
Hospitalist Discharge Summary     Patient ID:  Whit Handley  [id number]  37 y.o.  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 male who presents with past medical history of coronary disease, hypertension, dyslipidemia, diabetes mellitus is coming the hospital chief complaint of chest pain. Patient reports pain in the substernal area of the chest since the last 3 days which is pressure-like and also sometimes intermittently sharp, nonradiating, without any aggravating or relieving factors. Does not report any severe shortness of breath, palpitations, syncopal episodes. Does not report any fever or chills. Does not report any recent travel, trauma rash over the chest.  Does not report any abdominal pain, nausea or vomiting. He reports being compliant with his medications.     On arrival to ED, vital signs are within normal limits but on lab work noted to have a normal CBC. BMP shows a creatinine of 1.71. LFTs are normal.  Troponin 0 0.07. EKG shows normal sinus rhythm nonspecific ST-T wave changes. ______________________________________________________________________  DISCHARGE SUMMARY/HOSPITAL COURSE:  for full details see H&P, daily progress notes, labs, consult notes.      Troponin elevation rule out non-STEMI  -Description of pain is mixed with some typical cardiac pain and also intermittent sharp type of pain  -EKG shows nonspecific ST-T wave changes and troponin 0 0.07    -Troponin remained flat, cardiology was consulted, offered heart catheterization, but patient wanted to do a stress test, which was reasonable. Patient stress test was unremarkable. Patient also had elevated D-dimer, so had a CTA chest done which was negative for PE or any acute abnormalities.  -Discussed with the cardiologist, patient was discharged home.  -Patient was advised to come to ED if he develops chest pain again.       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 Pertinent Findings:  Gen:    Not in distress  Chest: Clear lungs  CVS:   Regular rhythm. No edema  Abd:  Soft, not distended, not tender  Neuro:  Alert,   _______________________________________________________________________  DISCHARGE MEDICATIONS:   Discharge Medication List as of 5/13/2021  5:44 PM      START taking these medications    Details   pantoprazole (PROTONIX) 40 mg tablet Take 1 Tab by mouth daily for 15 days. , Normal, Disp-15 Tab, R-0      nitroglycerin (NITROSTAT) 0.4 mg SL tablet 1 Tab by SubLINGual route every five (5) minutes as needed for Chest Pain. Up to 3 doses. , Normal, Disp-15 Tab, R-0         CONTINUE these medications which have NOT CHANGED    Details   hydrALAZINE (APRESOLINE) 50 mg tablet Take 1 Tab by mouth three (3) times daily. , Normal, Disp-270 Tab, R-3      clopidogreL (PLAVIX) 75 mg tab Take 1 Tab by mouth daily. , Normal, Disp-90 Tab, R-3      losartan (COZAAR) 25 mg tablet Take 1 tablet by mouth once daily, Normal, Disp-90 Tab, R-3      amLODIPine (NORVASC) 10 mg tablet Take 1 tablet by mouth once daily, Normal, Disp-90 Tab, R-3      canagliflozin (Invokana) 100 mg tablet TAKE 1 TABLET BY MOUTH ONCE DAILY BEFORE BREAKFAST, Normal, Disp-30 Tab, R-11      carvediloL (COREG) 12.5 mg tablet TAKE 1 TABLET BY MOUTH TWICE DAILY WITH MEALS, Normal, Disp-180 Tab, R-3      atorvastatin (LIPITOR) 40 mg tablet Take 1 Tab by mouth nightly., Normal, Disp-90 Tab, R-3      ferrous sulfate 325 mg (65 mg iron) tablet TAKE 1 TABLET BY MOUTH ONCE DAILY BEFORE BREAKFAST, Normal, Disp-90 Tab,R-3      glucose blood VI test strips (blood glucose test) strip Check fasting blood sugar daily using blood sugar test strip. Dx: e11.21, Normal, Disp-100 Strip, R-3      sennosides 8.6 mg cap senna 8.6 mg capsule   Take 2 capsules every day by oral route as needed., Historical Med      famotidine (PEPCID) 40 mg tablet Take 1 Tab by mouth daily. , Normal, Disp-90 Tab, R-3      acetaminophen (TYLENOL) 325 mg tablet Take 325-650 mg by mouth every four (4) hours as needed for Pain., Historical Med      cetirizine (ZYRTEC) 10 mg tablet Take 10 mg by mouth daily as needed for Allergies. , Historical Med      Pataday Twice Daily Relief 0.1 % ophthalmic solution Historical Med, CONI      diclofenac (VOLTAREN) 1 % gel Apply 4 g to affected area four (4) times daily. , Normal, Disp-1 Each,R-1         STOP taking these medications       predniSONE (DELTASONE) 20 mg tablet Comments:   Reason for Stopping:         trimethoprim-sulfamethoxazole (BACTRIM DS, SEPTRA DS) 160-800 mg per tablet Comments:   Reason for Stopping:                 Patient Follow Up Instructions:    Activity: Activity as tolerated  Diet: Cardiac Diet  Wound Care: None needed      Follow-up Information     Follow up With Specialties Details Why Contact Info    Dearl Cluster, DO Internal Medicine  Call to set up an appointment within 7 days [facility]  [address] [place]      Dayan Carrera MD Cardiology In 2 weeks cardiologist [address]  [address]  [place]ia Contreras [phone]          ________________________________________________________________    Risk of deterioration: Low    Condition at Discharge:  Stable  __________________________________________________________________    Disposition  Home with family, no needs    ____________________________________________________________________    Code Status: Full Code  ___________________________________________________________________      Total time in minutes spent coordinating this discharge (includes going over instructions, follow-up, prescriptions, and preparing report for sign off to her PCP) :  >30 minutes    Signed:  Austen Mahmood MD

## 2021-05-13 NOTE — PROGRESS NOTES
NUC MED: Pt for Same Day EXERCISE Cardiac Stress. Medicine ordered. Spoke to [facility]. Please keep NPO.

## 2021-05-13 NOTE — DISCHARGE INSTRUCTIONS
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 · Keep your medication in the bottles provided by the pharmacist and keep a list of the medication names, dosages, and times to be taken in your wallet. · Do not take other medications without consulting your doctor. What to do at [address]:  Cardiac Diet    Recommended activity: Activity as tolerated      If you have questions regarding the hospital related prescriptions or hospital related issues please call [facility] at [phone]. You can always direct your questions to your primary care doctor if you are unable to reach your hospital physician; your PCP works as an extension of your hospital doctor just like your hospital doctor is an extension of your PCP for your time at the hospital [facility])    If you experience any of the following symptoms then please call your primary care physician or return to the emergency room if you cannot get hold of your doctor:    Fever, chills, nausea, vomiting, or persistent diarrhea  Worsening weakness or new problems with your speech or balance  Dark stools or visible blood in your stools  New Leg swelling or shortness of breath as these could be signs of a clot    Additional Instructions:      Bring these papers with you to your follow up appointments. The papers will help your doctors be sure to continue the care plan from the hospital.              Information obtained by :  I understand that if any problems occur once I am at home I am to contact my physician.     I understand and acknowledge receipt of the instructions indicated above. Physician's or R.N.'s Signature                                                                  Date/Time                                                                                                                                              Patient or Representative Signature                       Patient Education        Chest Pain: Care Instructions  Your Care Instructions     There are many things that can cause chest pain. Some are not serious and will get better on their own in a few days. But some kinds of chest pain need more testing and treatment. Your doctor may have recommended a follow-up visit in the next 8 to 12 hours. If you are not getting better, you may need more tests or treatment. Even though your doctor has released you, you still need to watch for any problems. The doctor carefully checked you, but sometimes problems can develop later. If you have new symptoms or if your symptoms do not get better, get medical care right away. If you have worse or different chest pain or pressure that lasts more than 5 minutes or you passed out (lost consciousness), call 911 or seek other emergency help right away. A medical visit is only one step in your treatment. Even if you feel better, you still need to do what your doctor recommends, such as going to all suggested follow-up appointments and taking medicines exactly as directed. This will help you recover and help prevent future problems. How can you care for yourself at home? · Rest until you feel better. · Take your medicine exactly as prescribed. Call your doctor if you think you are having a problem with your medicine. · Do not drive after taking a prescription pain medicine. When should you call for help?    Call 911 if:     · You passed out (lost consciousness).     · You have severe difficulty breathing.     · You have symptoms of a heart attack. These may include:  ? Chest pain or pressure, or a strange feeling in your chest.  ? Sweating. ? Shortness of breath. ? Nausea or vomiting. ? Pain, pressure, or a strange feeling in your back, neck, jaw, or upper belly or in one or both shoulders or arms. ? Lightheadedness or sudden weakness. ? A fast or irregular heartbeat. After you call 911, the  may tell you to chew 1 adult-strength or 2 to 4 low-dose aspirin. Wait for an ambulance. Do not try to drive yourself. Call your doctor today if:     · You have any trouble breathing.     · Your chest pain gets worse.     · You are dizzy or lightheaded, or you feel like you may faint.     · You are not getting better as expected.     · You are having new or different chest pain. Where can you learn more? Go to http://www.molina.com/  Enter A120 in the search box to learn more about \"Chest Pain: Care Instructions. \"  Current as of: February 26, 2020               Content Version: 12.8  © 0755-2252 [employer] Care instructions adapted under license by [employer] (which disclaims liability or warranty for this information). If you have questions about a medical condition or this instruction, always ask your healthcare professional. [place] [address] any warranty or liability for your use of this information.

## 2021-05-13 NOTE — PROGRESS NOTES
Problem: Falls - Risk of  Goal: *Absence of Falls  Description: Document Jorge Osorio Fall Risk and appropriate interventions in the flowsheet.   Outcome: Progressing Towards Goal  Note: Fall Risk Interventions:            Medication Interventions: Evaluate medications/consider consulting pharmacy, Patient to call before getting OOB, Teach patient to arise slowly

## 2021-05-13 NOTE — PROGRESS NOTES
Pt injected for Nuc Med cardiac study. Please keep npo except for water. Scans and stress test to follow.

## 2021-05-14 ENCOUNTER — PATIENT OUTREACH (OUTPATIENT)
Dept: CASE MANAGEMENT | Age: 51
End: 2021-05-14

## 2021-05-14 LAB
STRESS ANGINA INDEX: 0
STRESS BASELINE DIAS BP: 62 MMHG
STRESS BASELINE HR: 67 BPM
STRESS BASELINE SYS BP: 136 MMHG
STRESS ESTIMATED WORKLOAD: 9.6 METS
STRESS EXERCISE DUR MIN: NORMAL
STRESS O2 SAT PEAK: 99 %
STRESS O2 SAT REST: 97 %
STRESS PEAK DIAS BP: 94 MMHG
STRESS PEAK SYS BP: 200 MMHG
STRESS PERCENT HR ACHIEVED: 85 %
STRESS POST PEAK HR: 146 BPM
STRESS RATE PRESSURE PRODUCT: NORMAL BPM*MMHG
STRESS TARGET HR: 171 BPM

## 2021-05-14 NOTE — PROGRESS NOTES
Educated patient about risk for severe COVID-19 due to risk factors according to CDC guidelines - MRM IP 5/12/21-5/13/21 dx-unstable angina (atypical CP)     ACM reviewed discharge instructions, medical action plan and red flag symptoms patient who verbalized understanding. Discussed COVID vaccination status yes. Education provided on COVID-19 vaccination as appropriate. Pt stated he is not vaccinated because he is scared. Explained the risk versus the benefit in someone with diabetes and hypertension. Discussed exposure protocols and quarantine with CDC Guidelines. Patient was given an opportunity to verbalize any questions and concerns and agrees to contact ACM or health care provider for questions related to their healthcare. Pt stated he has cardiology appt, will call PCP today for follow up and is on his way to pharmacy to  rxs. Pt is not longer taking prednisone and bactrim. Advance care planning not on file and education was provided. Pt agreed to a referral to the ACP specialty team as he named his mother as SDM who is not his legal NOK. Pt has adult children.

## 2021-05-14 NOTE — ACP (ADVANCE CARE PLANNING)
Advance care planning not on file and education was provided. Pt agreed to a referral to the ACP specialty team as he named his mother as SDM who is not his legal NOK. Pt has adult children.          Primary Decision MakerFlorjax Bri - Parent - [phone]  Advance Care Planning 5/14/2021   Patient's Healthcare Decision Maker is: Verbal statement (Legal Next of Kin remains as decision maker)   Confirm Advance Directive None   Patient Would Like to Complete Advance Directive Yes

## 2021-05-19 NOTE — PROGRESS NOTES
Physician Progress Note      PATIENT:               Sada Gutierrez  CSN #:                  [id number]  :                       1970  ADMIT DATE:       2021 3:23 PM  [address] [place] DATE:        2021 6:39 PM  RESPONDING  PROVIDER #:        Michelle Carmen MD          QUERY TEXT:    Dr. Raúl Fitzgerald,    Pt admitted with chest pain. Pt noted to have Chest pain, suspect non cardiac, hx GERD-Continue home Pepcid. If possible, please document in progress notes and discharge summary if you are evaluating and/or treating any of the following: The medical record reflects the following:  Risk Factors: coronary disease, hypertension, dyslipidemia, diabetes mellitus  Clinical Indicators: dc summary documents Chest pain, suspect non cardiac,LFTs are normal.  Troponin 0 0.07. EKG shows normal sinus rhythm nonspecific ST-T wave changes. stress test was unremarkable.   Treatment: consult cardiology, stress test, monitoring, continue home Pepcid      thank you,  Juan Jose Covington RN BS [facility]  [phone]  Options provided:  -- Chest pain due to CAD with unstable angina  -- Chest pain due to GERD  -- Chest pain due to costochondritis  -- Other - I will add my own diagnosis  -- Disagree - Not applicable / Not valid  -- Disagree - Clinically unable to determine / Unknown  -- Refer to Clinical Documentation Reviewer    PROVIDER RESPONSE TEXT:    Chest Pain, non cardiac    Query created by: Parul Mojica on 2021 1:44 PM      Electronically signed by:  Michelle Carmen MD 2021 1:31 PM

## 2021-05-28 ENCOUNTER — PATIENT OUTREACH (OUTPATIENT)
Dept: CASE MANAGEMENT | Age: 51
End: 2021-05-28

## 2021-05-28 NOTE — PROGRESS NOTES
Received referral for ACP facilitation from [address] [place] [address]. Called patient-no answer, LM with my direct contact information if interested in scheduling. Will attempt to reach patient again in one week.

## 2021-05-28 NOTE — ACP (ADVANCE CARE PLANNING)
Received referral for ACP facilitation from [address] [place] [address]. Called patient-no answer, LM with my direct contact information if interested in scheduling. Will attempt to reach patient again in one week.

## 2021-06-02 ENCOUNTER — PATIENT OUTREACH (OUTPATIENT)
Dept: CASE MANAGEMENT | Age: 51
End: 2021-06-02

## 2021-06-02 NOTE — PROGRESS NOTES
Second attempt made to contact patient to schedule ACP. Unable to reach. No aswer on patients home number [facility] at earliest convenience.

## 2021-06-02 NOTE — ACP (ADVANCE CARE PLANNING)
Second attempt made to contact patient to schedule ACP. Unable to reach. No aswer on patients home number [facility] at earliest convenience.

## 2021-06-04 ENCOUNTER — TELEPHONE (OUTPATIENT)
Dept: INTERNAL MEDICINE CLINIC | Age: 51
End: 2021-06-04

## 2021-06-04 NOTE — TELEPHONE ENCOUNTER
----- Message from Koki Barney sent at 6/4/2021  1:55 PM EDT -----  Regarding: Dr. Michelle Zaidi Message/Vendor Calls    Caller's first and last name: Pt       Reason for call: Needs to the name and information for the doctor Dr. Kim Alegria suggested for patient to contact to have fluid drained off knee and give cortisone shot. Callback required yes/no and why: yes, to inform.        Best contact number(s): [phone]        Message from [facility]

## 2021-06-08 NOTE — TELEPHONE ENCOUNTER
Patient received information and made appt with Dr. Tommie Adair. No further actions required at this time.

## 2021-06-16 ENCOUNTER — OFFICE VISIT (OUTPATIENT)
Dept: ORTHOPEDIC SURGERY | Age: 51
End: 2021-06-16
Payer: COMMERCIAL

## 2021-06-16 ENCOUNTER — HOSPITAL ENCOUNTER (OUTPATIENT)
Dept: GENERAL RADIOLOGY | Age: 51
Discharge: HOME | End: 2021-06-16
Payer: COMMERCIAL

## 2021-06-16 VITALS
BODY MASS INDEX: 27.98 KG/M2 | DIASTOLIC BLOOD PRESSURE: 65 MMHG | HEIGHT: 77 IN | OXYGEN SATURATION: 99 % | HEART RATE: 75 BPM | SYSTOLIC BLOOD PRESSURE: 105 MMHG | TEMPERATURE: 97 F | WEIGHT: 237 LBS

## 2021-06-16 DIAGNOSIS — M25.561 RIGHT KNEE PAIN, UNSPECIFIED CHRONICITY: Primary | ICD-10-CM

## 2021-06-16 DIAGNOSIS — M25.561 RIGHT KNEE PAIN, UNSPECIFIED CHRONICITY: ICD-10-CM

## 2021-06-16 DIAGNOSIS — M17.11 UNILATERAL PRIMARY OSTEOARTHRITIS, RIGHT KNEE: Primary | ICD-10-CM

## 2021-06-16 DIAGNOSIS — M17.11 PRIMARY OSTEOARTHRITIS OF RIGHT KNEE: ICD-10-CM

## 2021-06-16 PROCEDURE — 73564 X-RAY EXAM KNEE 4 OR MORE: CPT

## 2021-06-16 PROCEDURE — 99203 OFFICE O/P NEW LOW 30 MIN: CPT | Performed by: ORTHOPAEDIC SURGERY

## 2021-06-16 PROCEDURE — 20610 DRAIN/INJ JOINT/BURSA W/O US: CPT | Performed by: ORTHOPAEDIC SURGERY

## 2021-06-16 RX ORDER — BETAMETHASONE SODIUM PHOSPHATE AND BETAMETHASONE ACETATE 3; 3 MG/ML; MG/ML
6 INJECTION, SUSPENSION INTRA-ARTICULAR; INTRALESIONAL; INTRAMUSCULAR; SOFT TISSUE ONCE
Status: COMPLETED | OUTPATIENT
Start: 2021-06-16 | End: 2021-06-17

## 2021-06-16 NOTE — LETTER
6/17/2021 Patient: Jerrell Arias YOB: 1970 Date of Visit: 6/16/2021 Professor Lakesha Morris DO 
[address] [facility] [address] [address] [address] [employer] Dear Professor Lakesha Morris DO, Thank you for referring Mr. Jerrell Arias to [facility] for evaluation. My notes for this consultation are attached. If you have questions, please do not hesitate to call me. I look forward to following your patient along with you.  
 
 
Sincerely, 
 
Kathryn Landrum DO

## 2021-06-16 NOTE — PROGRESS NOTES
Identified pt with two pt identifiers (name and ). Reviewed chart in preparation for visit and have obtained necessary documentation. Crystal Newton is a 53 y.o. male  Chief Complaint   Patient presents with    Knee Pain     RT knee     Visit Vitals  /65 (BP 1 Location: Right arm, BP Patient Position: Sitting, BP Cuff Size: Large adult)   Pulse 75   Temp 97 °F (36.1 °C) (Tympanic)   Ht 6' 5\" (1.956 m)   Wt 237 lb (107.5 kg)   SpO2 99%   BMI 28.10 kg/m²     1. Have you been to the ER, urgent care clinic since your last visit? Hospitalized since your last visit? No    2. Have you seen or consulted any other health care providers outside of the [address] [place] [address] since your last visit? Include any pap smears or colon screening.  No

## 2021-06-17 RX ADMIN — BETAMETHASONE SODIUM PHOSPHATE AND BETAMETHASONE ACETATE 6 MG: 3; 3 INJECTION, SUSPENSION INTRA-ARTICULAR; INTRALESIONAL; INTRAMUSCULAR; SOFT TISSUE at 09:22

## 2021-06-17 NOTE — PROGRESS NOTES
6/16/2021    Chief Complaint: Right knee pain    HPI: This is a 53 y.o. male who complains of right knee pain. Onset was gradual years. The patient has had activity dependent pain for years. The patient has tried activity modification, physical therapy exercises, injections have worked in the past.  The pain is in the lateral knee, it is severe in intensity. The patient feels unstable with the knee, fears falling, and has significant limitation with activities of daily living, recreation, and walks with a limp. Past Medical History:   Diagnosis Date    Bilateral carotid artery stenosis     Diabetic peripheral neuropathy associated with type 2 diabetes mellitus ([facility] [address]     Foot fracture     H/O aortic dissection     Hollenhorst plaque, right eye     Hypertension     Jaw fracture (HCC)     Ruptured ear drum     Subjective visual disturbance, right eye     Thumb fracture        Past Surgical History:   Procedure Laterality Date    HX APPENDECTOMY      HX ARTERIAL BYPASS  02/15/2019    HX ORTHOPAEDIC      HX OTHER SURGICAL  02/15/2019    8 stints    HX OTHER SURGICAL  02/15/2019    Double bypass       Current Outpatient Medications on File Prior to Visit   Medication Sig Dispense Refill    nitroglycerin (NITROSTAT) 0.4 mg SL tablet 1 Tab by SubLINGual route every five (5) minutes as needed for Chest Pain. Up to 3 doses. 15 Tab 0    Pataday Twice Daily Relief 0.1 % ophthalmic solution       hydrALAZINE (APRESOLINE) 50 mg tablet Take 1 Tab by mouth three (3) times daily. 270 Tab 3    clopidogreL (PLAVIX) 75 mg tab Take 1 Tab by mouth daily.  90 Tab 3    losartan (COZAAR) 25 mg tablet Take 1 tablet by mouth once daily 90 Tab 3    amLODIPine (NORVASC) 10 mg tablet Take 1 tablet by mouth once daily 90 Tab 3    canagliflozin (Invokana) 100 mg tablet TAKE 1 TABLET BY MOUTH ONCE DAILY BEFORE BREAKFAST 30 Tab 11    carvediloL (COREG) 12.5 mg tablet TAKE 1 TABLET BY MOUTH TWICE DAILY WITH MEALS 180 Tab 3    atorvastatin (LIPITOR) 40 mg tablet Take 1 Tab by mouth nightly. 90 Tab 3    ferrous sulfate 325 mg (65 mg iron) tablet TAKE 1 TABLET BY MOUTH ONCE DAILY BEFORE BREAKFAST 90 Tab 3    diclofenac (VOLTAREN) 1 % gel Apply 4 g to affected area four (4) times daily. 1 Each 1    glucose blood VI test strips (blood glucose test) strip Check fasting blood sugar daily using blood sugar test strip. Dx: e11.21 100 Strip 3    sennosides 8.6 mg cap senna 8.6 mg capsule   Take 2 capsules every day by oral route as needed.  famotidine (PEPCID) 40 mg tablet Take 1 Tab by mouth daily. 90 Tab 3    acetaminophen (TYLENOL) 325 mg tablet Take 325-650 mg by mouth every four (4) hours as needed for Pain.  cetirizine (ZYRTEC) 10 mg tablet Take 10 mg by mouth daily as needed for Allergies. No current facility-administered medications on file prior to visit. No Known Allergies    Family History   Problem Relation Age of Onset    Diabetes Mother     Stroke Mother     Colon Cancer Father     Cancer Brother     No Known Problems Child        Social History     Socioeconomic History    Marital status: SINGLE     Spouse name: Not on file    Number of children: Not on file    Years of education: Not on file    Highest education level: Not on file   Tobacco Use    Smoking status: Never Smoker    Smokeless tobacco: Never Used   Substance and Sexual Activity    Alcohol use: No    Drug use: No    Sexual activity: Yes     Partners: Female     Birth control/protection: Surgical     Social Determinants of Health     Financial Resource Strain:     Difficulty of Paying Living Expenses:    Food Insecurity:     Worried About Running Out of Food in the Last Year:     Ran Out of Food in the Last Year:    Transportation Needs:     Lack of Transportation (Medical):      Lack of Transportation (Non-Medical):    Physical Activity:     Days of Exercise per Week:     Minutes of Exercise per Session:    Stress:  Feeling of Stress :    Social Connections:     Frequency of Communication with Friends and Family:     Frequency of Social Gatherings with Friends and Family:     Attends Restorationist Services:     Active Member of Clubs or Organizations:     Attends Club or Organization Meetings:     Marital Status:          Review of Systems:       General: Denies headache, lethargy, fever, weight loss  Ears/Nose/Throat: Denies ear discharge, drainage, nosebleeds, hoarse voice, dental problems  Cardiovascular: Denies chest pain, shortness of breath  Lungs: Denies chest pain, breathing problems, wheezing, pneumonia  Stomach: Denies stomach pain, heartburn, constipation, irritable bowel  Skin: Denies rash, sores, open wounds  Musculoskeletal: Admits to knee pain, no deformity. Genitourinary: Denies dysuria, hematuria, polyuria  Gastrointestinal: Denies constipation, obstipation, diarrhea  Neurological: Denies changes in sight, smell, hearing, taste, seizures. Denies loss of consciousness.   Psychiatric: Denies depression, sleep pattern changes, anxiety, change in personality  Endocrine: Denies mood swings, heat or cold intolerance  Hematologic/Lymphatic: Denies anemia, purpura, petechia  Allergic/Immunologic: Denies swelling of throat, pain or swelling at lymph nodes      Physical Examination:    Visit Vitals  /65 (BP 1 Location: Right arm, BP Patient Position: Sitting, BP Cuff Size: Large adult)   Pulse 75   Temp 97 °F (36.1 °C) (Tympanic)   Ht 6' 5\" (1.956 m)   Wt 237 lb (107.5 kg)   SpO2 99%   BMI 28.10 kg/m²        General: AOX3, no apparent distress  Psychiatric: mood and affect appropriate  Lungs: breathing is symmetric and unlabored bilaterally  Heart: regular rate and rhythm  Abdomen: no guarding  Head: normocephalic, atraumatic  Skin: No significant abnormalities, good turgor  Sensation intact to light touch: L1-S1 dermatomes  Muscular exam: 5/5 strength in all major muscle groups unless noted in specialty exam.    Extremities:      Left upper extremity: Full active and passive range of motion without pain, deformity, no open wound, strength 5/5 in all major muscle groups. Right upper extremity: Full active and passive range of motion without pain, deformity, no open wound, strength 5/5 in all major muscle groups. Left lower extremity: Full active and passive range of motion without pain, deformity, no open wound, strength 5/5 in all major muscle groups. Right lower extremity:  No deformity is noted. Range of motion of the knee is 0-1 30. Ligamentous testing of the knee indicates stability of the the MCL, LCL, PCL, and ACL. Lachman's, anterior and posterior drawer tests are specifically negative. Lateral and medial joint line tenderness to palpation is noted. Popliteal area is unremarkable. 1+  for effusion. Positive patellar crepitus. Patella tracks centrally. Pivot shift is negative. Strength testing is indicative of 5/5 strength at hip flexion, extension, knee flexion and extension, tibialis anterior, EHL, and FHL. Sensation is intact to light touch in the L1-S1 dermatomes. Capillary refill is less than 2 seconds in the toes. Diagnostics:    Pertinent Diagnostics: X-rays are available of the right knee, these indicate mild degenerative changes of the patellofemoral joint, there are bone spurs, there also appears to be an area of avascular necrosis of the lateral patella. Assessment: Osteoarthritis right knee    Plan: This patient and I did discuss the many options in treating knee osteoarthritis. We did discuss that we could continue to seek out nonoperative modalities, such as: NSAIDs, oral and topical analgesics, knee injections, knee braces, physical therapy, stretching, strengthening, and weight loss strategies, activity modification, ambulatory assistive devices.   The patient stated their understanding with this, but and would like to proceed with nonsurgical management in the form of injections. Procedures: Date of Procedure: 6/16/2021  PROCEDURE NOTE: Right knee injection of Celestone    Consent was obtained from the patient. The correct site was identified after confirmation with the patient. Following identification and confirmation of the correct site with the patient, the superolateral right knee was prepped in the normal sterile fashion. A local anesthetic of 1% lidocaine without epinephrine was then administered to the local tissues. Following, an injection of a mixture of  6 mg Celestone and 1% lidocaine without epinephrine was administered to the right knee. The needle was then withdrawn and the injection site dressed with a sterile bandage at the conclusion. The procedure was well tolerated by the patient. No immediate adverse reactions were noted. Post injection instructions were given. Mr. Justin Mckinnon has a reminder for a \"due or due soon\" health maintenance. I have asked that he contact his primary care provider for follow-up on this health maintenance.

## 2021-06-24 ENCOUNTER — VIRTUAL VISIT (OUTPATIENT)
Dept: ORTHOPEDIC SURGERY | Age: 51
End: 2021-06-24
Payer: COMMERCIAL

## 2021-06-24 DIAGNOSIS — M17.11 UNILATERAL PRIMARY OSTEOARTHRITIS, RIGHT KNEE: Primary | ICD-10-CM

## 2021-06-24 PROCEDURE — 99441 PR PHYS/QHP TELEPHONE EVALUATION 5-10 MIN: CPT | Performed by: ORTHOPAEDIC SURGERY

## 2021-06-24 NOTE — PROGRESS NOTES
6/24/2021      CC: right knee pain    HPI:      This is a 53y.o. year old male who presents for follow up of their right knee injection. The patient states that they have had some relief of their pain. The time since injection has been 1 week. PMH:  Past Medical History:   Diagnosis Date    Bilateral carotid artery stenosis     Diabetic peripheral neuropathy associated with type 2 diabetes mellitus ([address]     Foot fracture     H/O aortic dissection     Hollenhorst plaque, right eye     Hypertension     Jaw fracture (HCC)     Ruptured ear drum     Subjective visual disturbance, right eye     Thumb fracture        PSxHx:  Past Surgical History:   Procedure Laterality Date    HX APPENDECTOMY      HX ARTERIAL BYPASS  02/15/2019    HX ORTHOPAEDIC      HX OTHER SURGICAL  02/15/2019    8 stints    HX OTHER SURGICAL  02/15/2019    Double bypass       Meds:    Current Outpatient Medications:     nitroglycerin (NITROSTAT) 0.4 mg SL tablet, 1 Tab by SubLINGual route every five (5) minutes as needed for Chest Pain. Up to 3 doses. , Disp: 15 Tab, Rfl: 0    Pataday Twice Daily Relief 0.1 % ophthalmic solution, , Disp: , Rfl:     hydrALAZINE (APRESOLINE) 50 mg tablet, Take 1 Tab by mouth three (3) times daily. , Disp: 270 Tab, Rfl: 3    clopidogreL (PLAVIX) 75 mg tab, Take 1 Tab by mouth daily. , Disp: 90 Tab, Rfl: 3    losartan (COZAAR) 25 mg tablet, Take 1 tablet by mouth once daily, Disp: 90 Tab, Rfl: 3    amLODIPine (NORVASC) 10 mg tablet, Take 1 tablet by mouth once daily, Disp: 90 Tab, Rfl: 3    canagliflozin (Invokana) 100 mg tablet, TAKE 1 TABLET BY MOUTH ONCE DAILY BEFORE BREAKFAST, Disp: 30 Tab, Rfl: 11    carvediloL (COREG) 12.5 mg tablet, TAKE 1 TABLET BY MOUTH TWICE DAILY WITH MEALS, Disp: 180 Tab, Rfl: 3    atorvastatin (LIPITOR) 40 mg tablet, Take 1 Tab by mouth nightly., Disp: 90 Tab, Rfl: 3    ferrous sulfate 325 mg (65 mg iron) tablet, TAKE 1 TABLET BY MOUTH ONCE DAILY BEFORE BREAKFAST, Disp: 90 Tab, Rfl: 3    diclofenac (VOLTAREN) 1 % gel, Apply 4 g to affected area four (4) times daily. , Disp: 1 Each, Rfl: 1    glucose blood VI test strips (blood glucose test) strip, Check fasting blood sugar daily using blood sugar test strip. Dx: e11.21, Disp: 100 Strip, Rfl: 3    sennosides 8.6 mg cap, senna 8.6 mg capsule  Take 2 capsules every day by oral route as needed. , Disp: , Rfl:     famotidine (PEPCID) 40 mg tablet, Take 1 Tab by mouth daily. , Disp: 90 Tab, Rfl: 3    acetaminophen (TYLENOL) 325 mg tablet, Take 325-650 mg by mouth every four (4) hours as needed for Pain., Disp: , Rfl:     cetirizine (ZYRTEC) 10 mg tablet, Take 10 mg by mouth daily as needed for Allergies. , Disp: , Rfl:     All:  No Known Allergies    Social Hx:  Social History     Socioeconomic History    Marital status: SINGLE     Spouse name: Not on file    Number of children: Not on file    Years of education: Not on file    Highest education level: Not on file   Tobacco Use    Smoking status: Never Smoker    Smokeless tobacco: Never Used   Substance and Sexual Activity    Alcohol use: No    Drug use: No    Sexual activity: Yes     Partners: Female     Birth control/protection: Surgical     Social Determinants of Health     Financial Resource Strain:     Difficulty of Paying Living Expenses:    Food Insecurity:     Worried About Running Out of Food in the Last Year:     [address] in the Last Year:    Transportation Needs:     Lack of Transportation (Medical):      Lack of Transportation (Non-Medical):    Physical Activity:     Days of Exercise per Week:     Minutes of Exercise per Session:    Stress:     Feeling of Stress :    Social Connections:     Frequency of Communication with Friends and Family:     Frequency of Social Gatherings with Friends and Family:     Attends Denominational Services:     Active Member of Clubs or Organizations:     Attends Club or Organization Meetings:    Porsha Marital Status:        Family Hx:  Family History   Problem Relation Age of Onset    Diabetes Mother     Stroke Mother     Colon Cancer Father     Cancer Brother     No Known Problems Child          Review of Systems:       General: Denies headache, lethargy, fever, weight loss  Ears/Nose/Throat: Denies ear discharge, drainage, nosebleeds, hoarse voice, dental problems  Cardiovascular: Denies chest pain, shortness of breath  Lungs: Denies chest pain, breathing problems, wheezing, pneumonia  Stomach: Denies stomach pain, heartburn, constipation, irritable bowel  Skin: Denies rash, sores, open wounds  Musculoskeletal: Right knee pain right knee  Genitourinary: Denies dysuria, hematuria, polyuria  Gastrointestinal: Denies constipation, obstipation, diarrhea  Neurological: Denies changes in sight, smell, hearing, taste, seizures. Denies loss of consciousness. Psychiatric: Denies depression, sleep pattern changes, anxiety, change in personality  Endocrine: Denies mood swings, heat or cold intolerance  Hematologic/Lymphatic: Denies anemia, purpura, petechia  Allergic/Immunologic: Denies swelling of throat, pain or swelling at lymph nodes      Physical Examination:    There were no vitals taken for this visit. General: AOX3, no apparent distress  Psychiatric: mood and affect appropriate        Diagnostics:    Pertinent Diagnostics: none    Assessment: Status post right knee injection  Plan: This patient and I discussed the normal course for injections, we discussed that pain relief will likely be temporary to some degree, but I cannot predict the longevity of relief. We also discussed the limitations of injections, and that I cannot inject the same area any more often than every three months. We will proceed with continued observation, he is trialing a lidocaine patch as well as diclofenac gel. We will see how this treats him and he will follow-up on an as-needed basis.   Patient was in [facility] at the time of encounter. Mr. Sonia Trinh has a reminder for a \"due or due soon\" health maintenance. I have asked that he contact his primary care provider for follow-up on this health maintenance. I was in the office while conducting this encounter. Consent:  He and/or his healthcare decision maker is aware that this patient-initiated Telehealth encounter is a billable service, with coverage as determined by his insurance carrier. He is aware that he may receive a bill and has provided verbal consent to proceed: Yes    This virtual visit was conducted telephone encounter only. -  I affirm this is a Patient Initiated Episode with an Established Patient who has not had a related appointment within my department in the past 7 days or scheduled within the next 24 hours. Note: this encounter is not billable if this call serves to triage the patient into an appointment for the relevant concern. Total Time: minutes: 5-10 minutes.

## 2021-07-09 ENCOUNTER — OFFICE VISIT (OUTPATIENT)
Dept: INTERNAL MEDICINE CLINIC | Age: 51
End: 2021-07-09
Payer: MEDICAID

## 2021-07-09 VITALS
RESPIRATION RATE: 16 BRPM | HEIGHT: 77 IN | SYSTOLIC BLOOD PRESSURE: 143 MMHG | OXYGEN SATURATION: 97 % | WEIGHT: 238.2 LBS | BODY MASS INDEX: 28.13 KG/M2 | HEART RATE: 71 BPM | DIASTOLIC BLOOD PRESSURE: 63 MMHG

## 2021-07-09 DIAGNOSIS — I10 ESSENTIAL HYPERTENSION: Primary | ICD-10-CM

## 2021-07-09 DIAGNOSIS — H34.11 CENTRAL RETINAL ARTERY OCCLUSION OF RIGHT EYE: ICD-10-CM

## 2021-07-09 DIAGNOSIS — E11.69 HYPERLIPIDEMIA ASSOCIATED WITH TYPE 2 DIABETES MELLITUS (HCC): ICD-10-CM

## 2021-07-09 DIAGNOSIS — Z86.79 HISTORY OF AORTIC DISSECTION: ICD-10-CM

## 2021-07-09 DIAGNOSIS — E11.42 DIABETIC PERIPHERAL NEUROPATHY ASSOCIATED WITH TYPE 2 DIABETES MELLITUS (HCC): ICD-10-CM

## 2021-07-09 DIAGNOSIS — E78.5 HYPERLIPIDEMIA ASSOCIATED WITH TYPE 2 DIABETES MELLITUS (HCC): ICD-10-CM

## 2021-07-09 PROCEDURE — 99213 OFFICE O/P EST LOW 20 MIN: CPT | Performed by: INTERNAL MEDICINE

## 2021-07-09 NOTE — PROGRESS NOTES
Tashia Rachel is a 53 y.o. male who presents for evaluation of hospitalization and routine follow up. Last seen by me may 4, 2021. Was in[facility] 12[facility] with chest pain. Work up included stress test was normal.  He has done fine since getting home. Has a new glucometer, and sugars are low 100s in am.  He wants to resume going to the gym and working out again. Only eating one meal daily. Weight down 8 lbs from last visit.       ROS:  Constitutional: negative for fevers, chills, anorexia and weight loss  Eyes:   negative for visual disturbance and irritation  ENT:   negative for tinnitus,sore throat,nasal congestion,ear pain,hoarseness  Respiratory:  negative for cough, hemoptysis, dyspnea,wheezing  CV:   negative for chest pain, palpitations, lower extremity edema  GI:   negative for nausea, vomiting, diarrhea, abdominal pain,melena  Genitourinary: negative for frequency, dysuria and hematuria  Musculoskel: negative for myalgias, arthralgias, back pain, muscle weakness, joint pain  Neurological:  negative for headaches, dizziness, focal weakness, numbness  Psychiatric:     Negative for depression or anxiety      Past Medical History:   Diagnosis Date    Bilateral carotid artery stenosis     Diabetic peripheral neuropathy associated with type 2 diabetes mellitus ([facility] [address]     Foot fracture     H/O aortic dissection     Hollenhorst plaque, right eye     Hypertension     Jaw fracture (HCC)     Ruptured ear drum     Subjective visual disturbance, right eye     Thumb fracture        Past Surgical History:   Procedure Laterality Date    HX APPENDECTOMY      HX ARTERIAL BYPASS  02/15/2019    HX ORTHOPAEDIC      HX OTHER SURGICAL  02/15/2019    8 stints    HX OTHER SURGICAL  02/15/2019    Double bypass       Family History   Problem Relation Age of Onset    Diabetes Mother     Stroke Mother     Colon Cancer Father     Cancer Brother     No Known Problems Child        Social History Socioeconomic History    Marital status: SINGLE     Spouse name: Not on file    Number of children: Not on file    Years of education: Not on file    Highest education level: Not on file   Occupational History    Not on file   Tobacco Use    Smoking status: Never Smoker    Smokeless tobacco: Never Used   Substance and Sexual Activity    Alcohol use: No    Drug use: No    Sexual activity: Yes     Partners: Female     Birth control/protection: Surgical   Other Topics Concern    Not on file   Social History Narrative    Not on file     Social Determinants of Health     Financial Resource Strain:     Difficulty of Paying Living Expenses:    Food Insecurity:     Worried About Running Out of Food in the Last Year:     [address] in the Last Year:    Transportation Needs:     Lack of Transportation (Medical):      Lack of Transportation (Non-Medical):    Physical Activity:     Days of Exercise per Week:     Minutes of Exercise per Session:    Stress:     Feeling of Stress :    Social Connections:     Frequency of Communication with Friends and Family:     Frequency of Social Gatherings with Friends and Family:     Attends Yazidism Services:     Active Member of Clubs or Organizations:     Attends Club or Organization Meetings:     Marital Status:    Intimate Partner Violence:     Fear of Current or Ex-Partner:     Emotionally Abused:     Physically Abused:     Sexually Abused:             Visit Vitals  BP (!) 143/63 (BP 1 Location: Right upper arm, BP Patient Position: Sitting)   Pulse 71   Resp 16   Ht 6' 5\" (1.956 m)   Wt 238 lb 3.2 oz (108 kg)   SpO2 97%   BMI 28.25 kg/m²       Physical Examination:   General - Well appearing male  HEENT - PERRL, TM no erythema/opacification, normal nasal turbinates, no oropharyngeal erythema or exudate, MMM  Neck - supple, no bruits, no thyroidomegaly, no lymphadenopathy  Pulm - clear to auscultation bilaterally  Cardio - RRR, normal S1 S2, no murmur  Abd - soft, nontender, no masses, no HSM  Extrem - no edema, +2 distal pulses  Neuro-  No focal deficits, CN intact     Assessment/Plan:    1.  htn--bit elevated today, but remains dramatically improved. Continue current meds for now:  Hydralazine, coreg, cozaar, norvasc. Would increase cozaar if needed. Had tried to increase hydralazine in past, but it did not agree with him  2.  Dm, type 2--on invokana. Check a1c--7.9 today. 3.  hyperlipids--on lipitor  4. Hx aortic dissection--sp TEVAR, dr Jazmine Nichols. On plavix  5. Hx CRAO--on plavix  6.   Right knee osteoarthritis--saw dr Phillip Rosario, had injection  7.  ckd 3--has been stable    rtc 6 months for 30 min cpe        May Mixer III, DO

## 2021-07-09 NOTE — PATIENT INSTRUCTIONS
Home Blood Pressure Test: About This Test  What is it? A home blood pressure test allows you to keep track of your blood pressure at home. Blood pressure is a measure of the force of blood against the walls of your arteries. Blood pressure readings include two numbers, such as 130/80 (say \"130 over 80\"). The first number is the systolic pressure. The second number is the diastolic pressure. Why is this test done? You may do this test at home to:  · Find out if you have high blood pressure. · Track your blood pressure if you have high blood pressure. · Track how well medicine is working to reduce high blood pressure. · Check how lifestyle changes, such as weight loss and exercise, are affecting blood pressure. How do you prepare for the test?  For at least 30 minutes before you take your blood pressure, don't exercise, drink caffeine, or smoke. Empty your bladder before the test. Sit quietly with your back straight and both feet on the floor for at least 5 minutes. This helps you take your blood pressure while you feel comfortable and relaxed. How is the test done? · If your doctor recommends it, take your blood pressure twice a day. Take it in the morning and evening. · Sit with your arm slightly bent and resting on a table so that your upper arm is at the same level as your heart. · Use the same arm each time you take your blood pressure. · Place the blood pressure cuff on the bare skin of your upper arm. You may have to roll up your sleeve, remove your arm from the sleeve, or take your shirt off. · Wrap the blood pressure cuff around your upper arm so that the lower edge of the cuff is about 1 inch above the bend of your elbow. · Do not move, talk, or text while you take your blood pressure. Follow the instructions that came with your blood pressure monitor. They might be different from the following. · Press the on/off button on the automatic monitor.  Then you may need to wait until the screen says the monitor is ready. · Press the start button. The cuff will inflate and deflate by itself. · Your blood pressure numbers will appear on the screen. · Wait one minute and take your blood pressure again. · If your monitor does not automatically save your numbers, write them in your log book, along with the date and time. Follow-up care is a key part of your treatment and safety. Be sure to make and go to all appointments, and call your doctor if you are having problems. It's also a good idea to keep a list of the medicines you take. Where can you learn more? Go to http://www.molina.com/  Enter C427 in the search box to learn more about \"Home Blood Pressure Test: About This Test.\"  Current as of: August 31, 2020               Content Version: 12.8  © 0842-7540 [employer]. Care instructions adapted under license by [employer] (which disclaims liability or warranty for this information). If you have questions about a medical condition or this instruction, always ask your healthcare professional. [place] [address] any warranty or liability for your use of this information. Try to check bp at home few times per week. Notify me if consistently above 135/85. Ok to start going to gym again. Start easy though, as you have been out of the gym for a while.

## 2021-07-20 ENCOUNTER — TELEPHONE (OUTPATIENT)
Dept: INTERNAL MEDICINE CLINIC | Age: 51
End: 2021-07-20

## 2021-07-20 NOTE — TELEPHONE ENCOUNTER
----- Message from Nellene Farm sent at 7/20/2021  2:04 PM EDT -----  Regarding: Dr Freddy rBown first and last name: Jean-Pierre Casiano/ Mother      Reason for call:COVID vaccine questions      Callback required yes/no and why:yes to discuss COVID vaccine implications      Best contact number(s):[phone]      Details to clarify the request:Ms. Casiano is requesting a call back to obtain reason for advising pt not to get the COVID vaccine.       Nellene Farm

## 2021-07-21 NOTE — TELEPHONE ENCOUNTER
Progress note reviewed from 7/9/21 and  There was nothing in note about COVID vaccine. Will forward this encounter to Dr. Mati Candelaria.

## 2021-07-22 NOTE — TELEPHONE ENCOUNTER
Identified patient 2 identifiers verified. Patient has Stents in chest  And vaccines may cause heart to swell. Patient wants a phone call to discuss.

## 2021-07-22 NOTE — TELEPHONE ENCOUNTER
Called patient. ID verified with Name and . Informed patient, per provider, that patient is okay to get vaccine and that vaccine will not have impact on stents at this time. Patient continued to express concerns about getting vaccine at this time. Asked patient, if he was being followed by a cardiologist. Informed patient that, if he needs any reassurance, I would reach out to cardiologist in regards to receiving vaccine before making a decision. Patient states that he understands the information received at this time. Patient had no further questions or concerns at this time.

## 2021-08-17 DIAGNOSIS — Z20.2 POSSIBLE EXPOSURE TO STD: Primary | ICD-10-CM

## 2021-10-05 NOTE — TELEPHONE ENCOUNTER
----- Message from [employer]oo[employer]rs sent at 10/5/2021  4:46 PM EDT -----  Regarding: /refill  Medication Refill    Caller (if not patient):      Relationship of caller (if not patient):      Best contact number(s): [phone]      Name of medication and dosage if known:  Invokana      Is patient out of this medication (yes/no):yes      Pharmacy name: [facility] listed in chart? (yes/no): yes  Pharmacy phone number: [phone]      Details to clarify the request:Requesting Rx \"Invokana\" be filled      Message from [facility]

## 2021-10-06 NOTE — TELEPHONE ENCOUNTER
Future Appointments:  Future Appointments   Date Time Provider Travis Garcia   1/11/2022  8:30 AM Jenn Bruno [facility] AMB        Last Appointment With Me:  7/9/2021     Requested Prescriptions     Pending Prescriptions Disp Refills    canagliflozin (Invokana) 100 mg tablet 30 Tablet 11     Sig: TAKE 1 TABLET BY MOUTH ONCE DAILY BEFORE BREAKFAST

## 2021-10-07 RX ORDER — ORAL SEMAGLUTIDE 3 MG/1
TABLET ORAL
Qty: 30 TABLET | Refills: 0 | Status: SHIPPED | OUTPATIENT
Start: 2021-10-07 | End: 2021-11-18 | Stop reason: DRUGHIGH

## 2021-10-07 RX ORDER — ORAL SEMAGLUTIDE 7 MG/1
TABLET ORAL
Qty: 30 TABLET | Refills: 0 | Status: SHIPPED | OUTPATIENT
Start: 2021-10-07 | End: 2021-11-18 | Stop reason: SDUPTHER

## 2021-10-28 ENCOUNTER — OFFICE VISIT (OUTPATIENT)
Dept: ORTHOPEDIC SURGERY | Age: 51
End: 2021-10-28
Payer: MEDICAID

## 2021-10-28 VITALS
OXYGEN SATURATION: 98 % | TEMPERATURE: 98.8 F | HEART RATE: 86 BPM | HEIGHT: 77 IN | DIASTOLIC BLOOD PRESSURE: 80 MMHG | BODY MASS INDEX: 28.57 KG/M2 | SYSTOLIC BLOOD PRESSURE: 129 MMHG | WEIGHT: 242 LBS

## 2021-10-28 DIAGNOSIS — M70.62 TROCHANTERIC BURSITIS, LEFT HIP: ICD-10-CM

## 2021-10-28 DIAGNOSIS — M17.11 UNILATERAL PRIMARY OSTEOARTHRITIS, RIGHT KNEE: Primary | ICD-10-CM

## 2021-10-28 PROCEDURE — 20610 DRAIN/INJ JOINT/BURSA W/O US: CPT | Performed by: ORTHOPAEDIC SURGERY

## 2021-10-28 PROCEDURE — 99214 OFFICE O/P EST MOD 30 MIN: CPT | Performed by: ORTHOPAEDIC SURGERY

## 2021-10-28 RX ORDER — BETAMETHASONE SODIUM PHOSPHATE AND BETAMETHASONE ACETATE 3; 3 MG/ML; MG/ML
6 INJECTION, SUSPENSION INTRA-ARTICULAR; INTRALESIONAL; INTRAMUSCULAR; SOFT TISSUE ONCE
Status: COMPLETED | OUTPATIENT
Start: 2021-10-28 | End: 2021-10-28

## 2021-10-28 RX ORDER — LIDOCAINE 50 MG/G
PATCH TOPICAL
Qty: 30 EACH | Refills: 0 | Status: SHIPPED | OUTPATIENT
Start: 2021-10-28 | End: 2021-12-23 | Stop reason: SDUPTHER

## 2021-10-28 RX ADMIN — BETAMETHASONE SODIUM PHOSPHATE AND BETAMETHASONE ACETATE 6 MG: 3; 3 INJECTION, SUSPENSION INTRA-ARTICULAR; INTRALESIONAL; INTRAMUSCULAR; SOFT TISSUE at 16:13

## 2021-10-28 RX ADMIN — BETAMETHASONE SODIUM PHOSPHATE AND BETAMETHASONE ACETATE 6 MG: 3; 3 INJECTION, SUSPENSION INTRA-ARTICULAR; INTRALESIONAL; INTRAMUSCULAR; SOFT TISSUE at 14:34

## 2021-10-28 NOTE — PROGRESS NOTES
Identified pt with two pt identifiers (name and ). Reviewed chart in preparation for visit and have obtained necessary documentation. Laisha Victoria is a 49 y.o. male  Chief Complaint   Patient presents with    Follow-up     RT knee     Visit Vitals  /80 (BP 1 Location: Right arm, BP Patient Position: Sitting, BP Cuff Size: Large adult)   Pulse 86   Temp 98.8 °F (37.1 °C) (Tympanic)   Ht 6' 5\" (1.956 m)   Wt 242 lb (109.8 kg)   SpO2 98%   BMI 28.70 kg/m²     1. Have you been to the ER, urgent care clinic since your last visit? Hospitalized since your last visit? No    2. Have you seen or consulted any other health care providers outside of the [address] [place] [address] since your last visit? Include any pap smears or colon screening.  No

## 2021-10-28 NOTE — LETTER
10/28/2021    Patient: María Dowd   YOB: 1970   Date of Visit: 10/28/2021     Jake Mckeon III, DO  [address]  [facility]  [place]    Dear Heri Yates DO,      Thank you for referring Mr. María Dowd to [facility] for evaluation. My notes for this consultation are attached. If you have questions, please do not hesitate to call me. I look forward to following your patient along with you.       Sincerely,    Paulie Noel DO

## 2021-10-28 NOTE — PROGRESS NOTES
10/28/2021      CC: Left hip pain, right knee pain    HPI:      This is a 49y.o. year old male who presents for a follow up visit. The patient was last seen and diagnosed with osteoarthritis with effusion right knee, peritrochanteric pain syndrome, left hip. The patient's treatments since the most recent visit have comprised of aspiration and injections. The patient has had minimal long-term relief of the chief complaint. PMH:  Past Medical History:   Diagnosis Date    Bilateral carotid artery stenosis     Diabetic peripheral neuropathy associated with type 2 diabetes mellitus ([address]     Foot fracture     H/O aortic dissection     Hollenhorst plaque, right eye     Hypertension     Jaw fracture (HCC)     Ruptured ear drum     Subjective visual disturbance, right eye     Thumb fracture        PSxHx:  Past Surgical History:   Procedure Laterality Date    HX APPENDECTOMY      HX ARTERIAL BYPASS  02/15/2019    HX ORTHOPAEDIC      HX OTHER SURGICAL  02/15/2019    8 stints    HX OTHER SURGICAL  02/15/2019    Double bypass       Meds:    Current Outpatient Medications:     lidocaine (Lidoderm) 5 %, Apply patch to the affected area for 12 hours a day and remove for 12 hours a day., Disp: 30 Each, Rfl: 0    dapagliflozin (FARXIGA) 10 mg tab tablet, Take 1 Tablet by mouth daily. , Disp: 90 Tablet, Rfl: 3    Rybelsus 3 mg tablet, TAKE 1 TABLET BY MOUTH ONCE DAILY BEFORE BREAKFAST STOP  JANUVIA, Disp: 30 Tablet, Rfl: 0    Rybelsus 7 mg tablet, TAKE 1 TABLET BY MOUTH ONCE DAILY BEFORE BREAKFAST START  ONCE  FINISHED  WITH  3MG  DOSE, Disp: 30 Tablet, Rfl: 0    ferrous sulfate 325 mg (65 mg iron) tablet, TAKE 1 TABLET BY MOUTH ONCE DAILY BEFORE BREAKFAST, Disp: 90 Tablet, Rfl: 3    nitroglycerin (NITROSTAT) 0.4 mg SL tablet, 1 Tab by SubLINGual route every five (5) minutes as needed for Chest Pain. Up to 3 doses. , Disp: 15 Tab, Rfl: 0    Pataday Twice Daily Relief 0.1 % ophthalmic solution, , Disp: , Rfl:     hydrALAZINE (APRESOLINE) 50 mg tablet, Take 1 Tab by mouth three (3) times daily. , Disp: 270 Tab, Rfl: 3    clopidogreL (PLAVIX) 75 mg tab, Take 1 Tab by mouth daily. , Disp: 90 Tab, Rfl: 3    losartan (COZAAR) 25 mg tablet, Take 1 tablet by mouth once daily, Disp: 90 Tab, Rfl: 3    amLODIPine (NORVASC) 10 mg tablet, Take 1 tablet by mouth once daily, Disp: 90 Tab, Rfl: 3    carvediloL (COREG) 12.5 mg tablet, TAKE 1 TABLET BY MOUTH TWICE DAILY WITH MEALS, Disp: 180 Tab, Rfl: 3    atorvastatin (LIPITOR) 40 mg tablet, Take 1 Tab by mouth nightly., Disp: 90 Tab, Rfl: 3    glucose blood VI test strips (blood glucose test) strip, Check fasting blood sugar daily using blood sugar test strip. Dx: e11.21, Disp: 100 Strip, Rfl: 3    sennosides 8.6 mg cap, senna 8.6 mg capsule  Take 2 capsules every day by oral route as needed. , Disp: , Rfl:     famotidine (PEPCID) 40 mg tablet, Take 1 Tab by mouth daily. , Disp: 90 Tab, Rfl: 3    acetaminophen (TYLENOL) 325 mg tablet, Take 325-650 mg by mouth every four (4) hours as needed for Pain., Disp: , Rfl:     cetirizine (ZYRTEC) 10 mg tablet, Take 10 mg by mouth daily as needed for Allergies. , Disp: , Rfl:     diclofenac (VOLTAREN) 1 % gel, Apply 4 g to affected area four (4) times daily. (Patient not taking: Reported on 7/9/2021), Disp: 1 Each, Rfl: 1  No current facility-administered medications for this visit.     All:  No Known Allergies    Social Hx:  Social History     Socioeconomic History    Marital status: SINGLE     Spouse name: Not on file    Number of children: Not on file    Years of education: Not on file    Highest education level: Not on file   Tobacco Use    Smoking status: Never Smoker    Smokeless tobacco: Never Used   Substance and Sexual Activity    Alcohol use: No    Drug use: No    Sexual activity: Yes     Partners: Female     Birth control/protection: Surgical     Social Determinants of Health     Financial Resource Strain:  Difficulty of Paying Living Expenses:    Food Insecurity:     Worried About Running Out of Food in the Last Year:     Ran Out of Food in the Last Year:    Transportation Needs:     Lack of Transportation (Medical):  Lack of Transportation (Non-Medical):    Physical Activity:     Days of Exercise per Week:     Minutes of Exercise per Session:    Stress:     Feeling of Stress :    Social Connections:     Frequency of Communication with Friends and Family:     Frequency of Social Gatherings with Friends and Family:     Attends Voodoo Services:     Active Member of Clubs or Organizations:     Attends Club or Organization Meetings:     Marital Status:        Family Hx:  Family History   Problem Relation Age of Onset    Diabetes Mother     Stroke Mother     Colon Cancer Father     Cancer Brother     No Known Problems Child          Review of Systems:       General: Denies headache, lethargy, fever, weight loss  Ears/Nose/Throat: Denies ear discharge, drainage, nosebleeds, hoarse voice, dental problems  Cardiovascular: Denies chest pain, shortness of breath  Lungs: Denies chest pain, breathing problems, wheezing, pneumonia  Stomach: Denies stomach pain, heartburn, constipation, irritable bowel  Skin: Denies rash, sores, open wounds  Musculoskeletal: Left hip pain, right knee pain  Genitourinary: Denies dysuria, hematuria, polyuria  Gastrointestinal: Denies constipation, obstipation, diarrhea  Neurological: Denies changes in sight, smell, hearing, taste, seizures. Denies loss of consciousness.   Psychiatric: Denies depression, sleep pattern changes, anxiety, change in personality  Endocrine: Denies mood swings, heat or cold intolerance  Hematologic/Lymphatic: Denies anemia, purpura, petechia  Allergic/Immunologic: Denies swelling of throat, pain or swelling at lymph nodes      Physical Examination:    Visit Vitals  /80 (BP 1 Location: Right arm, BP Patient Position: Sitting, BP Cuff Size: Large adult)   Pulse 86   Temp 98.8 °F (37.1 °C) (Tympanic)   Ht 6' 5\" (1.956 m)   Wt 242 lb (109.8 kg)   SpO2 98%   BMI 28.70 kg/m²        General: AOX3, no apparent distress  Psychiatric: mood and affect appropriate  Lungs: breathing is symmetric and unlabored bilaterally  Heart: regular rate and rhythm  Abdomen: no guarding  Head: normocephalic, atraumatic  Skin: No significant abnormalities, good turgor  Sensation intact to light touch: C5-T1 dermatomes  Muscular exam: 5/5 strength in all major muscle groups unless noted in specialty exam.    Extremities        Right lower extremity: Moderate effusion of the knee. Knee range of motion is 0-1 10. Tenderness palpation is noted at the medial aspect of the joint line. Hip circumduction is full and pain-free. Capillary refills less than 2 seconds distally. Left lower extremity: Tenderness palpation is noted at the peritrochanteric area. Extremity is examined, no evidence of gross deformity, no gross motor or sensory deficit. Full active and passive range of motion is noted. Muscle tone and bulk is within normal expected limits. Capillary refill is less than 2 seconds distally. Diagnostics:    Pertinent Diagnostics: None today    Assessment: Left hip peritrochanteric pain syndrome, right knee osteoarthritis  Plan: This patient has the above-mentioned issues, he continues to have pain, he is interested in further treatment, injections have helped in the past, and it is logical to proceed with aspiration and injection on the right knee, injections into the left hip. Additionally, he is asked for lidocaine patches which he never obtained after the last visit. He will proceed with this and he will follow-up with me on an as-needed basis should any questions or concerns arise. 10/28/2021  PROCEDURE NOTE: Left Hip Peritrochanteric Cortisone Injection    After consent was signed, the patient's left hip was prepped using a chlorhexidine scrub.   Once sterile, a timeout was performed and a 22 gauge needle was used to inject 5cc of 1% lidocaine through the subcutaneous tissues and iliotibial band in the peritrochanteric area nearest the apex of tenderness. Once adequate anesthesia was confirmed, a mixture of 6mg of betamethasone and 5 cc of 1% lidocaine were injected below the iliotibial band in the same area. Needle was removed and a sterile dressing applied. Patient tolerated well without complication. Post injection instructions were given. PROCEDURE NOTE:     After consent was obtained, the superolateral aspect of the right knee was prepped in the standard fashion with chlorhexidine scrub. Once this was allowed to dry, a timeout was taken and the skin and capsule were injected using 6cc of 1% lidocaine through a 22 gauge needle. Once adequate analgesia was confirmed, the same site was entered with an 18 gauge needle, and 40 cc of clear appearing synovial fluid was aspirated. Once the joint was decompressed, an injection of a mixture of 6mg of betamethasone and 1% lidocaine without epinephrine was administered. The needle was then withdrawn and the injection site dressed with a sterile bandage at the conclusion. The procedure was well tolerated by the patient. No immediate adverse reactions were noted. the needle was extracted and a sterile dressing was applied. The patient tolerated well. Post injection instructions were given. Mr. Madhavi Frausto has a reminder for a \"due or due soon\" health maintenance. I have asked that he contact his primary care provider for follow-up on this health maintenance.

## 2021-11-15 ENCOUNTER — TRANSCRIBE ORDER (OUTPATIENT)
Dept: SCHEDULING | Age: 51
End: 2021-11-15

## 2021-11-15 DIAGNOSIS — I71.02 AORTIC DISSECTION, ABDOMINAL (HCC): Primary | ICD-10-CM

## 2021-11-19 ENCOUNTER — TELEPHONE (OUTPATIENT)
Dept: ORTHOPEDIC SURGERY | Age: 51
End: 2021-11-19

## 2021-11-19 NOTE — TELEPHONE ENCOUNTER
Patient states he is still having pain in his hip since injection. Would like to know if there is anything else he can do to help.  Please advise

## 2021-11-24 NOTE — TELEPHONE ENCOUNTER
Attempted to contact patient. LVM advising per Dr. Wagner Ortega, PT, TENS, or nsaids would be the next line of treatment.  Requested return call

## 2021-12-22 ENCOUNTER — DOCUMENTATION ONLY (OUTPATIENT)
Dept: INTERNAL MEDICINE CLINIC | Age: 51
End: 2021-12-22

## 2021-12-23 DIAGNOSIS — M70.62 TROCHANTERIC BURSITIS, LEFT HIP: ICD-10-CM

## 2021-12-23 DIAGNOSIS — M17.11 UNILATERAL PRIMARY OSTEOARTHRITIS, RIGHT KNEE: ICD-10-CM

## 2021-12-23 RX ORDER — LIDOCAINE 50 MG/G
PATCH TOPICAL
Qty: 30 EACH | Refills: 0 | Status: SHIPPED | OUTPATIENT
Start: 2021-12-23 | End: 2022-04-11 | Stop reason: SDUPTHER

## 2021-12-25 ENCOUNTER — APPOINTMENT (OUTPATIENT)
Dept: GENERAL RADIOLOGY | Age: 51
End: 2021-12-25
Attending: EMERGENCY MEDICINE
Payer: MEDICARE

## 2021-12-25 ENCOUNTER — HOSPITAL ENCOUNTER (EMERGENCY)
Age: 51
Discharge: HOME | End: 2021-12-25
Attending: EMERGENCY MEDICINE
Payer: MEDICARE

## 2021-12-25 VITALS
DIASTOLIC BLOOD PRESSURE: 77 MMHG | RESPIRATION RATE: 18 BRPM | HEART RATE: 93 BPM | BODY MASS INDEX: 28.79 KG/M2 | SYSTOLIC BLOOD PRESSURE: 117 MMHG | TEMPERATURE: 100.7 F | HEIGHT: 77 IN | WEIGHT: 243.83 LBS | OXYGEN SATURATION: 100 %

## 2021-12-25 DIAGNOSIS — Z20.822 PERSON UNDER INVESTIGATION FOR COVID-19: Primary | ICD-10-CM

## 2021-12-25 DIAGNOSIS — E86.0 DEHYDRATION: ICD-10-CM

## 2021-12-25 DIAGNOSIS — R63.0 DECREASED APPETITE: ICD-10-CM

## 2021-12-25 LAB
ALBUMIN SERPL-MCNC: 3.8 G/DL (ref 3.5–5)
ALBUMIN/GLOB SERPL: 1.2 {RATIO} (ref 1.1–2.2)
ALP SERPL-CCNC: 46 U/L (ref 45–117)
ALT SERPL-CCNC: 27 U/L (ref 12–78)
ANION GAP SERPL CALC-SCNC: 5 MMOL/L (ref 5–15)
AST SERPL-CCNC: 29 U/L (ref 15–37)
BASOPHILS # BLD: 0 K/UL (ref 0–0.1)
BASOPHILS NFR BLD: 0 % (ref 0–1)
BILIRUB SERPL-MCNC: 0.8 MG/DL (ref 0.2–1)
BUN SERPL-MCNC: 27 MG/DL (ref 6–20)
BUN/CREAT SERPL: 13 (ref 12–20)
CALCIUM SERPL-MCNC: 9.3 MG/DL (ref 8.5–10.1)
CHLORIDE SERPL-SCNC: 105 MMOL/L (ref 97–108)
CO2 SERPL-SCNC: 30 MMOL/L (ref 21–32)
CREAT SERPL-MCNC: 2.12 MG/DL (ref 0.7–1.3)
DIFFERENTIAL METHOD BLD: ABNORMAL
EOSINOPHIL # BLD: 0 K/UL (ref 0–0.4)
EOSINOPHIL NFR BLD: 0 % (ref 0–7)
ERYTHROCYTE [DISTWIDTH] IN BLOOD BY AUTOMATED COUNT: 14.2 % (ref 11.5–14.5)
FLUAV AG NPH QL IA: NEGATIVE
FLUBV AG NOSE QL IA: NEGATIVE
GLOBULIN SER CALC-MCNC: 3.3 G/DL (ref 2–4)
GLUCOSE SERPL-MCNC: 125 MG/DL (ref 65–100)
HCT VFR BLD AUTO: 39.9 % (ref 36.6–50.3)
HGB BLD-MCNC: 12.8 G/DL (ref 12.1–17)
IMM GRANULOCYTES # BLD AUTO: 0 K/UL (ref 0–0.04)
IMM GRANULOCYTES NFR BLD AUTO: 0 % (ref 0–0.5)
LYMPHOCYTES # BLD: 0.7 K/UL (ref 0.8–3.5)
LYMPHOCYTES NFR BLD: 27 % (ref 12–49)
MCH RBC QN AUTO: 27 PG (ref 26–34)
MCHC RBC AUTO-ENTMCNC: 32.1 G/DL (ref 30–36.5)
MCV RBC AUTO: 84.2 FL (ref 80–99)
MONOCYTES # BLD: 0.3 K/UL (ref 0–1)
MONOCYTES NFR BLD: 13 % (ref 5–13)
NEUTS SEG # BLD: 1.6 K/UL (ref 1.8–8)
NEUTS SEG NFR BLD: 60 % (ref 32–75)
NRBC # BLD: 0 K/UL (ref 0–0.01)
NRBC BLD-RTO: 0 PER 100 WBC
PLATELET # BLD AUTO: 127 K/UL (ref 150–400)
PMV BLD AUTO: 10.4 FL (ref 8.9–12.9)
POTASSIUM SERPL-SCNC: 3.4 MMOL/L (ref 3.5–5.1)
PROT SERPL-MCNC: 7.1 G/DL (ref 6.4–8.2)
RBC # BLD AUTO: 4.74 M/UL (ref 4.1–5.7)
RBC MORPH BLD: ABNORMAL
SARS-COV-2, COV2: NORMAL
SODIUM SERPL-SCNC: 140 MMOL/L (ref 136–145)
WBC # BLD AUTO: 2.6 K/UL (ref 4.1–11.1)

## 2021-12-25 PROCEDURE — 74011250637 HC RX REV CODE- 250/637: Performed by: EMERGENCY MEDICINE

## 2021-12-25 PROCEDURE — U0005 INFEC AGEN DETEC AMPLI PROBE: HCPCS

## 2021-12-25 PROCEDURE — 96360 HYDRATION IV INFUSION INIT: CPT

## 2021-12-25 PROCEDURE — 71045 X-RAY EXAM CHEST 1 VIEW: CPT

## 2021-12-25 PROCEDURE — 87804 INFLUENZA ASSAY W/OPTIC: CPT

## 2021-12-25 PROCEDURE — 36415 COLL VENOUS BLD VENIPUNCTURE: CPT

## 2021-12-25 PROCEDURE — 80053 COMPREHEN METABOLIC PANEL: CPT

## 2021-12-25 PROCEDURE — 99283 EMERGENCY DEPT VISIT LOW MDM: CPT

## 2021-12-25 PROCEDURE — 85025 COMPLETE CBC W/AUTO DIFF WBC: CPT

## 2021-12-25 PROCEDURE — 74011250636 HC RX REV CODE- 250/636: Performed by: EMERGENCY MEDICINE

## 2021-12-25 RX ORDER — ACETAMINOPHEN 325 MG/1
650 TABLET ORAL
Qty: 20 TABLET | Refills: 0 | Status: SHIPPED | OUTPATIENT
Start: 2021-12-25

## 2021-12-25 RX ORDER — ACETAMINOPHEN 500 MG
1000 TABLET ORAL ONCE
Status: COMPLETED | OUTPATIENT
Start: 2021-12-25 | End: 2021-12-25

## 2021-12-25 RX ADMIN — SODIUM CHLORIDE 1000 ML: 9 INJECTION, SOLUTION INTRAVENOUS at 02:36

## 2021-12-25 RX ADMIN — ACETAMINOPHEN 1000 MG: 500 TABLET ORAL at 02:36

## 2021-12-25 NOTE — DISCHARGE INSTRUCTIONS
It was a pleasure taking care of you in our Emergency Department today. We know that when you come to [facility], you are entrusting us with your health, comfort, and safety. Our physicians and nurses honor that trust, and truly appreciate the opportunity to care for you and your loved ones. We also value your feedback. If you receive a survey about your Emergency Department experience today, please fill it out. We care about our patients' feedback, and we listen to what you have to say. Thank you!       Dr. Isaias Mccartney MD.

## 2021-12-25 NOTE — ED PROVIDER NOTES
EMERGENCY DEPARTMENT HISTORY AND PHYSICAL EXAM     ----------------------------------------------------------------------------  Please note that this dictation was completed with [employer], the [employer] voice recognition software. Quite often unanticipated grammatical, syntax, homophones, and other interpretive errors are inadvertently transcribed by the computer software. Please disregard these errors. Please excuse any errors that have escaped final proofreading  ----------------------------------------------------------------------------      Date: 12/25/2021  Patient Name: Lucy Chow    History of Presenting Illness     Chief Complaint   Patient presents with    Fatigue     Patient ambulatory to triage w c/o not wanting to eat for the past 3 days. History Provided By:  Patient    HPI: Lucy Chow is a 49 y.o. male, with significant pmhx of hypertension, aortic dissection, diabetic peripheral neuropathy, type 2 diabetes, carotid artery stenosis, who presents via private vehicle to the ED with c/o 3 days of decreased appetite, increased fatigue and feeling as though he is dehydrated. Denies any associated nausea or vomiting but has not been taking in oral fluids. Notes having dry mouth sensation. Was unaware that he had a fever as noted in triage. Is not vaccinated against COVID-19 as he reports his vascular specialist encouraged him \"not to get it\" as it  have the potential to cause an inflammatory response which would be detrimental to him due to the fact that he has \"10 stents in his body. \"  Patient also specifically denies any associated chills, CP, SOB, nausea, vomiting, diarrhea, abd pain, changes in BM, urinary sxs, or headache. Social Hx: denies tobacco  denies EtOH , denies recreational/Illicit Drugs    There are no other complaints, changes, or physical findings at this time.      PCP: Roseanne Jerez, DO    No Known Allergies    Current Facility-Administered Medications Medication Dose Route Frequency Provider Last Rate Last Admin    sodium chloride 0.9 % bolus infusion 1,000 mL  1,000 mL IntraVENous NOW Socorro Ba MD 1,000 mL/hr at 12/25/21 0236 1,000 mL at 12/25/21 0236     Current Outpatient Medications   Medication Sig Dispense Refill    acetaminophen (TYLENOL) 325 mg tablet Take 2 Tablets by mouth every four (4) hours as needed for Pain or Fever. 20 Tablet 0    lidocaine (Lidoderm) 5 % Apply patch to the affected area for 12 hours a day and remove for 12 hours a day. 30 Each 0    hydrALAZINE (APRESOLINE) 50 mg tablet TAKE 1 TABLET BY MOUTH THREE TIMES DAILY 270 Tablet 3    clopidogreL (PLAVIX) 75 mg tab Take 1 tablet by mouth once daily 90 Tablet 3    semaglutide (Rybelsus) 7 mg tablet Take 1 Tablet by mouth Daily (before breakfast). 90 Tablet 3    dapagliflozin (FARXIGA) 10 mg tab tablet Take 1 Tablet by mouth daily. 90 Tablet 3    ferrous sulfate 325 mg (65 mg iron) tablet TAKE 1 TABLET BY MOUTH ONCE DAILY BEFORE BREAKFAST 90 Tablet 3    nitroglycerin (NITROSTAT) 0.4 mg SL tablet 1 Tab by SubLINGual route every five (5) minutes as needed for Chest Pain. Up to 3 doses. 15 Tab 0    Pataday Twice Daily Relief 0.1 % ophthalmic solution       losartan (COZAAR) 25 mg tablet Take 1 tablet by mouth once daily 90 Tab 3    amLODIPine (NORVASC) 10 mg tablet Take 1 tablet by mouth once daily 90 Tab 3    carvediloL (COREG) 12.5 mg tablet TAKE 1 TABLET BY MOUTH TWICE DAILY WITH MEALS 180 Tab 3    atorvastatin (LIPITOR) 40 mg tablet Take 1 Tab by mouth nightly. 90 Tab 3    diclofenac (VOLTAREN) 1 % gel Apply 4 g to affected area four (4) times daily. (Patient not taking: Reported on 7/9/2021) 1 Each 1    glucose blood VI test strips (blood glucose test) strip Check fasting blood sugar daily using blood sugar test strip. Dx: e11.21 100 Strip 3    sennosides 8.6 mg cap senna 8.6 mg capsule   Take 2 capsules every day by oral route as needed.       famotidine (PEPCID) 40 mg tablet Take 1 Tab by mouth daily. 90 Tab 3    acetaminophen (TYLENOL) 325 mg tablet Take 325-650 mg by mouth every four (4) hours as needed for Pain.  cetirizine (ZYRTEC) 10 mg tablet Take 10 mg by mouth daily as needed for Allergies. Past History     Past Medical History:  Past Medical History:   Diagnosis Date    Bilateral carotid artery stenosis     Diabetic peripheral neuropathy associated with type 2 diabetes mellitus ([facility] [address]     Foot fracture     H/O aortic dissection     Hollenhorst plaque, right eye     Hypertension     Jaw fracture (HCC)     Ruptured ear drum     Subjective visual disturbance, right eye     Thumb fracture        Past Surgical History:  Past Surgical History:   Procedure Laterality Date    HX APPENDECTOMY      HX ARTERIAL BYPASS  02/15/2019    HX ORTHOPAEDIC      HX OTHER SURGICAL  02/15/2019    8 stints    HX OTHER SURGICAL  02/15/2019    Double bypass       Family History:  Family History   Problem Relation Age of Onset    Diabetes Mother     Stroke Mother     Colon Cancer Father     Cancer Brother     No Known Problems Child        Social History:  Social History     Tobacco Use    Smoking status: Never Smoker    Smokeless tobacco: Never Used   Substance Use Topics    Alcohol use: No    Drug use: No       Allergies:  No Known Allergies      Review of Systems   Review of Systems   Constitutional: Positive for activity change, appetite change and fever. Negative for chills. HENT: Negative. Eyes: Negative. Respiratory: Negative for cough, chest tightness and shortness of breath. Cardiovascular: Negative for chest pain and leg swelling. Gastrointestinal: Negative for abdominal pain, diarrhea, nausea and vomiting. Endocrine: Negative. Genitourinary: Negative for difficulty urinating and dysuria. Musculoskeletal: Negative for myalgias. Skin: Negative. Neurological: Negative. Psychiatric/Behavioral: Negative.     All other systems reviewed and are negative. Physical Exam   Physical Exam  Vitals and nursing note reviewed. Constitutional:       General: He is not in acute distress. Appearance: He is well-developed. He is not diaphoretic. HENT:      Head: Normocephalic and atraumatic. Nose: Nose normal.      Mouth/Throat:      Pharynx: No oropharyngeal exudate. Eyes:      Conjunctiva/sclera: Conjunctivae normal.      Pupils: Pupils are equal, round, and reactive to light. Neck:      Vascular: No JVD. Cardiovascular:      Rate and Rhythm: Normal rate and regular rhythm. Heart sounds: Normal heart sounds. No murmur heard. No friction rub. Pulmonary:      Effort: Pulmonary effort is normal. No respiratory distress. Breath sounds: Normal breath sounds. No stridor. No wheezing or rales. Abdominal:      General: Bowel sounds are normal. There is no distension. Palpations: Abdomen is soft. Tenderness: There is no abdominal tenderness. There is no rebound. Musculoskeletal:         General: No tenderness. Normal range of motion. Cervical back: Normal range of motion and neck supple. Skin:     General: Skin is warm and dry. Findings: No rash. Neurological:      General: No focal deficit present. Mental Status: He is alert and oriented to person, place, and time. Cranial Nerves: No cranial nerve deficit. Psychiatric:         Speech: Speech normal.         Behavior: Behavior normal.         Thought Content:  Thought content normal.         Judgment: Judgment normal.           Diagnostic Study Results     Labs -     Recent Results (from the past 12 hour(s))   CBC WITH AUTOMATED DIFF    Collection Time: 12/25/21  1:01 AM   Result Value Ref Range    WBC 2.6 (L) 4.1 - 11.1 K/uL    RBC 4.74 4.10 - 5.70 M/uL    HGB 12.8 12.1 - 17.0 g/dL    HCT 39.9 36.6 - 50.3 %    MCV 84.2 80.0 - 99.0 FL    MCH 27.0 26.0 - 34.0 PG    MCHC 32.1 30.0 - 36.5 g/dL    RDW 14.2 11.5 - 14.5 % PLATELET 369 (L) 424 - 400 K/uL    MPV 10.4 8.9 - 12.9 FL    NRBC 0.0 0  WBC    ABSOLUTE NRBC 0.00 0.00 - 0.01 K/uL    NEUTROPHILS 60 32 - 75 %    LYMPHOCYTES 27 12 - 49 %    MONOCYTES 13 5 - 13 %    EOSINOPHILS 0 0 - 7 %    BASOPHILS 0 0 - 1 %    IMMATURE GRANULOCYTES 0 0.0 - 0.5 %    ABS. NEUTROPHILS 1.6 (L) 1.8 - 8.0 K/UL    ABS. LYMPHOCYTES 0.7 (L) 0.8 - 3.5 K/UL    ABS. MONOCYTES 0.3 0.0 - 1.0 K/UL    ABS. EOSINOPHILS 0.0 0.0 - 0.4 K/UL    ABS. BASOPHILS 0.0 0.0 - 0.1 K/UL    ABS. IMM. GRANS. 0.0 0.00 - 0.04 K/UL    DF SMEAR SCANNED      RBC COMMENTS ACANTHOCYTES     METABOLIC PANEL, COMPREHENSIVE    Collection Time: 12/25/21  1:01 AM   Result Value Ref Range    Sodium 140 136 - 145 mmol/L    Potassium 3.4 (L) 3.5 - 5.1 mmol/L    Chloride 105 97 - 108 mmol/L    CO2 30 21 - 32 mmol/L    Anion gap 5 5 - 15 mmol/L    Glucose 125 (H) 65 - 100 mg/dL    BUN 27 (H) 6 - 20 MG/DL    Creatinine 2.12 (H) 0.70 - 1.30 MG/DL    BUN/Creatinine ratio 13 12 - 20      GFR est AA 40 (L) >60 ml/min/1.73m2    GFR est non-AA 33 (L) >60 ml/min/1.73m2    Calcium 9.3 8.5 - 10.1 MG/DL    Bilirubin, total 0.8 0.2 - 1.0 MG/DL    ALT (SGPT) 27 12 - 78 U/L    AST (SGOT) 29 15 - 37 U/L    Alk. phosphatase 46 45 - 117 U/L    Protein, total 7.1 6.4 - 8.2 g/dL    Albumin 3.8 3.5 - 5.0 g/dL    Globulin 3.3 2.0 - 4.0 g/dL    A-G Ratio 1.2 1.1 - 2.2     INFLUENZA A+B VIRAL AGS    Collection Time: 12/25/21  2:41 AM   Result Value Ref Range    Influenza A Antigen Negative NEG      Influenza B Antigen Negative NEG     SARS-COV-2    Collection Time: 12/25/21  2:41 AM   Result Value Ref Range    SARS-CoV-2 Please find results under separate order         Radiologic Studies -   XR CHEST PORT   Final Result   No acute process identified. CT Results  (Last 48 hours)    None        CXR Results  (Last 48 hours)               12/25/21 0226  XR CHEST PORT Final result    Impression:  No acute process identified.             Narrative: Clinical history: fatigue   INDICATION:   fatigue   COMPARISON: 5/13/2021       FINDINGS:   AP portable upright view of the chest demonstrates a stable  cardiopericardial   silhouette. There is no pleural effusion. .There is no focal consolidation. .There   is no pneumothorax. . Aortic stent graft. Medical Decision Making   I am the first provider for this patient. I reviewed the vital signs, available nursing notes, past medical history, past surgical history, family history and social history. Vital Signs-Reviewed the patient's vital signs. Patient Vitals for the past 12 hrs:   Temp Pulse Resp BP SpO2   12/25/21 0042 (!) 100.7 °F (38.2 °C) 93 18 117/77 100 %       Pulse Oximetry Analysis - 100% on RA, normal  Rate: 93 bpm  Rhythm: nl      Provider Notes (Medical Decision Making):     DDX:  COVID, influenza, viral syndrome, dehydration, electrolyte abnormality    Plan:  Labs, IV fluids, UA, chest x-ray, antibiotics, flu and Covid testing    Impression:  Acute febrile illness, person under investigation for COVID-19    ED Course:   Initial assessment performed. The patients presenting problems have been discussed, and they are in agreement with the care plan formulated and outlined with them. I have encouraged them to ask questions as they arise throughout their visit. I reviewed the nursing notes and and vital signs from today's visit, as well as the electronic medical record system for any past medical records that were available that may contribute to the patients current condition, including having reviewed patient's operative report for emergent endograft placement by Dr. Aviva Casiano in March 2019    Nursing notes will be reviewed as they become available in realtime while the pt has been in the ED. Yuridia Hutton MD    I personally reviewed/interpreted pt's imaging. Agree with official read by radiology as noted above.   Yuridia Hutton MD      3:18 AM    Progress note:  Pt noted to be feeling better ready for discharge. Discussed lab and imaging findings with pt, specifically noting no evidence of pneumonia or influenza. Pt will follow up with primary care as instructed. All questions have been answered, pt voiced understanding and agreement with plan. Specific return precautions provided in addition to instructions for pt to return to the ED immediately should sx worsen at any time. Deepak Prasad MD           Critical Care Time:     none      Diagnosis     Clinical Impression:   1. Person under investigation for COVID-19    2. Decreased appetite    3. Dehydration        PLAN:  1. Current Discharge Medication List      START taking these medications    Details   !! acetaminophen (TYLENOL) 325 mg tablet Take 2 Tablets by mouth every four (4) hours as needed for Pain or Fever. Qty: 20 Tablet, Refills: 0  Start date: 12/25/2021       !! - Potential duplicate medications found. Please discuss with provider. CONTINUE these medications which have NOT CHANGED    Details   !! acetaminophen (TYLENOL) 325 mg tablet Take 325-650 mg by mouth every four (4) hours as needed for Pain. !! - Potential duplicate medications found. Please discuss with provider. 2.   Follow-up Information     Follow up With Specialties Details Why Kodi Alvarez DO Internal Medicine Schedule an appointment as soon as possible for a visit in 2 days  [address] [place] [address] [phone]. [phone]          Return to ED if worse     Disposition:  3:18 AM  The patient's results have been reviewed with family and/or caregiver. They verbally convey their understanding and agreement of the patient's signs, symptoms, diagnosis, treatment and prognosis and additionally agree to follow up as recommended in the discharge instructions or to return to the Emergency Room should the patient's condition change prior to their follow-up appointment.  The family and/or caregiver verbally agrees with the care-plan and all of their questions have been answered. The discharge instructions have also been provided to the them with educational information regarding the patient's diagnosis as well a list of reasons why the patient would want to return to the ER prior to their follow-up appointment should their condition change.   Madeline Schrader MD

## 2021-12-27 ENCOUNTER — PATIENT OUTREACH (OUTPATIENT)
Dept: CASE MANAGEMENT | Age: 51
End: 2021-12-27

## 2021-12-27 LAB
SARS-COV-2, XPLCVT: DETECTED
SOURCE, COVRS: ABNORMAL

## 2021-12-27 NOTE — PROGRESS NOTES
Patient contacted regarding COVID-19 diagnosis. Discussed COVID-19 related testing which was available at this time. Test results were positive. Patient informed of results, if available? yes. Ambulatory Care Manager contacted the patient by telephone to perform post discharge assessment. Call within 2 business days of discharge: Yes Verified name and  with patient as identifiers. Provided introduction to self, and explanation of the CTN/ACM role, and reason for call due to risk factors for infection and/or exposure to COVID-19. Symptoms reviewed with patient who verbalized the following symptoms: fever, fatigue, chills or shaking and sweating      Due to no new or worsening symptoms encounter was not routed to provider for escalation. Discussed follow-up appointments. If no appointment was previously scheduled, appointment scheduling offered:  no. [facility] follow up appointment(s):   Future Appointments   Date Time Provider Travis Garcia   2022  8:30 AM Yulissa Niño III, DO [facility]     [facility] follow up appointment(s): none    Interventions to address risk factors: Education of patient/family/caregiver/guardian to support self-management-bland diet, encouraged fluids     Advance Care Planning:   Does patient have an Advance Directive: not on file. Educated patient about risk for severe COVID-19 due to risk factors according to CDC guidelines. ACM reviewed discharge instructions, medical action plan and red flag symptoms with the patient who verbalized understanding. Discussed COVID vaccination status: yes. Education provided on COVID-19 vaccination as appropriate. Discussed exposure protocols and quarantine with CDC Guidelines. Patient was given an opportunity to verbalize any questions and concerns and agrees to contact ACM or health care provider for questions related to their healthcare.     Reviewed and educated patient on any new and changed medications related to discharge diagnosis     Was patient discharged with a pulse oximeter? no Discussed and confirmed pulse oximeter discharge instructions and when to notify provider or seek emergency care. ACM provided contact information. No further follow-up call identified based on severity of symptoms and risk factors.

## 2022-01-05 ENCOUNTER — PATIENT OUTREACH (OUTPATIENT)
Dept: CASE MANAGEMENT | Age: 52
End: 2022-01-05

## 2022-01-05 NOTE — PROGRESS NOTES
Patient resolved from [address] Transitions episode on 1/5/22. Discussed COVID-19 related testing which was available at this time. Test results were positive. Patient informed of results, if available? Previously advised of result yes     Patient/family has been provided the following resources and education related to COVID-19:                         Signs, symptoms and red flags related to COVID-19            CDC exposure and quarantine guidelines            [facility] exposure contact - [phone]            Contact for their local Department of Health               No further outreach scheduled with this CTN/ACM/LPN/HC/ MA. Episode of Care resolved. Patient has this CTN/ACM/LPN/HC/MA contact information if future needs arise.

## 2022-01-18 DIAGNOSIS — I10 ESSENTIAL HYPERTENSION: ICD-10-CM

## 2022-01-18 RX ORDER — LOSARTAN POTASSIUM 25 MG/1
TABLET ORAL
Qty: 90 TABLET | Refills: 0 | Status: SHIPPED | OUTPATIENT
Start: 2022-01-18 | End: 2022-01-26 | Stop reason: SDUPTHER

## 2022-01-26 ENCOUNTER — OFFICE VISIT (OUTPATIENT)
Dept: INTERNAL MEDICINE CLINIC | Age: 52
End: 2022-01-26
Payer: MEDICARE

## 2022-01-26 VITALS
WEIGHT: 237.4 LBS | DIASTOLIC BLOOD PRESSURE: 74 MMHG | BODY MASS INDEX: 28.03 KG/M2 | HEIGHT: 77 IN | SYSTOLIC BLOOD PRESSURE: 136 MMHG | RESPIRATION RATE: 20 BRPM | TEMPERATURE: 97.9 F

## 2022-01-26 DIAGNOSIS — U07.1 COVID-19 VIRUS INFECTION: ICD-10-CM

## 2022-01-26 DIAGNOSIS — I63.312 THROMBOTIC STROKE INVOLVING LEFT MIDDLE CEREBRAL ARTERY (HCC): ICD-10-CM

## 2022-01-26 DIAGNOSIS — I10 ESSENTIAL HYPERTENSION: Primary | ICD-10-CM

## 2022-01-26 DIAGNOSIS — Z12.11 SCREEN FOR COLON CANCER: ICD-10-CM

## 2022-01-26 DIAGNOSIS — I71.02 DISSECTING AAA (ABDOMINAL AORTIC ANEURYSM) (HCC): ICD-10-CM

## 2022-01-26 DIAGNOSIS — E11.69 HYPERLIPIDEMIA ASSOCIATED WITH TYPE 2 DIABETES MELLITUS (HCC): ICD-10-CM

## 2022-01-26 DIAGNOSIS — Z86.79 HISTORY OF AORTIC DISSECTION: ICD-10-CM

## 2022-01-26 DIAGNOSIS — E11.21 TYPE 2 DIABETES WITH NEPHROPATHY (HCC): ICD-10-CM

## 2022-01-26 DIAGNOSIS — I71.40 DISSECTING AAA (ABDOMINAL AORTIC ANEURYSM) (HCC): ICD-10-CM

## 2022-01-26 DIAGNOSIS — H34.11 CENTRAL RETINAL ARTERY OCCLUSION OF RIGHT EYE: ICD-10-CM

## 2022-01-26 DIAGNOSIS — E78.5 HYPERLIPIDEMIA ASSOCIATED WITH TYPE 2 DIABETES MELLITUS (HCC): ICD-10-CM

## 2022-01-26 DIAGNOSIS — N40.0 BENIGN PROSTATIC HYPERPLASIA, UNSPECIFIED WHETHER LOWER URINARY TRACT SYMPTOMS PRESENT: ICD-10-CM

## 2022-01-26 DIAGNOSIS — E11.42 DIABETIC PERIPHERAL NEUROPATHY ASSOCIATED WITH TYPE 2 DIABETES MELLITUS (HCC): ICD-10-CM

## 2022-01-26 PROCEDURE — G8510 SCR DEP NEG, NO PLAN REQD: HCPCS | Performed by: INTERNAL MEDICINE

## 2022-01-26 PROCEDURE — 2022F DILAT RTA XM EVC RTNOPTHY: CPT | Performed by: INTERNAL MEDICINE

## 2022-01-26 PROCEDURE — G8419 CALC BMI OUT NRM PARAM NOF/U: HCPCS | Performed by: INTERNAL MEDICINE

## 2022-01-26 PROCEDURE — 99214 OFFICE O/P EST MOD 30 MIN: CPT | Performed by: INTERNAL MEDICINE

## 2022-01-26 PROCEDURE — G8427 DOCREV CUR MEDS BY ELIG CLIN: HCPCS | Performed by: INTERNAL MEDICINE

## 2022-01-26 PROCEDURE — G8754 DIAS BP LESS 90: HCPCS | Performed by: INTERNAL MEDICINE

## 2022-01-26 PROCEDURE — G8752 SYS BP LESS 140: HCPCS | Performed by: INTERNAL MEDICINE

## 2022-01-26 PROCEDURE — 3017F COLORECTAL CA SCREEN DOC REV: CPT | Performed by: INTERNAL MEDICINE

## 2022-01-26 PROCEDURE — 3046F HEMOGLOBIN A1C LEVEL >9.0%: CPT | Performed by: INTERNAL MEDICINE

## 2022-01-26 RX ORDER — LOSARTAN POTASSIUM 25 MG/1
25 TABLET ORAL DAILY
Qty: 90 TABLET | Refills: 3 | Status: SHIPPED | OUTPATIENT
Start: 2022-01-26

## 2022-01-26 NOTE — PROGRESS NOTES
Gordo Logan is a 49 y.o. male who presents for evaluation of annual exam.  Last seen by me July 9, 2021. He no showed on jan 13, 2022 as he was still recovering from covid infection--he tested positive on dec 25. He is feeling back to normal now. Never started farxiga--explained to him the value in that, and he will start it now. Had a colonoscopy a few years ago in [place], and he woke up during it, so he does not want to do it again. He wants to do cologuard. Has eye exam later today.       ROS:  Constitutional: negative for fevers, chills, anorexia and weight loss  Eyes:   negative for visual disturbance and irritation  ENT:   negative for tinnitus,sore throat,nasal congestion,ear pain,hoarseness  Respiratory:  negative for cough, hemoptysis, dyspnea,wheezing  CV:   negative for chest pain, palpitations, lower extremity edema  GI:   negative for nausea, vomiting, diarrhea, abdominal pain,melena  Genitourinary: negative for frequency, dysuria and hematuria  Musculoskel: negative for myalgias, arthralgias, back pain, muscle weakness, joint pain  Neurological:  negative for headaches, dizziness, focal weakness, numbness  Psychiatric:     Negative for depression or anxiety      Past Medical History:   Diagnosis Date    Bilateral carotid artery stenosis     Diabetic peripheral neuropathy associated with type 2 diabetes mellitus ([facility] [address]     Foot fracture     H/O aortic dissection     Hollenhorst plaque, right eye     Hypertension     Jaw fracture (HCC)     Ruptured ear drum     Subjective visual disturbance, right eye     Thumb fracture        Past Surgical History:   Procedure Laterality Date    HX APPENDECTOMY      HX ARTERIAL BYPASS  02/15/2019    HX ORTHOPAEDIC      HX OTHER SURGICAL  02/15/2019    8 stints    HX OTHER SURGICAL  02/15/2019    Double bypass       Family History   Problem Relation Age of Onset    Diabetes Mother     Stroke Mother     Colon Cancer Father     Cancer Brother  No Known Problems Child        Social History     Socioeconomic History    Marital status: SINGLE     Spouse name: Not on file    Number of children: Not on file    Years of education: Not on file    Highest education level: Not on file   Occupational History    Not on file   Tobacco Use    Smoking status: Never Smoker    Smokeless tobacco: Never Used   Substance and Sexual Activity    Alcohol use: No    Drug use: No    Sexual activity: Yes     Partners: Female     Birth control/protection: Surgical   Other Topics Concern    Not on file   Social History Narrative    Not on file     Social Determinants of Health     Financial Resource Strain:     Difficulty of Paying Living Expenses: Not on file   Food Insecurity:     Worried About Running Out of Food in the Last Year: Not on file    Prosper of Food in the Last Year: Not on file   Transportation Needs:     Lack of Transportation (Medical): Not on file    Lack of Transportation (Non-Medical):  Not on file   Physical Activity:     Days of Exercise per Week: Not on file    Minutes of Exercise per Session: Not on file   Stress:     Feeling of Stress : Not on file   Social Connections:     Frequency of Communication with Friends and Family: Not on file    Frequency of Social Gatherings with Friends and Family: Not on file    Attends Confucianist Services: Not on file    Active Member of [address] [place] [address] [employer] or Organizations: Not on file    Attends Club or Organization Meetings: Not on file    Marital Status: Not on file   Intimate Partner Violence:     Fear of Current or Ex-Partner: Not on file    Emotionally Abused: Not on file    Physically Abused: Not on file    Sexually Abused: Not on file   Housing Stability:     Unable to Pay for Housing in the Last Year: Not on file    Number of [place] in the Last Year: Not on file    Unstable Housing in the Last Year: Not on file            Visit Vitals  /74 (BP 1 Location: Left upper arm, BP Patient Position: Sitting)   Temp 97.9 °F (36.6 °C) (Temporal)   Resp 20   Ht 6' 5\" (1.956 m)   Wt 237 lb 6.4 oz (107.7 kg)   BMI 28.15 kg/m²       Physical Examination:   General - Well appearing male  HEENT - PERRL, TM no erythema/opacification, normal nasal turbinates, no oropharyngeal erythema or exudate, MMM  Neck - supple, no bruits, no thyroidomegaly, no lymphadenopathy  Pulm - clear to auscultation bilaterally  Cardio - RRR, normal S1 S2, no murmur  Abd - soft, nontender, no masses, no HSM  Extrem - no edema, +2 distal pulses  Neuro-  No focal deficits, CN intact     Assessment/Plan:    1.  htn--consistently at goal now. Continue hydralazine, norvasc, cozaar, coreg  2. Dm, type 2--last a1c 7.1. He has been taking rybelsus, but never started Dorrene Ege, though he has it with him. Explained to him the benefits of that med. 3.  hyperlipids--on lipitor, check flp, cmp  4. Hx aortic dissection--sp TEVAR. bp finally at goal.  On plavix  5. Hx CRAO, right eye--on plavix now. Has eye follow up later today  6. Dyspepsia--on pepcid  7.  ckd 3--check cmp. Encouraged him to start farxiga  8. Screen colon cancer--cologuard ordered. He had colon at [facility] a few year ago that did not go so well, as he woke up during it.   9.  Recent covid infection--encouraged him to get covid vaccines, would start end of march (3 months after his infection)    rtc 6 months        Jonathan Cason III, DO

## 2022-01-26 NOTE — PATIENT INSTRUCTIONS
Colon Cancer Screening: Care Instructions  Overview     Colorectal cancer occurs in the colon or rectum. That's the lower part of your digestive system. It often starts in small growths called polyps in the colon or rectum. Polyps are usually found with screening tests. Depending on the type of test, any polyps found may be removed during the tests. Colorectal cancer usually does not cause symptoms at first. But regular tests can help find it early, before it spreads and becomes harder to treat. Your risk for colorectal cancer gets higher as you get older. Experts recommend starting screening at age 39 for people who are at average risk. Talk with your doctor about your risk and when to start and stop screening. You may have one of several tests. Follow-up care is a key part of your treatment and safety. Be sure to make and go to all appointments, and call your doctor if you are having problems. It's also a good idea to know your test results and keep a list of the medicines you take. What are the main screening tests for colon cancer? The screening tests are:  Stool tests. These include the guaiac fecal occult blood test (gFOBT), the fecal immunochemical test (FIT), and the combined fecal immunochemical test and stool DNA test (FIT-DNA). These tests check stool samples for signs of cancer. If your test is positive, you will need to have a colonoscopy. Sigmoidoscopy. This test lets your doctor look at the lining of your rectum and the lowest part of your colon. Your doctor uses a lighted tube called a sigmoidoscope. This test can't find cancers or polyps in the upper part of your colon. In some cases, polyps that are found can be removed. But if your doctor finds polyps, you will need to have a colonoscopy to check the upper part of your colon. Colonoscopy. This test lets your doctor look at the lining of your rectum and your entire colon. The doctor uses a thin, flexible tool called a colonoscope. It can also be used to remove polyps or get a tissue sample (biopsy). A less common test is CT colonography (CTC). It's also called virtual colonoscopy. Who should be screened for colorectal cancer? Your risk for colorectal cancer gets higher as you get older. Experts recommend starting screening at age 39 for people who are at average risk. Talk with your doctor about your risk and when to start and stop screening. How often you need screening depends on the type of test you get:  Stool tests. Every year for FIT or gFOBT. Every 1 to 3 years for sDNA, also called FIT-DNA. Tests that look inside the colon. Every 5 years for sigmoidoscopy. (If you do the FIT test every year, you can get this test every 10 years.)   Every 5 years for CT colonography (virtual colonoscopy). Every 10 years for colonoscopy. Experts agree that people at higher risk may need to be tested sooner and more often. This includes people who have a strong family history of colon cancer. Talk to your doctor about which test is best for you and when to be tested. When should you call for help? Watch closely for changes in your health, and be sure to contact your doctor if:    · You have any changes in your bowel habits.     · You have any problems. Where can you learn more? Go to http://www.gray.com/  Enter M541 in the search box to learn more about \"Colon Cancer Screening: Care Instructions. \"  Current as of: December 17, 2020               Content Version: 13.0  © 4040-4565 [employer]. Care instructions adapted under license by [employer] (which disclaims liability or warranty for this information). If you have questions about a medical condition or this instruction, always ask your healthcare professional. [place] [address] any warranty or liability for your use of this information. Would wait 3 months to get started on covid vaccines.   That would be about march 25, 2022.

## 2022-01-27 LAB
ALBUMIN SERPL-MCNC: 4.3 G/DL (ref 3.5–5)
ALBUMIN/GLOB SERPL: 1.3 {RATIO} (ref 1.1–2.2)
ALP SERPL-CCNC: 63 U/L (ref 45–117)
ALT SERPL-CCNC: 23 U/L (ref 12–78)
ANION GAP SERPL CALC-SCNC: 5 MMOL/L (ref 5–15)
AST SERPL-CCNC: 11 U/L (ref 15–37)
BASOPHILS # BLD: 0 K/UL (ref 0–0.1)
BASOPHILS NFR BLD: 0 % (ref 0–1)
BILIRUB SERPL-MCNC: 0.9 MG/DL (ref 0.2–1)
BUN SERPL-MCNC: 22 MG/DL (ref 6–20)
BUN/CREAT SERPL: 15 (ref 12–20)
CALCIUM SERPL-MCNC: 9.5 MG/DL (ref 8.5–10.1)
CHLORIDE SERPL-SCNC: 106 MMOL/L (ref 97–108)
CHOLEST SERPL-MCNC: 110 MG/DL
CO2 SERPL-SCNC: 29 MMOL/L (ref 21–32)
CREAT SERPL-MCNC: 1.51 MG/DL (ref 0.7–1.3)
CREAT UR-MCNC: 403 MG/DL
DIFFERENTIAL METHOD BLD: ABNORMAL
EOSINOPHIL # BLD: 0.3 K/UL (ref 0–0.4)
EOSINOPHIL NFR BLD: 5 % (ref 0–7)
ERYTHROCYTE [DISTWIDTH] IN BLOOD BY AUTOMATED COUNT: 15 % (ref 11.5–14.5)
EST. AVERAGE GLUCOSE BLD GHB EST-MCNC: 140 MG/DL
GLOBULIN SER CALC-MCNC: 3.3 G/DL (ref 2–4)
GLUCOSE SERPL-MCNC: 114 MG/DL (ref 65–100)
HBA1C MFR BLD: 6.5 % (ref 4–5.6)
HCT VFR BLD AUTO: 42.5 % (ref 36.6–50.3)
HCV AB SERPL QL IA: NONREACTIVE
HDLC SERPL-MCNC: 37 MG/DL
HDLC SERPL: 3 {RATIO} (ref 0–5)
HGB BLD-MCNC: 13.1 G/DL (ref 12.1–17)
IMM GRANULOCYTES # BLD AUTO: 0 K/UL (ref 0–0.04)
IMM GRANULOCYTES NFR BLD AUTO: 0 % (ref 0–0.5)
LDLC SERPL CALC-MCNC: 54.8 MG/DL (ref 0–100)
LYMPHOCYTES # BLD: 1.5 K/UL (ref 0.8–3.5)
LYMPHOCYTES NFR BLD: 32 % (ref 12–49)
MCH RBC QN AUTO: 27 PG (ref 26–34)
MCHC RBC AUTO-ENTMCNC: 30.8 G/DL (ref 30–36.5)
MCV RBC AUTO: 87.4 FL (ref 80–99)
MICROALBUMIN UR-MCNC: 6.24 MG/DL
MICROALBUMIN/CREAT UR-RTO: 15 MG/G (ref 0–30)
MONOCYTES # BLD: 0.4 K/UL (ref 0–1)
MONOCYTES NFR BLD: 9 % (ref 5–13)
NEUTS SEG # BLD: 2.5 K/UL (ref 1.8–8)
NEUTS SEG NFR BLD: 54 % (ref 32–75)
NRBC # BLD: 0 K/UL (ref 0–0.01)
NRBC BLD-RTO: 0 PER 100 WBC
PLATELET # BLD AUTO: 188 K/UL (ref 150–400)
PMV BLD AUTO: 11.3 FL (ref 8.9–12.9)
POTASSIUM SERPL-SCNC: 3.4 MMOL/L (ref 3.5–5.1)
PROT SERPL-MCNC: 7.6 G/DL (ref 6.4–8.2)
PSA SERPL-MCNC: 3.8 NG/ML (ref 0.01–4)
RBC # BLD AUTO: 4.86 M/UL (ref 4.1–5.7)
SODIUM SERPL-SCNC: 140 MMOL/L (ref 136–145)
TRIGL SERPL-MCNC: 91 MG/DL (ref ?–150)
TSH SERPL DL<=0.05 MIU/L-ACNC: 1.22 UIU/ML (ref 0.36–3.74)
VLDLC SERPL CALC-MCNC: 18.2 MG/DL
WBC # BLD AUTO: 4.6 K/UL (ref 4.1–11.1)

## 2022-03-18 PROBLEM — I71.019 ACUTE THORACIC AORTIC DISSECTION (HCC): Status: ACTIVE | Noted: 2019-02-15

## 2022-03-18 PROBLEM — I63.9 ACUTE ISCHEMIC STROKE (HCC): Status: ACTIVE | Noted: 2019-12-03

## 2022-03-19 PROBLEM — H34.11 CENTRAL RETINAL ARTERY OCCLUSION OF RIGHT EYE: Status: ACTIVE | Noted: 2020-01-17

## 2022-03-19 PROBLEM — E11.9 TYPE 2 DIABETES MELLITUS WITHOUT COMPLICATION, WITHOUT LONG-TERM CURRENT USE OF INSULIN (HCC): Status: ACTIVE | Noted: 2019-06-06

## 2022-03-19 PROBLEM — R07.9 CHEST PAIN: Status: ACTIVE | Noted: 2019-05-14

## 2022-03-19 PROBLEM — D63.8 ANEMIA, CHRONIC DISEASE: Status: ACTIVE | Noted: 2019-06-06

## 2022-03-19 PROBLEM — I20.0 UNSTABLE ANGINA (HCC): Status: ACTIVE | Noted: 2021-05-12

## 2022-03-19 PROBLEM — E11.21 TYPE 2 DIABETES WITH NEPHROPATHY (HCC): Status: ACTIVE | Noted: 2020-01-07

## 2022-03-19 PROBLEM — R10.9 ABDOMINAL PAIN: Status: ACTIVE | Noted: 2019-02-27

## 2022-03-19 PROBLEM — I63.312 THROMBOTIC STROKE INVOLVING LEFT MIDDLE CEREBRAL ARTERY (HCC): Status: ACTIVE | Noted: 2019-12-04

## 2022-03-19 PROBLEM — I10 ESSENTIAL HYPERTENSION: Status: ACTIVE | Noted: 2018-04-18

## 2022-03-20 PROBLEM — M17.11 UNILATERAL PRIMARY OSTEOARTHRITIS, RIGHT KNEE: Status: ACTIVE | Noted: 2021-06-16

## 2022-04-11 DIAGNOSIS — M17.11 UNILATERAL PRIMARY OSTEOARTHRITIS, RIGHT KNEE: ICD-10-CM

## 2022-04-11 DIAGNOSIS — M70.62 TROCHANTERIC BURSITIS, LEFT HIP: ICD-10-CM

## 2022-04-12 RX ORDER — LIDOCAINE 50 MG/G
PATCH TOPICAL
Qty: 30 EACH | Refills: 0 | Status: SHIPPED | OUTPATIENT
Start: 2022-04-12 | End: 2022-04-15 | Stop reason: SDUPTHER

## 2022-04-15 DIAGNOSIS — M70.62 TROCHANTERIC BURSITIS, LEFT HIP: ICD-10-CM

## 2022-04-15 DIAGNOSIS — M17.11 UNILATERAL PRIMARY OSTEOARTHRITIS, RIGHT KNEE: ICD-10-CM

## 2022-04-15 RX ORDER — LIDOCAINE 50 MG/G
PATCH TOPICAL
Qty: 30 EACH | Refills: 0 | Status: SHIPPED | OUTPATIENT
Start: 2022-04-15

## 2022-07-11 ENCOUNTER — OFFICE VISIT (OUTPATIENT)
Dept: INTERNAL MEDICINE CLINIC | Age: 52
End: 2022-07-11
Payer: MEDICARE

## 2022-07-11 ENCOUNTER — HOSPITAL ENCOUNTER (OUTPATIENT)
Dept: GENERAL RADIOLOGY | Age: 52
Discharge: HOME | End: 2022-07-11
Payer: MEDICARE

## 2022-07-11 VITALS
HEART RATE: 78 BPM | TEMPERATURE: 99 F | SYSTOLIC BLOOD PRESSURE: 134 MMHG | OXYGEN SATURATION: 97 % | WEIGHT: 244.2 LBS | BODY MASS INDEX: 28.83 KG/M2 | RESPIRATION RATE: 16 BRPM | DIASTOLIC BLOOD PRESSURE: 77 MMHG | HEIGHT: 77 IN
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
## 2022-07-11 PROCEDURE — G8419 CALC BMI OUT NRM PARAM NOF/U: HCPCS | Performed by: FAMILY MEDICINE

## 2022-07-11 PROCEDURE — G8510 SCR DEP NEG, NO PLAN REQD: HCPCS | Performed by: FAMILY MEDICINE

## 2022-07-11 PROCEDURE — 99214 OFFICE O/P EST MOD 30 MIN: CPT | Performed by: FAMILY MEDICINE

## 2022-07-11 PROCEDURE — 3017F COLORECTAL CA SCREEN DOC REV: CPT | Performed by: FAMILY MEDICINE

## 2022-07-11 PROCEDURE — G8427 DOCREV CUR MEDS BY ELIG CLIN: HCPCS | Performed by: FAMILY MEDICINE

## 2022-07-11 PROCEDURE — G8752 SYS BP LESS 140: HCPCS | Performed by: FAMILY MEDICINE

## 2022-07-11 PROCEDURE — 73502 X-RAY EXAM HIP UNI 2-3 VIEWS: CPT

## 2022-07-11 PROCEDURE — 73562 X-RAY EXAM OF KNEE 3: CPT

## 2022-07-11 PROCEDURE — G8754 DIAS BP LESS 90: HCPCS | Performed by: FAMILY MEDICINE

## 2022-07-11 RX ORDER — TRAMADOL HYDROCHLORIDE 50 MG/1
TABLET ORAL
COMMUNITY
Start: 2022-06-27 | End: 2022-07-11 | Stop reason: SDUPTHER

## 2022-07-11 RX ORDER — TRAMADOL HYDROCHLORIDE 50 MG/1
50 TABLET ORAL
Qty: 21 TABLET | Refills: 0 | Status: SHIPPED | OUTPATIENT
Start: 2022-07-11 | End: 2022-07-18

## 2022-07-11 NOTE — PROGRESS NOTES
SUBJECTIVE:   Mr. Estrella Munoz is a 49 y.o. male who is here for follow up of routine medical issues. Fall: He recently fell after running into a gas station from the rain about 2 weeks ago. When he fell, he notes that his left leg slipped behind his back. He was unaware if he hit his head, but he landed in a puddle of water. He went to an ER in [place]. He now experiences left hip pain, which is relieved by applying pressure. He notes pain with sitting and walking of his left hip. He is unable to bend his right knee. He has not been evaluated by an orthopedic physician since the fall. His last follow up was with by [facility]IS[facility] Waddell of [facility] for a knee injection. He notes lower back pain. He denies upper back pain, dizziness, and pain extending down the thigh. He takes Tramadol, which he notes an ineffective for the pain. His last A1c was 6.5. He notes a hx of stroke. He does not check his blood sugar. He will experience blurriness of his eye with an elevated blood sugar. At this time, he is otherwise doing well and has brought no other complaints to my attention today. For a list of the medical issues addressed today, see the assessment and plan below. PMH:   Past Medical History:   Diagnosis Date    Bilateral carotid artery stenosis     Diabetic peripheral neuropathy associated with type 2 diabetes mellitus ([facility] [address]     Foot fracture     H/O aortic dissection     Hollenhorst plaque, right eye     Hypertension     Jaw fracture (HCC)     Ruptured ear drum     Subjective visual disturbance, right eye     Thumb fracture      PSH:  has a past surgical history that includes hx appendectomy; hx orthopaedic; hx other surgical (02/15/2019); hx other surgical (02/15/2019); and hx arterial bypass (02/15/2019). All: has No Known Allergies.    MEDS:   Current Outpatient Medications   Medication Sig    traMADoL (ULTRAM) 50 mg tablet     lidocaine (Lidoderm) 5 % Apply patch to the affected area for 12 hours a day and remove for 12 hours a day.  amLODIPine (NORVASC) 10 mg tablet Take 1 tablet by mouth once daily    losartan (COZAAR) 25 mg tablet Take 1 Tablet by mouth daily.  atorvastatin (LIPITOR) 40 mg tablet Take 1 tablet by mouth nightly    carvediloL (COREG) 12.5 mg tablet TAKE 1 TABLET BY MOUTH TWICE DAILY WITH MEALS    acetaminophen (TYLENOL) 325 mg tablet Take 2 Tablets by mouth every four (4) hours as needed for Pain or Fever.  hydrALAZINE (APRESOLINE) 50 mg tablet TAKE 1 TABLET BY MOUTH THREE TIMES DAILY    clopidogreL (PLAVIX) 75 mg tab Take 1 tablet by mouth once daily    semaglutide (Rybelsus) 7 mg tablet Take 1 Tablet by mouth Daily (before breakfast).  dapagliflozin (FARXIGA) 10 mg tab tablet Take 1 Tablet by mouth daily.  ferrous sulfate 325 mg (65 mg iron) tablet TAKE 1 TABLET BY MOUTH ONCE DAILY BEFORE BREAKFAST    nitroglycerin (NITROSTAT) 0.4 mg SL tablet 1 Tab by SubLINGual route every five (5) minutes as needed for Chest Pain. Up to 3 doses.  Pataday Twice Daily Relief 0.1 % ophthalmic solution     diclofenac (VOLTAREN) 1 % gel Apply 4 g to affected area four (4) times daily.  glucose blood VI test strips (blood glucose test) strip Check fasting blood sugar daily using blood sugar test strip. Dx: e11.21    sennosides 8.6 mg cap senna 8.6 mg capsule   Take 2 capsules every day by oral route as needed.  famotidine (PEPCID) 40 mg tablet Take 1 Tab by mouth daily.  acetaminophen (TYLENOL) 325 mg tablet Take 325-650 mg by mouth every four (4) hours as needed for Pain.  cetirizine (ZYRTEC) 10 mg tablet Take 10 mg by mouth daily as needed for Allergies. No current facility-administered medications for this visit. FH: family history includes Cancer in his brother; Julane Grosser in his father; Diabetes in his mother; No Known Problems in his child; Stroke in his mother. SH:  reports that he has never smoked.  He has never used smokeless tobacco. He reports that he does not drink alcohol and does not use drugs. Review of Systems - History obtained from the patient  General ROS: no fever, chills, fatigue, body aches  Psychological ROS: no change in anxiety, depression, SI/HI  Ophthalmic ROS: no blurred vision, myopia, double vision  ENT ROS: no dysphagia, otalgia, otorrhea, rhinorrhea, post nasal drip  Respiratory ROS: no cough, shortness of breath, or wheezing  Cardiovascular ROS: no chest pain or dyspnea on exertion  Gastrointestinal ROS: no abdominal pain, change in bowel habits, or black or bloody stools  Genito-Urinary ROS: no frequency, urgency, incontinence, dysuria, hematouria  Musculoskeletal ROS: no arthralagia + Hip Pain +Knee Pain  Neurological ROS: no headaches, dizziness, lightheadedness, tremors, seizures  Dermatological ROS: no rash or lesions    OBJECTIVE:   Vitals:   Visit Vitals  /77 (BP 1 Location: Left arm, BP Patient Position: Sitting, BP Cuff Size: Large adult)   Pulse 78   Temp 99 °F (37.2 °C) (Temporal)   Resp 16   Ht 6' 5\" (1.956 m)   Wt 244 lb 3.2 oz (110.8 kg)   SpO2 97%   BMI 28.96 kg/m²      Gen: Pleasant 49 y.o.  male in NAD. HEENT: PERRLA. EOMI. OP moist and pink. Neck: Supple. No LAD. HEART: RRR, No M/G/R.      LUNGS: CTAB No W/R. ABDOMEN: S, NT, ND, BS+. EXTREMITIES: Warm. No C/C/E.    MUSCULOSKELETAL: Normal ROM, muscle strength 5/5 all groups. +Medial meniscus tenderness   NEURO: Alert and oriented x 3. Cranial nerves grossly intact. No focal sensory or motor deficits noted. SKIN: Warm. Dry. No rashes or other lesions noted. ASSESSMENT/ PLAN: Diagnoses and all orders for this visit:    1. Fall due to wet surface, initial encounter  -     XR HIP LT W OR WO PELV 2-3 VWS; Future  -     XR KNEE RT MAX 2 VWS; Future  -     REFERRAL TO ORTHOPEDICS    Fall Due to Wet Surface: I ordered a X-ray of his hip and knee. I referred pt to Dr. Leonid Germain. I refilled Tramadol.    X-ray results were reviewed and revealed a joint effusion and soft tissue swelling of the right knee. The x-ray of the right hip showed mild arthritis but no other significant abnormalities. ICD-10-CM ICD-9-CM    1. Fall due to wet surface, initial encounter  W01. 0XXA E885.9 XR HIP LT W OR WO PELV 2-3 VWS      XR KNEE RT MAX 2 VWS      REFERRAL TO ORTHOPEDICS        Follow-up and Dispositions    · Return if symptoms worsen or fail to improve. I have reviewed the patient's medications and risks/side effects/benefits were discussed. Diagnosis(-es) explained to patient and questions answered. Literature provided where appropriate.      Written by Reynold Cheek, as dictated by Herbert Valiente MD.

## 2022-07-11 NOTE — PROGRESS NOTES
1. \"Have you been to the ER, urgent care clinic since your last visit? Hospitalized since your last visit? \" Yes knee pain 6/27/22    2. \"Have you seen or consulted any other health care providers outside of the [address] [place] [address] since your last visit? \" No     3. For patients aged 39-70: Has the patient had a colonoscopy / FIT/ Cologuard? Yes - no Care Gap present      If the patient is female:    4. For patients aged 41-77: Has the patient had a mammogram within the past 2 years? NA - based on age or sex      11. For patients aged 21-65: Has the patient had a pap smear?  NA - based on age or sex

## 2022-07-12 ENCOUNTER — TELEPHONE (OUTPATIENT)
Dept: INTERNAL MEDICINE CLINIC | Age: 52
End: 2022-07-12

## 2022-07-12 NOTE — PROGRESS NOTES
Please inform the patient that he has some mild arthritis in the hip but no other acute process is seeing on his x-ray. There is no fracture or dislocation noted in the left hip. He will need to follow-up with orthopedics for further evaluation. He will need to make an appointment for an office visit and if the pain is persisting go to Ortho on-call until he can be seen in the office.

## 2022-07-12 NOTE — TELEPHONE ENCOUNTER
Contacted Patient (using 2 identifiers) and reviewed xray results. Patient stated he has an appointment next Wednesday with the Ortho doctor. Advised to go to Ortho on call if pain is persistent or gets worse. Patient verbally understood and had no further questions.

## 2022-07-12 NOTE — PROGRESS NOTES
Please inform this patient that the x-ray of his knee revealed a joint effusion and soft tissue swelling. He will need to follow-up with the Ortho for further evaluation of his knee due to trauma related to his fall.

## 2022-07-27 ENCOUNTER — OFFICE VISIT (OUTPATIENT)
Dept: INTERNAL MEDICINE CLINIC | Age: 52
End: 2022-07-27
Payer: MEDICARE

## 2022-07-27 VITALS
RESPIRATION RATE: 16 BRPM | HEART RATE: 79 BPM | HEIGHT: 77 IN | TEMPERATURE: 98.2 F | DIASTOLIC BLOOD PRESSURE: 71 MMHG | BODY MASS INDEX: 28.34 KG/M2 | WEIGHT: 240 LBS | OXYGEN SATURATION: 96 % | SYSTOLIC BLOOD PRESSURE: 112 MMHG

## 2022-07-27 DIAGNOSIS — I10 ESSENTIAL HYPERTENSION: ICD-10-CM

## 2022-07-27 DIAGNOSIS — Z86.79 HISTORY OF AORTIC DISSECTION: ICD-10-CM

## 2022-07-27 DIAGNOSIS — N18.31 STAGE 3A CHRONIC KIDNEY DISEASE (HCC): ICD-10-CM

## 2022-07-27 DIAGNOSIS — H34.11 CENTRAL RETINAL ARTERY OCCLUSION OF RIGHT EYE: ICD-10-CM

## 2022-07-27 DIAGNOSIS — Z23 ENCOUNTER FOR IMMUNIZATION: ICD-10-CM

## 2022-07-27 DIAGNOSIS — E11.42 DIABETIC PERIPHERAL NEUROPATHY ASSOCIATED WITH TYPE 2 DIABETES MELLITUS (HCC): ICD-10-CM

## 2022-07-27 DIAGNOSIS — E78.5 HYPERLIPIDEMIA ASSOCIATED WITH TYPE 2 DIABETES MELLITUS (HCC): ICD-10-CM

## 2022-07-27 DIAGNOSIS — Z00.00 WELCOME TO MEDICARE PREVENTIVE VISIT: Primary | ICD-10-CM

## 2022-07-27 DIAGNOSIS — E11.69 HYPERLIPIDEMIA ASSOCIATED WITH TYPE 2 DIABETES MELLITUS (HCC): ICD-10-CM

## 2022-07-27 PROBLEM — N18.30 CHRONIC RENAL DISEASE, STAGE III (HCC): Status: ACTIVE | Noted: 2022-07-27

## 2022-07-27 LAB — HBA1C MFR BLD HPLC: 7.2 % (ref 4.8–5.6)

## 2022-07-27 PROCEDURE — G0403 EKG FOR INITIAL PREVENT EXAM: HCPCS | Performed by: INTERNAL MEDICINE

## 2022-07-27 PROCEDURE — G0009 ADMIN PNEUMOCOCCAL VACCINE: HCPCS | Performed by: INTERNAL MEDICINE

## 2022-07-27 PROCEDURE — G8427 DOCREV CUR MEDS BY ELIG CLIN: HCPCS | Performed by: INTERNAL MEDICINE

## 2022-07-27 PROCEDURE — G8510 SCR DEP NEG, NO PLAN REQD: HCPCS | Performed by: INTERNAL MEDICINE

## 2022-07-27 PROCEDURE — G0402 INITIAL PREVENTIVE EXAM: HCPCS | Performed by: INTERNAL MEDICINE

## 2022-07-27 PROCEDURE — 90677 PCV20 VACCINE IM: CPT | Performed by: INTERNAL MEDICINE

## 2022-07-27 PROCEDURE — G8419 CALC BMI OUT NRM PARAM NOF/U: HCPCS | Performed by: INTERNAL MEDICINE

## 2022-07-27 PROCEDURE — 83036 HEMOGLOBIN GLYCOSYLATED A1C: CPT | Performed by: INTERNAL MEDICINE

## 2022-07-27 RX ORDER — TRAMADOL HYDROCHLORIDE 50 MG/1
50 TABLET ORAL
COMMUNITY

## 2022-07-27 NOTE — PROGRESS NOTES
1. \"Have you been to the ER, urgent care clinic since your last visit? Hospitalized since your last visit? \" Yes urgent care, covid    2. \"Have you seen or consulted any other health care providers outside of the [address] [place] [address] since your last visit? \" No     3. For patients aged 39-70: Has the patient had a colonoscopy / FIT/ Cologuard? Yes - no Care Gap present      If the patient is female:    4. For patients aged 41-77: Has the patient had a mammogram within the past 2 years? NA - based on age or sex      11. For patients aged 21-65: Has the patient had a pap smear?  NA - based on age or sex

## 2022-07-27 NOTE — PROGRESS NOTES
Saida Hernández is a 49 y.o. male who presents for evaluation of welcome to medicare visit. Last seen by me jan 26, 2022. Injured his right knee a few months ago, has mri upcoming. Otherwise, he has been doing well. Going to [place] next [place] for his [birth year].       ROS:  Constitutional: negative for fevers, chills, anorexia and weight loss  Eyes:   negative for visual disturbance and irritation  ENT:   negative for tinnitus,sore throat,nasal congestion,ear pain,hoarseness  Respiratory:  negative for cough, hemoptysis, dyspnea,wheezing  CV:   negative for chest pain, palpitations, lower extremity edema  GI:   negative for nausea, vomiting, diarrhea, abdominal pain,melena  Genitourinary: negative for frequency, dysuria and hematuria  Musculoskel: negative for myalgias, arthralgias, back pain, muscle weakness, joint pain  Neurological:  negative for headaches, dizziness, focal weakness, numbness  Psychiatric:     Negative for depression or anxiety      Past Medical History:   Diagnosis Date    Bilateral carotid artery stenosis     Diabetic peripheral neuropathy associated with type 2 diabetes mellitus (HCC)     Foot fracture     H/O aortic dissection     Hollenhorst plaque, right eye     Hypertension     Jaw fracture (HCC)     Ruptured ear drum     Subjective visual disturbance, right eye     Thumb fracture        Past Surgical History:   Procedure Laterality Date    HX APPENDECTOMY      HX ARTERIAL BYPASS  02/15/2019    HX ORTHOPAEDIC      HX OTHER SURGICAL  02/15/2019    8 stints    HX OTHER SURGICAL  02/15/2019    Double bypass       Family History   Problem Relation Age of Onset    Diabetes Mother     Stroke Mother     Colon Cancer Father     Cancer Brother     No Known Problems Child        Social History     Socioeconomic History    Marital status: SINGLE     Spouse name: Not on file    Number of children: Not on file    Years of education: Not on file    Highest education level: Not on file   Occupational History    Not on file   Tobacco Use    Smoking status: Never    Smokeless tobacco: Never   Substance and Sexual Activity    Alcohol use: No    Drug use: No    Sexual activity: Yes     Partners: Female     Birth control/protection: Surgical   Other Topics Concern    Not on file   Social History Narrative    Not on file     Social Determinants of Health     Financial Resource Strain: Not on file   Food Insecurity: Not on file   Transportation Needs: Not on file   Physical Activity: Not on file   Stress: Not on file   Social Connections: Not on file   Intimate Partner Violence: Not on file   Housing Stability: Not on file          Visit Vitals  /71 (BP 1 Location: Left upper arm, BP Patient Position: Sitting)   Pulse 79   Temp 98.2 °F (36.8 °C) (Temporal)   Resp 16   Ht 6' 5\" (1.956 m)   Wt 240 lb (108.9 kg)   SpO2 96%   BMI 28.46 kg/m²       Physical Examination:   General - Well appearing male  HEENT - PERRL, TM no erythema/opacification, normal nasal turbinates, no oropharyngeal erythema or exudate, MMM  Neck - supple, no bruits, no thyroidomegaly, no lymphadenopathy  Pulm - clear to auscultation bilaterally  Cardio - RRR, normal S1 S2, no murmur  Abd - soft, nontender, no masses, no HSM  Extrem - no edema, +2 distal pulses  Neuro-  No focal deficits, CN intact     Assessment/Plan:     Dm, type 2--check a1c, on farxiga, rybelsus. A1c up a bit to 7.3  Htn--well controlled with norvasc, cozaar, coreg, hydralazine  Ckd 3--has been stable  Hyperlipids--contineu lipitor  Hx aortic dissection, type B--sp TEVAR   On plavix  Hx CRAO right eye--vision still blurry , but improved.   On plavix    Rtc 6 months for 15 min f/u        Ileneene Memory III, DO              This is a \"Welcome to [employer]"  Initial Preventive Physical Examination (IPPE) providing Personalized Prevention Plan Services (Performed in the first 12 months of enrollment)    I have reviewed the patient's medical history in detail and updated the computerized patient record. Assessment/Plan   Education and counseling provided:  Are appropriate based on today's review and evaluation  End-of-Life planning (with patient's consent)  Pneumococcal Vaccine  Influenza Vaccine  Prostate cancer screening tests (PSA, covered annually)  Colorectal cancer screening tests    1. Welcome to Medicare preventive visit  -     AMB POC EKG ROUTINE W/ 12 LEADS, INTER & REP       Depression Risk Screen     3 most recent PHQ Screens 7/27/2022   Little interest or pleasure in doing things Not at all   Feeling down, depressed, irritable, or hopeless More than half the days   Total Score PHQ 2 2   Trouble falling or staying asleep, or sleeping too much Several days   Feeling tired or having little energy Not at all   Poor appetite, weight loss, or overeating Not at all   Feeling bad about yourself - or that you are a failure or have let yourself or your family down Not at all   Trouble concentrating on things such as school, work, reading, or watching TV Not at all   Moving or speaking so slowly that other people could have noticed; or the opposite being so fidgety that others notice Not at all   Thoughts of being better off dead, or hurting yourself in some way Not at all   PHQ 9 Score 3   How difficult have these problems made it for you to do your work, take care of your home and get along with others Not difficult at all       Alcohol & Drug Abuse Risk Screen    Do you average more than 2 drinks per night or 14 drinks a week: No    On any one occasion in the past three months have you have had more than 4 drinks containing alcohol:  No          Functional Ability and Level of Safety    Diet: The patient is prescribed and follows a special diet. Tries to follow low carb diet for dm. Hearing: Hearing is good. Vision Screening:  Vision is  blurred vision in right eye from CRAO  No results found. Activities of Daily Living:   The home contains: no safety equipment. Patient does total self care      Ambulation: with mild difficulty. Only currently as he injured his right knee. Has mri upcoming. Exercise level: moderately active     Fall Risk Screen:  Fall Risk Assessment, last 12 mths 7/27/2022   Able to walk? Yes   Fall in past 12 months? 1   Do you feel unsteady? 0   Are you worried about falling 0   Number of falls in past 12 months 1   Fall with injury? 1      Abuse Screen:  Patient is not abused       Screening EKG   EKG order placed: Yes    End of Life Planning   Advanced care planning directives were discussed with the patient and /or family/caregiver.      Health Maintenance Due     Health Maintenance Due   Topic Date Due    COVID-19 Vaccine (1) Never done    Pneumococcal 0-64 years (2 - PCV) 06/10/2020    Foot Exam Q1  01/07/2021    Shingrix Vaccine Age 50> (1 of 2) Never done    Eye Exam Retinal or Dilated  03/16/2022    A1C test (Diabetic or Prediabetic)  07/26/2022       Patient Care Team   Patient Care Team:  Melinda Talbot DO as PCP - General (Internal Medicine Physician)  Melinda Talobt DO as PCP [facility]  Fredia Closs, MD (Ophthalmology)    History     Past Medical History:   Diagnosis Date    Bilateral carotid artery stenosis     Diabetic peripheral neuropathy associated with type 2 diabetes mellitus ([address]     Foot fracture     H/O aortic dissection     Hollenhorst plaque, right eye     Hypertension     Jaw fracture ([address]     Ruptured ear drum     Subjective visual disturbance, right eye     Thumb fracture       Past Surgical History:   Procedure Laterality Date    HX APPENDECTOMY      HX ARTERIAL BYPASS  02/15/2019    HX ORTHOPAEDIC      HX OTHER SURGICAL  02/15/2019    8 stints    HX OTHER SURGICAL  02/15/2019    Double bypass     Current Outpatient Medications   Medication Sig Dispense Refill    traMADoL (ULTRAM) 50 mg tablet Take 50 mg by mouth every six (6) hours as needed for Pain.      lidocaine (Lidoderm) 5 % Apply patch to the affected area for 12 hours a day and remove for 12 hours a day. 30 Each 0    amLODIPine (NORVASC) 10 mg tablet Take 1 tablet by mouth once daily 90 Tablet 3    losartan (COZAAR) 25 mg tablet Take 1 Tablet by mouth daily. 90 Tablet 3    atorvastatin (LIPITOR) 40 mg tablet Take 1 tablet by mouth nightly 90 Tablet 3    carvediloL (COREG) 12.5 mg tablet TAKE 1 TABLET BY MOUTH TWICE DAILY WITH MEALS 180 Tablet 3    acetaminophen (TYLENOL) 325 mg tablet Take 2 Tablets by mouth every four (4) hours as needed for Pain or Fever. 20 Tablet 0    hydrALAZINE (APRESOLINE) 50 mg tablet TAKE 1 TABLET BY MOUTH THREE TIMES DAILY 270 Tablet 3    clopidogreL (PLAVIX) 75 mg tab Take 1 tablet by mouth once daily 90 Tablet 3    semaglutide (Rybelsus) 7 mg tablet Take 1 Tablet by mouth Daily (before breakfast). 90 Tablet 3    dapagliflozin (FARXIGA) 10 mg tab tablet Take 1 Tablet by mouth daily. 90 Tablet 3    ferrous sulfate 325 mg (65 mg iron) tablet TAKE 1 TABLET BY MOUTH ONCE DAILY BEFORE BREAKFAST 90 Tablet 3    nitroglycerin (NITROSTAT) 0.4 mg SL tablet 1 Tab by SubLINGual route every five (5) minutes as needed for Chest Pain. Up to 3 doses. 15 Tab 0    Pataday Twice Daily Relief 0.1 % ophthalmic solution       diclofenac (VOLTAREN) 1 % gel Apply 4 g to affected area four (4) times daily. 1 Each 1    glucose blood VI test strips (blood glucose test) strip Check fasting blood sugar daily using blood sugar test strip. Dx: e11.21 100 Strip 3    sennosides 8.6 mg cap senna 8.6 mg capsule   Take 2 capsules every day by oral route as needed. famotidine (PEPCID) 40 mg tablet Take 1 Tab by mouth daily. 90 Tab 3    acetaminophen (TYLENOL) 325 mg tablet Take 325-650 mg by mouth every four (4) hours as needed for Pain. cetirizine (ZYRTEC) 10 mg tablet Take 10 mg by mouth daily as needed for Allergies.        No Known Allergies    Family History   Problem Relation Age of Onset    Diabetes Mother Stroke Mother     Colon Cancer Father     Cancer Brother     No Known Problems Child      Social History     Tobacco Use    Smoking status: Never    Smokeless tobacco: Never   Substance Use Topics    Alcohol use: No       Jose M Duke III, DO

## 2022-07-27 NOTE — PATIENT INSTRUCTIONS
Medicare Wellness Visit, Male    The best way to live healthy is to have a lifestyle where you eat a well-balanced diet, exercise regularly, limit alcohol use, and quit all forms of tobacco/nicotine, if applicable. Regular preventive services are another way to keep healthy. Preventive services (vaccines, screening tests, monitoring & exams) can help personalize your care plan, which helps you manage your own care. Screening tests can find health problems at the earliest stages, when they are easiest to treat. [facility] follows the current, evidence-based guidelines published by the [facility] ([facility]) when recommending preventive services for our patients. Because we follow these guidelines, sometimes recommendations change over time as research supports it. (For example, a prostate screening blood test is no longer routinely recommended for men with no symptoms). Of course, you and your doctor may decide to screen more often for some diseases, based on your risk and co-morbidities (chronic disease you are already diagnosed with). Preventive services for you include:  - Medicare offers their members a free annual wellness visit, which is time for you and your primary care provider to discuss and plan for your preventive service needs. Take advantage of this benefit every year!  -All adults over age 72 should receive the recommended pneumonia vaccines. Current USPSTF guidelines recommend a series of two vaccines for the best pneumonia protection.   -All adults should have a flu vaccine yearly and tetanus vaccine every 10 years.  -All adults age 48 and older should receive the shingles vaccines (series of two vaccines).        -All adults age 38-68 who are overweight should have a diabetes screening test once every three years.   -Other screening tests & preventive services for persons with diabetes include: an eye exam to screen for diabetic retinopathy, a kidney function test, a foot exam, and stricter control over your cholesterol.   -Cardiovascular screening for adults with routine risk involves an electrocardiogram (ECG) at intervals determined by the provider.   -Colorectal cancer screening should be done for adults age 54-65 with no increased risk factors for colorectal cancer. There are a number of acceptable methods of screening for this type of cancer. Each test has its own benefits and drawbacks. Discuss with your provider what is most appropriate for you during your annual wellness visit. The different tests include: colonoscopy (considered the best screening method), a fecal occult blood test, a fecal DNA test, and sigmoidoscopy.  -All adults born between [facility] should be screened once for Hepatitis C.  -An Abdominal Aortic Aneurysm (AAA) Screening is recommended for men age 73-68 who has ever smoked in their lifetime.      Here is a list of your current Health Maintenance items (your personalized list of preventive services) with a due date:  Health Maintenance Due   Topic Date Due    COVID-19 Vaccine (1) Never done    Pneumococcal Vaccine (2 - PCV) 06/10/2020    Diabetic Foot Care  01/07/2021    Shingles Vaccine (1 of 2) Never done    Eye Exam  03/16/2022    Hemoglobin A1C    07/26/2022

## 2022-09-26 RX ORDER — LANOLIN ALCOHOL/MO/W.PET/CERES
325 CREAM (GRAM) TOPICAL
Qty: 90 TABLET | Refills: 3 | Status: SHIPPED | OUTPATIENT
Start: 2022-09-26

## 2022-09-26 NOTE — TELEPHONE ENCOUNTER
Future Appointments:  Future Appointments   Date Time Provider Travis Radha   1/25/2023 10:40 AM Lubna Gibson [facility] BS AMB        Last Appointment With Me:  7/27/2022     Requested Prescriptions     Pending Prescriptions Disp Refills    ferrous sulfate 325 mg (65 mg iron) tablet 90 Tablet 3     Sig: Take 1 Tablet by mouth Daily (before breakfast).

## 2022-09-26 NOTE — TELEPHONE ENCOUNTER
Patient has been trying to get it from the pharmacy but they finally told pt that he needs to contact his dr first.  Patient has now run out of medications. Caller requests Refill of:  ferrous sulfate 325 mg (65 mg iron) tablet      Please send to:  [address] [facility], [address] [place] [address] [address]  [phone]      Visit Appointment History:   Future:   1/25/23  Previous: 7/27/22      Caller confirmed instructions and dosages as correct. Caller was advised that Meds will be refilled as soon as possible, however there can be a 48-72 business hour turn around on refill requests.

## 2022-10-18 RX ORDER — DAPAGLIFLOZIN 10 MG/1
TABLET, FILM COATED ORAL
Qty: 90 TABLET | Refills: 0 | Status: SHIPPED | OUTPATIENT
Start: 2022-10-18

## 2022-11-08 ENCOUNTER — TRANSCRIBE ORDER (OUTPATIENT)
Dept: SCHEDULING | Age: 52
End: 2022-11-08

## 2022-11-08 DIAGNOSIS — I71.00 MEDIONECROSIS OF AORTA (HCC): Primary | ICD-10-CM

## 2022-12-12 ENCOUNTER — HOSPITAL ENCOUNTER (OUTPATIENT)
Dept: CT IMAGING | Age: 52
Discharge: HOME | End: 2022-12-12
Payer: COMMERCIAL

## 2022-12-12 DIAGNOSIS — I71.00 MEDIONECROSIS OF AORTA (HCC): ICD-10-CM

## 2022-12-12 LAB — CREAT BLD-MCNC: 1.7 MG/DL (ref 0.6–1.3)

## 2022-12-12 PROCEDURE — 74011000636 HC RX REV CODE- 636

## 2022-12-12 PROCEDURE — 82565 ASSAY OF CREATININE: CPT

## 2022-12-12 PROCEDURE — 74174 CTA ABD&PLVS W/CONTRAST: CPT

## 2022-12-12 RX ADMIN — IOPAMIDOL 100 ML: 755 INJECTION, SOLUTION INTRAVENOUS at 10:52

## 2023-01-25 ENCOUNTER — OFFICE VISIT (OUTPATIENT)
Dept: INTERNAL MEDICINE CLINIC | Age: 53
End: 2023-01-25
Payer: MEDICAID

## 2023-01-25 VITALS
DIASTOLIC BLOOD PRESSURE: 69 MMHG | HEART RATE: 77 BPM | BODY MASS INDEX: 28.71 KG/M2 | TEMPERATURE: 98.6 F | OXYGEN SATURATION: 95 % | HEIGHT: 77 IN | WEIGHT: 243.2 LBS | SYSTOLIC BLOOD PRESSURE: 136 MMHG | RESPIRATION RATE: 14 BRPM

## 2023-01-25 DIAGNOSIS — E78.5 HYPERLIPIDEMIA ASSOCIATED WITH TYPE 2 DIABETES MELLITUS (HCC): ICD-10-CM

## 2023-01-25 DIAGNOSIS — E11.69 HYPERLIPIDEMIA ASSOCIATED WITH TYPE 2 DIABETES MELLITUS (HCC): ICD-10-CM

## 2023-01-25 DIAGNOSIS — N18.31 STAGE 3A CHRONIC KIDNEY DISEASE (HCC): ICD-10-CM

## 2023-01-25 DIAGNOSIS — I71.02 DISSECTING AAA (ABDOMINAL AORTIC ANEURYSM) (HCC): ICD-10-CM

## 2023-01-25 DIAGNOSIS — I71.40 DISSECTING AAA (ABDOMINAL AORTIC ANEURYSM) (HCC): ICD-10-CM

## 2023-01-25 DIAGNOSIS — N40.0 BENIGN PROSTATIC HYPERPLASIA, UNSPECIFIED WHETHER LOWER URINARY TRACT SYMPTOMS PRESENT: ICD-10-CM

## 2023-01-25 DIAGNOSIS — H34.11 CENTRAL RETINAL ARTERY OCCLUSION OF RIGHT EYE: ICD-10-CM

## 2023-01-25 DIAGNOSIS — E11.42 DIABETIC PERIPHERAL NEUROPATHY ASSOCIATED WITH TYPE 2 DIABETES MELLITUS (HCC): Primary | ICD-10-CM

## 2023-01-25 DIAGNOSIS — I10 ESSENTIAL HYPERTENSION: ICD-10-CM

## 2023-01-25 PROCEDURE — 99214 OFFICE O/P EST MOD 30 MIN: CPT | Performed by: INTERNAL MEDICINE

## 2023-01-25 RX ORDER — FAMOTIDINE 40 MG/1
40 TABLET, FILM COATED ORAL DAILY
Qty: 90 TABLET | Refills: 3 | Status: SHIPPED | OUTPATIENT
Start: 2023-01-25

## 2023-01-25 RX ORDER — NITROGLYCERIN 0.4 MG/1
0.4 TABLET SUBLINGUAL
Qty: 15 TABLET | Refills: 0 | Status: SHIPPED | OUTPATIENT
Start: 2023-01-25

## 2023-01-25 NOTE — PROGRESS NOTES
1. \"Have you been to the ER, urgent care clinic since your last visit? Hospitalized since your last visit? \" No    2. \"Have you seen or consulted any other health care providers outside of the [address] [place] [address] since your last visit? \" No     3. For patients aged 39-70: Has the patient had a colonoscopy / FIT/ Cologuard? Yes - no Care Gap present      If the patient is female:    4. For patients aged 41-77: Has the patient had a mammogram within the past 2 years? NA - based on age or sex      11. For patients aged 21-65: Has the patient had a pap smear?  NA - based on age or sex

## 2023-01-25 NOTE — PROGRESS NOTES
Beba Edgar is a 47 y.o. male who presents for evaluation of routine follow up for dm, htn, hld, ckd, crao, hx aortic dissection. Last seen by me July 27, 2022 in welcome to medicare visit. He no longer has [employer], now has medicaid. Had an episode of chest pain over a month ago, lasted for about 2 hours. Got better after drinking a pepsi and belching. Has not had any more episodes. Right eye vision remains blurry. 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       ROS:  Constitutional: negative for fevers, chills, anorexia and weight loss  Eyes:   negative for visual disturbance and irritation  ENT:   negative for tinnitus,sore throat,nasal congestion,ear pain,hoarseness  Respiratory:  negative for cough, hemoptysis, dyspnea,wheezing  CV:   negative for chest pain, palpitations, lower extremity edema  GI:   negative for nausea, vomiting, diarrhea, abdominal pain,melena  Genitourinary: negative for frequency, dysuria and hematuria  Musculoskel: negative for myalgias, arthralgias, back pain, muscle weakness, joint pain  Neurological:  negative for headaches, dizziness, focal weakness, numbness  Psychiatric:     Negative for depression or anxiety      Past Medical History:   Diagnosis Date    Bilateral carotid artery stenosis     Diabetic peripheral neuropathy associated with type 2 diabetes mellitus (HCC)     Foot fracture     H/O aortic dissection     Hollenhorst plaque, right eye     Hypertension     Jaw fracture (HCC)     Ruptured ear drum     Subjective visual disturbance, right eye     Thumb fracture        Past Surgical History:   Procedure Laterality Date    HX APPENDECTOMY      HX ARTERIAL BYPASS  02/15/2019    HX ORTHOPAEDIC      HX OTHER SURGICAL  02/15/2019    8 stints    HX OTHER SURGICAL  02/15/2019    Double bypass       Family History   Problem Relation Age of Onset    Diabetes Mother     Stroke Mother     Colon Cancer Father     Cancer Brother     No Known Problems Child        Social History     Socioeconomic History    Marital status: SINGLE     Spouse name: Not on file    Number of children: Not on file    Years of education: Not on file    Highest education level: Not on file   Occupational History    Not on file   Tobacco Use    Smoking status: Never    Smokeless tobacco: Never   Substance and Sexual Activity    Alcohol use: No    Drug use: No    Sexual activity: Yes     Partners: Female     Birth control/protection: Surgical   Other Topics Concern    Not on file   Social History Narrative    Not on file     Social Determinants of Health     Financial Resource Strain: Low Risk     Difficulty of Paying Living Expenses: Not very hard   Food Insecurity: No Food Insecurity    Worried About Running Out of Food in the Last Year: Never true    Ran Out of Food in the Last Year: Never true   Transportation Needs: Not on file   Physical Activity: Not on file   Stress: Not on file   Social Connections: Not on file   Intimate Partner Violence: Not on file   Housing Stability: Not on file          Visit Vitals  /69 (BP 1 Location: Left upper arm, BP Patient Position: Sitting)   Pulse 77   Temp 98.6 °F (37 °C) (Temporal)   Resp 14   Ht 6' 5\" (1.956 m)   Wt 243 lb 3.2 oz (110.3 kg)   SpO2 95%   BMI 28.84 kg/m²       Physical Examination:   General - Well appearing male  HEENT - PERRL, TM no erythema/opacification, normal nasal turbinates, no oropharyngeal erythema or exudate, MMM  Neck - supple, no bruits, no thyroidomegaly, no lymphadenopathy  Pulm - clear to auscultation bilaterally  Cardio - RRR, normal S1 S2, no murmur  Abd - soft, nontender, no masses, no HSM  Extrem - no edema, +2 distal pulses  Neuro-  No focal deficits, CN intact     Assessment/Plan:     Dm, type 2--check a1c, urine micro. Continue farxiga, rybelsus  Htn--continue cozaar, hydralazine, coreg, norvasc.   Check cbc, cmp  Hyperlipids--on lipitor, check flp, cmp  Hx aortic dissection--on plavix  Hx CRAO right eye--on plavix   Dyspepsia--refill given for pepcid    Rtc 6 months        Collins Music III, DO

## 2023-01-26 LAB
ALBUMIN SERPL-MCNC: 4.4 G/DL (ref 3.5–5)
ALBUMIN/GLOB SERPL: 1.2 (ref 1.1–2.2)
ALP SERPL-CCNC: 66 U/L (ref 45–117)
ALT SERPL-CCNC: 25 U/L (ref 12–78)
ANION GAP SERPL CALC-SCNC: 6 MMOL/L (ref 5–15)
AST SERPL-CCNC: 13 U/L (ref 15–37)
BASOPHILS # BLD: 0.1 K/UL (ref 0–0.1)
BASOPHILS NFR BLD: 1 % (ref 0–1)
BILIRUB SERPL-MCNC: 0.8 MG/DL (ref 0.2–1)
BUN SERPL-MCNC: 24 MG/DL (ref 6–20)
BUN/CREAT SERPL: 14 (ref 12–20)
CALCIUM SERPL-MCNC: 9.2 MG/DL (ref 8.5–10.1)
CHLORIDE SERPL-SCNC: 106 MMOL/L (ref 97–108)
CHOLEST SERPL-MCNC: 100 MG/DL
CO2 SERPL-SCNC: 28 MMOL/L (ref 21–32)
CREAT SERPL-MCNC: 1.69 MG/DL (ref 0.7–1.3)
CREAT UR-MCNC: 187 MG/DL
DIFFERENTIAL METHOD BLD: ABNORMAL
EOSINOPHIL # BLD: 0.2 K/UL (ref 0–0.4)
EOSINOPHIL NFR BLD: 3 % (ref 0–7)
ERYTHROCYTE [DISTWIDTH] IN BLOOD BY AUTOMATED COUNT: 14.9 % (ref 11.5–14.5)
EST. AVERAGE GLUCOSE BLD GHB EST-MCNC: 163 MG/DL
GLOBULIN SER CALC-MCNC: 3.6 G/DL (ref 2–4)
GLUCOSE SERPL-MCNC: 124 MG/DL (ref 65–100)
HBA1C MFR BLD: 7.3 % (ref 4–5.6)
HCT VFR BLD AUTO: 44.5 % (ref 36.6–50.3)
HDLC SERPL-MCNC: 36 MG/DL
HDLC SERPL: 2.8 (ref 0–5)
HGB BLD-MCNC: 14 G/DL (ref 12.1–17)
IMM GRANULOCYTES # BLD AUTO: 0 K/UL (ref 0–0.04)
IMM GRANULOCYTES NFR BLD AUTO: 0 % (ref 0–0.5)
LDLC SERPL CALC-MCNC: 45.6 MG/DL (ref 0–100)
LYMPHOCYTES # BLD: 2 K/UL (ref 0.8–3.5)
LYMPHOCYTES NFR BLD: 38 % (ref 12–49)
MCH RBC QN AUTO: 26.3 PG (ref 26–34)
MCHC RBC AUTO-ENTMCNC: 31.5 G/DL (ref 30–36.5)
MCV RBC AUTO: 83.5 FL (ref 80–99)
MICROALBUMIN UR-MCNC: 5.38 MG/DL
MICROALBUMIN/CREAT UR-RTO: 29 MG/G (ref 0–30)
MONOCYTES # BLD: 0.5 K/UL (ref 0–1)
MONOCYTES NFR BLD: 9 % (ref 5–13)
NEUTS SEG # BLD: 2.6 K/UL (ref 1.8–8)
NEUTS SEG NFR BLD: 49 % (ref 32–75)
NRBC # BLD: 0 K/UL (ref 0–0.01)
NRBC BLD-RTO: 0 PER 100 WBC
PLATELET # BLD AUTO: 189 K/UL (ref 150–400)
PMV BLD AUTO: 11 FL (ref 8.9–12.9)
POTASSIUM SERPL-SCNC: 3.4 MMOL/L (ref 3.5–5.1)
PROT SERPL-MCNC: 8 G/DL (ref 6.4–8.2)
PSA SERPL-MCNC: 1.3 NG/ML (ref 0.01–4)
RBC # BLD AUTO: 5.33 M/UL (ref 4.1–5.7)
SODIUM SERPL-SCNC: 140 MMOL/L (ref 136–145)
TRIGL SERPL-MCNC: 92 MG/DL (ref ?–150)
TSH SERPL DL<=0.05 MIU/L-ACNC: 1.12 UIU/ML (ref 0.36–3.74)
VLDLC SERPL CALC-MCNC: 18.4 MG/DL
WBC # BLD AUTO: 5.3 K/UL (ref 4.1–11.1)

## 2023-01-26 NOTE — PROGRESS NOTES
Letter mailed to patient. Labs remain good and stable.  Psa level back down again.  Continue same meds.  Stay active, stay well.

## 2023-02-23 ENCOUNTER — TELEPHONE (OUTPATIENT)
Dept: INTERNAL MEDICINE CLINIC | Age: 53
End: 2023-02-23

## 2023-02-23 RX ORDER — LEVOCETIRIZINE DIHYDROCHLORIDE 5 MG/1
5 TABLET, FILM COATED ORAL DAILY
Qty: 30 TABLET | Refills: 5 | Status: SHIPPED | OUTPATIENT
Start: 2023-02-23

## 2023-02-23 RX ORDER — AZELASTINE 1 MG/ML
1 SPRAY, METERED NASAL 2 TIMES DAILY
Qty: 1 EACH | Refills: 1 | Status: SHIPPED | OUTPATIENT
Start: 2023-02-23

## 2023-02-23 NOTE — TELEPHONE ENCOUNTER
Patient states he needs a call back to discuss getting something called in/Prescribed for Nasal Congestion making it difficulty to sleep at night & Nasal Spray is not working. Patient also reports his Ears are stopped up, has No Fever But Has Not Tested for COVID. Patient stats symptoms for approx. 3 days. Patient reports he doesn't have COVID Symptoms. Please call back to discuss & advise Plan of Care.  Thank you

## 2023-02-24 NOTE — TELEPHONE ENCOUNTER
Pt called at this time. 2 identifiers confirmed. Per Dr. Barbra Gaviria:  Rx sent in for xyzal and astelin nasal spray. No further concerns voiced.

## 2023-03-14 DIAGNOSIS — M70.62 TROCHANTERIC BURSITIS, LEFT HIP: ICD-10-CM

## 2023-03-14 DIAGNOSIS — M17.11 UNILATERAL PRIMARY OSTEOARTHRITIS, RIGHT KNEE: ICD-10-CM

## 2023-03-14 RX ORDER — LIDOCAINE 50 MG/G
PATCH TOPICAL
Qty: 30 EACH | Refills: 0 | Status: SHIPPED | OUTPATIENT
Start: 2023-03-14

## 2023-03-27 NOTE — PROGRESS NOTES
0800 Report received from CHRISTOPHER Brennan RN via [employer] and [employer]. Skin warm and dry to touch color color acyanotic. Significant other at bedside. Pt. With c/o \"headahe 20/10\". Stating \"had for awhile trying to tough it out. \"  Advised against this ideology. Also with c/o back discomfort, reports when I get up in that chair, pointing to recliner my backs hurts. Sitting up on bedside beti tenderness bilateral mid back . Epidural site with some tenderness. Note temperature. Nel Rivera and NP in.  1000 Hygiene care with assistance. Up in chair. Dr. Cheng Xander in examining back and epidural site. Voiding clear urine. 1100 Dr. Colleen Fish in. Remains up in chair Denies headache and back pain \"unless I push on it. \" dozing intermittently in chair. 1246 ambulate with PT states \"I feel good. \"   [phone] quad discontinue without difficulty. Personal items gathered and packed in preparation for transfer to 2139. Significant other at bedside. TRANSFER - OUT REPORT:    Verbal report given to [facility] 2139 on Ele Collins  being transferred to  general surgery telemetry for routine progression of care       Report consisted of patients Situation, Background, Assessment and   Recommendations(SBAR). Information from the following report(s) OR Summary, Procedure Summary, Intake/Output, MAR, Recent Results and Cardiac Rhythm sinus was reviewed with the receiving nurse.     Lines:   Peripheral IV 02/15/19 Right Forearm (Active)   Site Assessment Clean, dry, & intact 2/22/2019  8:00 AM   Phlebitis Assessment 0 2/22/2019  8:00 AM   Infiltration Assessment 1 2/22/2019  8:00 AM   Dressing Status Clean, dry, & intact 2/22/2019  8:00 AM   Dressing Type Bacteriocidal 2/22/2019  8:00 AM   Hub Color/Line Status Orange;Capped 2/22/2019  8:00 AM   Action Taken Open ports on tubing capped 2/22/2019  3:04 AM   Alcohol Cap Used Yes 2/22/2019  3:04 AM       Peripheral IV 02/15/19 Left Hand (Active)   Site Assessment Clean, dry, & intact KRYSTIAN Chan: I was not involved in the patient care and am only entering information to back log from downtime. please see paper chart as the mandatory fields may not be accurate.    dizziness X 2 wks, diarrhea today 2/22/2019  8:00 AM   Phlebitis Assessment 0 2/22/2019  8:00 AM   Infiltration Assessment 0 2/22/2019  8:00 AM   Dressing Status Clean, dry, & intact 2/22/2019  8:00 AM   Dressing Type Bacteriocidal 2/22/2019  8:00 AM   Hub Color/Line Status Green;Capped 2/22/2019  8:00 AM   Action Taken Open ports on tubing capped 2/22/2019  3:04 AM   Alcohol Cap Used Yes 2/22/2019  3:04 AM        Opportunity for questions and clarification was provided.       Patient transported with  Monitor and belongings

## 2023-04-16 ENCOUNTER — APPOINTMENT (OUTPATIENT)
Dept: GENERAL RADIOLOGY | Age: 53
End: 2023-04-16
Attending: PHYSICIAN ASSISTANT
Payer: MEDICAID

## 2023-04-16 ENCOUNTER — HOSPITAL ENCOUNTER (EMERGENCY)
Age: 53
Discharge: HOME | End: 2023-04-16
Attending: EMERGENCY MEDICINE
Payer: MEDICAID

## 2023-04-16 VITALS
SYSTOLIC BLOOD PRESSURE: 170 MMHG | BODY MASS INDEX: 28.32 KG/M2 | WEIGHT: 239.86 LBS | HEIGHT: 77 IN | TEMPERATURE: 98.2 F | RESPIRATION RATE: 18 BRPM | OXYGEN SATURATION: 98 % | HEART RATE: 80 BPM | DIASTOLIC BLOOD PRESSURE: 104 MMHG

## 2023-04-16 DIAGNOSIS — M25.532 ACUTE PAIN OF LEFT WRIST: Primary | ICD-10-CM

## 2023-04-16 DIAGNOSIS — I10 ESSENTIAL HYPERTENSION: ICD-10-CM

## 2023-04-16 DIAGNOSIS — E11.65 TYPE 2 DIABETES MELLITUS WITH HYPERGLYCEMIA, WITHOUT LONG-TERM CURRENT USE OF INSULIN (HCC): ICD-10-CM

## 2023-04-16 LAB
ALBUMIN SERPL-MCNC: 4.2 G/DL (ref 3.5–5)
ALBUMIN/GLOB SERPL: 1.1 (ref 1.1–2.2)
ALP SERPL-CCNC: 120 U/L (ref 45–117)
ALT SERPL-CCNC: 43 U/L (ref 12–78)
ANION GAP SERPL CALC-SCNC: 3 MMOL/L (ref 5–15)
APPEARANCE UR: CLEAR
AST SERPL-CCNC: 13 U/L (ref 15–37)
BACTERIA URNS QL MICRO: NEGATIVE /HPF
BASOPHILS # BLD: 0 K/UL (ref 0–0.1)
BASOPHILS NFR BLD: 0 % (ref 0–1)
BILIRUB SERPL-MCNC: 0.7 MG/DL (ref 0.2–1)
BILIRUB UR QL: NEGATIVE
BUN SERPL-MCNC: 25 MG/DL (ref 6–20)
BUN/CREAT SERPL: 12 (ref 12–20)
CALCIUM SERPL-MCNC: 9.2 MG/DL (ref 8.5–10.1)
CHLORIDE SERPL-SCNC: 101 MMOL/L (ref 97–108)
CO2 SERPL-SCNC: 28 MMOL/L (ref 21–32)
COLOR UR: ABNORMAL
COMMENT, HOLDF: NORMAL
CREAT SERPL-MCNC: 2.09 MG/DL (ref 0.7–1.3)
DIFFERENTIAL METHOD BLD: NORMAL
EOSINOPHIL # BLD: 0.1 K/UL (ref 0–0.4)
EOSINOPHIL NFR BLD: 3 % (ref 0–7)
EPITH CASTS URNS QL MICRO: ABNORMAL /LPF
ERYTHROCYTE [DISTWIDTH] IN BLOOD BY AUTOMATED COUNT: 14.2 % (ref 11.5–14.5)
GLOBULIN SER CALC-MCNC: 4 G/DL (ref 2–4)
GLUCOSE BLD STRIP.AUTO-MCNC: 257 MG/DL (ref 65–117)
GLUCOSE BLD STRIP.AUTO-MCNC: 590 MG/DL (ref 65–117)
GLUCOSE SERPL-MCNC: 613 MG/DL (ref 65–100)
GLUCOSE UR STRIP.AUTO-MCNC: >1000 MG/DL
HCT VFR BLD AUTO: 44 % (ref 36.6–50.3)
HGB BLD-MCNC: 14.4 G/DL (ref 12.1–17)
HGB UR QL STRIP: NEGATIVE
HYALINE CASTS URNS QL MICRO: ABNORMAL /LPF (ref 0–2)
IMM GRANULOCYTES # BLD AUTO: 0 K/UL (ref 0–0.04)
IMM GRANULOCYTES NFR BLD AUTO: 0 % (ref 0–0.5)
KETONES UR QL STRIP.AUTO: NEGATIVE MG/DL
LEUKOCYTE ESTERASE UR QL STRIP.AUTO: NEGATIVE
LYMPHOCYTES # BLD: 1.8 K/UL (ref 0.8–3.5)
LYMPHOCYTES NFR BLD: 36 % (ref 12–49)
MCH RBC QN AUTO: 26.5 PG (ref 26–34)
MCHC RBC AUTO-ENTMCNC: 32.7 G/DL (ref 30–36.5)
MCV RBC AUTO: 80.9 FL (ref 80–99)
MONOCYTES # BLD: 0.4 K/UL (ref 0–1)
MONOCYTES NFR BLD: 7 % (ref 5–13)
NEUTS SEG # BLD: 2.6 K/UL (ref 1.8–8)
NEUTS SEG NFR BLD: 54 % (ref 32–75)
NITRITE UR QL STRIP.AUTO: NEGATIVE
NRBC # BLD: 0 K/UL (ref 0–0.01)
NRBC BLD-RTO: 0 PER 100 WBC
PH UR STRIP: 5.5 (ref 5–8)
PLATELET # BLD AUTO: 197 K/UL (ref 150–400)
PMV BLD AUTO: 11.4 FL (ref 8.9–12.9)
POTASSIUM SERPL-SCNC: 4 MMOL/L (ref 3.5–5.1)
PROT SERPL-MCNC: 8.2 G/DL (ref 6.4–8.2)
PROT UR STRIP-MCNC: NEGATIVE MG/DL
RBC # BLD AUTO: 5.44 M/UL (ref 4.1–5.7)
RBC #/AREA URNS HPF: ABNORMAL /HPF (ref 0–5)
SAMPLES BEING HELD,HOLD: NORMAL
SERVICE CMNT-IMP: ABNORMAL
SERVICE CMNT-IMP: ABNORMAL
SODIUM SERPL-SCNC: 132 MMOL/L (ref 136–145)
SP GR UR REFRACTOMETRY: 1.01 (ref 1–1.03)
UA: UC IF INDICATED,UAUC: ABNORMAL
UROBILINOGEN UR QL STRIP.AUTO: 0.2 EU/DL (ref 0.2–1)
WBC # BLD AUTO: 4.9 K/UL (ref 4.1–11.1)
WBC URNS QL MICRO: ABNORMAL /HPF (ref 0–4)

## 2023-04-16 PROCEDURE — 74011250636 HC RX REV CODE- 250/636: Performed by: EMERGENCY MEDICINE

## 2023-04-16 PROCEDURE — 85025 COMPLETE CBC W/AUTO DIFF WBC: CPT

## 2023-04-16 PROCEDURE — 96361 HYDRATE IV INFUSION ADD-ON: CPT

## 2023-04-16 PROCEDURE — 80053 COMPREHEN METABOLIC PANEL: CPT

## 2023-04-16 PROCEDURE — 82962 GLUCOSE BLOOD TEST: CPT

## 2023-04-16 PROCEDURE — 73110 X-RAY EXAM OF WRIST: CPT

## 2023-04-16 PROCEDURE — 74011636637 HC RX REV CODE- 636/637: Performed by: EMERGENCY MEDICINE

## 2023-04-16 PROCEDURE — 83036 HEMOGLOBIN GLYCOSYLATED A1C: CPT

## 2023-04-16 PROCEDURE — 81001 URINALYSIS AUTO W/SCOPE: CPT

## 2023-04-16 PROCEDURE — 36415 COLL VENOUS BLD VENIPUNCTURE: CPT

## 2023-04-16 PROCEDURE — 96374 THER/PROPH/DIAG INJ IV PUSH: CPT

## 2023-04-16 PROCEDURE — 99284 EMERGENCY DEPT VISIT MOD MDM: CPT

## 2023-04-16 RX ORDER — ACETAMINOPHEN 325 MG/1
650 TABLET ORAL
Qty: 20 TABLET | Refills: 0 | Status: SHIPPED | OUTPATIENT
Start: 2023-04-16

## 2023-04-16 RX ADMIN — SODIUM CHLORIDE 2000 ML: 9 INJECTION, SOLUTION INTRAVENOUS at 21:25

## 2023-04-16 RX ADMIN — Medication 10 UNITS: at 21:25

## 2023-04-17 LAB
EST. AVERAGE GLUCOSE BLD GHB EST-MCNC: 252 MG/DL
HBA1C MFR BLD: 10.4 % (ref 4–5.6)

## 2023-04-24 ENCOUNTER — OFFICE VISIT (OUTPATIENT)
Dept: INTERNAL MEDICINE CLINIC | Age: 53
End: 2023-04-24
Payer: MEDICAID

## 2023-04-24 VITALS
SYSTOLIC BLOOD PRESSURE: 123 MMHG | RESPIRATION RATE: 16 BRPM | TEMPERATURE: 98 F | DIASTOLIC BLOOD PRESSURE: 72 MMHG | BODY MASS INDEX: 28.5 KG/M2 | HEIGHT: 77 IN | OXYGEN SATURATION: 97 % | WEIGHT: 241.4 LBS | HEART RATE: 82 BPM

## 2023-04-24 DIAGNOSIS — E11.42 DIABETIC PERIPHERAL NEUROPATHY ASSOCIATED WITH TYPE 2 DIABETES MELLITUS (HCC): Primary | ICD-10-CM

## 2023-04-24 DIAGNOSIS — E78.5 HYPERLIPIDEMIA ASSOCIATED WITH TYPE 2 DIABETES MELLITUS (HCC): ICD-10-CM

## 2023-04-24 DIAGNOSIS — I10 ESSENTIAL HYPERTENSION: ICD-10-CM

## 2023-04-24 DIAGNOSIS — Z86.73 HISTORY OF CVA (CEREBROVASCULAR ACCIDENT): ICD-10-CM

## 2023-04-24 DIAGNOSIS — Z86.79 HISTORY OF AORTIC DISSECTION: ICD-10-CM

## 2023-04-24 DIAGNOSIS — E11.69 HYPERLIPIDEMIA ASSOCIATED WITH TYPE 2 DIABETES MELLITUS (HCC): ICD-10-CM

## 2023-04-24 DIAGNOSIS — H34.11 CENTRAL RETINAL ARTERY OCCLUSION OF RIGHT EYE: ICD-10-CM

## 2023-04-24 PROCEDURE — 99214 OFFICE O/P EST MOD 30 MIN: CPT | Performed by: INTERNAL MEDICINE

## 2023-04-24 NOTE — PROGRESS NOTES
1. \"Have you been to the ER, urgent care clinic since your last visit? Hospitalized since your last visit? \" Yes see encounters    2. \"Have you seen or consulted any other health care providers outside of the [address] [place] [address] since your last visit? \" No     3. For patients aged 39-70: Has the patient had a colonoscopy / FIT/ Cologuard? Yes - no Care Gap present      If the patient is female:    4. For patients aged 41-77: Has the patient had a mammogram within the past 2 years? NA - based on age or sex      11. For patients aged 21-65: Has the patient had a pap smear?  NA - based on age or sex

## 2023-04-24 NOTE — PROGRESS NOTES
Amie Carvajal is a 47 y.o. male who presents for evaluation of ED follow up and form completion. Last seen by me jan 25, 2023. He was upset at his son who recently got , and invited everybody in the family except him. This set him into a spiral of decline. He went and ate 2 dozen crispy cream donuts, as well as box of twinkies and was drinking sweetened iced tea. He ended up going to ED after injuring his left wrist, and sugar was 613. He was given iv fluids and some insulin. Sugar trended downward, and he was d/c to home. Sugar was 176 this am.  He is doing better. 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       ROS:  Constitutional: negative for fevers, chills, anorexia and weight loss  Eyes:   negative for visual disturbance and irritation  ENT:   negative for tinnitus,sore throat,nasal congestion,ear pain,hoarseness  Respiratory:  negative for cough, hemoptysis, dyspnea,wheezing  CV:   negative for chest pain, palpitations, lower extremity edema  GI:   negative for nausea, vomiting, diarrhea, abdominal pain,melena  Genitourinary: negative for frequency, dysuria and hematuria  Musculoskel: negative for myalgias, arthralgias, back pain, muscle weakness, joint pain  Neurological:  negative for headaches, dizziness, focal weakness, numbness  Psychiatric:     Negative for depression or anxiety      Past Medical History:   Diagnosis Date    Bilateral carotid artery stenosis     Diabetic peripheral neuropathy associated with type 2 diabetes mellitus (HCC)     Foot fracture     H/O aortic dissection     Hollenhorst plaque, right eye     Hypertension     Jaw fracture (HCC)     Ruptured ear drum     Subjective visual disturbance, right eye     Thumb fracture        Past Surgical History:   Procedure Laterality Date    HX APPENDECTOMY      HX ARTERIAL BYPASS  02/15/2019    HX ORTHOPAEDIC      HX OTHER SURGICAL  02/15/2019    8 stints    HX OTHER SURGICAL  02/15/2019    Double bypass Family History   Problem Relation Age of Onset    Diabetes Mother     Stroke Mother     Colon Cancer Father     Cancer Brother     No Known Problems Child        Social History     Socioeconomic History    Marital status: SINGLE     Spouse name: Not on file    Number of children: Not on file    Years of education: Not on file    Highest education level: Not on file   Occupational History    Not on file   Tobacco Use    Smoking status: Never    Smokeless tobacco: Never   Substance and Sexual Activity    Alcohol use: No    Drug use: No    Sexual activity: Yes     Partners: Female     Birth control/protection: Surgical   Other Topics Concern    Not on file   Social History Narrative    Not on file     Social Determinants of Health     Financial Resource Strain: Low Risk     Difficulty of Paying Living Expenses: Not very hard   Food Insecurity: No Food Insecurity    Worried About Running Out of Food in the Last Year: Never true    Ran Out of Food in the Last Year: Never true   Transportation Needs: Not on file   Physical Activity: Not on file   Stress: Not on file   Social Connections: Not on file   Intimate Partner Violence: Not on file   Housing Stability: Not on file          Visit Vitals  /72 (BP 1 Location: Left upper arm, BP Patient Position: Sitting)   Pulse 82   Temp 98 °F (36.7 °C) (Temporal)   Resp 16   Ht 6' 5\" (1.956 m)   Wt 241 lb 6.4 oz (109.5 kg)   SpO2 97%   BMI 28.63 kg/m²       Physical Examination:   General - Well appearing male  HEENT - PERRL, TM no erythema/opacification, normal nasal turbinates, no oropharyngeal erythema or exudate, MMM  Neck - supple, no bruits, no thyroidomegaly, no lymphadenopathy  Pulm - clear to auscultation bilaterally  Cardio - RRR, normal S1 S2, no murmur  Abd - soft, nontender, no masses, no HSM  Extrem - no edema, +2 distal pulses  Neuro-  No focal deficits, CN intact     Assessment/Plan:     Cad with stents and cabg--on plavix, coreg, losartan, farxiga  Dm, type 2--on rybelsus, farxiga. 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         Krystin Diana III, DO

## 2023-04-24 NOTE — PATIENT INSTRUCTIONS
I gave you a referral to see dr Camilla Staples, an orthopedic foot specialist a few years ago. Call him for an appt. He won't do any surgery until your a1c and sugars are improved, less than 7.5 a1c.

## 2023-05-24 RX ORDER — AZELASTINE HYDROCHLORIDE 137 UG/1
SPRAY, METERED NASAL
Qty: 30 ML | Refills: 3 | Status: SHIPPED | OUTPATIENT
Start: 2023-05-24

## 2023-07-28 RX ORDER — LEVOCETIRIZINE DIHYDROCHLORIDE 5 MG/1
TABLET, FILM COATED ORAL
Qty: 30 TABLET | Refills: 0 | OUTPATIENT
Start: 2023-07-28

## 2023-08-22 RX ORDER — LEVOCETIRIZINE DIHYDROCHLORIDE 5 MG/1
TABLET, FILM COATED ORAL
Qty: 30 TABLET | Refills: 5 | Status: SHIPPED | OUTPATIENT
Start: 2023-08-22

## 2023-09-07 RX ORDER — FERROUS SULFATE 325(65) MG
TABLET ORAL
Qty: 90 TABLET | Refills: 0 | Status: SHIPPED | OUTPATIENT
Start: 2023-09-07

## 2023-11-14 RX ORDER — ORAL SEMAGLUTIDE 7 MG/1
TABLET ORAL
Qty: 90 TABLET | Refills: 3 | Status: SHIPPED | OUTPATIENT
Start: 2023-11-14

## 2023-11-24 RX ORDER — ATORVASTATIN CALCIUM 40 MG/1
TABLET, FILM COATED ORAL NIGHTLY
Qty: 90 TABLET | Refills: 0 | Status: SHIPPED | OUTPATIENT
Start: 2023-11-24

## 2023-12-01 RX ORDER — HYDRALAZINE HYDROCHLORIDE 50 MG/1
50 TABLET, FILM COATED ORAL 3 TIMES DAILY
Qty: 270 TABLET | Refills: 0 | Status: SHIPPED | OUTPATIENT
Start: 2023-12-01

## 2023-12-01 RX ORDER — FERROUS SULFATE 325(65) MG
TABLET ORAL
Qty: 90 TABLET | Refills: 0 | Status: SHIPPED | OUTPATIENT
Start: 2023-12-01

## 2023-12-05 RX ORDER — CARVEDILOL 12.5 MG/1
12.5 TABLET ORAL 2 TIMES DAILY WITH MEALS
Qty: 180 TABLET | Refills: 0 | Status: SHIPPED | OUTPATIENT
Start: 2023-12-05

## 2024-01-02 RX ORDER — LOSARTAN POTASSIUM 25 MG/1
25 TABLET ORAL DAILY
Qty: 90 TABLET | Refills: 3 | Status: SHIPPED | OUTPATIENT
Start: 2024-01-02

## 2024-01-02 RX ORDER — DAPAGLIFLOZIN 10 MG/1
10 TABLET, FILM COATED ORAL DAILY
Qty: 90 TABLET | Refills: 3 | Status: SHIPPED | OUTPATIENT
Start: 2024-01-02

## 2024-01-09 ENCOUNTER — OFFICE VISIT (OUTPATIENT)
Age: 54
End: 2024-01-09
Payer: MEDICAID

## 2024-01-09 VITALS
WEIGHT: 232.2 LBS | BODY MASS INDEX: 27.42 KG/M2 | RESPIRATION RATE: 20 BRPM | TEMPERATURE: 98.4 F | SYSTOLIC BLOOD PRESSURE: 106 MMHG | OXYGEN SATURATION: 95 % | DIASTOLIC BLOOD PRESSURE: 61 MMHG | HEIGHT: 77 IN | HEART RATE: 80 BPM

## 2024-01-09 DIAGNOSIS — Z12.5 PROSTATE CANCER SCREENING: ICD-10-CM

## 2024-01-09 DIAGNOSIS — I10 ESSENTIAL HYPERTENSION: ICD-10-CM

## 2024-01-09 DIAGNOSIS — H34.11 CENTRAL RETINAL ARTERY OCCLUSION OF RIGHT EYE: ICD-10-CM

## 2024-01-09 DIAGNOSIS — E78.5 HYPERLIPIDEMIA ASSOCIATED WITH TYPE 2 DIABETES MELLITUS (HCC): ICD-10-CM

## 2024-01-09 DIAGNOSIS — N18.31 CHRONIC KIDNEY DISEASE, STAGE 3A (HCC): ICD-10-CM

## 2024-01-09 DIAGNOSIS — E11.69 HYPERLIPIDEMIA ASSOCIATED WITH TYPE 2 DIABETES MELLITUS (HCC): ICD-10-CM

## 2024-01-09 DIAGNOSIS — E11.42 TYPE 2 DIABETES MELLITUS WITH DIABETIC POLYNEUROPATHY, WITHOUT LONG-TERM CURRENT USE OF INSULIN (HCC): Primary | ICD-10-CM

## 2024-01-09 DIAGNOSIS — Z00.00 MEDICARE ANNUAL WELLNESS VISIT, SUBSEQUENT: ICD-10-CM

## 2024-01-09 DIAGNOSIS — I25.83 CORONARY ARTERY DISEASE DUE TO LIPID RICH PLAQUE: ICD-10-CM

## 2024-01-09 DIAGNOSIS — I25.10 CORONARY ARTERY DISEASE DUE TO LIPID RICH PLAQUE: ICD-10-CM

## 2024-01-09 PROBLEM — I71.02 DISSECTING AAA (ABDOMINAL AORTIC ANEURYSM) (HCC): Status: RESOLVED | Noted: 2023-01-25 | Resolved: 2024-01-09

## 2024-01-09 PROBLEM — I71.019 ACUTE THORACIC AORTIC DISSECTION (HCC): Status: RESOLVED | Noted: 2019-02-15 | Resolved: 2024-01-09

## 2024-01-09 PROCEDURE — 99214 OFFICE O/P EST MOD 30 MIN: CPT | Performed by: INTERNAL MEDICINE

## 2024-01-09 PROCEDURE — 3078F DIAST BP <80 MM HG: CPT | Performed by: INTERNAL MEDICINE

## 2024-01-09 PROCEDURE — G0439 PPPS, SUBSEQ VISIT: HCPCS | Performed by: INTERNAL MEDICINE

## 2024-01-09 PROCEDURE — 3074F SYST BP LT 130 MM HG: CPT | Performed by: INTERNAL MEDICINE

## 2024-01-09 RX ORDER — FAMOTIDINE 40 MG/1
40 TABLET, FILM COATED ORAL DAILY
Qty: 90 TABLET | Refills: 3 | Status: SHIPPED | OUTPATIENT
Start: 2024-01-09

## 2024-01-09 ASSESSMENT — PATIENT HEALTH QUESTIONNAIRE - PHQ9
SUM OF ALL RESPONSES TO PHQ QUESTIONS 1-9: 0
1. LITTLE INTEREST OR PLEASURE IN DOING THINGS: 0
SUM OF ALL RESPONSES TO PHQ9 QUESTIONS 1 & 2: 0
SUM OF ALL RESPONSES TO PHQ QUESTIONS 1-9: 0
2. FEELING DOWN, DEPRESSED OR HOPELESS: 0
SUM OF ALL RESPONSES TO PHQ QUESTIONS 1-9: 0
SUM OF ALL RESPONSES TO PHQ QUESTIONS 1-9: 0

## 2024-01-09 ASSESSMENT — LIFESTYLE VARIABLES
HOW OFTEN DO YOU HAVE A DRINK CONTAINING ALCOHOL: NEVER
HOW MANY STANDARD DRINKS CONTAINING ALCOHOL DO YOU HAVE ON A TYPICAL DAY: PATIENT DOES NOT DRINK

## 2024-01-09 NOTE — PROGRESS NOTES
Jameel Rueda is a 53 y.o. male who presents for evaluation of AWV.  Last seen by me April 24, 2023.  Weight down 9 lbs, suspicious due to hyperglycemia.  A1c was 10.4 last visit, and he never started farxiga.  Has been taking rybelsus.  Has not been checking his sugars much, maybe every few weeks.  When he last checked was 'hi'.  Admits to drinking lots of sweet tea.    GF with him today.      ROS:  Constitutional: negative for fevers, chills, anorexia and weight loss  Eyes:   negative for visual disturbance and irritation  ENT:   negative for tinnitus,sore throat,nasal congestion,ear pain,hoarseness  Respiratory:  negative for cough, hemoptysis, dyspnea,wheezing  CV:   negative for chest pain, palpitations, lower extremity edema  GI:   negative for nausea, vomiting, diarrhea, abdominal pain,melena  Genitourinary: negative for frequency, dysuria and hematuria  Musculoskel: negative for myalgias, arthralgias, back pain, muscle weakness, joint pain  Neurological:  negative for headaches, dizziness, focal weakness, numbness  Psychiatric:     Negative for depression or anxiety      Past Medical History:   Diagnosis Date    Bilateral carotid artery stenosis     Diabetic peripheral neuropathy associated with type 2 diabetes mellitus (HCC)     Foot fracture     H/O aortic dissection     Hollenhorst plaque, right eye     Hypertension     Jaw fracture (HCC)     Ruptured ear drum     Subjective visual disturbance, right eye     Thumb fracture        Past Surgical History:   Procedure Laterality Date    APPENDECTOMY      ARTERIAL BYPASS SURGRY  02/15/2019    ORTHOPEDIC SURGERY      OTHER SURGICAL HISTORY  02/15/2019    8 stints    OTHER SURGICAL HISTORY  02/15/2019    Double bypass       Family History   Problem Relation Age of Onset    Stroke Mother     Colon Cancer Father     No Known Problems Child     Diabetes Mother     Cancer Brother        Social History     Socioeconomic History    Marital status: Single     Spouse

## 2024-01-09 NOTE — PATIENT INSTRUCTIONS
the number or symbol using a pointer.  How do these tests feel?  There is very little chance of having a problem from this test. If dilating drops are used for a vision test, they may make the eyes sting and cause a medicine taste in the mouth.  Follow-up care is a key part of your treatment and safety. Be sure to make and go to all appointments, and call your doctor if you are having problems. It's also a good idea to know your test results and keep a list of the medicines you take.  Where can you learn more?  Go to https://www.[employer].net/patientEd and enter G551 to learn more about \"Learning About Vision Tests.\"  Current as of: June 6, 2023               Content Version: 13.9  © 2006-2023 [employer].   Care instructions adapted under license by [employer]. If you have questions about a medical condition or this instruction, always ask your healthcare professional. [employer] disclaims any warranty or liability for your use of this information.           Advance Directives: Care Instructions  Overview  An advance directive is a legal way to state your wishes at the end of your life. It tells your family and your doctor what to do if you can't say what you want.  There are two main types of advance directives. You can change them any time your wishes change.  Living will.  This form tells your family and your doctor your wishes about life support and other treatment. The form is also called a declaration.  Medical power of .  This form lets you name a person to make treatment decisions for you when you can't speak for yourself. This person is called a health care agent (health care proxy, health care surrogate). The form is also called a durable power of  for health care.  If you do not have an advance directive, decisions about your medical care may be made by a family member, or by a doctor or a  who doesn't know you.  It may help to think of an advance

## 2024-01-10 LAB
ALBUMIN SERPL-MCNC: 4.3 G/DL (ref 3.5–5)
ALBUMIN/GLOB SERPL: 1.3 (ref 1.1–2.2)
ALP SERPL-CCNC: 96 U/L (ref 45–117)
ALT SERPL-CCNC: 27 U/L (ref 12–78)
ANION GAP SERPL CALC-SCNC: 8 MMOL/L (ref 5–15)
AST SERPL-CCNC: 12 U/L (ref 15–37)
BASOPHILS # BLD: 0 K/UL (ref 0–0.1)
BASOPHILS NFR BLD: 0 % (ref 0–1)
BILIRUB SERPL-MCNC: 0.8 MG/DL (ref 0.2–1)
BUN SERPL-MCNC: 22 MG/DL (ref 6–20)
BUN/CREAT SERPL: 13 (ref 12–20)
CALCIUM SERPL-MCNC: 9 MG/DL (ref 8.5–10.1)
CHLORIDE SERPL-SCNC: 104 MMOL/L (ref 97–108)
CHOLEST SERPL-MCNC: 102 MG/DL
CO2 SERPL-SCNC: 27 MMOL/L (ref 21–32)
CREAT SERPL-MCNC: 1.66 MG/DL (ref 0.7–1.3)
CREAT UR-MCNC: 62.4 MG/DL
DIFFERENTIAL METHOD BLD: NORMAL
EOSINOPHIL # BLD: 0.2 K/UL (ref 0–0.4)
EOSINOPHIL NFR BLD: 3 % (ref 0–7)
ERYTHROCYTE [DISTWIDTH] IN BLOOD BY AUTOMATED COUNT: 14.5 % (ref 11.5–14.5)
EST. AVERAGE GLUCOSE BLD GHB EST-MCNC: 289 MG/DL
GLOBULIN SER CALC-MCNC: 3.3 G/DL (ref 2–4)
GLUCOSE SERPL-MCNC: 326 MG/DL (ref 65–100)
HBA1C MFR BLD: 11.7 % (ref 4–5.6)
HCT VFR BLD AUTO: 45.2 % (ref 36.6–50.3)
HDLC SERPL-MCNC: 30 MG/DL
HDLC SERPL: 3.4 (ref 0–5)
HGB BLD-MCNC: 14.4 G/DL (ref 12.1–17)
HIV 1+2 AB+HIV1 P24 AG SERPL QL IA: NONREACTIVE
HIV 1/2 RESULT COMMENT: NORMAL
IMM GRANULOCYTES # BLD AUTO: 0 K/UL (ref 0–0.04)
IMM GRANULOCYTES NFR BLD AUTO: 0 % (ref 0–0.5)
LDLC SERPL CALC-MCNC: 28.6 MG/DL (ref 0–100)
LYMPHOCYTES # BLD: 2.3 K/UL (ref 0.8–3.5)
LYMPHOCYTES NFR BLD: 35 % (ref 12–49)
MCH RBC QN AUTO: 26.4 PG (ref 26–34)
MCHC RBC AUTO-ENTMCNC: 31.9 G/DL (ref 30–36.5)
MCV RBC AUTO: 82.9 FL (ref 80–99)
MICROALBUMIN UR-MCNC: <0.5 MG/DL
MICROALBUMIN/CREAT UR-RTO: <8 MG/G (ref 0–30)
MONOCYTES # BLD: 0.5 K/UL (ref 0–1)
MONOCYTES NFR BLD: 7 % (ref 5–13)
NEUTS SEG # BLD: 3.5 K/UL (ref 1.8–8)
NEUTS SEG NFR BLD: 55 % (ref 32–75)
NRBC # BLD: 0 K/UL (ref 0–0.01)
NRBC BLD-RTO: 0 PER 100 WBC
PLATELET # BLD AUTO: 184 K/UL (ref 150–400)
PMV BLD AUTO: 11.7 FL (ref 8.9–12.9)
POTASSIUM SERPL-SCNC: 3.6 MMOL/L (ref 3.5–5.1)
PROT SERPL-MCNC: 7.6 G/DL (ref 6.4–8.2)
PSA SERPL-MCNC: 1.3 NG/ML (ref 0.01–4)
RBC # BLD AUTO: 5.45 M/UL (ref 4.1–5.7)
SODIUM SERPL-SCNC: 139 MMOL/L (ref 136–145)
TRIGL SERPL-MCNC: 217 MG/DL
TSH SERPL DL<=0.05 MIU/L-ACNC: 1.01 UIU/ML (ref 0.36–3.74)
VLDLC SERPL CALC-MCNC: 43.4 MG/DL
WBC # BLD AUTO: 6.5 K/UL (ref 4.1–11.1)

## 2024-01-12 RX ORDER — ORAL SEMAGLUTIDE 14 MG/1
14 TABLET ORAL
Qty: 30 TABLET | Refills: 11 | Status: SHIPPED | OUTPATIENT
Start: 2024-01-12

## 2024-01-12 NOTE — RESULT ENCOUNTER NOTE
Called, spoke to pt.  Two pt identifiers confirmed.     Patient informed of lab results per. Dr. Hu      See message below.    Dm worse.  A1c up to 11.7, for average sugar of 289.  Let's double dose of rybelsus to 14 mg, so use up what you have by taking 2 each morning first thing.    Also need to make sure to be taking farxiga every day.  Also need to start eating better, eliminate sugar in drinks and in snacks.  Next step if sugars don't improve with be insulin.  Other labs look good, so continue other meds as before.      Patient verbalized understanding of information discussed with no further questions at this time.     Erin Ozuna LPN

## 2024-01-15 RX ORDER — AMLODIPINE BESYLATE 10 MG/1
10 TABLET ORAL DAILY
Qty: 90 TABLET | Refills: 3 | Status: SHIPPED | OUTPATIENT
Start: 2024-01-15

## 2024-01-29 RX ORDER — LEVOCETIRIZINE DIHYDROCHLORIDE 5 MG/1
TABLET, FILM COATED ORAL
Qty: 30 TABLET | Refills: 5 | Status: SHIPPED | OUTPATIENT
Start: 2024-01-29

## 2024-02-19 ENCOUNTER — TRANSCRIBE ORDERS (OUTPATIENT)
Facility: HOSPITAL | Age: 54
End: 2024-02-19

## 2024-02-19 DIAGNOSIS — I71.03 DISSECTION OF AORTA, THORACOABDOMINAL (HCC): ICD-10-CM

## 2024-02-19 DIAGNOSIS — I71.00 MEDIONECROSIS OF AORTA (HCC): Primary | ICD-10-CM

## 2024-02-19 RX ORDER — ATORVASTATIN CALCIUM 40 MG/1
TABLET, FILM COATED ORAL NIGHTLY
Qty: 90 TABLET | Refills: 3 | Status: SHIPPED | OUTPATIENT
Start: 2024-02-19

## 2024-02-19 RX ORDER — CLOPIDOGREL BISULFATE 75 MG/1
75 TABLET ORAL DAILY
Qty: 90 TABLET | Refills: 3 | Status: SHIPPED | OUTPATIENT
Start: 2024-02-19

## 2024-02-20 ENCOUNTER — HOSPITAL ENCOUNTER (OUTPATIENT)
Facility: HOSPITAL | Age: 54
Discharge: HOME | End: 2024-02-23
Payer: MEDICAID

## 2024-02-20 DIAGNOSIS — I71.00 MEDIONECROSIS OF AORTA (HCC): ICD-10-CM

## 2024-02-20 DIAGNOSIS — I71.02 DISSECTION OF AORTA, ABDOMINAL (HCC): ICD-10-CM

## 2024-02-20 DIAGNOSIS — I71.03 DISSECTION OF AORTA, THORACOABDOMINAL (HCC): ICD-10-CM

## 2024-02-20 LAB — CREAT BLD-MCNC: 1.6 MG/DL (ref 0.6–1.3)

## 2024-02-20 PROCEDURE — 74174 CTA ABD&PLVS W/CONTRAST: CPT

## 2024-02-20 PROCEDURE — 6360000004 HC RX CONTRAST MEDICATION: Performed by: INTERNAL MEDICINE

## 2024-02-20 PROCEDURE — 82565 ASSAY OF CREATININE: CPT

## 2024-02-20 RX ADMIN — IOPAMIDOL 100 ML: 755 INJECTION, SOLUTION INTRAVENOUS at 15:38

## 2024-02-26 RX ORDER — HYDRALAZINE HYDROCHLORIDE 50 MG/1
50 TABLET, FILM COATED ORAL 3 TIMES DAILY
Qty: 270 TABLET | Refills: 3 | Status: SHIPPED | OUTPATIENT
Start: 2024-02-26

## 2024-02-28 RX ORDER — CARVEDILOL 12.5 MG/1
12.5 TABLET ORAL 2 TIMES DAILY WITH MEALS
Qty: 180 TABLET | Refills: 3 | Status: SHIPPED | OUTPATIENT
Start: 2024-02-28

## 2024-02-29 ENCOUNTER — TELEPHONE (OUTPATIENT)
Age: 54
End: 2024-02-29
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
## 2024-03-01 ENCOUNTER — OFFICE VISIT (OUTPATIENT)
Age: 54
End: 2024-03-01
Payer: MEDICARE

## 2024-03-01 VITALS
HEIGHT: 77 IN | RESPIRATION RATE: 20 BRPM | TEMPERATURE: 98.6 F | OXYGEN SATURATION: 98 % | HEART RATE: 84 BPM | WEIGHT: 232.4 LBS | BODY MASS INDEX: 27.44 KG/M2 | SYSTOLIC BLOOD PRESSURE: 130 MMHG | DIASTOLIC BLOOD PRESSURE: 73 MMHG

## 2024-03-01 DIAGNOSIS — I71.40 ABDOMINAL AORTIC ANEURYSM (AAA) WITHOUT RUPTURE, UNSPECIFIED PART (HCC): ICD-10-CM

## 2024-03-01 DIAGNOSIS — E11.42 TYPE 2 DIABETES MELLITUS WITH DIABETIC POLYNEUROPATHY, WITHOUT LONG-TERM CURRENT USE OF INSULIN (HCC): ICD-10-CM

## 2024-03-01 DIAGNOSIS — I10 ESSENTIAL HYPERTENSION: ICD-10-CM

## 2024-03-01 DIAGNOSIS — T14.8XXA TORN LIGAMENT: ICD-10-CM
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
## 2024-03-01 DIAGNOSIS — T14.8XXA ABRASION OF SKIN: ICD-10-CM

## 2024-03-01 PROCEDURE — G8427 DOCREV CUR MEDS BY ELIG CLIN: HCPCS | Performed by: INTERNAL MEDICINE

## 2024-03-01 PROCEDURE — G8419 CALC BMI OUT NRM PARAM NOF/U: HCPCS | Performed by: INTERNAL MEDICINE

## 2024-03-01 PROCEDURE — 3078F DIAST BP <80 MM HG: CPT | Performed by: INTERNAL MEDICINE

## 2024-03-01 PROCEDURE — 99214 OFFICE O/P EST MOD 30 MIN: CPT | Performed by: INTERNAL MEDICINE

## 2024-03-01 PROCEDURE — 3017F COLORECTAL CA SCREEN DOC REV: CPT | Performed by: INTERNAL MEDICINE

## 2024-03-01 PROCEDURE — 3046F HEMOGLOBIN A1C LEVEL >9.0%: CPT | Performed by: INTERNAL MEDICINE

## 2024-03-01 PROCEDURE — G8484 FLU IMMUNIZE NO ADMIN: HCPCS | Performed by: INTERNAL MEDICINE

## 2024-03-01 PROCEDURE — 2022F DILAT RTA XM EVC RTNOPTHY: CPT | Performed by: INTERNAL MEDICINE

## 2024-03-01 PROCEDURE — 3075F SYST BP GE 130 - 139MM HG: CPT | Performed by: INTERNAL MEDICINE

## 2024-03-01 PROCEDURE — 1036F TOBACCO NON-USER: CPT | Performed by: INTERNAL MEDICINE

## 2024-03-01 RX ORDER — LIDOCAINE 50 MG/G
PATCH TOPICAL
Qty: 30 PATCH | Refills: 1 | Status: SHIPPED | OUTPATIENT
Start: 2024-03-01

## 2024-03-01 RX ORDER — LIDOCAINE 50 MG/G
PATCH TOPICAL
Qty: 30 PATCH | Refills: 1 | Status: SHIPPED | OUTPATIENT
Start: 2024-03-01 | End: 2024-03-01 | Stop reason: SDUPTHER

## 2024-03-01 SDOH — ECONOMIC STABILITY: HOUSING INSECURITY
IN THE LAST 12 MONTHS, WAS THERE A TIME WHEN YOU DID NOT HAVE A STEADY PLACE TO SLEEP OR SLEPT IN A SHELTER (INCLUDING NOW)?: NO

## 2024-03-01 SDOH — ECONOMIC STABILITY: INCOME INSECURITY: HOW HARD IS IT FOR YOU TO PAY FOR THE VERY BASICS LIKE FOOD, HOUSING, MEDICAL CARE, AND HEATING?: NOT HARD AT ALL

## 2024-03-01 SDOH — ECONOMIC STABILITY: FOOD INSECURITY: WITHIN THE PAST 12 MONTHS, YOU WORRIED THAT YOUR FOOD WOULD RUN OUT BEFORE YOU GOT MONEY TO BUY MORE.: NEVER TRUE

## 2024-03-01 SDOH — ECONOMIC STABILITY: FOOD INSECURITY: WITHIN THE PAST 12 MONTHS, THE FOOD YOU BOUGHT JUST DIDN'T LAST AND YOU DIDN'T HAVE MONEY TO GET MORE.: NEVER TRUE

## 2024-03-01 ASSESSMENT — PATIENT HEALTH QUESTIONNAIRE - PHQ9
SUM OF ALL RESPONSES TO PHQ9 QUESTIONS 1 & 2: 0
2. FEELING DOWN, DEPRESSED OR HOPELESS: 0
1. LITTLE INTEREST OR PLEASURE IN DOING THINGS: 0
SUM OF ALL RESPONSES TO PHQ QUESTIONS 1-9: 0

## 2024-03-01 NOTE — PATIENT INSTRUCTIONS
Good job getting your sugars down.  A1c is now 9.1.  keep working at it.  Want it to be under 8.5 at next visit.

## 2024-03-01 NOTE — PROGRESS NOTES
\"Have you been to the ER, urgent care clinic since your last visit?  Hospitalized since your last visit?\"    YES - When: approximately 2/16/24 ago.  Where and Why: [facility].    “Have you seen or consulted any other health care providers outside of [facility] since your last visit?”    NO
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
## 2024-03-22 ENCOUNTER — TRANSCRIBE ORDERS (OUTPATIENT)
Facility: HOSPITAL | Age: 54
End: 2024-03-22

## 2024-03-22 DIAGNOSIS — S63.415A TRAUMATIC RUPTURE OF COLLATERAL LIGAMENT OF LEFT RING FINGER, INITIAL ENCOUNTER: Primary | ICD-10-CM

## 2024-03-25 RX ORDER — LEVOCETIRIZINE DIHYDROCHLORIDE 5 MG/1
TABLET, FILM COATED ORAL
Qty: 30 TABLET | Refills: 5 | Status: SHIPPED | OUTPATIENT
Start: 2024-03-25

## 2024-03-26 RX ORDER — FERROUS SULFATE 325(65) MG
1 TABLET ORAL
Qty: 90 TABLET | Refills: 3 | Status: SHIPPED | OUTPATIENT
Start: 2024-03-26

## 2024-03-26 NOTE — TELEPHONE ENCOUNTER
Pt states he needs a refill on iron tablets which he is giving me the spelling of ferroga 25 mg/different from what is in chart.    Pt also needs another medication, but threw the bottle away when he called into pharm.  He does not know the name of med needed.  Can we call pharm?      [facility] [place] #[id number] [facility]

## 2024-03-26 NOTE — TELEPHONE ENCOUNTER
PCP: Arturo Hu III,     Last appt: 3/1/2024  Future Appointments   Date Time Provider Department Center   3/29/2024  3:00 PM [facility] MRI 1 [facility]   5/2/2024 10:00 AM Arturo Hu III,  [facility] AMB       Requested Prescriptions     Pending Prescriptions Disp Refills    ferrous sulfate (SV IRON) 325 (65 Fe) MG tablet 90 tablet 1     Sig: Take 1 tablet by mouth every morning (before breakfast)

## 2024-03-29 ENCOUNTER — HOSPITAL ENCOUNTER (OUTPATIENT)
Facility: HOSPITAL | Age: 54
End: 2024-03-29
Payer: MEDICARE

## 2024-03-29 DIAGNOSIS — S63.415A TRAUMATIC RUPTURE OF COLLATERAL LIGAMENT OF LEFT RING FINGER, INITIAL ENCOUNTER: ICD-10-CM

## 2024-03-29 PROCEDURE — 73218 MRI UPPER EXTREMITY W/O DYE: CPT

## 2024-04-04 RX ORDER — DOCUSATE SODIUM 100 MG/1
100 CAPSULE, LIQUID FILLED ORAL 2 TIMES DAILY
COMMUNITY

## 2024-04-05 ENCOUNTER — ANESTHESIA EVENT (OUTPATIENT)
Facility: HOSPITAL | Age: 54
End: 2024-04-05
Payer: MEDICARE

## 2024-04-05 NOTE — H&P
ulcers.  Neurological: See below.  Psychiatric: Alert and oriented x3.    Musculoskeletal     Examination of the left hand demonstrates at the ring finger there is instability noted at the MCP joint worse ulnarly. There is tenderness to palpation at the MCP joint. Neurovascular intact distally.    Radiographs:   Order: XR FINGER 2+ VW LEFT - Indication: Finger pain, left    X-ray finger left 2+ views ([id number])    Result Date: 3/21/2024  Sitting. PA, External Oblique , Lateral . Ring.     Impression: X-rays are ordered and reviewed and independently interpreted by myself today. No acute bony abnormality is appreciated. Ring finger MCP joint well located.     MRI HAND LEFT WO CONTRAST  Order: [id number]  Status: Final result       Visible to patient: No (not released)       Next appt: 05/02/2024 at 10:00 AM in Internal Medicine (Arturo Hu DO)       Dx: Traumatic rupture of collateral ligam...    0 Result Notes  Details    Reading Physician Reading Date Result Priority   Laz Escobar MD  [phone] 4/1/2024      Narrative & Impression  INDICATION:  Traumatic rupture of collateral ligament of left ring finger at  metacarpophalangeal and interphalangeal joint, initial encounter      EXAM: MRI left hand. Sequences include multiplanar T1 and T2-weighted images  with and without fat saturation     FINDINGS: There is a full-thickness tear of the radial collateral ligament  fourth carpometacarpal joint. Tear has occurred distally with proximal  retraction of the ligament. Ulnar collateral ligament is intact. There is a  joint effusion and fluid in the adjacent soft tissues.     Flexor and extensor tendons are intact. There is no acute fracture or bone  destruction. Muscle signal is normal. Joint spaces are preserved. No erosive  change. No discrete mass lesion.     IMPRESSION:  1. Full-thickness tear radial collateral ligament for CMC joint          Assessment:     1. Finger pain, left   2. Traumatic rupture of

## 2024-04-08 ENCOUNTER — ANESTHESIA (OUTPATIENT)
Facility: HOSPITAL | Age: 54
End: 2024-04-08
Payer: MEDICARE

## 2024-04-08 ENCOUNTER — HOSPITAL ENCOUNTER (OUTPATIENT)
Facility: HOSPITAL | Age: 54
Setting detail: OUTPATIENT SURGERY
Discharge: HOME | End: 2024-04-08
Attending: ORTHOPAEDIC SURGERY | Admitting: ORTHOPAEDIC SURGERY
Payer: MEDICARE

## 2024-04-08 VITALS
TEMPERATURE: 97.5 F | OXYGEN SATURATION: 97 % | HEART RATE: 74 BPM | HEIGHT: 77 IN | RESPIRATION RATE: 13 BRPM | BODY MASS INDEX: 27.51 KG/M2 | WEIGHT: 233 LBS | DIASTOLIC BLOOD PRESSURE: 83 MMHG | SYSTOLIC BLOOD PRESSURE: 126 MMHG

## 2024-04-08 DIAGNOSIS — G89.18 POST-OP PAIN: Primary | ICD-10-CM

## 2024-04-08 LAB
GLUCOSE BLD STRIP.AUTO-MCNC: 139 MG/DL (ref 65–117)
SERVICE CMNT-IMP: ABNORMAL

## 2024-04-08 PROCEDURE — C1713 ANCHOR/SCREW BN/BN,TIS/BN: HCPCS | Performed by: ORTHOPAEDIC SURGERY

## 2024-04-08 PROCEDURE — 2580000003 HC RX 258: Performed by: ORTHOPAEDIC SURGERY

## 2024-04-08 PROCEDURE — A4217 STERILE WATER/SALINE, 500 ML: HCPCS | Performed by: ORTHOPAEDIC SURGERY

## 2024-04-08 PROCEDURE — 6360000002 HC RX W HCPCS: Performed by: NURSE ANESTHETIST, CERTIFIED REGISTERED

## 2024-04-08 PROCEDURE — 3600000013 HC SURGERY LEVEL 3 ADDTL 15MIN: Performed by: ORTHOPAEDIC SURGERY

## 2024-04-08 PROCEDURE — 7100000010 HC PHASE II RECOVERY - FIRST 15 MIN: Performed by: ORTHOPAEDIC SURGERY

## 2024-04-08 PROCEDURE — 6360000002 HC RX W HCPCS: Performed by: ORTHOPAEDIC SURGERY

## 2024-04-08 PROCEDURE — 6360000002 HC RX W HCPCS: Performed by: ANESTHESIOLOGY

## 2024-04-08 PROCEDURE — 7100000011 HC PHASE II RECOVERY - ADDTL 15 MIN: Performed by: ORTHOPAEDIC SURGERY

## 2024-04-08 PROCEDURE — 3700000001 HC ADD 15 MINUTES (ANESTHESIA): Performed by: ORTHOPAEDIC SURGERY

## 2024-04-08 PROCEDURE — 3700000000 HC ANESTHESIA ATTENDED CARE: Performed by: ORTHOPAEDIC SURGERY

## 2024-04-08 PROCEDURE — 7100000000 HC PACU RECOVERY - FIRST 15 MIN: Performed by: ORTHOPAEDIC SURGERY

## 2024-04-08 PROCEDURE — 2709999900 HC NON-CHARGEABLE SUPPLY: Performed by: ORTHOPAEDIC SURGERY

## 2024-04-08 PROCEDURE — 3600000003 HC SURGERY LEVEL 3 BASE: Performed by: ORTHOPAEDIC SURGERY

## 2024-04-08 PROCEDURE — 82962 GLUCOSE BLOOD TEST: CPT

## 2024-04-08 PROCEDURE — 7100000001 HC PACU RECOVERY - ADDTL 15 MIN: Performed by: ORTHOPAEDIC SURGERY

## 2024-04-08 PROCEDURE — 2580000003 HC RX 258: Performed by: ANESTHESIOLOGY

## 2024-04-08 DEVICE — SUTURE ANCHOR, MICRO CORKSCREW FT
Type: IMPLANTABLE DEVICE | Site: RING FINGER | Status: FUNCTIONAL
Brand: ARTHREX®

## 2024-04-08 RX ORDER — MIDAZOLAM HYDROCHLORIDE 1 MG/ML
2 INJECTION, SOLUTION INTRAMUSCULAR; INTRAVENOUS
Status: DISCONTINUED | OUTPATIENT
Start: 2024-04-08 | End: 2024-04-08 | Stop reason: HOSPADM

## 2024-04-08 RX ORDER — LIDOCAINE HYDROCHLORIDE 10 MG/ML
1 INJECTION, SOLUTION EPIDURAL; INFILTRATION; INTRACAUDAL; PERINEURAL
Status: DISCONTINUED | OUTPATIENT
Start: 2024-04-08 | End: 2024-04-08 | Stop reason: HOSPADM

## 2024-04-08 RX ORDER — OXYCODONE HYDROCHLORIDE 5 MG/1
5 TABLET ORAL EVERY 4 HOURS PRN
Qty: 30 TABLET | Refills: 0 | Status: SHIPPED | OUTPATIENT
Start: 2024-04-08 | End: 2024-04-08 | Stop reason: HOSPADM

## 2024-04-08 RX ORDER — SODIUM CHLORIDE 0.9 % (FLUSH) 0.9 %
5-40 SYRINGE (ML) INJECTION EVERY 12 HOURS SCHEDULED
Status: DISCONTINUED | OUTPATIENT
Start: 2024-04-08 | End: 2024-04-08 | Stop reason: HOSPADM

## 2024-04-08 RX ORDER — ONDANSETRON 2 MG/ML
4 INJECTION INTRAMUSCULAR; INTRAVENOUS
Status: DISCONTINUED | OUTPATIENT
Start: 2024-04-08 | End: 2024-04-08 | Stop reason: HOSPADM

## 2024-04-08 RX ORDER — SODIUM CHLORIDE 0.9 % (FLUSH) 0.9 %
5-40 SYRINGE (ML) INJECTION PRN
Status: DISCONTINUED | OUTPATIENT
Start: 2024-04-08 | End: 2024-04-08 | Stop reason: HOSPADM

## 2024-04-08 RX ORDER — NALOXONE HYDROCHLORIDE 0.4 MG/ML
INJECTION, SOLUTION INTRAMUSCULAR; INTRAVENOUS; SUBCUTANEOUS PRN
Status: DISCONTINUED | OUTPATIENT
Start: 2024-04-08 | End: 2024-04-08 | Stop reason: HOSPADM

## 2024-04-08 RX ORDER — SODIUM CHLORIDE 9 MG/ML
INJECTION, SOLUTION INTRAVENOUS PRN
Status: DISCONTINUED | OUTPATIENT
Start: 2024-04-08 | End: 2024-04-08 | Stop reason: HOSPADM

## 2024-04-08 RX ORDER — SODIUM CHLORIDE, SODIUM LACTATE, POTASSIUM CHLORIDE, CALCIUM CHLORIDE 600; 310; 30; 20 MG/100ML; MG/100ML; MG/100ML; MG/100ML
INJECTION, SOLUTION INTRAVENOUS CONTINUOUS
Status: DISCONTINUED | OUTPATIENT
Start: 2024-04-08 | End: 2024-04-08 | Stop reason: HOSPADM

## 2024-04-08 RX ORDER — FENTANYL CITRATE 50 UG/ML
25 INJECTION, SOLUTION INTRAMUSCULAR; INTRAVENOUS EVERY 5 MIN PRN
Status: DISCONTINUED | OUTPATIENT
Start: 2024-04-08 | End: 2024-04-08 | Stop reason: HOSPADM

## 2024-04-08 RX ORDER — ONDANSETRON 4 MG/1
4 TABLET, ORALLY DISINTEGRATING ORAL 3 TIMES DAILY PRN
Qty: 21 TABLET | Refills: 0 | Status: SHIPPED | OUTPATIENT
Start: 2024-04-08

## 2024-04-08 RX ORDER — WATER 10 ML/10ML
INJECTION INTRAMUSCULAR; INTRAVENOUS; SUBCUTANEOUS
Status: DISCONTINUED
Start: 2024-04-08 | End: 2024-04-08 | Stop reason: HOSPADM

## 2024-04-08 RX ORDER — DROPERIDOL 2.5 MG/ML
0.62 INJECTION, SOLUTION INTRAMUSCULAR; INTRAVENOUS
Status: DISCONTINUED | OUTPATIENT
Start: 2024-04-08 | End: 2024-04-08 | Stop reason: HOSPADM

## 2024-04-08 RX ORDER — OXYCODONE HYDROCHLORIDE 5 MG/1
5 TABLET ORAL EVERY 4 HOURS PRN
Qty: 30 TABLET | Refills: 0 | Status: SHIPPED | OUTPATIENT
Start: 2024-04-08 | End: 2024-04-14

## 2024-04-08 RX ORDER — ROPIVACAINE HYDROCHLORIDE 5 MG/ML
INJECTION, SOLUTION EPIDURAL; INFILTRATION; PERINEURAL
Status: COMPLETED
Start: 2024-04-08 | End: 2024-04-08

## 2024-04-08 RX ORDER — OXYCODONE HYDROCHLORIDE 5 MG/1
10 TABLET ORAL PRN
Status: DISCONTINUED | OUTPATIENT
Start: 2024-04-08 | End: 2024-04-08 | Stop reason: HOSPADM

## 2024-04-08 RX ORDER — KETOROLAC TROMETHAMINE 30 MG/ML
15 INJECTION, SOLUTION INTRAMUSCULAR; INTRAVENOUS
Status: DISCONTINUED | OUTPATIENT
Start: 2024-04-08 | End: 2024-04-08 | Stop reason: HOSPADM

## 2024-04-08 RX ORDER — MEPERIDINE HYDROCHLORIDE 25 MG/ML
12.5 INJECTION INTRAMUSCULAR; INTRAVENOUS; SUBCUTANEOUS EVERY 5 MIN PRN
Status: DISCONTINUED | OUTPATIENT
Start: 2024-04-08 | End: 2024-04-08 | Stop reason: HOSPADM

## 2024-04-08 RX ORDER — MIDAZOLAM HYDROCHLORIDE 1 MG/ML
INJECTION INTRAMUSCULAR; INTRAVENOUS
Status: COMPLETED | OUTPATIENT
Start: 2024-04-08 | End: 2024-04-08

## 2024-04-08 RX ORDER — MIDAZOLAM HYDROCHLORIDE 1 MG/ML
INJECTION INTRAMUSCULAR; INTRAVENOUS
Status: COMPLETED
Start: 2024-04-08 | End: 2024-04-08

## 2024-04-08 RX ORDER — CEFAZOLIN SODIUM 1 G/3ML
INJECTION, POWDER, FOR SOLUTION INTRAMUSCULAR; INTRAVENOUS
Status: DISCONTINUED
Start: 2024-04-08 | End: 2024-04-08 | Stop reason: HOSPADM

## 2024-04-08 RX ORDER — ROPIVACAINE HYDROCHLORIDE 5 MG/ML
INJECTION, SOLUTION EPIDURAL; INFILTRATION; PERINEURAL
Status: COMPLETED | OUTPATIENT
Start: 2024-04-08 | End: 2024-04-08

## 2024-04-08 RX ORDER — ONDANSETRON 4 MG/1
4 TABLET, ORALLY DISINTEGRATING ORAL 3 TIMES DAILY PRN
Qty: 21 TABLET | Refills: 0 | Status: SHIPPED | OUTPATIENT
Start: 2024-04-08 | End: 2024-04-08 | Stop reason: HOSPADM

## 2024-04-08 RX ORDER — FENTANYL CITRATE 50 UG/ML
INJECTION, SOLUTION INTRAMUSCULAR; INTRAVENOUS PRN
Status: DISCONTINUED | OUTPATIENT
Start: 2024-04-08 | End: 2024-04-08 | Stop reason: SDUPTHER

## 2024-04-08 RX ORDER — ACETAMINOPHEN 500 MG
1000 TABLET ORAL
Status: DISCONTINUED | OUTPATIENT
Start: 2024-04-08 | End: 2024-04-08 | Stop reason: HOSPADM

## 2024-04-08 RX ORDER — OXYCODONE HYDROCHLORIDE 5 MG/1
5 TABLET ORAL PRN
Status: DISCONTINUED | OUTPATIENT
Start: 2024-04-08 | End: 2024-04-08 | Stop reason: HOSPADM

## 2024-04-08 RX ORDER — MAGNESIUM HYDROXIDE 1200 MG/15ML
LIQUID ORAL CONTINUOUS PRN
Status: COMPLETED | OUTPATIENT
Start: 2024-04-08 | End: 2024-04-08

## 2024-04-08 RX ADMIN — PROPOFOL 100 MCG/KG/MIN: 10 INJECTION, EMULSION INTRAVENOUS at 09:22

## 2024-04-08 RX ADMIN — ROPIVACAINE HYDROCHLORIDE 30 ML: 5 INJECTION, SOLUTION EPIDURAL; INFILTRATION; PERINEURAL at 09:05

## 2024-04-08 RX ADMIN — MIDAZOLAM HYDROCHLORIDE 3 MG: 1 INJECTION, SOLUTION INTRAMUSCULAR; INTRAVENOUS at 08:59

## 2024-04-08 RX ADMIN — FENTANYL CITRATE 50 MCG: 50 INJECTION, SOLUTION INTRAMUSCULAR; INTRAVENOUS at 09:34

## 2024-04-08 RX ADMIN — SODIUM CHLORIDE, POTASSIUM CHLORIDE, SODIUM LACTATE AND CALCIUM CHLORIDE: 600; 310; 30; 20 INJECTION, SOLUTION INTRAVENOUS at 08:32

## 2024-04-08 RX ADMIN — WATER 2000 MG: 1 INJECTION INTRAMUSCULAR; INTRAVENOUS; SUBCUTANEOUS at 09:23

## 2024-04-08 ASSESSMENT — PAIN SCALES - GENERAL
PAINLEVEL_OUTOF10: 0
PAINLEVEL_OUTOF10: 0

## 2024-04-08 ASSESSMENT — PAIN - FUNCTIONAL ASSESSMENT: PAIN_FUNCTIONAL_ASSESSMENT: 0-10

## 2024-04-08 NOTE — ANESTHESIA PRE PROCEDURE
Department of Anesthesiology  Preprocedure Note       Name:  Jameel Rueda   Age:  53 y.o.  :  1970                                          MRN:  [id number]         Date:  2024      Surgeon: Surgeon(s):  Mehdi Gimenez MD    Procedure: Procedure(s):  LEFT RING FINGER RADIAL COLLATERAL LIGAMENT REPAIR (ANES REGIONAL BLOCK WITH MAC)    Medications prior to admission:   Prior to Admission medications    Medication Sig Start Date End Date Taking? Authorizing Provider   docusate sodium (COLACE) 100 MG capsule Take 1 capsule by mouth 2 times daily   Yes Provider, MD Ivy   ferrous sulfate (SV IRON) 325 (65 Fe) MG tablet Take 1 tablet by mouth every morning (before breakfast) 3/26/24   Arturo Hu III, DO   levocetirizine (XYZAL) 5 MG tablet Take 1 tablet by mouth once daily 3/25/24   Arturo Hu III, DO   lidocaine (LIDODERM) 5 % Apply patch to the affected area for 12 hours a day and remove for 12 hours a day.  Patient taking differently: Place 1 patch onto the skin daily Indications: for hip pain Apply patch to the affected area for 12 hours a day and remove for 12 hours a day. 3/1/24   Arturo Hu III, DO   carvedilol (COREG) 12.5 MG tablet TAKE 1 TABLET BY MOUTH TWICE DAILY WITH MEALS 24   Arturo Hu III, DO   hydrALAZINE (APRESOLINE) 50 MG tablet TAKE 1 TABLET BY MOUTH THREE TIMES DAILY 24   Arturo Hu III, DO   clopidogrel (PLAVIX) 75 MG tablet Take 1 tablet by mouth once daily  Patient taking differently: Take 1 tablet by mouth every morning 24   Arturo Hu III, DO   atorvastatin (LIPITOR) 40 MG tablet Take 1 tablet by mouth nightly 24   Arturo Hu III, DO   amLODIPine (NORVASC) 10 MG tablet Take 1 tablet by mouth once daily  Patient taking differently: Take 1 tablet by mouth every morning 1/15/24   Arturo Hu III, DO   Semaglutide (RYBELSUS) 14 MG TABS Take 14 mg by mouth every morning

## 2024-04-08 NOTE — ANESTHESIA POSTPROCEDURE EVALUATION
Department of Anesthesiology  Postprocedure Note    Patient: Jameel Rueda  MRN: [id number]  YOB: 1970  Date of evaluation: 4/8/2024    Procedure Summary       Date: 04/08/24 Room / Location: \[facility]\"" ASU  / \[facility]\"" AMBULATORY OR    Anesthesia Start: 0918 Anesthesia Stop: 1024    Procedure: LEFT RING FINGER RADIAL COLLATERAL LIGAMENT REPAIR (ANES REGIONAL BLOCK WITH MAC) (Left: Ring Finger) Diagnosis:       Traumatic rupture of collateral ligament of left ring finger, initial encounter      (Traumatic rupture of collateral ligament of left ring finger, initial encounter [S63.415A])    Surgeons: Mehdi Gimenez MD Responsible Provider: Tess Duffy MD    Anesthesia Type: MAC, Regional ASA Status: 3            Anesthesia Type: MAC, Regional    Alee Phase I: Alee Score: 7    Alee Phase II: Alee Score: 9    Anesthesia Post Evaluation    Patient location during evaluation: PACU  Patient participation: complete - patient participated  Level of consciousness: awake and alert  Pain score: 0  Airway patency: patent  Nausea & Vomiting: no nausea and no vomiting  Cardiovascular status: hemodynamically stable  Respiratory status: acceptable  Hydration status: euvolemic  Comments: Pt has Supraclavicular block; sling postop until block resolves.  Multimodal analgesia pain management approach  Pain management: satisfactory to patient    No notable events documented.

## 2024-04-08 NOTE — BRIEF OP NOTE
Brief Postoperative Note      Patient: Jameel Rueda  YOB: 1970  MRN: [id number]    Date of Procedure: 4/8/2024    Pre-Op Diagnosis Codes:     * Traumatic rupture of collateral ligament of left ring finger, initial encounter [S63.415A]    Post-Op Diagnosis: Same       Procedure(s):  LEFT RING FINGER RADIAL COLLATERAL LIGAMENT REPAIR (ANES REGIONAL BLOCK WITH MAC)    Surgeon(s):  Mehdi Gimenez MD    Assistant:  Physician Assistant: Lidia Baird PA-C    Anesthesia: Regional    Estimated Blood Loss (mL): Minimal (25 m TT)    Complications: None    Specimens:   * No specimens in log *    Implants:  Implant Name Type Inv. Item Serial No.  Lot No. LRB No. Used Action   ANCHOR SUT K WIRE DIA2.2MM IDA W/ 2-0 FIBERWIRE AND 2 NDL - SNA  ANCHOR SUT K WIRE DIA2.2MM IDA W/ 2-0 FIBERWIRE AND 2 NDL NA [employer] [id number] Left 1 Implanted         Drains: * No LDAs found *    Findings:  Infection Present At Time Of Surgery (PATOS) (choose all levels that have infection present):  No infection present  Other Findings: as expected    Electronically signed by Mehdi Gimenez MD on 4/8/2024 at 10:11 AM

## 2024-04-08 NOTE — OP NOTE
PATIENT NAME:  Jameel Rueda    SURGEON: Mehdi Gimenez MD    DATE OF SURGERY:  4/8/2024    LOCATION: [facility] ASU    PREOPERATIVE DIAGNOSIS:   left ring finger MCP joint radial collateral ligament rupture    POSTOPERATIVE DIAGNOSIS:  Same    PROCEDURE:  left ring finger radial collateral ligament repair of MCP joint          ANESTHESIA:  Block/sedation    BLOOD LOSS:  Minimal    TOURNIQUET TIME:  25 minutes    Assistant: Lidia Baird PA-C       OPERATIVE INDICATIONS:   They had sustained a traumatic complete rupture of the left ring finger MCP joint radial collateral ligament with significant instability. He was therefore indicated for surgical management.  After risks benefits and alternatives were discussed with the patient, they consented to proceed.     DESCRIPTION  OF PROCEDURE:   On the date of operation the patient presented stable to the holding area. The correct upper extremity was identified and marked.  Regional anesthesia was induced by the anesthesia team.  They were then brought to the operating room and placed supine with the operative upper extremity on a hand table and a nonsterile upper arm tourniquet placed.  The upper extremity was then prepped and draped in the standard sterile fashion.  After formal time-out was performed the upper extremity was elevated and exsanguinated and the upper arm tourniquet was inflated to 250 mm of mercury.     A curvilinear incision was made over the dorsal ring finger MCP joint. Skin and subcutaneous tissue was incised and the deeper soft tissues were carefully dissected. The sagittal band was divided and tagged for future repair. The MCP joint was entered. There was a complete radial collateral ligament rupture noted from the insertion at the proximal phalanx. This was debrided. The micro-FT anchor was then predrilled for and placed into the insertion site in the proximal phalanx. Correct location was confirmed on x-ray. The ligament was then repaired using

## 2024-04-08 NOTE — ANESTHESIA PROCEDURE NOTES
Peripheral Block    Patient location during procedure: pre-op  Reason for block: primary anesthetic and at surgeon's request  Start time: 4/8/2024 8:58 AM  End time: 4/8/2024 9:07 AM  Staffing  Performed: anesthesiologist   Anesthesiologist: Tess Duffy MD  Performed by: Tess Duffy MD  Authorized by: Tess Duffy MD    Preanesthetic Checklist  Completed: patient identified, IV checked, site marked, risks and benefits discussed, surgical/procedural consents, equipment checked, pre-op evaluation, timeout performed, anesthesia consent given, oxygen available, monitors applied/VS acknowledged, fire risk safety assessment completed and verbalized and blood product R/B/A discussed and consented  Peripheral Block   Patient position: sitting  Prep: ChloraPrep  Provider prep: mask and sterile gloves  Patient monitoring: cardiac monitor, continuous pulse ox, frequent blood pressure checks, IV access, oxygen and responsive to questions  Block type: Brachial plexus  Supraclavicular  Laterality: left  Injection technique: single-shot  Guidance: ultrasound guided    Needle   Needle type: insulated echogenic nerve stimulator needle   Needle gauge: 22 G  Needle localization: ultrasound guidance  Needle length: 5 cm  Assessment   Injection assessment: no paresthesia on injection, negative aspiration for heme, local visualized surrounding nerve on ultrasound and no intravascular symptoms  Paresthesia pain: none  Slow fractionated injection: yes  Hemodynamics: stable  Outcomes: uncomplicated and patient tolerated procedure well    Medications Administered  midazolam (VERSED) injection 2 mg/2mL - IntraVENous   3 mg - 4/8/2024 8:59:00 AM  ropivacaine (NAROPIN) injection 0.5% - Perineural, Hand Left   30 mL - 4/8/2024 9:05:00 AM

## 2024-04-08 NOTE — DISCHARGE INSTRUCTIONS
[facility]  Post-operative instructions  For: Jameel Rueda    Your first postop appointment should be scheduled with Dr. Gimenez for 2 weeks post-op.    [facility]  [address], [address]  [place] [address]  Phone: [phone]  Fax: [phone]    Please follow these instructions for a safe and speedy recovery:    1. Surgical Bandage: Leave the bandage in place until we remove it. Please keep it clean and dry. To shower or bathe, apply a plastic bag or GLAD Press'n Seal® plastic wrap around the bandage or simply sponge bathe.    2. Elevation: Hand swelling is best prevented by keeping your hand elevated above the level of your heart at all times, night and day. The opposite, dangling your hand below your waist, will cause additional pain, swelling, and later stiffness. You can elevate the hand in a sling or by propping it on a pillow at night. Ice compresses may help but do not rep[lace elevation. Frequently, extreme pain is caused by a tight bandage, which should be loosened. If pain is severe and progressive, call us at [phone] during the day (ask for immediate connection to Dr. Gimenez's Team) or during the night (ask for the on-call physician).    3. Medication: You will be provided with an appropriate pain medication (over-the-counter or prescription). Please fill this at a pharmacy promptly so you will have it available when all local anesthetic wears off. Take this to relieve pain as directed on the bottle. Please refrain from driving, drinking alcohol, and making important medical decisions while taking the medication. Please call us if you need something stronger. Medication changes or refills must be made before 5pm or through your pharmacy.    4. Weight bearing: Do NOT bear any weight on the operative extremity.    I want to thank you for choosing us for your hand care needs. My staff and I are committed to

## 2024-04-09 NOTE — PERIOP NOTE
04/07/08/24/24  7995    Post up call completed with Mr Rueda. Mr Rueda informed nursing he had a rough night, very restless and could not control his pain. He informed nursing he took the prescribed narcotic and Tylenol 1 gm with no change in pain level. He called the office to ask for an increase in dosage, and was told to take 10mg vs 5mg. Pt informed nursing he took the increased dose plus some left over narcotics he had at home. Nursing educated pt about taking only the newly prescribed medication and taking them according to the label. Pt verbalize understanding of overtaking narcotics. Pt will call MDs office to ask about loosening the dsg, he is uncomfortable doing it himself. Pt overall had a good experience.  
Air Warming blanket placed on pt; turned on for comfort    Permission received to review discharge instructions and discuss private health information with g/f  and will have someone with them after discharge     0910 Dr. Duffy performed a SHANNON Extremity   nerve block with the U/S machine. PT  was on cardiac monitoring, pulse oximetry and 3L NC O2.  Pt. Was given  3 mg of versed l IV for sedation.  VSS. Pt.  Slightly drowsy easy to arouse  post block.    T.O. 0895    
Jameel Rueda  1970  [id number]    Situation:  Verbal report given from: JOE Baxter CRNA RN  Procedure: Procedure(s):  LEFT RING FINGER RADIAL COLLATERAL LIGAMENT REPAIR (ANES REGIONAL BLOCK WITH MAC)    Background:    Preoperative diagnosis: Traumatic rupture of collateral ligament of left ring finger, initial encounter [S63.415A]    Postoperative diagnosis: * No post-op diagnosis entered *    :  Dr. Gimenez    Assistant(s): Circulator: Divine Oshea RN  Scrub Person First: Andrés Shaw  Physician Assistant: Lidia Baird PA-C    Specimens: * No specimens in log *    Assessment:  Intra-procedure medications         Anesthesia gave intra-procedure sedation and medications, see anesthesia flow sheet     Intravenous fluids: LR@ KVO     Vital signs stable       Recommendation:        
Post up f/u call with Mr Rueda  
Pt presenting in recovery room snoring and sleeping.  VSS  Pt on oxygen, L arm on pillow.    1036-D/c instructions reviewed, all questions answered.  Reviewed when to call the doctor,diet,activity and hygiene.  Reviewed nerve block signs and symptoms, when/what to take for pain, use of sling, use of elevation.    1055-Pt waking up more now, tolerating liquids.  Pt continues to deny pain.  VSS    1120-Transported via w/c to awaiting transportation.  No complaints noted at this time. Pt has all of his belongings.  
medications the day of surgery.    If you are scheduled to arrive for surgery after 8:00 AM, and your AM blood sugar is >200, please call Ambulatory Surgery.    I understand a pre-operative phone call will be made to verify my surgery time.  In the event that I am not available, I give permission for a message to be left on my answering service and/or with another person?      Yes    Reviewed instructions via telephone, pt verbalized understanding          ___________________      ___________________      ________________  (Signature of Patient)          (Witness)                   (Date and Time)

## 2024-05-02 ENCOUNTER — OFFICE VISIT (OUTPATIENT)
Age: 54
End: 2024-05-02
Payer: MEDICARE

## 2024-05-02 VITALS
HEART RATE: 83 BPM | BODY MASS INDEX: 27.98 KG/M2 | SYSTOLIC BLOOD PRESSURE: 139 MMHG | OXYGEN SATURATION: 96 % | DIASTOLIC BLOOD PRESSURE: 88 MMHG | HEIGHT: 77 IN | RESPIRATION RATE: 16 BRPM | WEIGHT: 237 LBS | TEMPERATURE: 98.1 F

## 2024-05-02 DIAGNOSIS — E11.69 HYPERLIPIDEMIA ASSOCIATED WITH TYPE 2 DIABETES MELLITUS (HCC): ICD-10-CM

## 2024-05-02 DIAGNOSIS — H34.11 CENTRAL RETINAL ARTERY OCCLUSION OF RIGHT EYE: ICD-10-CM

## 2024-05-02 DIAGNOSIS — E11.42 TYPE 2 DIABETES MELLITUS WITH DIABETIC POLYNEUROPATHY, WITHOUT LONG-TERM CURRENT USE OF INSULIN (HCC): Primary | ICD-10-CM

## 2024-05-02 DIAGNOSIS — E78.5 HYPERLIPIDEMIA ASSOCIATED WITH TYPE 2 DIABETES MELLITUS (HCC): ICD-10-CM

## 2024-05-02 DIAGNOSIS — I10 ESSENTIAL HYPERTENSION: ICD-10-CM

## 2024-05-02 DIAGNOSIS — N18.31 CHRONIC KIDNEY DISEASE, STAGE 3A (HCC): ICD-10-CM

## 2024-05-02 DIAGNOSIS — Z86.73 HISTORY OF STROKE: ICD-10-CM

## 2024-05-02 PROCEDURE — 99213 OFFICE O/P EST LOW 20 MIN: CPT | Performed by: INTERNAL MEDICINE

## 2024-05-02 PROCEDURE — 3079F DIAST BP 80-89 MM HG: CPT | Performed by: INTERNAL MEDICINE

## 2024-05-02 PROCEDURE — 3017F COLORECTAL CA SCREEN DOC REV: CPT | Performed by: INTERNAL MEDICINE

## 2024-05-02 PROCEDURE — G8427 DOCREV CUR MEDS BY ELIG CLIN: HCPCS | Performed by: INTERNAL MEDICINE

## 2024-05-02 PROCEDURE — 3046F HEMOGLOBIN A1C LEVEL >9.0%: CPT | Performed by: INTERNAL MEDICINE

## 2024-05-02 PROCEDURE — 2022F DILAT RTA XM EVC RTNOPTHY: CPT | Performed by: INTERNAL MEDICINE

## 2024-05-02 PROCEDURE — 1036F TOBACCO NON-USER: CPT | Performed by: INTERNAL MEDICINE

## 2024-05-02 PROCEDURE — G8419 CALC BMI OUT NRM PARAM NOF/U: HCPCS | Performed by: INTERNAL MEDICINE

## 2024-05-02 PROCEDURE — 3075F SYST BP GE 130 - 139MM HG: CPT | Performed by: INTERNAL MEDICINE

## 2024-05-02 RX ORDER — OXYCODONE HYDROCHLORIDE 5 MG/1
5 CAPSULE ORAL EVERY 4 HOURS PRN
COMMUNITY

## 2024-05-02 NOTE — PROGRESS NOTES
\"Have you been to the ER, urgent care clinic since your last visit?  Hospitalized since your last visit?\"    NO    “Have you seen or consulted any other health care providers outside of [facility] since your last visit?”    NO            Click Here for Release of Records Request  
Housing in the Last Year: No          /88 (Site: Left Upper Arm, Position: Sitting, Cuff Size: Large Adult)   Pulse 83   Temp 98.1 °F (36.7 °C) (Temporal)   Resp 16   Ht 1.956 m (6' 5\")   Wt 107.5 kg (237 lb)   SpO2 96%   BMI 28.10 kg/m²     Physical Examination:   General - Well appearing male  HEENT - PERRL, TM no erythema/opacification, normal nasal turbinates, no oropharyngeal erythema or exudate, MMM  Neck - supple, no bruits, no thyroidomegaly, no lymphadenopathy  Pulm - clear to auscultation bilaterally  Cardio - RRR, normal S1 S2, no murmur  Abd - soft, nontender, no masses, no HSM  Extrem - no edema, +2 distal pulses  Neuro-  No focal deficits, CN intact     Assessment/Plan:     Dm, type 2--continues to make good improvement, A1c down to 7.4 today.  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

## 2024-09-13 ENCOUNTER — HOSPITAL ENCOUNTER (EMERGENCY)
Facility: HOSPITAL | Age: 54
Discharge: LEFT BEFORE DISCHARGE | End: 2024-09-14

## 2024-09-13 ASSESSMENT — PAIN SCALES - GENERAL: PAINLEVEL_OUTOF10: 6

## 2024-09-14 VITALS
DIASTOLIC BLOOD PRESSURE: 91 MMHG | OXYGEN SATURATION: 94 % | BODY MASS INDEX: 28.39 KG/M2 | HEART RATE: 85 BPM | RESPIRATION RATE: 16 BRPM | TEMPERATURE: 98.8 F | SYSTOLIC BLOOD PRESSURE: 147 MMHG | WEIGHT: 239.42 LBS

## 2024-09-15 ENCOUNTER — TELEPHONE (OUTPATIENT)
Age: 54
End: 2024-09-15

## 2024-09-16 ENCOUNTER — OFFICE VISIT (OUTPATIENT)
Age: 54
End: 2024-09-16
Payer: MEDICARE

## 2024-09-16 VITALS
RESPIRATION RATE: 14 BRPM | OXYGEN SATURATION: 95 % | WEIGHT: 238 LBS | TEMPERATURE: 97.9 F | BODY MASS INDEX: 28.1 KG/M2 | DIASTOLIC BLOOD PRESSURE: 89 MMHG | SYSTOLIC BLOOD PRESSURE: 146 MMHG | HEART RATE: 91 BPM | HEIGHT: 77 IN

## 2024-09-16 DIAGNOSIS — E78.5 HYPERLIPIDEMIA ASSOCIATED WITH TYPE 2 DIABETES MELLITUS (HCC): ICD-10-CM

## 2024-09-16 DIAGNOSIS — I10 ESSENTIAL HYPERTENSION: ICD-10-CM

## 2024-09-16 DIAGNOSIS — E11.69 HYPERLIPIDEMIA ASSOCIATED WITH TYPE 2 DIABETES MELLITUS (HCC): ICD-10-CM

## 2024-09-16 DIAGNOSIS — E11.42 TYPE 2 DIABETES MELLITUS WITH DIABETIC POLYNEUROPATHY, WITHOUT LONG-TERM CURRENT USE OF INSULIN (HCC): ICD-10-CM

## 2024-09-16 DIAGNOSIS — N18.31 CHRONIC KIDNEY DISEASE, STAGE 3A (HCC): ICD-10-CM

## 2024-09-16 DIAGNOSIS — L72.0 CYST OF SKIN AND SUBCUTANEOUS TISSUE: Primary | ICD-10-CM

## 2024-09-16 PROCEDURE — 3017F COLORECTAL CA SCREEN DOC REV: CPT | Performed by: INTERNAL MEDICINE

## 2024-09-16 PROCEDURE — G8427 DOCREV CUR MEDS BY ELIG CLIN: HCPCS | Performed by: INTERNAL MEDICINE

## 2024-09-16 PROCEDURE — 3046F HEMOGLOBIN A1C LEVEL >9.0%: CPT | Performed by: INTERNAL MEDICINE

## 2024-09-16 PROCEDURE — G8419 CALC BMI OUT NRM PARAM NOF/U: HCPCS | Performed by: INTERNAL MEDICINE

## 2024-09-16 PROCEDURE — 1036F TOBACCO NON-USER: CPT | Performed by: INTERNAL MEDICINE

## 2024-09-16 PROCEDURE — 99213 OFFICE O/P EST LOW 20 MIN: CPT | Performed by: INTERNAL MEDICINE

## 2024-09-16 PROCEDURE — 3077F SYST BP >= 140 MM HG: CPT | Performed by: INTERNAL MEDICINE

## 2024-09-16 PROCEDURE — 2022F DILAT RTA XM EVC RTNOPTHY: CPT | Performed by: INTERNAL MEDICINE

## 2024-09-16 PROCEDURE — 3079F DIAST BP 80-89 MM HG: CPT | Performed by: INTERNAL MEDICINE

## 2024-09-16 RX ORDER — ZOSTER VACCINE RECOMBINANT, ADJUVANTED 50 MCG/0.5
0.5 KIT INTRAMUSCULAR SEE ADMIN INSTRUCTIONS
Qty: 0.5 ML | Refills: 0 | Status: SHIPPED | OUTPATIENT
Start: 2024-09-16 | End: 2025-03-15

## 2024-09-19 ENCOUNTER — HOSPITAL ENCOUNTER (OUTPATIENT)
Facility: HOSPITAL | Age: 54
Discharge: HOME | End: 2024-09-22
Payer: MEDICARE

## 2024-09-19 DIAGNOSIS — L72.0 CYST OF SKIN AND SUBCUTANEOUS TISSUE: ICD-10-CM

## 2024-09-19 PROCEDURE — 76536 US EXAM OF HEAD AND NECK: CPT

## 2024-10-04 ENCOUNTER — TELEPHONE (OUTPATIENT)
Age: 54
End: 2024-10-04

## 2024-10-06 RX ORDER — FAMOTIDINE 40 MG/1
40 TABLET, FILM COATED ORAL DAILY
Qty: 90 TABLET | Refills: 3 | Status: SHIPPED | OUTPATIENT
Start: 2024-10-06

## 2024-10-07 DIAGNOSIS — E11.42 TYPE 2 DIABETES MELLITUS WITH DIABETIC POLYNEUROPATHY, WITHOUT LONG-TERM CURRENT USE OF INSULIN (HCC): Primary | ICD-10-CM

## 2024-10-07 RX ORDER — DAPAGLIFLOZIN 10 MG/1
10 TABLET, FILM COATED ORAL DAILY
Qty: 90 TABLET | Refills: 3 | Status: SHIPPED | OUTPATIENT
Start: 2024-10-07

## 2024-10-07 NOTE — TELEPHONE ENCOUNTER
PCP: Arturo Hu III,     Last appt: 9/16/2024  Future Appointments   Date Time Provider Department Center   10/18/2024 12:00 PM Arturo Hu III, DO [facility] ECC DEP       Requested Prescriptions     Pending Prescriptions Disp Refills    dapagliflozin (FARXIGA) 10 MG tablet 90 tablet 3     Sig: Take 1 tablet by mouth daily

## 2024-10-18 ENCOUNTER — OFFICE VISIT (OUTPATIENT)
Age: 54
End: 2024-10-18
Payer: MEDICARE

## 2024-10-18 VITALS
HEIGHT: 77 IN | WEIGHT: 226 LBS | RESPIRATION RATE: 14 BRPM | HEART RATE: 83 BPM | OXYGEN SATURATION: 97 % | BODY MASS INDEX: 26.68 KG/M2 | SYSTOLIC BLOOD PRESSURE: 122 MMHG | TEMPERATURE: 97.6 F | DIASTOLIC BLOOD PRESSURE: 79 MMHG

## 2024-10-18 DIAGNOSIS — N18.31 CHRONIC KIDNEY DISEASE, STAGE 3A (HCC): ICD-10-CM

## 2024-10-18 DIAGNOSIS — D17.0 LIPOMA OF NECK: Primary | ICD-10-CM

## 2024-10-18 DIAGNOSIS — I10 ESSENTIAL HYPERTENSION: ICD-10-CM

## 2024-10-18 DIAGNOSIS — E78.5 HYPERLIPIDEMIA ASSOCIATED WITH TYPE 2 DIABETES MELLITUS (HCC): ICD-10-CM

## 2024-10-18 DIAGNOSIS — E11.42 TYPE 2 DIABETES MELLITUS WITH DIABETIC POLYNEUROPATHY, WITHOUT LONG-TERM CURRENT USE OF INSULIN (HCC): ICD-10-CM

## 2024-10-18 DIAGNOSIS — E11.69 HYPERLIPIDEMIA ASSOCIATED WITH TYPE 2 DIABETES MELLITUS (HCC): ICD-10-CM

## 2024-10-18 PROCEDURE — 2022F DILAT RTA XM EVC RTNOPTHY: CPT | Performed by: INTERNAL MEDICINE

## 2024-10-18 PROCEDURE — 3046F HEMOGLOBIN A1C LEVEL >9.0%: CPT | Performed by: INTERNAL MEDICINE

## 2024-10-18 PROCEDURE — 3074F SYST BP LT 130 MM HG: CPT | Performed by: INTERNAL MEDICINE

## 2024-10-18 PROCEDURE — 1036F TOBACCO NON-USER: CPT | Performed by: INTERNAL MEDICINE

## 2024-10-18 PROCEDURE — 3017F COLORECTAL CA SCREEN DOC REV: CPT | Performed by: INTERNAL MEDICINE

## 2024-10-18 PROCEDURE — 99213 OFFICE O/P EST LOW 20 MIN: CPT | Performed by: INTERNAL MEDICINE

## 2024-10-18 PROCEDURE — G8419 CALC BMI OUT NRM PARAM NOF/U: HCPCS | Performed by: INTERNAL MEDICINE

## 2024-10-18 PROCEDURE — 3078F DIAST BP <80 MM HG: CPT | Performed by: INTERNAL MEDICINE

## 2024-10-18 PROCEDURE — G8427 DOCREV CUR MEDS BY ELIG CLIN: HCPCS | Performed by: INTERNAL MEDICINE

## 2024-10-18 PROCEDURE — G8484 FLU IMMUNIZE NO ADMIN: HCPCS | Performed by: INTERNAL MEDICINE

## 2024-10-18 NOTE — PROGRESS NOTES
\"Have you been to the ER, urgent care clinic since your last visit?  Hospitalized since your last visit?\"    NO    “Have you seen or consulted any other health care providers outside our system since your last visit?”    NO      “Have you had a diabetic eye exam?”    NO     Date of last diabetic eye exam: 3/16/2020          
 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

## 2024-11-01 ENCOUNTER — HOSPITAL ENCOUNTER (EMERGENCY)
Facility: HOSPITAL | Age: 54
Discharge: HOME | End: 2024-11-02
Attending: EMERGENCY MEDICINE
Payer: MEDICARE

## 2024-11-01 DIAGNOSIS — T67.5XXA HEAT EXHAUSTION, INITIAL ENCOUNTER: Primary | ICD-10-CM

## 2024-11-01 DIAGNOSIS — N18.9 CHRONIC RENAL IMPAIRMENT, UNSPECIFIED CKD STAGE: ICD-10-CM

## 2024-11-01 DIAGNOSIS — R79.89 ELEVATED TROPONIN LEVEL: ICD-10-CM

## 2024-11-01 LAB
APPEARANCE UR: CLEAR
BACTERIA URNS QL MICRO: NEGATIVE /HPF
BASOPHILS # BLD: 0 K/UL (ref 0–0.1)
BASOPHILS NFR BLD: 1 % (ref 0–1)
BILIRUB UR QL: NEGATIVE
COLOR UR: YELLOW
DIFFERENTIAL METHOD BLD: NORMAL
EOSINOPHIL # BLD: 0.1 K/UL (ref 0–0.4)
EOSINOPHIL NFR BLD: 2 % (ref 0–7)
EPITH CASTS URNS QL MICRO: ABNORMAL /LPF
ERYTHROCYTE [DISTWIDTH] IN BLOOD BY AUTOMATED COUNT: 14.5 % (ref 11.5–14.5)
GLUCOSE UR STRIP.AUTO-MCNC: >1000 MG/DL
HCT VFR BLD AUTO: 40.9 % (ref 36.6–50.3)
HGB BLD-MCNC: 13.2 G/DL (ref 12.1–17)
HGB UR QL STRIP: NEGATIVE
IMM GRANULOCYTES # BLD AUTO: 0 K/UL (ref 0–0.04)
IMM GRANULOCYTES NFR BLD AUTO: 0 % (ref 0–0.5)
KETONES UR QL STRIP.AUTO: NEGATIVE MG/DL
LEUKOCYTE ESTERASE UR QL STRIP.AUTO: NEGATIVE
LYMPHOCYTES # BLD: 2.4 K/UL (ref 0.8–3.5)
LYMPHOCYTES NFR BLD: 39 % (ref 12–49)
MCH RBC QN AUTO: 26.3 PG (ref 26–34)
MCHC RBC AUTO-ENTMCNC: 32.3 G/DL (ref 30–36.5)
MCV RBC AUTO: 81.5 FL (ref 80–99)
MONOCYTES # BLD: 0.6 K/UL (ref 0–1)
MONOCYTES NFR BLD: 9 % (ref 5–13)
NEUTS SEG # BLD: 3 K/UL (ref 1.8–8)
NEUTS SEG NFR BLD: 49 % (ref 32–75)
NITRITE UR QL STRIP.AUTO: NEGATIVE
PH UR STRIP: 6 (ref 5–8)
PLATELET # BLD AUTO: 181 K/UL (ref 150–400)
PMV BLD AUTO: 10.5 FL (ref 8.9–12.9)
PROT UR STRIP-MCNC: NEGATIVE MG/DL
RBC # BLD AUTO: 5.02 M/UL (ref 4.1–5.7)
RBC #/AREA URNS HPF: ABNORMAL /HPF (ref 0–5)
SP GR UR REFRACTOMETRY: 1.01 (ref 1–1.03)
UROBILINOGEN UR QL STRIP.AUTO: 0.1 EU/DL (ref 0.2–1)
WBC # BLD AUTO: 6 K/UL (ref 4.1–11.1)
WBC URNS QL MICRO: ABNORMAL /HPF (ref 0–4)

## 2024-11-01 PROCEDURE — 80048 BASIC METABOLIC PNL TOTAL CA: CPT

## 2024-11-01 PROCEDURE — 99284 EMERGENCY DEPT VISIT MOD MDM: CPT

## 2024-11-01 PROCEDURE — 81001 URINALYSIS AUTO W/SCOPE: CPT

## 2024-11-01 PROCEDURE — 84484 ASSAY OF TROPONIN QUANT: CPT

## 2024-11-01 PROCEDURE — 85025 COMPLETE CBC W/AUTO DIFF WBC: CPT

## 2024-11-01 PROCEDURE — 83735 ASSAY OF MAGNESIUM: CPT

## 2024-11-01 PROCEDURE — 80076 HEPATIC FUNCTION PANEL: CPT

## 2024-11-01 PROCEDURE — 36415 COLL VENOUS BLD VENIPUNCTURE: CPT

## 2024-11-01 PROCEDURE — 82550 ASSAY OF CK (CPK): CPT

## 2024-11-01 ASSESSMENT — PAIN SCALES - GENERAL: PAINLEVEL_OUTOF10: 10

## 2024-11-01 ASSESSMENT — PAIN DESCRIPTION - ORIENTATION: ORIENTATION: RIGHT;LEFT

## 2024-11-01 ASSESSMENT — PAIN - FUNCTIONAL ASSESSMENT: PAIN_FUNCTIONAL_ASSESSMENT: 0-10

## 2024-11-01 ASSESSMENT — PAIN DESCRIPTION - LOCATION: LOCATION: LEG

## 2024-11-02 VITALS
OXYGEN SATURATION: 98 % | SYSTOLIC BLOOD PRESSURE: 127 MMHG | HEIGHT: 77 IN | HEART RATE: 72 BPM | WEIGHT: 227 LBS | DIASTOLIC BLOOD PRESSURE: 72 MMHG | RESPIRATION RATE: 14 BRPM | BODY MASS INDEX: 26.8 KG/M2 | TEMPERATURE: 97.9 F

## 2024-11-02 LAB
ALBUMIN SERPL-MCNC: 4.1 G/DL (ref 3.5–5)
ALBUMIN/GLOB SERPL: 1.2 (ref 1.1–2.2)
ALP SERPL-CCNC: 72 U/L (ref 45–117)
ALT SERPL-CCNC: 34 U/L (ref 12–78)
ANION GAP SERPL CALC-SCNC: 12 MMOL/L (ref 2–12)
AST SERPL W P-5'-P-CCNC: 23 U/L (ref 15–37)
BILIRUB DIRECT SERPL-MCNC: 0.2 MG/DL (ref 0–0.2)
BILIRUB SERPL-MCNC: 0.8 MG/DL (ref 0.2–1)
BUN SERPL-MCNC: 25 MG/DL (ref 6–20)
BUN/CREAT SERPL: 16 (ref 12–20)
CA-I BLD-MCNC: 9.1 MG/DL (ref 8.5–10.1)
CHLORIDE SERPL-SCNC: 104 MMOL/L (ref 97–108)
CK SERPL-CCNC: 429 U/L (ref 39–308)
CO2 SERPL-SCNC: 26 MMOL/L (ref 21–32)
CREAT SERPL-MCNC: 1.58 MG/DL (ref 0.7–1.3)
GLOBULIN SER CALC-MCNC: 3.4 G/DL (ref 2–4)
GLUCOSE SERPL-MCNC: 145 MG/DL (ref 65–100)
MAGNESIUM SERPL-MCNC: 2.1 MG/DL (ref 1.6–2.4)
POTASSIUM SERPL-SCNC: 3.4 MMOL/L (ref 3.5–5.1)
PROT SERPL-MCNC: 7.5 G/DL (ref 6.4–8.2)
SODIUM SERPL-SCNC: 142 MMOL/L (ref 136–145)
TROPONIN I SERPL HS-MCNC: 81 NG/L (ref 0–76)
TROPONIN I SERPL HS-MCNC: 83 NG/L (ref 0–76)
TROPONIN I SERPL HS-MCNC: 87 NG/L (ref 0–76)

## 2024-11-02 PROCEDURE — 2580000003 HC RX 258: Performed by: EMERGENCY MEDICINE

## 2024-11-02 PROCEDURE — 84484 ASSAY OF TROPONIN QUANT: CPT

## 2024-11-02 PROCEDURE — 36415 COLL VENOUS BLD VENIPUNCTURE: CPT

## 2024-11-02 PROCEDURE — 6370000000 HC RX 637 (ALT 250 FOR IP): Performed by: EMERGENCY MEDICINE

## 2024-11-02 PROCEDURE — 93005 ELECTROCARDIOGRAM TRACING: CPT | Performed by: EMERGENCY MEDICINE

## 2024-11-02 RX ORDER — 0.9 % SODIUM CHLORIDE 0.9 %
1000 INTRAVENOUS SOLUTION INTRAVENOUS
Status: COMPLETED | OUTPATIENT
Start: 2024-11-02 | End: 2024-11-02

## 2024-11-02 RX ORDER — POTASSIUM CHLORIDE 1500 MG/1
40 TABLET, EXTENDED RELEASE ORAL ONCE
Status: COMPLETED | OUTPATIENT
Start: 2024-11-02 | End: 2024-11-02

## 2024-11-02 RX ADMIN — SODIUM CHLORIDE 1000 ML: 9 INJECTION, SOLUTION INTRAVENOUS at 00:18

## 2024-11-02 RX ADMIN — POTASSIUM CHLORIDE 40 MEQ: 1500 TABLET, EXTENDED RELEASE ORAL at 00:18

## 2024-11-02 NOTE — ED PROVIDER NOTES
[facility] EMERGENCY DEPT  EMERGENCY DEPARTMENT HISTORY AND PHYSICAL EXAM      Date: 11/1/2024  Patient Name: Jameel Rueda  MRN: [id number]  Birthdate 1970  Date of evaluation: 11/1/2024  Provider: Angi Garcias MD  Note Started: 4:50 AM EDT 11/2/24    HISTORY OF PRESENT ILLNESS     Chief Complaint   Patient presents with    Extremity Weakness       History Provided By: Patient    HPI: Jameel Rueda is a 54 y.o. male.  Patient was brought to emergency room by his family members with a complaint of bilateral lower leg cramping that began this evening.  Patient states that he spent all day working inside with excessive sweating.  No headache, chest pain, or shortness of breath.  No abdominal pain.  No nausea vomiting or diarrhea.  No fever or chills.  No URI symptoms.  Patient has multiple significant past medical history that includes including diabetes/coronary artery disease with a cardiac stent/status post aortic dissection repair.  Patient stated he has been compliant with all his medication including Plavix.        PAST MEDICAL HISTORY   Past Medical History:  Past Medical History:   Diagnosis Date    At risk for bleeding associated with anticoagulants     Bilateral carotid artery stenosis     CAD (coronary artery disease)     Cerebral artery occlusion with cerebral infarction (HCC)     blurry vision in right eye still    Diabetic peripheral neuropathy associated with type 2 diabetes mellitus (HCC)     pt denies    Foot fracture     H/O aortic dissection     History of blood transfusion     Hollenhorst plaque, right eye     Hypertension     Jaw fracture     Ruptured ear drum     Subjective visual disturbance, right eye     Thumb fracture        Past Surgical History:  Past Surgical History:   Procedure Laterality Date    APPENDECTOMY      ARTERIAL BYPASS SURGRY  02/15/2019    CORONARY ANGIOPLASTY WITH STENT PLACEMENT  02/15/2019    8 stents    CORONARY ARTERY BYPASS GRAFT  02/15/2019    double    HAND TENDON  upon leaving.  A clear verbal instruction given to the patient to return to the emergency room for chest pain/chest pain/diaphoresis/nausea/weakness/palpitation bradycardia or syncopal episode.  Patient voiced understanding of the instruction.  Patient is discharged in stable condition        Records Reviewed (source and summary of external notes): Prior medical records and Nursing notes.    Vitals:    Vitals:    11/01/24 2320 11/02/24 0030 11/02/24 0322   BP: 127/87 135/85 127/72   Pulse: 74 72 72   Resp: 16 15 14   Temp: 97.9 °F (36.6 °C)     TempSrc: Oral     SpO2: 97% 97% 98%   Weight: 103 kg (227 lb)     Height: 1.956 m (6' 5\")          ED COURSE  ED Course as of 11/02/24 0521   Sat Nov 02, 2024   0009 Troponin, High Sensitivity(!): 87 [SK]   0143 Troponin, High Sensitivity(!): 83 [SK]   0210 Feeling better / no cp  / no sob/ discussed again regarding elevated trop level / continues to refuse adm / patient agrees with third trop level [SK]   0217 Troponin, High Sensitivity(!): 83 [SK]      ED Course User Index  [SK] Angi Chen MD           Patient was given the following medications:  Medications   potassium chloride (KLOR-CON M) extended release tablet 40 mEq (40 mEq Oral Given 11/2/24 0018)   sodium chloride 0.9 % bolus 1,000 mL (0 mLs IntraVENous Stopped 11/2/24 0049)       CONSULTS: See ED Course/MDM for further details.            PROCEDURES   Unless otherwise noted above, none  Procedures      CRITICAL CARE TIME       ED IMPRESSION     1. Heat exhaustion, initial encounter    2. Elevated troponin level    3. Chronic renal impairment, unspecified CKD stage          DISPOSITION/PLAN   DISPOSITION Decision To Discharge 11/02/2024 03:13:45 AM         Discharge Note: The patient is stable for discharge home. The signs, symptoms, diagnosis, and discharge instructions have been discussed, understanding conveyed, and agreed upon. The patient is to follow up as recommended or return to ER should their symptoms

## 2024-11-02 NOTE — ED TRIAGE NOTES
Pt reports bilateral lower limb weakness and pain that started an hour ago. Pt has ahx of diabetic neuropathy

## 2024-11-02 NOTE — ED NOTES
This Rn went to pt;s room at this time to discuss his plan of care after provider recommended admission but pt declined. After discussion pt notified writer that he felt much better after getting fluids, no shortness of breath or chest pain reported. Pt informed this RN that he will contact his Primary care doctor on Monday for follow up. Writer reiterated that in the event that he feels worse then he should come back to the ER to be seen. Pt's mother and significant other in the room. Patient walked out of the ER at this time with steady gait

## 2024-11-03 LAB
EKG ATRIAL RATE: 71 BPM
EKG DIAGNOSIS: NORMAL
EKG P AXIS: 55 DEGREES
EKG P-R INTERVAL: 198 MS
EKG Q-T INTERVAL: 400 MS
EKG QRS DURATION: 86 MS
EKG QTC CALCULATION (BAZETT): 434 MS
EKG R AXIS: 14 DEGREES
EKG T AXIS: 13 DEGREES
EKG VENTRICULAR RATE: 71 BPM

## 2024-11-11 ENCOUNTER — OFFICE VISIT (OUTPATIENT)
Age: 54
End: 2024-11-11
Payer: MEDICARE

## 2024-11-11 VITALS
OXYGEN SATURATION: 95 % | SYSTOLIC BLOOD PRESSURE: 126 MMHG | TEMPERATURE: 98.2 F | BODY MASS INDEX: 28.01 KG/M2 | RESPIRATION RATE: 14 BRPM | WEIGHT: 237.2 LBS | DIASTOLIC BLOOD PRESSURE: 82 MMHG | HEART RATE: 103 BPM | HEIGHT: 77 IN

## 2024-11-11 DIAGNOSIS — I25.83 CORONARY ARTERY DISEASE DUE TO LIPID RICH PLAQUE: Primary | ICD-10-CM

## 2024-11-11 DIAGNOSIS — I25.10 CORONARY ARTERY DISEASE DUE TO LIPID RICH PLAQUE: Primary | ICD-10-CM

## 2024-11-11 DIAGNOSIS — E11.42 TYPE 2 DIABETES MELLITUS WITH DIABETIC POLYNEUROPATHY, WITHOUT LONG-TERM CURRENT USE OF INSULIN (HCC): ICD-10-CM

## 2024-11-11 DIAGNOSIS — E11.69 HYPERLIPIDEMIA ASSOCIATED WITH TYPE 2 DIABETES MELLITUS (HCC): ICD-10-CM

## 2024-11-11 DIAGNOSIS — Z86.73 HISTORY OF STROKE: ICD-10-CM

## 2024-11-11 DIAGNOSIS — N18.31 CHRONIC KIDNEY DISEASE, STAGE 3A (HCC): ICD-10-CM

## 2024-11-11 DIAGNOSIS — I10 ESSENTIAL HYPERTENSION: ICD-10-CM

## 2024-11-11 DIAGNOSIS — E78.5 HYPERLIPIDEMIA ASSOCIATED WITH TYPE 2 DIABETES MELLITUS (HCC): ICD-10-CM

## 2024-11-11 PROCEDURE — G8484 FLU IMMUNIZE NO ADMIN: HCPCS | Performed by: INTERNAL MEDICINE

## 2024-11-11 PROCEDURE — 2022F DILAT RTA XM EVC RTNOPTHY: CPT | Performed by: INTERNAL MEDICINE

## 2024-11-11 PROCEDURE — 3079F DIAST BP 80-89 MM HG: CPT | Performed by: INTERNAL MEDICINE

## 2024-11-11 PROCEDURE — 1036F TOBACCO NON-USER: CPT | Performed by: INTERNAL MEDICINE

## 2024-11-11 PROCEDURE — 3074F SYST BP LT 130 MM HG: CPT | Performed by: INTERNAL MEDICINE

## 2024-11-11 PROCEDURE — 3046F HEMOGLOBIN A1C LEVEL >9.0%: CPT | Performed by: INTERNAL MEDICINE

## 2024-11-11 PROCEDURE — 99213 OFFICE O/P EST LOW 20 MIN: CPT | Performed by: INTERNAL MEDICINE

## 2024-11-11 PROCEDURE — G8419 CALC BMI OUT NRM PARAM NOF/U: HCPCS | Performed by: INTERNAL MEDICINE

## 2024-11-11 PROCEDURE — 3017F COLORECTAL CA SCREEN DOC REV: CPT | Performed by: INTERNAL MEDICINE

## 2024-11-11 PROCEDURE — G8427 DOCREV CUR MEDS BY ELIG CLIN: HCPCS | Performed by: INTERNAL MEDICINE

## 2024-11-11 NOTE — PROGRESS NOTES
PRogress Note    NAME: Ele Collins   :  1970   MRN:  [id number]     Date/Time:  2019          Assessment :    Plan:  MARY on CKD stage 2    HTN/LVH    type B aortic dissection with filling of the true and false lumen --S/P TEVAR, RA stenting, (L) carotid bypass    Fever           Creatinine 2.1 to 1.99; K ok    SBP much better today with the addition of low dose Lisinopril    Will decrease Hydralazine dose to 50 mg tid and titrate up Lisinopril dose to 5 mg (it would be great if we can achieve good BP control with an easier regimen)    electrolytes stable; continue on PO K           Subjective:   CHIEF COMPLAINT:  No n/v/cp/sob; feeling well today. Past Medical History:   Diagnosis Date    Foot fracture     Hypertension     Jaw fracture (HCC)     Ruptured ear drum     Thumb fracture       Past Surgical History:   Procedure Laterality Date    HX APPENDECTOMY      HX ORTHOPAEDIC       Social History     Tobacco Use    Smoking status: Never Smoker    Smokeless tobacco: Never Used   Substance Use Topics    Alcohol use: No      Family History   Problem Relation Age of Onset    Diabetes Mother       No Known Allergies   Prior to Admission medications    Medication Sig Start Date End Date Taking? Authorizing Provider   amLODIPine (NORVASC) 5 mg tablet Take 1 Tab by mouth daily. 18   Татьяна Pair, NP   valsartan-hydroCHLOROthiazide (DIOVAN-HCT) 320-25 mg per tablet Take 1 Tab by mouth daily.  18   Татьяна Pair, NP     REVIEW OF SYSTEMS:    thirsty      Objective:   VITALS:    Visit Vitals  /70 (BP 1 Location: Left arm, BP Patient Position: At rest)   Pulse 80   Temp 98.2 °F (36.8 °C)   Resp 18   Ht 6' 5\" (1.956 m)   Wt 109.4 kg (241 lb 2.9 oz)   SpO2 95%   BMI 28.60 kg/m²     PHYSICAL EXAM:  Gen:  [x]  WD [x]  WN  [] cachectic []  thin []  obese []  disheveled             []  ill apearing  []   Critical  []   Chronic    [x]  No acute distress    HEENT:   [x] NC/AT    [] pink conjunctivae      [] pale conjunctivae                  PERRL  [] yes  [] no      [] moist mucosa    [] dry mucosa    hearing intact to voice [x] yes  [] No                   RESP:   [] CTA bilaterally/no wheezing/rhonchi/rales/crackles    [] rhonchi bilaterally - no dullness  [] wheezing   [] rhonchi   [x] crackles     use of accessory muscles [] yes [x] no    CARD:   [x]  regular rate and rhythm/No murmurs/rubs/gallops    murmur  [] yes ()  [] no      Rubs  [] yes  [] no       Gallops [] yes  [] no    Rate []  regular  []  irregular        carotid bruits  [] Right  []  Left                 LE edema [] yes  [x] no           JVP  []  yes   [x]  no    NEUR:   [x] cranial nerves II-XII grossly intact       [] Cranial nerves deficit                   LAB DATA REVIEWED:    Recent Labs     02/23/19 0223   WBC 8.2   HGB 9.4*   HCT 28.6*        Recent Labs     02/23/19 0223 02/22/19  0325    136   K 3.9 3.8    102   CO2 26 26   BUN 26* 24*   CREA 2.11* 1.66*   * 107*   CA 8.3* 8.0*   PHOS  --  2.8     No results for input(s): SGOT, GPT, ALT, AP, TBIL, TBILI, ALB, GLOB, GGT, AML, LPSE in the last 72 hours. No lab exists for component: AMYP, HLPSE  No results for input(s): INR, PTP, APTT in the last 72 hours. No lab exists for component: INREXT, INREXT   No results for input(s): FE, TIBC, PSAT, FERR in the last 72 hours. No results for input(s): PH, PCO2, PO2 in the last 72 hours. No results for input(s): CPK, CKMB in the last 72 hours. No lab exists for component: TROPONINI  No results found for: GLUCPOC    Procedures: see electronic medical records for all procedures/Xrays and details which were not copied into this note but were reviewed prior to creation of Plan.    ________________________________________________________________________       ___________________________________________________  Consulting Physician:  Loanne , MD fall precautions

## 2024-11-11 NOTE — PROGRESS NOTES
Jameel Rueda is a 54 y.o. male who presents for evaluation of hospital follow up.  Last seen by me oct 18, 2024.  He had ed visit on nov 1 due to heat exhaustion and mild dehydration and mild hypokalemia.  He had been working in his food truck all day, and had not eaten or had any water to drink, only some soda.   When he got home from work that evening, his legs cramped up severely, which prompted him to go to ED.  Trop was slightly elevated, and K 3.4.  was given some iv fluids and d/c to home.  He has felt fine since.  Mom with him today.  He admits to not having taken his meds last night or again yet today.  Discussed with him need to take all of his meds daily as prescribed.      ROS:  Constitutional: negative for fevers, chills, anorexia and weight loss  Eyes:   negative for visual disturbance and irritation  ENT:   negative for tinnitus,sore throat,nasal congestion,ear pain,hoarseness  Respiratory:  negative for cough, hemoptysis, dyspnea,wheezing  CV:   negative for chest pain, palpitations, lower extremity edema  GI:   negative for nausea, vomiting, diarrhea, abdominal pain,melena  Genitourinary: negative for frequency, dysuria and hematuria  Musculoskel: negative for myalgias, arthralgias, back pain, muscle weakness, joint pain  Neurological:  negative for headaches, dizziness, focal weakness, numbness  Psychiatric:     Negative for depression or anxiety      Past Medical History:   Diagnosis Date    At risk for bleeding associated with anticoagulants     Bilateral carotid artery stenosis     CAD (coronary artery disease)     Cerebral artery occlusion with cerebral infarction (HCC)     blurry vision in right eye still    Diabetic peripheral neuropathy associated with type 2 diabetes mellitus (HCC)     pt denies    Foot fracture     H/O aortic dissection     History of blood transfusion     Hollenhorst plaque, right eye     Hypertension     Jaw fracture     Ruptured ear drum     Subjective visual

## 2024-11-11 NOTE — PROGRESS NOTES
\"Have you been to the ER, urgent care clinic since your last visit?  Hospitalized since your last visit?\"    YES - When: approximately 11/1/24 ago.  Where and Why: [facility] ER-Heat Exhaustion.    “Have you seen or consulted any other health care providers outside our system since your last visit?”    NO      “Have you had a diabetic eye exam?”    NO     Date of last diabetic eye exam: 3/16/2020

## 2024-12-11 ENCOUNTER — CLINICAL DOCUMENTATION (OUTPATIENT)
Age: 54
End: 2024-12-11

## 2024-12-11 NOTE — PROGRESS NOTES
\Received LINUS request from Uri Marshaller  on 11/19/24. Faxed request to [facility] on 11/27/24.

## 2024-12-22 RX ORDER — LOSARTAN POTASSIUM 25 MG/1
25 TABLET ORAL DAILY
Qty: 90 TABLET | Refills: 3 | Status: SHIPPED | OUTPATIENT
Start: 2024-12-22

## 2024-12-26 RX ORDER — AMLODIPINE BESYLATE 10 MG/1
10 TABLET ORAL DAILY
Qty: 90 TABLET | Refills: 0 | Status: SHIPPED | OUTPATIENT
Start: 2024-12-26

## 2025-01-22 RX ORDER — DAPAGLIFLOZIN 10 MG/1
10 TABLET, FILM COATED ORAL EVERY MORNING
Qty: 90 TABLET | Refills: 3 | Status: SHIPPED | OUTPATIENT
Start: 2025-01-22

## 2025-02-02 RX ORDER — LEVOCETIRIZINE DIHYDROCHLORIDE 5 MG/1
TABLET, FILM COATED ORAL
Qty: 30 TABLET | Refills: 5 | Status: SHIPPED | OUTPATIENT
Start: 2025-02-02

## 2025-02-02 RX ORDER — ORAL SEMAGLUTIDE 14 MG/1
1 TABLET ORAL
Qty: 30 TABLET | Refills: 11 | Status: SHIPPED | OUTPATIENT
Start: 2025-02-02

## 2025-02-05 ENCOUNTER — TRANSCRIBE ORDERS (OUTPATIENT)
Facility: HOSPITAL | Age: 55
End: 2025-02-05

## 2025-02-05 DIAGNOSIS — I71.03 DISSECTION OF AORTA, THORACOABDOMINAL (HCC): ICD-10-CM

## 2025-02-05 DIAGNOSIS — I71.02 DISSECTION OF AORTA, ABDOMINAL (HCC): Primary | ICD-10-CM

## 2025-02-06 ENCOUNTER — HOSPITAL ENCOUNTER (OUTPATIENT)
Facility: HOSPITAL | Age: 55
Discharge: HOME | End: 2025-02-09
Payer: MEDICARE

## 2025-02-06 ENCOUNTER — TELEPHONE (OUTPATIENT)
Age: 55
End: 2025-02-06

## 2025-02-06 DIAGNOSIS — Z12.11 SCREENING FOR COLON CANCER: Primary | ICD-10-CM

## 2025-02-06 DIAGNOSIS — I71.02 DISSECTION OF AORTA, ABDOMINAL (HCC): ICD-10-CM

## 2025-02-06 DIAGNOSIS — I71.03 DISSECTION OF AORTA, THORACOABDOMINAL (HCC): ICD-10-CM

## 2025-02-06 LAB
BUN SERPL-MCNC: 17 MG/DL (ref 6–20)
CREAT SERPL-MCNC: 1.37 MG/DL (ref 0.7–1.3)

## 2025-02-06 PROCEDURE — 84520 ASSAY OF UREA NITROGEN: CPT

## 2025-02-06 PROCEDURE — 6360000004 HC RX CONTRAST MEDICATION: Performed by: SURGERY

## 2025-02-06 PROCEDURE — 36415 COLL VENOUS BLD VENIPUNCTURE: CPT

## 2025-02-06 PROCEDURE — 74174 CTA ABD&PLVS W/CONTRAST: CPT

## 2025-02-06 PROCEDURE — 82565 ASSAY OF CREATININE: CPT

## 2025-02-06 RX ORDER — IOPAMIDOL 755 MG/ML
100 INJECTION, SOLUTION INTRAVASCULAR
Status: COMPLETED | OUTPATIENT
Start: 2025-02-06 | End: 2025-02-06

## 2025-02-06 RX ADMIN — IOPAMIDOL 100 ML: 755 INJECTION, SOLUTION INTRAVENOUS at 11:05

## 2025-02-06 NOTE — TELEPHONE ENCOUNTER
States received text that he is due for colon screening    Asks if so, please order cologuard screening    Please call pt to advise.

## 2025-02-06 NOTE — TELEPHONE ENCOUNTER
Called/Spoke with pt     Pt notified Cologaurd Order put in- Mailed to pt via [employer]    Verbalized Understanding.

## 2025-02-12 RX ORDER — CARVEDILOL 12.5 MG/1
12.5 TABLET ORAL 2 TIMES DAILY WITH MEALS
Qty: 180 TABLET | Refills: 3 | Status: SHIPPED | OUTPATIENT
Start: 2025-02-12

## 2025-02-12 RX ORDER — HYDRALAZINE HYDROCHLORIDE 50 MG/1
50 TABLET, FILM COATED ORAL 3 TIMES DAILY
Qty: 270 TABLET | Refills: 3 | Status: SHIPPED | OUTPATIENT
Start: 2025-02-12

## 2025-02-13 LAB — NONINV COLON CA DNA+OCC BLD SCRN STL QL: NEGATIVE

## 2025-02-26 ENCOUNTER — TRANSCRIBE ORDERS (OUTPATIENT)
Facility: HOSPITAL | Age: 55
End: 2025-02-26

## 2025-02-26 DIAGNOSIS — I71.03 DISSECTION OF AORTA, THORACOABDOMINAL (HCC): Primary | ICD-10-CM

## 2025-02-26 RX ORDER — CLOPIDOGREL BISULFATE 75 MG/1
75 TABLET ORAL DAILY
Qty: 90 TABLET | Refills: 3 | Status: SHIPPED | OUTPATIENT
Start: 2025-02-26

## 2025-03-06 RX ORDER — ATORVASTATIN CALCIUM 40 MG/1
TABLET, FILM COATED ORAL NIGHTLY
Qty: 90 TABLET | Refills: 3 | Status: SHIPPED | OUTPATIENT
Start: 2025-03-06

## 2025-03-13 ENCOUNTER — HOSPITAL ENCOUNTER (OUTPATIENT)
Facility: HOSPITAL | Age: 55
Discharge: HOME | End: 2025-03-16

## 2025-03-13 DIAGNOSIS — I71.03 DISSECTION OF AORTA, THORACOABDOMINAL (HCC): ICD-10-CM

## 2025-03-17 RX ORDER — PNV NO.95/FERROUS FUM/FOLIC AC 28MG-0.8MG
TABLET ORAL
Qty: 90 TABLET | Refills: 3 | Status: SHIPPED | OUTPATIENT
Start: 2025-03-17

## 2025-03-21 RX ORDER — AMLODIPINE BESYLATE 10 MG/1
10 TABLET ORAL DAILY
Qty: 90 TABLET | Refills: 0 | OUTPATIENT
Start: 2025-03-21

## 2025-03-21 RX ORDER — AMLODIPINE BESYLATE 10 MG/1
10 TABLET ORAL DAILY
Qty: 90 TABLET | Refills: 3 | Status: SHIPPED | OUTPATIENT
Start: 2025-03-21

## 2025-04-10 ENCOUNTER — HOSPITAL ENCOUNTER (OUTPATIENT)
Facility: HOSPITAL | Age: 55
Discharge: HOME | End: 2025-04-13
Payer: MEDICARE

## 2025-04-10 DIAGNOSIS — M25.562 LEFT KNEE PAIN, UNSPECIFIED CHRONICITY: ICD-10-CM

## 2025-04-10 PROCEDURE — 73721 MRI JNT OF LWR EXTRE W/O DYE: CPT

## 2025-05-06 ENCOUNTER — HOSPITAL ENCOUNTER (EMERGENCY)
Facility: HOSPITAL | Age: 55
Discharge: HOME | End: 2025-05-06
Attending: STUDENT IN AN ORGANIZED HEALTH CARE EDUCATION/TRAINING PROGRAM
Payer: MEDICARE

## 2025-05-06 VITALS
HEART RATE: 80 BPM | BODY MASS INDEX: 28.34 KG/M2 | DIASTOLIC BLOOD PRESSURE: 105 MMHG | SYSTOLIC BLOOD PRESSURE: 142 MMHG | TEMPERATURE: 97.9 F | HEIGHT: 77 IN | WEIGHT: 240 LBS | RESPIRATION RATE: 18 BRPM | OXYGEN SATURATION: 96 %

## 2025-05-06 DIAGNOSIS — M62.838 NECK MUSCLE SPASM: Primary | ICD-10-CM

## 2025-05-06 LAB
GLUCOSE BLD STRIP.AUTO-MCNC: 332 MG/DL (ref 65–100)
PERFORMED BY:: ABNORMAL

## 2025-05-06 PROCEDURE — 6370000000 HC RX 637 (ALT 250 FOR IP): Performed by: STUDENT IN AN ORGANIZED HEALTH CARE EDUCATION/TRAINING PROGRAM

## 2025-05-06 PROCEDURE — 99283 EMERGENCY DEPT VISIT LOW MDM: CPT

## 2025-05-06 PROCEDURE — 82962 GLUCOSE BLOOD TEST: CPT

## 2025-05-06 RX ORDER — METHOCARBAMOL 500 MG/1
750 TABLET, FILM COATED ORAL ONCE
Status: COMPLETED | OUTPATIENT
Start: 2025-05-06 | End: 2025-05-06

## 2025-05-06 RX ORDER — LIDOCAINE 4 G/G
1 PATCH TOPICAL
Status: DISCONTINUED | OUTPATIENT
Start: 2025-05-06 | End: 2025-05-06 | Stop reason: HOSPADM

## 2025-05-06 RX ORDER — METHOCARBAMOL 750 MG/1
750 TABLET, FILM COATED ORAL 4 TIMES DAILY
Qty: 40 TABLET | Refills: 0 | Status: SHIPPED | OUTPATIENT
Start: 2025-05-06 | End: 2025-05-16

## 2025-05-06 RX ORDER — LIDOCAINE 4 G/G
1 PATCH TOPICAL DAILY
Qty: 30 PATCH | Refills: 0 | Status: SHIPPED | OUTPATIENT
Start: 2025-05-06 | End: 2025-06-05

## 2025-05-06 RX ADMIN — METHOCARBAMOL 750 MG: 500 TABLET ORAL at 19:34

## 2025-05-06 ASSESSMENT — PAIN DESCRIPTION - LOCATION: LOCATION: NECK

## 2025-05-06 ASSESSMENT — PAIN - FUNCTIONAL ASSESSMENT: PAIN_FUNCTIONAL_ASSESSMENT: 0-10

## 2025-05-06 ASSESSMENT — PAIN DESCRIPTION - DESCRIPTORS: DESCRIPTORS: ACHING

## 2025-05-06 ASSESSMENT — PAIN SCALES - GENERAL: PAINLEVEL_OUTOF10: 8

## 2025-05-06 ASSESSMENT — PAIN DESCRIPTION - ORIENTATION: ORIENTATION: POSTERIOR

## 2025-05-06 NOTE — ED TRIAGE NOTES
X 3 days  Posterior neck pain constant sharp pain that moves to front when getting up  Hurts to move neck back and forth  Tylenol 1 hr ago, not helping.  No trauma or heavy lifting.

## 2025-05-06 NOTE — DISCHARGE INSTRUCTIONS
Thank you for choosing our Emergency Department for your care.  It is our privilege to care for you in your time of need.  In the next several days, you may receive a survey via email or mailed to your home about your experience with our team.  We would greatly appreciate you taking a few minutes to complete the survey, as we use this information to learn what we have done well and what we could be doing better. Thank you for trusting us with your care!    Below you will find a list of your tests from today's visit.   Labs and Radiology Studies  Recent Results (from the past 12 hours)   POCT Glucose    Collection Time: 05/06/25  7:41 PM   Result Value Ref Range    POC Glucose 332 (H) 65 - 100 mg/dL    Performed by: Claudia Barr      No results found.  ------------------------------------------------------------------------------------------------------------  The evaluation and treatment you received in the Emergency Department were for an urgent problem. It is important that you follow-up with a doctor, nurse practitioner, or physician assistant to:  (1) confirm your diagnosis,  (2) re-evaluation of changes in your illness and treatment, and (3) for ongoing care. Please take your discharge instructions with you when you go to your follow-up appointment.     If you have any problem arranging a follow-up appointment, contact us!  If your symptoms become worse or you do not improve as expected, please return to us. We are available 24 hours a day.     If a prescription has been provided, please fill it as soon as possible to prevent a delay in treatment. If you have any questions or reservations about taking the medication due to side effects or interactions with other medications, please call your primary care provider or contact us directly.  Again, THANK YOU for choosing us to care for YOU!

## 2025-05-06 NOTE — ED PROVIDER NOTES
[facility] EMERGENCY DEPT  EMERGENCY DEPARTMENT HISTORY AND PHYSICAL EXAM      Date of evaluation: 5/6/2025  Patient Name: Jameel Rueda  Birthdate 1970  MRN: [id number]  ED Provider: Hyacinth Maguire MD   Note Started: 7:52 PM EDT 5/6/25    HISTORY OF PRESENT ILLNESS     Chief Complaint   Patient presents with    Neck Pain       History Provided By: Patient, only     HPI: Jameel Rueda is a 54 y.o. male with past medical surgery below presents with 3-day history of pain reports taking Tylenol prior to arrival without improvement.  Denies any heavy lifting, trauma or injury.  Patient does report that he uses about 3 pillows to sleep at night after eating a snack in the middle the night and usually falls asleep slightly sitting up.    PAST MEDICAL HISTORY   Past Medical History:  Past Medical History:   Diagnosis Date    At risk for bleeding associated with anticoagulants     Bilateral carotid artery stenosis     CAD (coronary artery disease)     Cerebral artery occlusion with cerebral infarction (HCC)     blurry vision in right eye still    Diabetic peripheral neuropathy associated with type 2 diabetes mellitus (HCC)     pt denies    Foot fracture     H/O aortic dissection     History of blood transfusion     Hollenhorst plaque, right eye     Hypertension     Jaw fracture (HCC)     Ruptured ear drum     Subjective visual disturbance, right eye     Thumb fracture        Past Surgical History:  Past Surgical History:   Procedure Laterality Date    APPENDECTOMY      ARTERIAL BYPASS SURGRY  02/15/2019    CORONARY ANGIOPLASTY WITH STENT PLACEMENT  02/15/2019    8 stents    CORONARY ARTERY BYPASS GRAFT  02/15/2019    double    HAND TENDON SURGERY Left 4/8/2024    LEFT RING FINGER RADIAL COLLATERAL LIGAMENT REPAIR (ANES REGIONAL BLOCK WITH MAC) performed by Mehdi Gimenez MD at [facility] AMBULATORY OR    ORTHOPEDIC SURGERY      jaw fracture surgery       Family History:  Family History   Problem Relation Age of Onset

## 2025-05-11 ENCOUNTER — APPOINTMENT (OUTPATIENT)
Facility: HOSPITAL | Age: 55
End: 2025-05-11
Payer: MEDICARE

## 2025-05-11 ENCOUNTER — HOSPITAL ENCOUNTER (EMERGENCY)
Facility: HOSPITAL | Age: 55
Discharge: HOME | End: 2025-05-11
Payer: MEDICARE

## 2025-05-11 VITALS
RESPIRATION RATE: 18 BRPM | DIASTOLIC BLOOD PRESSURE: 82 MMHG | TEMPERATURE: 98.1 F | HEART RATE: 80 BPM | OXYGEN SATURATION: 99 % | SYSTOLIC BLOOD PRESSURE: 140 MMHG

## 2025-05-11 DIAGNOSIS — I10 ESSENTIAL HYPERTENSION: ICD-10-CM

## 2025-05-11 DIAGNOSIS — M54.2 ACUTE NECK PAIN: Primary | ICD-10-CM

## 2025-05-11 DIAGNOSIS — M48.02 NEUROFORAMINAL STENOSIS OF CERVICAL SPINE: ICD-10-CM

## 2025-05-11 PROCEDURE — 99284 EMERGENCY DEPT VISIT MOD MDM: CPT

## 2025-05-11 PROCEDURE — 72125 CT NECK SPINE W/O DYE: CPT

## 2025-05-11 PROCEDURE — 6370000000 HC RX 637 (ALT 250 FOR IP): Performed by: PHYSICIAN ASSISTANT

## 2025-05-11 RX ORDER — HYDROCODONE BITARTRATE AND ACETAMINOPHEN 5; 325 MG/1; MG/1
1 TABLET ORAL
Refills: 0 | Status: COMPLETED | OUTPATIENT
Start: 2025-05-11 | End: 2025-05-11

## 2025-05-11 RX ORDER — ONDANSETRON 4 MG/1
4 TABLET, ORALLY DISINTEGRATING ORAL ONCE
Status: COMPLETED | OUTPATIENT
Start: 2025-05-11 | End: 2025-05-11

## 2025-05-11 RX ORDER — DIPHENHYDRAMINE HCL 25 MG
25 CAPSULE ORAL
Status: COMPLETED | OUTPATIENT
Start: 2025-05-11 | End: 2025-05-11

## 2025-05-11 RX ORDER — HYDROCODONE BITARTRATE AND ACETAMINOPHEN 5; 325 MG/1; MG/1
1 TABLET ORAL EVERY 8 HOURS PRN
Qty: 12 TABLET | Refills: 0 | Status: SHIPPED | OUTPATIENT
Start: 2025-05-11 | End: 2025-05-15

## 2025-05-11 RX ORDER — CYCLOBENZAPRINE HCL 10 MG
10 TABLET ORAL 3 TIMES DAILY PRN
Qty: 21 TABLET | Refills: 0 | Status: SHIPPED | OUTPATIENT
Start: 2025-05-11 | End: 2025-05-21

## 2025-05-11 RX ORDER — LIDOCAINE 50 MG/G
1 PATCH TOPICAL DAILY
Qty: 10 PATCH | Refills: 0 | Status: SHIPPED | OUTPATIENT
Start: 2025-05-11 | End: 2025-05-21

## 2025-05-11 RX ADMIN — DIPHENHYDRAMINE HYDROCHLORIDE 25 MG: 25 CAPSULE ORAL at 16:12

## 2025-05-11 RX ADMIN — HYDROCODONE BITARTRATE AND ACETAMINOPHEN 1 TABLET: 5; 325 TABLET ORAL at 16:12

## 2025-05-11 RX ADMIN — ONDANSETRON 4 MG: 4 TABLET, ORALLY DISINTEGRATING ORAL at 16:12

## 2025-05-11 ASSESSMENT — PAIN SCALES - GENERAL: PAINLEVEL_OUTOF10: 10

## 2025-05-11 NOTE — ED PROVIDER NOTES
Disp-270 tablet, R-3Normal      carvedilol (COREG) 12.5 MG tablet TAKE 1 TABLET BY MOUTH TWICE DAILY WITH MEALS, Disp-180 tablet, R-3Normal      Semaglutide (RYBELSUS) 14 MG TABS TAKE 1 TABLET BY MOUTH IN THE MORNING BEFORE BREAKFAST, Disp-30 tablet, R-11Normal      levocetirizine (XYZAL) 5 MG tablet Take 1 tablet by mouth once daily, Disp-30 tablet, R-5Normal      !! FARXIGA 10 MG tablet Take 1 tablet by mouth every morning, Disp-90 tablet, R-3, DAWNormal      losartan (COZAAR) 25 MG tablet Take 1 tablet by mouth once daily, Disp-90 tablet, R-3Normal      !! dapagliflozin (FARXIGA) 10 MG tablet Take 1 tablet by mouth daily, Disp-90 tablet, R-3Normal      famotidine (PEPCID) 40 MG tablet Take 1 tablet by mouth once daily, Disp-90 tablet, R-3Normal      oxyCODONE 5 MG capsule Take 1 capsule by mouth every 4 hours as needed for Pain.Historical Med      ondansetron (ZOFRAN-ODT) 4 MG disintegrating tablet Take 1 tablet by mouth 3 times daily as needed for Nausea or Vomiting, Disp-21 tablet, R-0Normal      docusate sodium (COLACE) 100 MG capsule Take 1 capsule by mouth 2 times dailyHistorical Med      Azelastine HCl 137 MCG/SPRAY SOLN USE 1 SPRAY(S) IN EACH NOSTRIL TWICE DAILY AS DIRECTED, Disp-30 mL, R-3Normal      acetaminophen (TYLENOL) 325 MG tablet Take 2 tablets by mouth every 4 hours as neededHistorical Med      cetirizine (ZYRTEC) 10 MG tablet Take 1 tablet by mouth daily as neededHistorical Med      diclofenac sodium (VOLTAREN) 1 % GEL Apply 4 g topically 4 times daily, Topical, 4 TIMES DAILY Starting Thu 8/20/2020, Historical Med      nitroGLYCERIN (NITROSTAT) 0.4 MG SL tablet Place 1 tablet under the tongue as needed for Chest painHistorical Med      olopatadine (PATADAY) 0.1 % ophthalmic solution ceived the following from [facility]: Outside name: Pataday Twice Daily Relief 0.1 % ophthalmic solutionHistorical Med       !! - Potential duplicate medications found. Please discuss with provider.

## 2025-05-11 NOTE — DISCHARGE INSTRUCTIONS
Thank You!    It was a pleasure taking care of you in our Emergency Department today. We know that when you come to our Emergency Department, you are entrusting us with your health, comfort, and safety. Our physicians and nurses honor that trust, and truly appreciate the opportunity to care for you and your loved ones.      We also value your feedback. If you receive a survey about your Emergency Department experience today, please fill it out.  We care about our patients' feedback, and we listen to what you have to say.  Thank you.    Venkat Rueda PA-C      ________________________________________________________________________  I have included a copy of your lab results and/or radiologic studies from today's visit so you can have them easily available at your follow-up visit. We hope you feel better and please do not hesitate to contact the ED if you have any questions at all!    No results found for this or any previous visit (from the past 12 hours).    CT CERVICAL SPINE WO CONTRAST   Final Result   No acute fracture or subluxation. CT myelography or MR can be   performed for further evaluation, as indicated.         Electronically signed by Raf Maddox MD        [unfilled]  The exam and treatment you received in the Emergency Department were for an urgent problem and are not intended as complete care. It is important that you follow up with a doctor, nurse practitioner, or physician assistant for ongoing care. If your symptoms become worse or you do not improve as expected and you are unable to reach your usual health care provider, you should return to the Emergency Department. We are available 24 hours a day.    Please take your discharge instructions with you when you go to your follow-up appointment.     If a prescription has been provided, please have it filled as soon as possible to prevent a delay in treatment. Read the entire medication instruction sheet provided to you by the pharmacy. If you have any

## 2025-05-11 NOTE — ED NOTES
DC papers reviewed and in hand, pt verbalized understanding. Patient ambulatory out of ED with steady gait, wife to provide ride home, no acute distress noted.

## 2025-06-05 ENCOUNTER — OFFICE VISIT (OUTPATIENT)
Age: 55
End: 2025-06-05
Payer: MEDICARE

## 2025-06-05 ENCOUNTER — TELEPHONE (OUTPATIENT)
Age: 55
End: 2025-06-05

## 2025-06-05 VITALS
DIASTOLIC BLOOD PRESSURE: 79 MMHG | SYSTOLIC BLOOD PRESSURE: 126 MMHG | RESPIRATION RATE: 14 BRPM | HEART RATE: 89 BPM | OXYGEN SATURATION: 96 % | HEIGHT: 77 IN | WEIGHT: 232.2 LBS | BODY MASS INDEX: 27.42 KG/M2 | TEMPERATURE: 97.8 F

## 2025-06-05 DIAGNOSIS — N18.31 CHRONIC KIDNEY DISEASE, STAGE 3A (HCC): ICD-10-CM

## 2025-06-05 DIAGNOSIS — E11.42 TYPE 2 DIABETES MELLITUS WITH DIABETIC POLYNEUROPATHY, WITHOUT LONG-TERM CURRENT USE OF INSULIN (HCC): ICD-10-CM

## 2025-06-05 DIAGNOSIS — Z12.5 PROSTATE CANCER SCREENING: ICD-10-CM

## 2025-06-05 DIAGNOSIS — Z86.73 HISTORY OF STROKE: ICD-10-CM

## 2025-06-05 DIAGNOSIS — I25.10 CORONARY ARTERY DISEASE DUE TO LIPID RICH PLAQUE: ICD-10-CM

## 2025-06-05 DIAGNOSIS — E78.5 HYPERLIPIDEMIA ASSOCIATED WITH TYPE 2 DIABETES MELLITUS (HCC): ICD-10-CM

## 2025-06-05 DIAGNOSIS — I10 ESSENTIAL HYPERTENSION: ICD-10-CM

## 2025-06-05 DIAGNOSIS — I25.83 CORONARY ARTERY DISEASE DUE TO LIPID RICH PLAQUE: ICD-10-CM

## 2025-06-05 DIAGNOSIS — D17.0 LIPOMA OF NECK: ICD-10-CM

## 2025-06-05 DIAGNOSIS — E11.69 HYPERLIPIDEMIA ASSOCIATED WITH TYPE 2 DIABETES MELLITUS (HCC): ICD-10-CM

## 2025-06-05 DIAGNOSIS — Z00.00 MEDICARE ANNUAL WELLNESS VISIT, SUBSEQUENT: Primary | ICD-10-CM

## 2025-06-05 PROCEDURE — 3074F SYST BP LT 130 MM HG: CPT | Performed by: INTERNAL MEDICINE

## 2025-06-05 PROCEDURE — 1036F TOBACCO NON-USER: CPT | Performed by: INTERNAL MEDICINE

## 2025-06-05 PROCEDURE — 3078F DIAST BP <80 MM HG: CPT | Performed by: INTERNAL MEDICINE

## 2025-06-05 PROCEDURE — G8419 CALC BMI OUT NRM PARAM NOF/U: HCPCS | Performed by: INTERNAL MEDICINE

## 2025-06-05 PROCEDURE — G8427 DOCREV CUR MEDS BY ELIG CLIN: HCPCS | Performed by: INTERNAL MEDICINE

## 2025-06-05 PROCEDURE — 3046F HEMOGLOBIN A1C LEVEL >9.0%: CPT | Performed by: INTERNAL MEDICINE

## 2025-06-05 PROCEDURE — 99213 OFFICE O/P EST LOW 20 MIN: CPT | Performed by: INTERNAL MEDICINE

## 2025-06-05 PROCEDURE — 2022F DILAT RTA XM EVC RTNOPTHY: CPT | Performed by: INTERNAL MEDICINE

## 2025-06-05 PROCEDURE — G0439 PPPS, SUBSEQ VISIT: HCPCS | Performed by: INTERNAL MEDICINE

## 2025-06-05 PROCEDURE — 3017F COLORECTAL CA SCREEN DOC REV: CPT | Performed by: INTERNAL MEDICINE

## 2025-06-05 RX ORDER — HYDROCODONE BITARTRATE AND ACETAMINOPHEN 5; 325 MG/1; MG/1
1 TABLET ORAL EVERY 6 HOURS PRN
COMMUNITY

## 2025-06-05 SDOH — ECONOMIC STABILITY: FOOD INSECURITY: WITHIN THE PAST 12 MONTHS, YOU WORRIED THAT YOUR FOOD WOULD RUN OUT BEFORE YOU GOT MONEY TO BUY MORE.: NEVER TRUE

## 2025-06-05 SDOH — ECONOMIC STABILITY: FOOD INSECURITY: WITHIN THE PAST 12 MONTHS, THE FOOD YOU BOUGHT JUST DIDN'T LAST AND YOU DIDN'T HAVE MONEY TO GET MORE.: NEVER TRUE

## 2025-06-05 ASSESSMENT — PATIENT HEALTH QUESTIONNAIRE - PHQ9
SUM OF ALL RESPONSES TO PHQ QUESTIONS 1-9: 0
2. FEELING DOWN, DEPRESSED OR HOPELESS: NOT AT ALL
SUM OF ALL RESPONSES TO PHQ QUESTIONS 1-9: 0
SUM OF ALL RESPONSES TO PHQ QUESTIONS 1-9: 0
1. LITTLE INTEREST OR PLEASURE IN DOING THINGS: NOT AT ALL
SUM OF ALL RESPONSES TO PHQ QUESTIONS 1-9: 0

## 2025-06-05 ASSESSMENT — LIFESTYLE VARIABLES
HOW MANY STANDARD DRINKS CONTAINING ALCOHOL DO YOU HAVE ON A TYPICAL DAY: PATIENT DOES NOT DRINK
HOW OFTEN DO YOU HAVE A DRINK CONTAINING ALCOHOL: NEVER

## 2025-06-05 NOTE — PROGRESS NOTES
Have you been to the ER, urgent care clinic since your last visit?  Hospitalized since your last visit?   YES - When: approximately 5/14/25 ago.  Where and Why: [facility] ED-Neck Pain .    Have you seen or consulted any other health care providers outside our system since your last visit?   NO

## 2025-06-05 NOTE — PROGRESS NOTES
Medicare Annual Wellness Visit    Jameel Rueda is here for Medicare AWV    Assessment & Plan   Medicare annual wellness visit, subsequent     No follow-ups on file.     Subjective       Patient's complete Health Risk Assessment and screening values have been reviewed and are found in Flowsheets. The following problems were reviewed today and where indicated follow up appointments were made and/or referrals ordered.    Positive Risk Factor Screenings with Interventions:          Controlled Medication Review:    Today's Pain Level: No data recorded   Opioid Risk: (Low risk score <55) Opioid risk score: 38    Patient is low risk for opioid use disorder or overdose.    Last PDMP Ajay as Reviewed:  Review User Review Instant Review Result   AUREA RUEDA 5/11/2025  3:50 PM     Reviewed PDMP [1]          Inactivity:  On average, how many days per week do you engage in moderate to strenuous exercise (like a brisk walk)?: 0 days (!) Abnormal  On average, how many minutes do you engage in exercise at this level?: 0 min  Interventions:  Patient declined any further interventions or treatment      Dentist Screen:  Have you seen the dentist within the past year?: (!) No    Intervention:  Advised to schedule with their dentist     Vision Screen:  Do you have difficulty driving, watching TV, or doing any of your daily activities because of your eyesight?: No  Have you had an eye exam within the past year?: (!) No  Interventions:   Patient declines any further evaluation or treatment    Safety:  Do you have any tripping hazards - loose or unsecured carpets or rugs?: (!) Yes  Do you have non-slip mats or non-slip surfaces or shower bars or grab bars in your shower or bathtub?: (!) No  Interventions:  Patient declined any further interventions or treatment     Advanced Directives:  Do you have a Living Will?: (!) No    Intervention:  has NO advanced directive - information provided                     Objective   Vitals:

## 2025-06-06 ENCOUNTER — RESULTS FOLLOW-UP (OUTPATIENT)
Age: 55
End: 2025-06-06

## 2025-06-06 LAB
ALBUMIN SERPL-MCNC: 3.9 G/DL (ref 3.5–5)
ALBUMIN/GLOB SERPL: 1.2 (ref 1.1–2.2)
ALP SERPL-CCNC: 90 U/L (ref 45–117)
ALT SERPL-CCNC: 23 U/L (ref 12–78)
ANION GAP SERPL CALC-SCNC: 6 MMOL/L (ref 2–12)
AST SERPL-CCNC: 14 U/L (ref 15–37)
BASOPHILS # BLD: 0.02 K/UL (ref 0–0.1)
BASOPHILS NFR BLD: 0.4 % (ref 0–1)
BILIRUB SERPL-MCNC: 0.9 MG/DL (ref 0.2–1)
BUN SERPL-MCNC: 15 MG/DL (ref 6–20)
BUN/CREAT SERPL: 10 (ref 12–20)
CALCIUM SERPL-MCNC: 9.3 MG/DL (ref 8.5–10.1)
CHLORIDE SERPL-SCNC: 105 MMOL/L (ref 97–108)
CHOLEST SERPL-MCNC: 95 MG/DL
CO2 SERPL-SCNC: 27 MMOL/L (ref 21–32)
CREAT SERPL-MCNC: 1.53 MG/DL (ref 0.7–1.3)
CREAT UR-MCNC: 96.5 MG/DL
DIFFERENTIAL METHOD BLD: ABNORMAL
EOSINOPHIL # BLD: 0.11 K/UL (ref 0–0.4)
EOSINOPHIL NFR BLD: 2.4 % (ref 0–7)
ERYTHROCYTE [DISTWIDTH] IN BLOOD BY AUTOMATED COUNT: 14.6 % (ref 11.5–14.5)
EST. AVERAGE GLUCOSE BLD GHB EST-MCNC: 252 MG/DL
GLOBULIN SER CALC-MCNC: 3.3 G/DL (ref 2–4)
GLUCOSE SERPL-MCNC: 288 MG/DL (ref 65–100)
HBA1C MFR BLD: 10.4 % (ref 4–5.6)
HCT VFR BLD AUTO: 40.3 % (ref 36.6–50.3)
HDLC SERPL-MCNC: 33 MG/DL
HDLC SERPL: 2.9 (ref 0–5)
HGB BLD-MCNC: 13.1 G/DL (ref 12.1–17)
IMM GRANULOCYTES # BLD AUTO: 0.01 K/UL (ref 0–0.04)
IMM GRANULOCYTES NFR BLD AUTO: 0.2 % (ref 0–0.5)
LDLC SERPL CALC-MCNC: 32 MG/DL (ref 0–100)
LYMPHOCYTES # BLD: 1.58 K/UL (ref 0.8–3.5)
LYMPHOCYTES NFR BLD: 34.4 % (ref 12–49)
MCH RBC QN AUTO: 26.5 PG (ref 26–34)
MCHC RBC AUTO-ENTMCNC: 32.5 G/DL (ref 30–36.5)
MCV RBC AUTO: 81.6 FL (ref 80–99)
MICROALBUMIN UR-MCNC: 1.39 MG/DL
MICROALBUMIN/CREAT UR-RTO: 14 MG/G (ref 0–30)
MONOCYTES # BLD: 0.37 K/UL (ref 0–1)
MONOCYTES NFR BLD: 8.1 % (ref 5–13)
NEUTS SEG # BLD: 2.5 K/UL (ref 1.8–8)
NEUTS SEG NFR BLD: 54.5 % (ref 32–75)
NRBC # BLD: 0 K/UL (ref 0–0.01)
NRBC BLD-RTO: 0 PER 100 WBC
PLATELET # BLD AUTO: 193 K/UL (ref 150–400)
PMV BLD AUTO: 12.3 FL (ref 8.9–12.9)
POTASSIUM SERPL-SCNC: 3.2 MMOL/L (ref 3.5–5.1)
PROT SERPL-MCNC: 7.2 G/DL (ref 6.4–8.2)
PSA SERPL-MCNC: 1.5 NG/ML (ref 0.01–4)
RBC # BLD AUTO: 4.94 M/UL (ref 4.1–5.7)
SODIUM SERPL-SCNC: 138 MMOL/L (ref 136–145)
SPECIMEN HOLD: NORMAL
TRIGL SERPL-MCNC: 150 MG/DL
TSH SERPL DL<=0.05 MIU/L-ACNC: 0.7 UIU/ML (ref 0.36–3.74)
VLDLC SERPL CALC-MCNC: 30 MG/DL
WBC # BLD AUTO: 4.6 K/UL (ref 4.1–11.1)

## 2025-06-06 RX ORDER — INSULIN DEGLUDEC 200 U/ML
10 INJECTION, SOLUTION SUBCUTANEOUS
Qty: 9 ML | Refills: 3 | Status: SHIPPED | OUTPATIENT
Start: 2025-06-06

## 2025-06-30 ENCOUNTER — PATIENT MESSAGE (OUTPATIENT)
Age: 55
End: 2025-06-30

## 2025-07-01 RX ORDER — LIDOCAINE 50 MG/G
1 PATCH TOPICAL DAILY
COMMUNITY

## 2025-07-22 ENCOUNTER — OFFICE VISIT (OUTPATIENT)
Age: 55
End: 2025-07-22
Payer: MEDICARE

## 2025-07-22 VITALS
WEIGHT: 231 LBS | BODY MASS INDEX: 27.28 KG/M2 | HEART RATE: 71 BPM | TEMPERATURE: 97.5 F | OXYGEN SATURATION: 97 % | DIASTOLIC BLOOD PRESSURE: 87 MMHG | HEIGHT: 77 IN | SYSTOLIC BLOOD PRESSURE: 134 MMHG

## 2025-07-22 DIAGNOSIS — M79.89 SOFT TISSUE MASS: Primary | ICD-10-CM

## 2025-07-22 PROBLEM — R22.1 NECK MASS: Status: ACTIVE | Noted: 2025-07-22

## 2025-07-22 PROCEDURE — 99203 OFFICE O/P NEW LOW 30 MIN: CPT | Performed by: SURGERY

## 2025-07-22 PROCEDURE — 1036F TOBACCO NON-USER: CPT | Performed by: SURGERY

## 2025-07-22 PROCEDURE — G8419 CALC BMI OUT NRM PARAM NOF/U: HCPCS | Performed by: SURGERY

## 2025-07-22 PROCEDURE — 3079F DIAST BP 80-89 MM HG: CPT | Performed by: SURGERY

## 2025-07-22 PROCEDURE — G8427 DOCREV CUR MEDS BY ELIG CLIN: HCPCS | Performed by: SURGERY

## 2025-07-22 PROCEDURE — 3017F COLORECTAL CA SCREEN DOC REV: CPT | Performed by: SURGERY

## 2025-07-22 PROCEDURE — 3075F SYST BP GE 130 - 139MM HG: CPT | Performed by: SURGERY

## 2025-07-22 ASSESSMENT — PATIENT HEALTH QUESTIONNAIRE - PHQ9
SUM OF ALL RESPONSES TO PHQ QUESTIONS 1-9: 0
1. LITTLE INTEREST OR PLEASURE IN DOING THINGS: NOT AT ALL
2. FEELING DOWN, DEPRESSED OR HOPELESS: NOT AT ALL
SUM OF ALL RESPONSES TO PHQ QUESTIONS 1-9: 0

## 2025-08-13 ENCOUNTER — ANESTHESIA (OUTPATIENT)
Facility: HOSPITAL | Age: 55
End: 2025-08-13
Payer: MEDICARE

## 2025-08-13 ENCOUNTER — HOSPITAL ENCOUNTER (OUTPATIENT)
Facility: HOSPITAL | Age: 55
Setting detail: OUTPATIENT SURGERY
Discharge: HOME | End: 2025-08-13
Attending: SURGERY | Admitting: SURGERY
Payer: MEDICARE

## 2025-08-13 ENCOUNTER — ANESTHESIA EVENT (OUTPATIENT)
Facility: HOSPITAL | Age: 55
End: 2025-08-13
Payer: MEDICARE

## 2025-08-13 VITALS
DIASTOLIC BLOOD PRESSURE: 88 MMHG | SYSTOLIC BLOOD PRESSURE: 133 MMHG | BODY MASS INDEX: 27.18 KG/M2 | WEIGHT: 230.16 LBS | TEMPERATURE: 98.3 F | RESPIRATION RATE: 16 BRPM | HEART RATE: 74 BPM | HEIGHT: 77 IN | OXYGEN SATURATION: 96 %

## 2025-08-13 DIAGNOSIS — R22.1 NECK MASS: Primary | ICD-10-CM

## 2025-08-13 LAB
GLUCOSE BLD STRIP.AUTO-MCNC: 225 MG/DL (ref 65–117)
GLUCOSE BLD STRIP.AUTO-MCNC: 253 MG/DL (ref 65–117)
SERVICE CMNT-IMP: ABNORMAL
SERVICE CMNT-IMP: ABNORMAL

## 2025-08-13 PROCEDURE — 6360000002 HC RX W HCPCS: Performed by: SURGERY

## 2025-08-13 PROCEDURE — 2709999900 HC NON-CHARGEABLE SUPPLY: Performed by: SURGERY

## 2025-08-13 PROCEDURE — 2500000003 HC RX 250 WO HCPCS: Performed by: SURGERY

## 2025-08-13 PROCEDURE — 7100000000 HC PACU RECOVERY - FIRST 15 MIN: Performed by: SURGERY

## 2025-08-13 PROCEDURE — 3700000001 HC ADD 15 MINUTES (ANESTHESIA): Performed by: SURGERY

## 2025-08-13 PROCEDURE — 7100000011 HC PHASE II RECOVERY - ADDTL 15 MIN: Performed by: SURGERY

## 2025-08-13 PROCEDURE — 21556 EXC NECK TUM DEEP < 5 CM: CPT | Performed by: SURGERY

## 2025-08-13 PROCEDURE — 82962 GLUCOSE BLOOD TEST: CPT

## 2025-08-13 PROCEDURE — 6360000002 HC RX W HCPCS

## 2025-08-13 PROCEDURE — 2580000003 HC RX 258

## 2025-08-13 PROCEDURE — 88305 TISSUE EXAM BY PATHOLOGIST: CPT

## 2025-08-13 PROCEDURE — 7100000001 HC PACU RECOVERY - ADDTL 15 MIN: Performed by: SURGERY

## 2025-08-13 PROCEDURE — 3700000000 HC ANESTHESIA ATTENDED CARE: Performed by: SURGERY

## 2025-08-13 PROCEDURE — 7100000010 HC PHASE II RECOVERY - FIRST 15 MIN: Performed by: SURGERY

## 2025-08-13 PROCEDURE — 3600000002 HC SURGERY LEVEL 2 BASE: Performed by: SURGERY

## 2025-08-13 PROCEDURE — 3600000012 HC SURGERY LEVEL 2 ADDTL 15MIN: Performed by: SURGERY

## 2025-08-13 PROCEDURE — 2580000003 HC RX 258: Performed by: STUDENT IN AN ORGANIZED HEALTH CARE EDUCATION/TRAINING PROGRAM

## 2025-08-13 PROCEDURE — 2500000003 HC RX 250 WO HCPCS

## 2025-08-13 RX ORDER — HYDROMORPHONE HYDROCHLORIDE 1 MG/ML
0.5 INJECTION, SOLUTION INTRAMUSCULAR; INTRAVENOUS; SUBCUTANEOUS EVERY 5 MIN PRN
Status: DISCONTINUED | OUTPATIENT
Start: 2025-08-13 | End: 2025-08-13 | Stop reason: HOSPADM

## 2025-08-13 RX ORDER — PROPOFOL 10 MG/ML
INJECTION, EMULSION INTRAVENOUS
Status: DISCONTINUED | OUTPATIENT
Start: 2025-08-13 | End: 2025-08-13 | Stop reason: SDUPTHER

## 2025-08-13 RX ORDER — SUCCINYLCHOLINE/SOD CL,ISO/PF 200MG/10ML
SYRINGE (ML) INTRAVENOUS
Status: DISCONTINUED | OUTPATIENT
Start: 2025-08-13 | End: 2025-08-13 | Stop reason: SDUPTHER

## 2025-08-13 RX ORDER — EPHEDRINE SULFATE/0.9% NACL/PF 25 MG/5 ML
SYRINGE (ML) INTRAVENOUS
Status: DISCONTINUED | OUTPATIENT
Start: 2025-08-13 | End: 2025-08-13 | Stop reason: SDUPTHER

## 2025-08-13 RX ORDER — ONDANSETRON 2 MG/ML
INJECTION INTRAMUSCULAR; INTRAVENOUS
Status: DISCONTINUED | OUTPATIENT
Start: 2025-08-13 | End: 2025-08-13 | Stop reason: SDUPTHER

## 2025-08-13 RX ORDER — IPRATROPIUM BROMIDE AND ALBUTEROL SULFATE 2.5; .5 MG/3ML; MG/3ML
1 SOLUTION RESPIRATORY (INHALATION)
Status: DISCONTINUED | OUTPATIENT
Start: 2025-08-13 | End: 2025-08-13 | Stop reason: HOSPADM

## 2025-08-13 RX ORDER — FENTANYL CITRATE 50 UG/ML
25 INJECTION, SOLUTION INTRAMUSCULAR; INTRAVENOUS EVERY 5 MIN PRN
Status: DISCONTINUED | OUTPATIENT
Start: 2025-08-13 | End: 2025-08-13 | Stop reason: HOSPADM

## 2025-08-13 RX ORDER — FENTANYL CITRATE 50 UG/ML
INJECTION, SOLUTION INTRAMUSCULAR; INTRAVENOUS
Status: DISCONTINUED | OUTPATIENT
Start: 2025-08-13 | End: 2025-08-13 | Stop reason: SDUPTHER

## 2025-08-13 RX ORDER — BUPIVACAINE HYDROCHLORIDE 5 MG/ML
INJECTION, SOLUTION PERINEURAL PRN
Status: DISCONTINUED | OUTPATIENT
Start: 2025-08-13 | End: 2025-08-13 | Stop reason: ALTCHOICE

## 2025-08-13 RX ORDER — MIDAZOLAM HYDROCHLORIDE 1 MG/ML
INJECTION, SOLUTION INTRAMUSCULAR; INTRAVENOUS
Status: DISCONTINUED | OUTPATIENT
Start: 2025-08-13 | End: 2025-08-13 | Stop reason: SDUPTHER

## 2025-08-13 RX ORDER — ONDANSETRON 2 MG/ML
4 INJECTION INTRAMUSCULAR; INTRAVENOUS
Status: DISCONTINUED | OUTPATIENT
Start: 2025-08-13 | End: 2025-08-13 | Stop reason: HOSPADM

## 2025-08-13 RX ORDER — SODIUM CHLORIDE 0.9 % (FLUSH) 0.9 %
5-40 SYRINGE (ML) INJECTION PRN
Status: DISCONTINUED | OUTPATIENT
Start: 2025-08-13 | End: 2025-08-13 | Stop reason: HOSPADM

## 2025-08-13 RX ORDER — SODIUM CHLORIDE, SODIUM LACTATE, POTASSIUM CHLORIDE, CALCIUM CHLORIDE 600; 310; 30; 20 MG/100ML; MG/100ML; MG/100ML; MG/100ML
INJECTION, SOLUTION INTRAVENOUS
Status: DISCONTINUED | OUTPATIENT
Start: 2025-08-13 | End: 2025-08-13 | Stop reason: SDUPTHER

## 2025-08-13 RX ORDER — DEXMEDETOMIDINE HYDROCHLORIDE 100 UG/ML
INJECTION, SOLUTION INTRAVENOUS
Status: DISCONTINUED | OUTPATIENT
Start: 2025-08-13 | End: 2025-08-13 | Stop reason: SDUPTHER

## 2025-08-13 RX ORDER — SODIUM CHLORIDE 0.9 % (FLUSH) 0.9 %
5-40 SYRINGE (ML) INJECTION EVERY 12 HOURS SCHEDULED
Status: DISCONTINUED | OUTPATIENT
Start: 2025-08-13 | End: 2025-08-13 | Stop reason: HOSPADM

## 2025-08-13 RX ORDER — LIDOCAINE HYDROCHLORIDE 20 MG/ML
INJECTION, SOLUTION EPIDURAL; INFILTRATION; INTRACAUDAL; PERINEURAL
Status: DISCONTINUED | OUTPATIENT
Start: 2025-08-13 | End: 2025-08-13 | Stop reason: SDUPTHER

## 2025-08-13 RX ORDER — KETOROLAC TROMETHAMINE 30 MG/ML
30 INJECTION, SOLUTION INTRAMUSCULAR; INTRAVENOUS ONCE
Status: COMPLETED | OUTPATIENT
Start: 2025-08-13 | End: 2025-08-13

## 2025-08-13 RX ORDER — SODIUM CHLORIDE 9 MG/ML
INJECTION, SOLUTION INTRAVENOUS PRN
Status: DISCONTINUED | OUTPATIENT
Start: 2025-08-13 | End: 2025-08-13 | Stop reason: HOSPADM

## 2025-08-13 RX ORDER — DEXTROSE MONOHYDRATE 100 MG/ML
INJECTION, SOLUTION INTRAVENOUS CONTINUOUS PRN
Status: DISCONTINUED | OUTPATIENT
Start: 2025-08-13 | End: 2025-08-13 | Stop reason: HOSPADM

## 2025-08-13 RX ORDER — OXYCODONE HYDROCHLORIDE 5 MG/1
5 TABLET ORAL EVERY 6 HOURS PRN
Qty: 20 TABLET | Refills: 0 | Status: SHIPPED | OUTPATIENT
Start: 2025-08-13 | End: 2025-08-18

## 2025-08-13 RX ORDER — PROCHLORPERAZINE EDISYLATE 5 MG/ML
5 INJECTION INTRAMUSCULAR; INTRAVENOUS
Status: DISCONTINUED | OUTPATIENT
Start: 2025-08-13 | End: 2025-08-13 | Stop reason: HOSPADM

## 2025-08-13 RX ORDER — IBUPROFEN 800 MG/1
800 TABLET, FILM COATED ORAL 3 TIMES DAILY PRN
Qty: 20 TABLET | Refills: 0 | Status: SHIPPED | OUTPATIENT
Start: 2025-08-13

## 2025-08-13 RX ORDER — GLUCAGON 1 MG/ML
1 KIT INJECTION PRN
Status: DISCONTINUED | OUTPATIENT
Start: 2025-08-13 | End: 2025-08-13 | Stop reason: HOSPADM

## 2025-08-13 RX ORDER — POLYETHYLENE GLYCOL 3350 17 G/17G
17 POWDER, FOR SOLUTION ORAL DAILY
Qty: 116 G | Refills: 0 | Status: SHIPPED | OUTPATIENT
Start: 2025-08-13 | End: 2025-08-20

## 2025-08-13 RX ORDER — SODIUM CHLORIDE, SODIUM LACTATE, POTASSIUM CHLORIDE, CALCIUM CHLORIDE 600; 310; 30; 20 MG/100ML; MG/100ML; MG/100ML; MG/100ML
INJECTION, SOLUTION INTRAVENOUS ONCE
Status: COMPLETED | OUTPATIENT
Start: 2025-08-13 | End: 2025-08-13

## 2025-08-13 RX ADMIN — DEXMEDETOMIDINE 10 MCG: 100 INJECTION, SOLUTION INTRAVENOUS at 09:38

## 2025-08-13 RX ADMIN — ONDANSETRON 4 MG: 2 INJECTION, SOLUTION INTRAMUSCULAR; INTRAVENOUS at 09:35

## 2025-08-13 RX ADMIN — MIDAZOLAM HYDROCHLORIDE 2 MG: 1 INJECTION, SOLUTION INTRAMUSCULAR; INTRAVENOUS at 09:35

## 2025-08-13 RX ADMIN — EPHEDRINE SULFATE 5 MG: 5 INJECTION INTRAVENOUS at 10:08

## 2025-08-13 RX ADMIN — PROPOFOL 100 MG: 10 INJECTION, EMULSION INTRAVENOUS at 10:41

## 2025-08-13 RX ADMIN — KETOROLAC TROMETHAMINE 30 MG: 30 INJECTION, SOLUTION INTRAMUSCULAR at 12:00

## 2025-08-13 RX ADMIN — SODIUM CHLORIDE, SODIUM LACTATE, POTASSIUM CHLORIDE, AND CALCIUM CHLORIDE: .6; .31; .03; .02 INJECTION, SOLUTION INTRAVENOUS at 06:55

## 2025-08-13 RX ADMIN — PROPOFOL 100 MG: 10 INJECTION, EMULSION INTRAVENOUS at 10:39

## 2025-08-13 RX ADMIN — WATER 2000 MG: 1 INJECTION INTRAMUSCULAR; INTRAVENOUS; SUBCUTANEOUS at 09:45

## 2025-08-13 RX ADMIN — FENTANYL CITRATE 100 MCG: 50 INJECTION, SOLUTION INTRAMUSCULAR; INTRAVENOUS at 09:38

## 2025-08-13 RX ADMIN — LIDOCAINE HYDROCHLORIDE 100 MG: 20 INJECTION, SOLUTION EPIDURAL; INFILTRATION; INTRACAUDAL; PERINEURAL at 09:41

## 2025-08-13 RX ADMIN — PROPOFOL 200 MG: 10 INJECTION, EMULSION INTRAVENOUS at 09:41

## 2025-08-13 RX ADMIN — Medication 200 MG: at 09:41

## 2025-08-13 RX ADMIN — SODIUM CHLORIDE, POTASSIUM CHLORIDE, SODIUM LACTATE AND CALCIUM CHLORIDE: 600; 310; 30; 20 INJECTION, SOLUTION INTRAVENOUS at 09:35

## 2025-08-13 RX ADMIN — DEXMEDETOMIDINE 10 MCG: 100 INJECTION, SOLUTION INTRAVENOUS at 09:40

## 2025-08-13 RX ADMIN — PROPOFOL 200 MG: 10 INJECTION, EMULSION INTRAVENOUS at 10:03

## 2025-08-13 ASSESSMENT — PAIN SCALES - GENERAL
PAINLEVEL_OUTOF10: 0
PAINLEVEL_OUTOF10: 0

## 2025-08-29 RX ORDER — LEVOCETIRIZINE DIHYDROCHLORIDE 5 MG/1
5 TABLET, FILM COATED ORAL DAILY
Qty: 30 TABLET | Refills: 5 | Status: SHIPPED | OUTPATIENT
Start: 2025-08-29

## 2025-09-02 ENCOUNTER — OFFICE VISIT (OUTPATIENT)
Age: 55
End: 2025-09-02

## 2025-09-02 VITALS
TEMPERATURE: 98 F | RESPIRATION RATE: 18 BRPM | HEIGHT: 77 IN | SYSTOLIC BLOOD PRESSURE: 134 MMHG | HEART RATE: 81 BPM | DIASTOLIC BLOOD PRESSURE: 85 MMHG | WEIGHT: 230.1 LBS | OXYGEN SATURATION: 96 % | BODY MASS INDEX: 27.17 KG/M2

## 2025-09-02 DIAGNOSIS — Z48.89 POSTOPERATIVE VISIT: Primary | ICD-10-CM

## 2025-09-02 PROCEDURE — 99024 POSTOP FOLLOW-UP VISIT: CPT | Performed by: SURGERY

## 2025-09-02 ASSESSMENT — PATIENT HEALTH QUESTIONNAIRE - PHQ9
1. LITTLE INTEREST OR PLEASURE IN DOING THINGS: NOT AT ALL
SUM OF ALL RESPONSES TO PHQ QUESTIONS 1-9: 0
SUM OF ALL RESPONSES TO PHQ QUESTIONS 1-9: 0
2. FEELING DOWN, DEPRESSED OR HOPELESS: NOT AT ALL
SUM OF ALL RESPONSES TO PHQ QUESTIONS 1-9: 0
SUM OF ALL RESPONSES TO PHQ QUESTIONS 1-9: 0

## (undated) DEVICE — SPLINT ORTH W4XL15IN PLSTR OF PARIS LO EXOTHERM SMOOTH

## (undated) DEVICE — X-RAY SPONGES,16 PLY: Brand: DERMACEA

## (undated) DEVICE — Device

## (undated) DEVICE — STAPLER SKIN H3.9MM WIRE DIA0.58MM CRWN 6.9MM 35 STPL ROT

## (undated) DEVICE — GUIDEWIRE VASC L260CM DIA0.035IN L1CM TIP PTFE S STL SHT

## (undated) DEVICE — SUTURE VCRL SZ 3-0 L27IN ABSRB UD L26MM SH 1/2 CIR J416H

## (undated) DEVICE — VASCULAR-RICHMOND-LF: Brand: MEDLINE INDUSTRIES, INC.

## (undated) DEVICE — SUTURE PROL SZ 5-0 L24IN NONABSORBABLE BLU RB-2 L13IN 1/2 8554H

## (undated) DEVICE — RING BASIN GUIDEWIRE BOWL: Brand: MEDLINE INDUSTRIES, INC.

## (undated) DEVICE — SYR LR LCK 1ML GRAD NSAF 30ML --

## (undated) DEVICE — HANDLE LT SNAP ON ULT DURABLE LENS FOR TRUMPF ALC DISPOSABLE

## (undated) DEVICE — PERCLOSE PROGLIDE™ SUTURE-MEDIATED CLOSURE SYSTEM: Brand: PERCLOSE PROGLIDE™

## (undated) DEVICE — PADDING CAST 4 INX5 YD STRL

## (undated) DEVICE — GLOVE SURG SZ 65 THK91MIL LTX FREE SYN POLYISOPRENE

## (undated) DEVICE — GOWN,SIRUS,NONRNF,SETINSLV,XL,20/CS: Brand: MEDLINE

## (undated) DEVICE — DRAPE PRB US TRNSDCR 6X96IN --

## (undated) DEVICE — SYRINGE MED 10ML LUERLOCK TIP W/O SFTY DISP

## (undated) DEVICE — SOLUTION IRRIG 1000ML 0.9% SOD CHL USP POUR PLAS BTL

## (undated) DEVICE — ZIMMER® STERILE DISPOSABLE TOURNIQUET CUFF WITH PROTECTIVE SLEEVE AND PLC, DUAL PORT, SINGLE BLADDER, 18 IN. (46 CM)

## (undated) DEVICE — SHEAR RMFG HARMONIC FOCUS 9CM -- OEM ITEM L#322125

## (undated) DEVICE — PACK PROCEDURE SURG HRT CATH

## (undated) DEVICE — TAPE SURG W4INXL10YD SFT CLTH H2O RESIST MEDIPORE H

## (undated) DEVICE — GLOVE SURG SZ 7 L12IN FNGR THK79MIL GRN LTX FREE

## (undated) DEVICE — GLOVE SURG SZ 85 CRM LTX FREE POLYISOPRENE POLYMER BEAD ANTI

## (undated) DEVICE — SYRINGE ANGIO CNTRST DEL 20 CC POLYCARB LIGHT GRN MEDALLION

## (undated) DEVICE — CLIP INT SM WIDE RED TI TRNSVRS GRV CHEVRON SHP W PRECIS

## (undated) DEVICE — 3M™ IOBAN™ 2 ANTIMICROBIAL INCISE DRAPE 6648EZ: Brand: IOBAN™ 2

## (undated) DEVICE — SUT PROL 6-0 24IN BV1 DA BLU --

## (undated) DEVICE — SPONGE GZ W4XL4IN COT RADPQ HIGHLY ABSRB STERILE

## (undated) DEVICE — DRAPE XR C ARM 41X74IN LF --

## (undated) DEVICE — CATH SOFT-VU HEADHUNTER 1 5 -- SOFT-VU

## (undated) DEVICE — GRADUATED BOWL: Brand: DEVON

## (undated) DEVICE — STERILE POLYISOPRENE POWDER-FREE SURGICAL GLOVES: Brand: PROTEXIS

## (undated) DEVICE — PENCIL ES CRD L10FT HND SWCHING ROCK SWCH W/ EDGE COAT BLDE

## (undated) DEVICE — NEEDLE HYPO 22GA L1.5IN ULT SHRP TRIBEVELED INTUITIVE 1 HND

## (undated) DEVICE — SUTURE VICRYL + SZ 4-0 L27IN ABSRB UD PS-2 3/8 CIR REV CUT VCP426H

## (undated) DEVICE — SUTURE MONOCRYL SZ 4-0 L27IN ABSRB UD L19MM PS-2 1/2 CIR PRIM Y426H

## (undated) DEVICE — SUTURE ETHILON SZ 4-0 L18IN NONABSORBABLE BLK L19MM PS-2 3/8 1667H

## (undated) DEVICE — COVER,TABLE,60X90,STERILE: Brand: MEDLINE

## (undated) DEVICE — GLOVE ORANGE PI 7   MSG9070

## (undated) DEVICE — SUTURE MCRYL SZ 4-0 L27IN ABSRB UD L24MM PS-1 3/8 CIR PRIM Y935H

## (undated) DEVICE — BANDAGE COMPR W4INXL5YD WHT BGE POLY COT M E WRP WV HK AND

## (undated) DEVICE — SUTURE VICRYL + SZ 3-0 L27IN ABSRB UD L26MM SH 1/2 CIR VCP416H

## (undated) DEVICE — 1200 GUARD II KIT W/5MM TUBE W/O VAC TUBE: Brand: GUARDIAN

## (undated) DEVICE — SYRINGE MED 20ML STD CLR PLAS LUERLOCK TIP N CTRL DISP

## (undated) DEVICE — Device: Brand: VISIONS PV .035 DIGITAL IVUS CATHETER

## (undated) DEVICE — LABEL MED CARD MRMC STRL

## (undated) DEVICE — GOWN SURG 2XL L49IN BLU NONREINFORCED SET IN SL HK LOOP

## (undated) DEVICE — BANDAGE,GAUZE,CONFORMING,3"X75",STRL,LF: Brand: MEDLINE

## (undated) DEVICE — STRIP SKIN CLSR W0.25XL4IN WHT SPUNBOUND FBR NYL HI ADH

## (undated) DEVICE — INFECTION CONTROL KIT SYS

## (undated) DEVICE — ADHESIVE SKIN CLOSURE TOP 0.8 CC PREM PUR LIQUIBAND RAPID LF

## (undated) DEVICE — Z DISCONTINUED NO SUB IDED SET EXTN W/ 4 W STPCOCK M SPIN LOK 36IN

## (undated) DEVICE — SUTURE ETHIBOND EXCEL SZ 3-0 L36IN NONABSORBABLE GRN BB L17MM X588H

## (undated) DEVICE — SYR ART 700 CLEAR MARK 7 -- ARTERION

## (undated) DEVICE — DRAPE,REIN 53X77,STERILE: Brand: MEDLINE

## (undated) DEVICE — SUTURE ETHILON SZ 3-0 L18IN NONABSORBABLE BLK PS-2 L19MM 3/8 1669H

## (undated) DEVICE — REM POLYHESIVE ADULT PATIENT RETURN ELECTRODE: Brand: VALLEYLAB

## (undated) DEVICE — INFLATION DEVICE: Brand: ENCORE™ 26

## (undated) DEVICE — SOLUTION IRRIG 1000ML 09% SOD CHL USP PIC PLAS CONTAINER

## (undated) DEVICE — DRAPE FLD WRM W44XL66IN C6L FOR INTRATEMP SYS THERMABASIN

## (undated) DEVICE — PART NUMBER 108260, VTI 8 MHZ DISPOSABLE DOPPLER PROBE, STRAIGHT, BOX: Brand: VTI 8 MHZ DISPOSABLE DOPPLER PROBE, STRAIGHT, BOX

## (undated) DEVICE — DESTINATION CAROTID GUIDING SHEATH: Brand: DESTINATION

## (undated) DEVICE — 3M™ IOBAN™ 2 ANTIMICROBIAL INCISE DRAPE 6650EZ: Brand: IOBAN™ 2

## (undated) DEVICE — TOWEL SURG W17XL27IN STD BLU COT NONFENESTRATED PREWASHED

## (undated) DEVICE — HAND-MRMCASU: Brand: MEDLINE INDUSTRIES, INC.

## (undated) DEVICE — SPONGE GZ W4XL4IN COT 12 PLY TYP VII WVN C FLD DSGN STERILE

## (undated) DEVICE — SOLUTION IV 1000ML 0.9% SOD CHL

## (undated) DEVICE — INTRODUCER SHTH 8FR CANN L11CM DIL TIP 35MM BLU TUNGSTEN

## (undated) DEVICE — VESSEL LOOPS,MAXI, BLUE: Brand: DEVON

## (undated) DEVICE — AGENT HEMSTAT W4XL4IN OXIDIZED REGENERATED CELOS ABSRB SFT

## (undated) DEVICE — CATHETER ANGIO 5FR L100CM GWIRE 0.035IN 1CM SPC OMNI FLSH

## (undated) DEVICE — CATHETER ANGIO 5FR L100CM GWIRE 0.038IN BERN NONBRAIDED HI

## (undated) DEVICE — TUBING HI PRSS INJ 48IN 1200PSI FLX BRAID POLYUR CNTRST

## (undated) DEVICE — ELECTRODE PT RET AD L9FT HI MOIST COND ADH HYDRGEL CORDED

## (undated) DEVICE — DESTINATION RENAL GUIDING SHEATH: Brand: DESTINATION

## (undated) DEVICE — MICROPUNCTURE INTRODUCER SET SILHOUETTE TRANSITIONLESS WITH STAINLESS STEEL WIRE GUIDE: Brand: MICROPUNCTURE

## (undated) DEVICE — PACK,UNIVERSAL,NO GOWNS: Brand: MEDLINE

## (undated) DEVICE — GLOVE SURG SZ 8 CRM LTX FREE POLYISOPRENE POLYMER BEAD ANTI

## (undated) DEVICE — DRAPE LAP PED W77XL108IN 6X6IN STD SOFTESSE FEN W/ ADH

## (undated) DEVICE — MEDI-VAC NON-CONDUCTIVE SUCTION TUBING: Brand: CARDINAL HEALTH

## (undated) DEVICE — SUTURE MONOCRYL SZ 3-0 L27IN ABSRB UD L19MM PS-2 3/8 CIR PRIM Y427H

## (undated) DEVICE — ROSEN CURVED WIRE GUIDE: Brand: ROSEN

## (undated) DEVICE — (D)PREP SKN CHLRAPRP APPL 26ML -- CONVERT TO ITEM 371833

## (undated) DEVICE — SUTURE VCRL SZ 2-0 L36IN ABSRB UD L40MM CT 1/2 CIR J957H

## (undated) DEVICE — DEVON™ KNEE AND BODY STRAP 60" X 3" (1.5 M X 7.6 CM): Brand: DEVON

## (undated) DEVICE — CORD ES L12FT BPLR FRCP

## (undated) DEVICE — GOWN,NON-REINFORCED,XXL: Brand: MEDLINE

## (undated) DEVICE — SUTURE GORTX SZ 4-0 L24IN NONABSORBABLE L13MM TTC-13 3/8 CIR 5K02A

## (undated) DEVICE — GUIDEWIRE VASC L260CM DIA0.035IN L7CM DIA3MM J TIP PTFE S